# Patient Record
Sex: FEMALE | Race: WHITE | NOT HISPANIC OR LATINO | URBAN - METROPOLITAN AREA
[De-identification: names, ages, dates, MRNs, and addresses within clinical notes are randomized per-mention and may not be internally consistent; named-entity substitution may affect disease eponyms.]

---

## 2017-06-06 PROBLEM — Z00.00 ENCOUNTER FOR PREVENTIVE HEALTH EXAMINATION: Status: ACTIVE | Noted: 2017-06-06

## 2017-06-14 ENCOUNTER — EMERGENCY (EMERGENCY)
Facility: HOSPITAL | Age: 57
LOS: 0 days | Discharge: HOME | End: 2017-06-14
Admitting: INTERNAL MEDICINE

## 2017-06-14 DIAGNOSIS — F20.9 SCHIZOPHRENIA, UNSPECIFIED: ICD-10-CM

## 2017-06-14 DIAGNOSIS — F31.9 BIPOLAR DISORDER, UNSPECIFIED: ICD-10-CM

## 2017-06-14 DIAGNOSIS — S72.009A FRACTURE OF UNSPECIFIED PART OF NECK OF UNSPECIFIED FEMUR, INITIAL ENCOUNTER FOR CLOSED FRACTURE: ICD-10-CM

## 2017-06-14 DIAGNOSIS — K52.9 NONINFECTIVE GASTROENTERITIS AND COLITIS, UNSPECIFIED: ICD-10-CM

## 2017-06-14 DIAGNOSIS — M19.90 UNSPECIFIED OSTEOARTHRITIS, UNSPECIFIED SITE: ICD-10-CM

## 2017-06-14 DIAGNOSIS — F41.9 ANXIETY DISORDER, UNSPECIFIED: ICD-10-CM

## 2017-06-14 DIAGNOSIS — N10 ACUTE PYELONEPHRITIS: ICD-10-CM

## 2017-06-28 DIAGNOSIS — R19.7 DIARRHEA, UNSPECIFIED: ICD-10-CM

## 2017-06-28 DIAGNOSIS — Z79.899 OTHER LONG TERM (CURRENT) DRUG THERAPY: ICD-10-CM

## 2017-06-28 DIAGNOSIS — N39.0 URINARY TRACT INFECTION, SITE NOT SPECIFIED: ICD-10-CM

## 2017-06-28 DIAGNOSIS — J45.909 UNSPECIFIED ASTHMA, UNCOMPLICATED: ICD-10-CM

## 2017-06-28 DIAGNOSIS — Z96.652 PRESENCE OF LEFT ARTIFICIAL KNEE JOINT: ICD-10-CM

## 2017-06-28 DIAGNOSIS — Z96.642 PRESENCE OF LEFT ARTIFICIAL HIP JOINT: ICD-10-CM

## 2017-06-28 DIAGNOSIS — Z88.0 ALLERGY STATUS TO PENICILLIN: ICD-10-CM

## 2017-06-28 DIAGNOSIS — F11.23 OPIOID DEPENDENCE WITH WITHDRAWAL: ICD-10-CM

## 2017-06-28 DIAGNOSIS — Z88.1 ALLERGY STATUS TO OTHER ANTIBIOTIC AGENTS STATUS: ICD-10-CM

## 2017-06-28 DIAGNOSIS — R35.0 FREQUENCY OF MICTURITION: ICD-10-CM

## 2017-06-28 DIAGNOSIS — Z87.891 PERSONAL HISTORY OF NICOTINE DEPENDENCE: ICD-10-CM

## 2020-05-21 ENCOUNTER — APPOINTMENT (OUTPATIENT)
Dept: NEUROLOGY | Facility: CLINIC | Age: 60
End: 2020-05-21

## 2021-05-11 ENCOUNTER — INPATIENT (INPATIENT)
Facility: HOSPITAL | Age: 61
LOS: 4 days | Discharge: HOME HEALTH | End: 2021-05-16
Attending: INTERNAL MEDICINE | Admitting: INTERNAL MEDICINE
Payer: MEDICARE

## 2021-05-11 VITALS
RESPIRATION RATE: 18 BRPM | WEIGHT: 123.9 LBS | HEART RATE: 98 BPM | TEMPERATURE: 97 F | SYSTOLIC BLOOD PRESSURE: 117 MMHG | DIASTOLIC BLOOD PRESSURE: 71 MMHG | HEIGHT: 65 IN | OXYGEN SATURATION: 97 %

## 2021-05-11 LAB
ALBUMIN SERPL ELPH-MCNC: 4.4 G/DL — SIGNIFICANT CHANGE UP (ref 3.5–5.2)
ALP SERPL-CCNC: 250 U/L — HIGH (ref 30–115)
ALT FLD-CCNC: 358 U/L — HIGH (ref 0–41)
ANION GAP SERPL CALC-SCNC: 14 MMOL/L — SIGNIFICANT CHANGE UP (ref 7–14)
AST SERPL-CCNC: 282 U/L — HIGH (ref 0–41)
BASOPHILS # BLD AUTO: 0.04 K/UL — SIGNIFICANT CHANGE UP (ref 0–0.2)
BASOPHILS NFR BLD AUTO: 0.2 % — SIGNIFICANT CHANGE UP (ref 0–1)
BILIRUB DIRECT SERPL-MCNC: 0.2 MG/DL — SIGNIFICANT CHANGE UP (ref 0–0.2)
BILIRUB INDIRECT FLD-MCNC: 0.3 MG/DL — SIGNIFICANT CHANGE UP (ref 0.2–1.2)
BILIRUB SERPL-MCNC: 0.5 MG/DL — SIGNIFICANT CHANGE UP (ref 0.2–1.2)
BUN SERPL-MCNC: 18 MG/DL — SIGNIFICANT CHANGE UP (ref 10–20)
CALCIUM SERPL-MCNC: 9.9 MG/DL — SIGNIFICANT CHANGE UP (ref 8.5–10.1)
CHLORIDE SERPL-SCNC: 101 MMOL/L — SIGNIFICANT CHANGE UP (ref 98–110)
CO2 SERPL-SCNC: 25 MMOL/L — SIGNIFICANT CHANGE UP (ref 17–32)
CREAT SERPL-MCNC: 0.7 MG/DL — SIGNIFICANT CHANGE UP (ref 0.7–1.5)
EOSINOPHIL # BLD AUTO: 0.02 K/UL — SIGNIFICANT CHANGE UP (ref 0–0.7)
EOSINOPHIL NFR BLD AUTO: 0.1 % — SIGNIFICANT CHANGE UP (ref 0–8)
GLUCOSE SERPL-MCNC: 102 MG/DL — HIGH (ref 70–99)
HCT VFR BLD CALC: 40.5 % — SIGNIFICANT CHANGE UP (ref 37–47)
HGB BLD-MCNC: 13 G/DL — SIGNIFICANT CHANGE UP (ref 12–16)
IMM GRANULOCYTES NFR BLD AUTO: 0.4 % — HIGH (ref 0.1–0.3)
LIDOCAIN IGE QN: 37 U/L — SIGNIFICANT CHANGE UP (ref 7–60)
LYMPHOCYTES # BLD AUTO: 11.7 % — LOW (ref 20.5–51.1)
LYMPHOCYTES # BLD AUTO: 2.09 K/UL — SIGNIFICANT CHANGE UP (ref 1.2–3.4)
MCHC RBC-ENTMCNC: 28.9 PG — SIGNIFICANT CHANGE UP (ref 27–31)
MCHC RBC-ENTMCNC: 32.1 G/DL — SIGNIFICANT CHANGE UP (ref 32–37)
MCV RBC AUTO: 90 FL — SIGNIFICANT CHANGE UP (ref 81–99)
MONOCYTES # BLD AUTO: 0.54 K/UL — SIGNIFICANT CHANGE UP (ref 0.1–0.6)
MONOCYTES NFR BLD AUTO: 3 % — SIGNIFICANT CHANGE UP (ref 1.7–9.3)
NEUTROPHILS # BLD AUTO: 15.05 K/UL — HIGH (ref 1.4–6.5)
NEUTROPHILS NFR BLD AUTO: 84.6 % — HIGH (ref 42.2–75.2)
NRBC # BLD: 0 /100 WBCS — SIGNIFICANT CHANGE UP (ref 0–0)
PLATELET # BLD AUTO: 361 K/UL — SIGNIFICANT CHANGE UP (ref 130–400)
POTASSIUM SERPL-MCNC: 4.3 MMOL/L — SIGNIFICANT CHANGE UP (ref 3.5–5)
POTASSIUM SERPL-SCNC: 4.3 MMOL/L — SIGNIFICANT CHANGE UP (ref 3.5–5)
PROT SERPL-MCNC: 7 G/DL — SIGNIFICANT CHANGE UP (ref 6–8)
RBC # BLD: 4.5 M/UL — SIGNIFICANT CHANGE UP (ref 4.2–5.4)
RBC # FLD: 14 % — SIGNIFICANT CHANGE UP (ref 11.5–14.5)
SODIUM SERPL-SCNC: 140 MMOL/L — SIGNIFICANT CHANGE UP (ref 135–146)
WBC # BLD: 17.82 K/UL — HIGH (ref 4.8–10.8)
WBC # FLD AUTO: 17.82 K/UL — HIGH (ref 4.8–10.8)

## 2021-05-11 PROCEDURE — 74177 CT ABD & PELVIS W/CONTRAST: CPT | Mod: 26,MA

## 2021-05-11 PROCEDURE — 93010 ELECTROCARDIOGRAM REPORT: CPT

## 2021-05-11 PROCEDURE — 99285 EMERGENCY DEPT VISIT HI MDM: CPT | Mod: CS

## 2021-05-11 PROCEDURE — 76705 ECHO EXAM OF ABDOMEN: CPT | Mod: 26

## 2021-05-11 RX ORDER — ONDANSETRON 8 MG/1
4 TABLET, FILM COATED ORAL ONCE
Refills: 0 | Status: COMPLETED | OUTPATIENT
Start: 2021-05-11 | End: 2021-05-11

## 2021-05-11 RX ORDER — SODIUM CHLORIDE 9 MG/ML
1000 INJECTION, SOLUTION INTRAVENOUS ONCE
Refills: 0 | Status: COMPLETED | OUTPATIENT
Start: 2021-05-11 | End: 2021-05-11

## 2021-05-11 RX ADMIN — ONDANSETRON 4 MILLIGRAM(S): 8 TABLET, FILM COATED ORAL at 22:18

## 2021-05-11 RX ADMIN — SODIUM CHLORIDE 1000 MILLILITER(S): 9 INJECTION, SOLUTION INTRAVENOUS at 22:19

## 2021-05-11 RX ADMIN — ONDANSETRON 4 MILLIGRAM(S): 8 TABLET, FILM COATED ORAL at 21:08

## 2021-05-11 RX ADMIN — ONDANSETRON 4 MILLIGRAM(S): 8 TABLET, FILM COATED ORAL at 18:23

## 2021-05-11 RX ADMIN — SODIUM CHLORIDE 1000 MILLILITER(S): 9 INJECTION, SOLUTION INTRAVENOUS at 18:23

## 2021-05-11 NOTE — ED PROVIDER NOTE - ATTENDING CONTRIBUTION TO CARE
59 yo f hx frequent UTI, osteoporosis  pt was dx w/ uti 2 days ago. pt was started on macrobid. since then, pt has started feeling unwell and vomiting. pt states she is sensitive to abx generally. no f/c/urinary sx/bowel sx/abd pain/flank pain. no hx abd surgeries. no hx kidney stones.    vss  gen- NAD, aaox3, MM dry  card-rrr  lungs-ctab, no wheezing or rhonchi  abd-sntnd, no guarding or rebound, no cvat  neuro- full str/sensation, cn ii-xii grossly intact, normal coordination and gait  Skin- no rashes    well appearing  will get screening labs, r/o lyte abn, madeline, repeat ua  will provide supportive care, serial exam and ED observation period

## 2021-05-11 NOTE — ED PROVIDER NOTE - OBJECTIVE STATEMENT
60 year old female, past medical history frequent UTIs, Rheumatoid Arthritis,  who presents w/ vomiting x3 days. patient states she was started on macrobid for UTI x3 days ago, began having nb/nb vomiting. patient reports persistent episodes w/ inability to tolerate po prompting presentation to ed. denies f/c, chest pain, SOB, abd pain, urinary symptoms/bowel changes. Denies hx abdominal surgeries.

## 2021-05-11 NOTE — ED PROVIDER NOTE - PHYSICAL EXAMINATION
CONSTITUTIONAL: Well-developed; well-nourished; in no acute distress, nontoxic appearing  SKIN: skin exam is warm and dry  HEAD: Normocephalic; atraumatic.  EYES: PERRL, 3 mm bilateral, no nystagmus, EOM intact; conjunctiva and sclera clear.  ENT: Dry MM   NECK: ROM intact.  CARD: S1, S2 normal, no murmur  RESP: No wheezes, rales or rhonchi. Good air movement bilaterally  ABD: soft; non-distended; non-tender. No rebound/guarding. No CVAT   EXT: Normal ROM.   NEURO: awake, alert, following commands, oriented, grossly unremarkable. No Focal deficits. GCS 15.   PSYCH: Cooperative, appropriate.

## 2021-05-11 NOTE — ED PROVIDER NOTE - NS ED ROS FT
Review of Systems:  	•	CONSTITUTIONAL: no fever, no diaphoresis, no chills  	•	SKIN: no rash  	•	HEMATOLOGIC: no bleeding, no bruising  	•	ENT: no sore throat   	•	RESPIRATORY: no shortness of breath, no cough  	•	CARDIAC: no chest pain, no palpitations  	•	GI: +nausea, +vomiting, no diarrhea, no constipation   	•	GENITO-URINARY: no discharge, no dysuria; no hematuria, no increased urinary frequency  	•	MUSCULOSKELETAL: no joint paint, no swelling, no redness  	•	NEUROLOGIC: no weakness, no headache  	•	PSYCH: no anxiety, non suicidal, non homicidal, no hallucination, no depression

## 2021-05-11 NOTE — ED PROVIDER NOTE - PROGRESS NOTE DETAILS
pt endorsed to Dr. Edmond- pending imaging results, reassess, dispo Spoke with Dr. Everett, will evaluate pt.

## 2021-05-12 DIAGNOSIS — Z96.652 PRESENCE OF LEFT ARTIFICIAL KNEE JOINT: Chronic | ICD-10-CM

## 2021-05-12 LAB
ALBUMIN SERPL ELPH-MCNC: 3.9 G/DL — SIGNIFICANT CHANGE UP (ref 3.5–5.2)
ALP SERPL-CCNC: 186 U/L — HIGH (ref 30–115)
ALT FLD-CCNC: 195 U/L — HIGH (ref 0–41)
ANION GAP SERPL CALC-SCNC: 12 MMOL/L — SIGNIFICANT CHANGE UP (ref 7–14)
ANION GAP SERPL CALC-SCNC: 14 MMOL/L — SIGNIFICANT CHANGE UP (ref 7–14)
APPEARANCE UR: CLEAR — SIGNIFICANT CHANGE UP
AST SERPL-CCNC: 96 U/L — HIGH (ref 0–41)
BILIRUB SERPL-MCNC: 0.4 MG/DL — SIGNIFICANT CHANGE UP (ref 0.2–1.2)
BILIRUB UR-MCNC: NEGATIVE — SIGNIFICANT CHANGE UP
BUN SERPL-MCNC: 10 MG/DL — SIGNIFICANT CHANGE UP (ref 10–20)
BUN SERPL-MCNC: 12 MG/DL — SIGNIFICANT CHANGE UP (ref 10–20)
CALCIUM SERPL-MCNC: 9.6 MG/DL — SIGNIFICANT CHANGE UP (ref 8.5–10.1)
CALCIUM SERPL-MCNC: 9.8 MG/DL — SIGNIFICANT CHANGE UP (ref 8.5–10.1)
CHLORIDE SERPL-SCNC: 101 MMOL/L — SIGNIFICANT CHANGE UP (ref 98–110)
CHLORIDE SERPL-SCNC: 104 MMOL/L — SIGNIFICANT CHANGE UP (ref 98–110)
CO2 SERPL-SCNC: 24 MMOL/L — SIGNIFICANT CHANGE UP (ref 17–32)
CO2 SERPL-SCNC: 24 MMOL/L — SIGNIFICANT CHANGE UP (ref 17–32)
COLOR SPEC: YELLOW — SIGNIFICANT CHANGE UP
CREAT SERPL-MCNC: 0.6 MG/DL — LOW (ref 0.7–1.5)
CREAT SERPL-MCNC: 0.7 MG/DL — SIGNIFICANT CHANGE UP (ref 0.7–1.5)
DIFF PNL FLD: NEGATIVE — SIGNIFICANT CHANGE UP
GLUCOSE SERPL-MCNC: 87 MG/DL — SIGNIFICANT CHANGE UP (ref 70–99)
GLUCOSE SERPL-MCNC: 98 MG/DL — SIGNIFICANT CHANGE UP (ref 70–99)
GLUCOSE UR QL: NEGATIVE — SIGNIFICANT CHANGE UP
HCT VFR BLD CALC: 38.7 % — SIGNIFICANT CHANGE UP (ref 37–47)
HGB BLD-MCNC: 12.6 G/DL — SIGNIFICANT CHANGE UP (ref 12–16)
KETONES UR-MCNC: ABNORMAL
LEUKOCYTE ESTERASE UR-ACNC: NEGATIVE — SIGNIFICANT CHANGE UP
MAGNESIUM SERPL-MCNC: 1.7 MG/DL — LOW (ref 1.8–2.4)
MCHC RBC-ENTMCNC: 29.7 PG — SIGNIFICANT CHANGE UP (ref 27–31)
MCHC RBC-ENTMCNC: 32.6 G/DL — SIGNIFICANT CHANGE UP (ref 32–37)
MCV RBC AUTO: 91.3 FL — SIGNIFICANT CHANGE UP (ref 81–99)
NITRITE UR-MCNC: NEGATIVE — SIGNIFICANT CHANGE UP
NRBC # BLD: 0 /100 WBCS — SIGNIFICANT CHANGE UP (ref 0–0)
PH UR: 7 — SIGNIFICANT CHANGE UP (ref 5–8)
PHOSPHATE SERPL-MCNC: 3.5 MG/DL — SIGNIFICANT CHANGE UP (ref 2.1–4.9)
PLATELET # BLD AUTO: 336 K/UL — SIGNIFICANT CHANGE UP (ref 130–400)
POTASSIUM SERPL-MCNC: 3.9 MMOL/L — SIGNIFICANT CHANGE UP (ref 3.5–5)
POTASSIUM SERPL-MCNC: 3.9 MMOL/L — SIGNIFICANT CHANGE UP (ref 3.5–5)
POTASSIUM SERPL-SCNC: 3.9 MMOL/L — SIGNIFICANT CHANGE UP (ref 3.5–5)
POTASSIUM SERPL-SCNC: 3.9 MMOL/L — SIGNIFICANT CHANGE UP (ref 3.5–5)
PROT SERPL-MCNC: 6.5 G/DL — SIGNIFICANT CHANGE UP (ref 6–8)
PROT UR-MCNC: SIGNIFICANT CHANGE UP
RBC # BLD: 4.24 M/UL — SIGNIFICANT CHANGE UP (ref 4.2–5.4)
RBC # FLD: 13.9 % — SIGNIFICANT CHANGE UP (ref 11.5–14.5)
SARS-COV-2 RNA SPEC QL NAA+PROBE: SIGNIFICANT CHANGE UP
SODIUM SERPL-SCNC: 139 MMOL/L — SIGNIFICANT CHANGE UP (ref 135–146)
SODIUM SERPL-SCNC: 140 MMOL/L — SIGNIFICANT CHANGE UP (ref 135–146)
SP GR SPEC: 1.05 — HIGH (ref 1.01–1.03)
TROPONIN T SERPL-MCNC: <0.01 NG/ML — SIGNIFICANT CHANGE UP
UROBILINOGEN FLD QL: SIGNIFICANT CHANGE UP
WBC # BLD: 13.56 K/UL — HIGH (ref 4.8–10.8)
WBC # FLD AUTO: 13.56 K/UL — HIGH (ref 4.8–10.8)

## 2021-05-12 PROCEDURE — 99222 1ST HOSP IP/OBS MODERATE 55: CPT

## 2021-05-12 PROCEDURE — 70450 CT HEAD/BRAIN W/O DYE: CPT | Mod: 26,MA

## 2021-05-12 RX ORDER — CHLORHEXIDINE GLUCONATE 213 G/1000ML
1 SOLUTION TOPICAL
Refills: 0 | Status: DISCONTINUED | OUTPATIENT
Start: 2021-05-12 | End: 2021-05-16

## 2021-05-12 RX ORDER — ONDANSETRON 8 MG/1
4 TABLET, FILM COATED ORAL ONCE
Refills: 0 | Status: COMPLETED | OUTPATIENT
Start: 2021-05-12 | End: 2021-05-12

## 2021-05-12 RX ORDER — LANOLIN ALCOHOL/MO/W.PET/CERES
5 CREAM (GRAM) TOPICAL AT BEDTIME
Refills: 0 | Status: DISCONTINUED | OUTPATIENT
Start: 2021-05-12 | End: 2021-05-16

## 2021-05-12 RX ORDER — ONDANSETRON 8 MG/1
4 TABLET, FILM COATED ORAL ONCE
Refills: 0 | Status: DISCONTINUED | OUTPATIENT
Start: 2021-05-12 | End: 2021-05-16

## 2021-05-12 RX ORDER — KETOROLAC TROMETHAMINE 30 MG/ML
15 SYRINGE (ML) INJECTION ONCE
Refills: 0 | Status: DISCONTINUED | OUTPATIENT
Start: 2021-05-12 | End: 2021-05-12

## 2021-05-12 RX ORDER — CLONAZEPAM 1 MG
0.5 TABLET ORAL ONCE
Refills: 0 | Status: DISCONTINUED | OUTPATIENT
Start: 2021-05-12 | End: 2021-05-12

## 2021-05-12 RX ORDER — ACETAMINOPHEN 500 MG
650 TABLET ORAL ONCE
Refills: 0 | Status: COMPLETED | OUTPATIENT
Start: 2021-05-12 | End: 2021-05-12

## 2021-05-12 RX ORDER — ENOXAPARIN SODIUM 100 MG/ML
40 INJECTION SUBCUTANEOUS DAILY
Refills: 0 | Status: DISCONTINUED | OUTPATIENT
Start: 2021-05-12 | End: 2021-05-16

## 2021-05-12 RX ADMIN — Medication 0.5 MILLIGRAM(S): at 05:42

## 2021-05-12 RX ADMIN — Medication 650 MILLIGRAM(S): at 15:03

## 2021-05-12 RX ADMIN — ONDANSETRON 4 MILLIGRAM(S): 8 TABLET, FILM COATED ORAL at 13:52

## 2021-05-12 RX ADMIN — Medication 15 MILLIGRAM(S): at 21:14

## 2021-05-12 RX ADMIN — ONDANSETRON 4 MILLIGRAM(S): 8 TABLET, FILM COATED ORAL at 02:09

## 2021-05-12 RX ADMIN — Medication 650 MILLIGRAM(S): at 13:52

## 2021-05-12 NOTE — H&P ADULT - NSICDXFAMILYHX_GEN_ALL_CORE_FT
FAMILY HISTORY:  Father  Still living? Unknown  FH: prostate cancer, Age at diagnosis: Age Unknown    Mother  Still living? Unknown  FH: Parkinson's disease, Age at diagnosis: Age Unknown

## 2021-05-12 NOTE — H&P ADULT - NSHPLABSRESULTS_GEN_ALL_CORE
12.6   13.56 )-----------( 336      ( 12 May 2021 11:18 )             38.7           140  |  101  |  18  ----------------------------<  102<H>  4.3   |  25  |  0.7    Ca    9.9      11 May 2021 17:29    TPro  7.0  /  Alb  4.4  /  TBili  0.5  /  DBili  0.2  /  AST  282<H>  /  ALT  358<H>  /  AlkPhos  250<H>                Urinalysis Basic - ( 12 May 2021 01:00 )    Color: Yellow / Appearance: Clear / S.050 / pH: x  Gluc: x / Ketone: Small  / Bili: Negative / Urobili: <2 mg/dL   Blood: x / Protein: Trace / Nitrite: Negative   Leuk Esterase: Negative / RBC: x / WBC x   Sq Epi: x / Non Sq Epi: x / Bacteria: x            Lactate Trend      CARDIAC MARKERS ( 12 May 2021 03:09 )  x     / <0.01 ng/mL / x     / x     / x            CAPILLARY BLOOD GLUCOSE            < from: CT Head No Cont (21 @ 04:20) >    IMPRESSION:    Prominent/dilated ventricles relative to the degree of cortical sulcation/atrophy suggestive of hydrocephalus.  Right lateral ventricle is mildly enlarged relative to the left with increased periventricular white matter hypodensity relative to the left which may reflect transependymal flow.    No evidence for acute intracranial hemorrhage, midline shift or large territorial infarct.    MRI brain can be utilized for further evaluation.    < end of copied text >    < from: US Abdomen Upper Quadrant Right (21 @ 20:54) >        < end of copied text >    < from: CT Abdomen and Pelvis w/ IV Cont (21 @ 20:36) >    IMPRESSION:    No CT evidence of acute intra-abdominal infectious/inflammatory process.    < end of copied text >

## 2021-05-12 NOTE — H&P ADULT - ATTENDING COMMENTS
60 year old female w/ pmh of schizophrenia (mild), Bipolar Disorder (hypomanic episodes), Depression, anxiety, Chronic low back pain, IBS, Osteoarthritis, tremor (essential?), multiple UTI, urinary incontinence, present with 3 days of intractable vomiting upon macrobid initiation CT head significant for hydrocephalus w/ some transaminitis.     Pt seen and examined- much improved    Spoke with HO    chart reviewed- agree with above    diet as tolerated    off abx    neurology f/u to consider LP o characterize NPH    OOB    fall precautions    DC home when cleared by neuro if tolerating diet

## 2021-05-12 NOTE — H&P ADULT - NSICDXPASTMEDICALHX_GEN_ALL_CORE_FT
PAST MEDICAL HISTORY:  Anxiety     Bipolar disorder     Chronic lower back pain result of pelvic fracture in the past    Chronic urinary tract infection     Depression     History of tremor     Osteoarthritis     Schizophrenia     Urinary incontinence

## 2021-05-12 NOTE — CONSULT NOTE ADULT - ATTENDING COMMENTS
Patient seen and examined with PA and agree with above except as noted.  Patients history obtained for patient and she is able to provide details with her history and specific times for issues such as ambulation and urinary incontinence.  She has had gait difficulties for 3-4 years and urinary incontinence since 2015.  Gait unable to be assessed due too severe pain in the right knee with swelling.  Pain limited part of RLE exam as well.  Remaining exam appeared normal including cognitive assessment     Plan as above (can obtain MRI brain due too the severe white matter disease vs transependymal flow on CTH; but unlikely NPH and would be difficult to assess gait with her other co-morbidities and severe arthritis)

## 2021-05-12 NOTE — H&P ADULT - NSHPPHYSICALEXAM_GEN_ALL_CORE
LOS:     VITALS:   T(C): 38.2 (05-12-21 @ 07:29), Max: 38.2 (05-12-21 @ 07:29)  HR: 96 (05-12-21 @ 07:29) (80 - 98)  BP: 189/97 (05-12-21 @ 07:29) (117/71 - 189/97)  RR: 18 (05-12-21 @ 07:29) (18 - 18)  SpO2: 98% (05-12-21 @ 07:29) (96% - 98%)    GENERAL: NAD, lying in bed comfortably  HEAD:  Atraumatic, Normocephalic  EYES: EOMI, PERRLA, conjunctiva and sclera clear  ENT: Moist mucous membranes  NECK: Supple, No JVD  CHEST/LUNG: Clear to auscultation bilaterally; No rales, rhonchi, wheezing, or rubs. Unlabored respirations  HEART: Regular rate and rhythm; No murmurs, rubs, or gallops  ABDOMEN: BSx4; Soft, nontender, nondistended  EXTREMITIES:  2+ Peripheral Pulses, brisk capillary refill. No clubbing, cyanosis, or edema. Varus deformity in right knee, limited right knee movement. Left knee s/o TKA. Severe hammar toe defort right hallus.   NERVOUS SYSTEM:  A&Ox3, no focal deficits   SKIN: No rashes or lesions

## 2021-05-12 NOTE — H&P ADULT - ASSESSMENT
60 year old female w/ pmh of schizophrenia (mild), Bipolar Disorder (hypomanic episodes), Depression, anxiety, Chronic low back pain, IBS, Osteoarthritis, tremor (essential?), multiple UTI, urinary incontinence, present with 3 days of intractable vomiting upon macrobid initiation CT head significant for hydrocephalus w/ some transaminitis.     #Vomiting  #Idiopathic hydrocephalus   - Keep NPO  - MRI head ordered to better assess CT head finding  - Consult neurology  - Vomiting could be secondary to hydrocephalus development vs antibiotic induced  - IV fluids and conservative management    #Urinary incontinence  #recent UTI  - ua negative  - continue primafit  - Hold bactrim for now    #Transaminitis  - lab work in march noted transaminitis, increased on this admission  - GI consult for further evaluation  - f/u hepatitis panel, anti dsDNA, anti RADHA, anti diana wrk up    #Depression  #Schizophrenia  #Bipolar  #Anxiety  - consider continuing home meds    #Chronic low back pain  #Osteoarthritis  - uses walker at home  - takes Diclofenac 75mg daily  - has significant right varus knee  - PT/OT evaluation    #DVT ppx- lovenox  #GI ppx- protonix  #Diet- soft introduction of food, regular diet  #Activity- increase as tolerated, weight bearing as tolerated  #Dispo- from home

## 2021-05-12 NOTE — H&P ADULT - HISTORY OF PRESENT ILLNESS
60 year old female, past medical history frequent UTIs, Rheumatoid Arthritis,  who presents w/ vomiting x3 days. patient states she was started on macrobid for UTI x3 days ago, began having nb/nb vomiting. patient reports persistent episodes w/ inability to tolerate po prompting presentation to ed. denies f/c, chest pain, SOB, abd pain, urinary symptoms/bowel changes. Denies hx abdominal surgeries. 60 year old female w/ pmh of schizophrenia (mild), Bipolar Disorder (hypomanic episodes), Depression, anxiety, Chronic low back pain, IBS, Osteoarthritis, tremor (essential?), multiple UTI, urinary incontinence, present with 3 days of intractable vomiting. Patient state that she began treatment for recent UTI, was placed on Macrobid 100mg 1 tab BID x 7day, for 3 days since starting the medication she started to have vomiting non stop. She describes it as brown in nature then becomes bile then back to brownish color. She has not eaten for the past few days properly as a result. Patient also noted some epigastric pain associated with the vomiting episodes and blurriness of vision. She attributed this to intolerance with the Macrobid and was switched to Bactrim 800mg 1 tab BID x 7days. She notes that a few weeks ago while smoking a cigarette that she had a left frontotemporal region pain that stopped with the cessation of smoking, has not presented since. Of note, she is currently having frequent urination which has been chronic since receiving a lin catheter during left knee surgery in the past, since then has had to wear pull up briefs to control the urination. This patient uses a walker at home and notes that it can be difficult to transport during urgency. Otherwise, she denies headache, neck stiffness, diarrhea, weakness.    PCP- Dr. Flakito Hawley  Pharmacy- [facility] ([phone])    ED course: Vitals WNL initially, this morning hypertensive @ 189/97, low grade temp 100.7. Labs are significant for Transaminitis AST//358 . UA negative. COVID negative. US abdomen negative, CT abd/pelvis negative. CT head significant for Prominent/dilated ventricles relative to the degree of cortical sulcation/atrophy suggestive of hydrocephalus.  Right lateral ventricle is mildly enlarged relative to the left with increased periventricular white matter hypodensity relative to the left which may reflect transependymal flow.

## 2021-05-13 LAB
ALBUMIN SERPL ELPH-MCNC: 3.5 G/DL — SIGNIFICANT CHANGE UP (ref 3.5–5.2)
ALP SERPL-CCNC: 143 U/L — HIGH (ref 30–115)
ALT FLD-CCNC: 132 U/L — HIGH (ref 0–41)
ANION GAP SERPL CALC-SCNC: 7 MMOL/L — SIGNIFICANT CHANGE UP (ref 7–14)
APTT BLD: 29 SEC — SIGNIFICANT CHANGE UP (ref 27–39.2)
AST SERPL-CCNC: 48 U/L — HIGH (ref 0–41)
BASOPHILS # BLD AUTO: 0.08 K/UL — SIGNIFICANT CHANGE UP (ref 0–0.2)
BASOPHILS NFR BLD AUTO: 0.8 % — SIGNIFICANT CHANGE UP (ref 0–1)
BILIRUB SERPL-MCNC: 0.4 MG/DL — SIGNIFICANT CHANGE UP (ref 0.2–1.2)
BLD GP AB SCN SERPL QL: SIGNIFICANT CHANGE UP
BUN SERPL-MCNC: 18 MG/DL — SIGNIFICANT CHANGE UP (ref 10–20)
CALCIUM SERPL-MCNC: 9.4 MG/DL — SIGNIFICANT CHANGE UP (ref 8.5–10.1)
CHLORIDE SERPL-SCNC: 102 MMOL/L — SIGNIFICANT CHANGE UP (ref 98–110)
CHOLEST SERPL-MCNC: 199 MG/DL — SIGNIFICANT CHANGE UP
CO2 SERPL-SCNC: 29 MMOL/L — SIGNIFICANT CHANGE UP (ref 17–32)
CREAT SERPL-MCNC: 0.6 MG/DL — LOW (ref 0.7–1.5)
CULTURE RESULTS: NO GROWTH — SIGNIFICANT CHANGE UP
EOSINOPHIL # BLD AUTO: 0.29 K/UL — SIGNIFICANT CHANGE UP (ref 0–0.7)
EOSINOPHIL NFR BLD AUTO: 2.9 % — SIGNIFICANT CHANGE UP (ref 0–8)
FOLATE SERPL-MCNC: 10.3 NG/ML — SIGNIFICANT CHANGE UP
GLUCOSE SERPL-MCNC: 75 MG/DL — SIGNIFICANT CHANGE UP (ref 70–99)
HAV IGM SER-ACNC: SIGNIFICANT CHANGE UP
HBV CORE IGM SER-ACNC: SIGNIFICANT CHANGE UP
HBV SURFACE AG SER-ACNC: SIGNIFICANT CHANGE UP
HCT VFR BLD CALC: 35.4 % — LOW (ref 37–47)
HCV AB S/CO SERPL IA: 0.03 COI — SIGNIFICANT CHANGE UP
HCV AB S/CO SERPL IA: 0.17 S/CO — SIGNIFICANT CHANGE UP (ref 0–0.99)
HCV AB SERPL-IMP: SIGNIFICANT CHANGE UP
HCV AB SERPL-IMP: SIGNIFICANT CHANGE UP
HDLC SERPL-MCNC: 43 MG/DL — LOW
HGB BLD-MCNC: 11.5 G/DL — LOW (ref 12–16)
IMM GRANULOCYTES NFR BLD AUTO: 0.3 % — SIGNIFICANT CHANGE UP (ref 0.1–0.3)
INR BLD: 0.96 RATIO — SIGNIFICANT CHANGE UP (ref 0.65–1.3)
LIPID PNL WITH DIRECT LDL SERPL: 125 MG/DL — HIGH
LYMPHOCYTES # BLD AUTO: 3.39 K/UL — SIGNIFICANT CHANGE UP (ref 1.2–3.4)
LYMPHOCYTES # BLD AUTO: 33.8 % — SIGNIFICANT CHANGE UP (ref 20.5–51.1)
MAGNESIUM SERPL-MCNC: 1.8 MG/DL — SIGNIFICANT CHANGE UP (ref 1.8–2.4)
MCHC RBC-ENTMCNC: 29.9 PG — SIGNIFICANT CHANGE UP (ref 27–31)
MCHC RBC-ENTMCNC: 32.5 G/DL — SIGNIFICANT CHANGE UP (ref 32–37)
MCV RBC AUTO: 92.2 FL — SIGNIFICANT CHANGE UP (ref 81–99)
MONOCYTES # BLD AUTO: 0.68 K/UL — HIGH (ref 0.1–0.6)
MONOCYTES NFR BLD AUTO: 6.8 % — SIGNIFICANT CHANGE UP (ref 1.7–9.3)
NEUTROPHILS # BLD AUTO: 5.55 K/UL — SIGNIFICANT CHANGE UP (ref 1.4–6.5)
NEUTROPHILS NFR BLD AUTO: 55.4 % — SIGNIFICANT CHANGE UP (ref 42.2–75.2)
NON HDL CHOLESTEROL: 156 MG/DL — HIGH
NRBC # BLD: 0 /100 WBCS — SIGNIFICANT CHANGE UP (ref 0–0)
PHOSPHATE SERPL-MCNC: 3.8 MG/DL — SIGNIFICANT CHANGE UP (ref 2.1–4.9)
PLATELET # BLD AUTO: 336 K/UL — SIGNIFICANT CHANGE UP (ref 130–400)
POTASSIUM SERPL-MCNC: 4 MMOL/L — SIGNIFICANT CHANGE UP (ref 3.5–5)
POTASSIUM SERPL-SCNC: 4 MMOL/L — SIGNIFICANT CHANGE UP (ref 3.5–5)
PROT SERPL-MCNC: 5.9 G/DL — LOW (ref 6–8)
PROTHROM AB SERPL-ACNC: 11 SEC — SIGNIFICANT CHANGE UP (ref 9.95–12.87)
RBC # BLD: 3.84 M/UL — LOW (ref 4.2–5.4)
RBC # FLD: 14 % — SIGNIFICANT CHANGE UP (ref 11.5–14.5)
SODIUM SERPL-SCNC: 138 MMOL/L — SIGNIFICANT CHANGE UP (ref 135–146)
SPECIMEN SOURCE: SIGNIFICANT CHANGE UP
TRIGL SERPL-MCNC: 161 MG/DL — HIGH
VIT B12 SERPL-MCNC: 968 PG/ML — SIGNIFICANT CHANGE UP (ref 232–1245)
WBC # BLD: 10.02 K/UL — SIGNIFICANT CHANGE UP (ref 4.8–10.8)
WBC # FLD AUTO: 10.02 K/UL — SIGNIFICANT CHANGE UP (ref 4.8–10.8)

## 2021-05-13 PROCEDURE — 70553 MRI BRAIN STEM W/O & W/DYE: CPT | Mod: 26

## 2021-05-13 RX ORDER — CLONAZEPAM 1 MG
0.5 TABLET ORAL DAILY
Refills: 0 | Status: DISCONTINUED | OUTPATIENT
Start: 2021-05-13 | End: 2021-05-16

## 2021-05-13 RX ORDER — DICLOFENAC SODIUM 75 MG/1
25 TABLET, DELAYED RELEASE ORAL DAILY
Refills: 0 | Status: DISCONTINUED | OUTPATIENT
Start: 2021-05-13 | End: 2021-05-16

## 2021-05-13 RX ORDER — SERTRALINE 25 MG/1
100 TABLET, FILM COATED ORAL DAILY
Refills: 0 | Status: DISCONTINUED | OUTPATIENT
Start: 2021-05-13 | End: 2021-05-16

## 2021-05-13 RX ORDER — POLYETHYLENE GLYCOL 3350 17 G/17G
17 POWDER, FOR SOLUTION ORAL
Refills: 0 | Status: DISCONTINUED | OUTPATIENT
Start: 2021-05-13 | End: 2021-05-16

## 2021-05-13 RX ORDER — OLANZAPINE 15 MG/1
10 TABLET, FILM COATED ORAL DAILY
Refills: 0 | Status: DISCONTINUED | OUTPATIENT
Start: 2021-05-13 | End: 2021-05-16

## 2021-05-13 RX ORDER — LACTULOSE 10 G/15ML
20 SOLUTION ORAL ONCE
Refills: 0 | Status: COMPLETED | OUTPATIENT
Start: 2021-05-13 | End: 2021-05-13

## 2021-05-13 RX ORDER — PANTOPRAZOLE SODIUM 20 MG/1
40 TABLET, DELAYED RELEASE ORAL
Refills: 0 | Status: DISCONTINUED | OUTPATIENT
Start: 2021-05-13 | End: 2021-05-16

## 2021-05-13 RX ORDER — SENNA PLUS 8.6 MG/1
2 TABLET ORAL AT BEDTIME
Refills: 0 | Status: DISCONTINUED | OUTPATIENT
Start: 2021-05-13 | End: 2021-05-16

## 2021-05-13 RX ADMIN — DICLOFENAC SODIUM 25 MILLIGRAM(S): 75 TABLET, DELAYED RELEASE ORAL at 17:05

## 2021-05-13 RX ADMIN — Medication 1 MILLIGRAM(S): at 22:20

## 2021-05-13 RX ADMIN — SERTRALINE 100 MILLIGRAM(S): 25 TABLET, FILM COATED ORAL at 11:57

## 2021-05-13 RX ADMIN — Medication 5 MILLIGRAM(S): at 21:34

## 2021-05-13 RX ADMIN — SENNA PLUS 2 TABLET(S): 8.6 TABLET ORAL at 13:05

## 2021-05-13 RX ADMIN — CHLORHEXIDINE GLUCONATE 1 APPLICATION(S): 213 SOLUTION TOPICAL at 05:15

## 2021-05-13 RX ADMIN — ENOXAPARIN SODIUM 40 MILLIGRAM(S): 100 INJECTION SUBCUTANEOUS at 11:58

## 2021-05-13 RX ADMIN — OLANZAPINE 10 MILLIGRAM(S): 15 TABLET, FILM COATED ORAL at 11:58

## 2021-05-13 RX ADMIN — Medication 5 MILLIGRAM(S): at 00:13

## 2021-05-13 RX ADMIN — DICLOFENAC SODIUM 25 MILLIGRAM(S): 75 TABLET, DELAYED RELEASE ORAL at 16:35

## 2021-05-13 RX ADMIN — Medication 0.5 MILLIGRAM(S): at 12:00

## 2021-05-13 RX ADMIN — PANTOPRAZOLE SODIUM 40 MILLIGRAM(S): 20 TABLET, DELAYED RELEASE ORAL at 11:56

## 2021-05-14 ENCOUNTER — TRANSCRIPTION ENCOUNTER (OUTPATIENT)
Age: 61
End: 2021-05-14

## 2021-05-14 LAB
ALBUMIN SERPL ELPH-MCNC: 3.4 G/DL — LOW (ref 3.5–5.2)
ALP SERPL-CCNC: 130 U/L — HIGH (ref 30–115)
ALT FLD-CCNC: 84 U/L — HIGH (ref 0–41)
ANA TITR SER: NEGATIVE — SIGNIFICANT CHANGE UP
ANION GAP SERPL CALC-SCNC: 7 MMOL/L — SIGNIFICANT CHANGE UP (ref 7–14)
AST SERPL-CCNC: 26 U/L — SIGNIFICANT CHANGE UP (ref 0–41)
BASOPHILS # BLD AUTO: 0.08 K/UL — SIGNIFICANT CHANGE UP (ref 0–0.2)
BASOPHILS NFR BLD AUTO: 1 % — SIGNIFICANT CHANGE UP (ref 0–1)
BILIRUB SERPL-MCNC: 0.4 MG/DL — SIGNIFICANT CHANGE UP (ref 0.2–1.2)
BUN SERPL-MCNC: 12 MG/DL — SIGNIFICANT CHANGE UP (ref 10–20)
CALCIUM SERPL-MCNC: 9.2 MG/DL — SIGNIFICANT CHANGE UP (ref 8.5–10.1)
CHLORIDE SERPL-SCNC: 104 MMOL/L — SIGNIFICANT CHANGE UP (ref 98–110)
CO2 SERPL-SCNC: 29 MMOL/L — SIGNIFICANT CHANGE UP (ref 17–32)
COVID-19 SPIKE DOMAIN AB INTERP: NEGATIVE — SIGNIFICANT CHANGE UP
COVID-19 SPIKE DOMAIN ANTIBODY RESULT: 0.4 U/ML — SIGNIFICANT CHANGE UP
CREAT SERPL-MCNC: 0.6 MG/DL — LOW (ref 0.7–1.5)
EOSINOPHIL # BLD AUTO: 0.45 K/UL — SIGNIFICANT CHANGE UP (ref 0–0.7)
EOSINOPHIL NFR BLD AUTO: 5.5 % — SIGNIFICANT CHANGE UP (ref 0–8)
GLUCOSE SERPL-MCNC: 85 MG/DL — SIGNIFICANT CHANGE UP (ref 70–99)
HCT VFR BLD CALC: 37 % — SIGNIFICANT CHANGE UP (ref 37–47)
HGB BLD-MCNC: 11.7 G/DL — LOW (ref 12–16)
IMM GRANULOCYTES NFR BLD AUTO: 0.4 % — HIGH (ref 0.1–0.3)
LYMPHOCYTES # BLD AUTO: 3.76 K/UL — HIGH (ref 1.2–3.4)
LYMPHOCYTES # BLD AUTO: 45.7 % — SIGNIFICANT CHANGE UP (ref 20.5–51.1)
MCHC RBC-ENTMCNC: 29 PG — SIGNIFICANT CHANGE UP (ref 27–31)
MCHC RBC-ENTMCNC: 31.6 G/DL — LOW (ref 32–37)
MCV RBC AUTO: 91.6 FL — SIGNIFICANT CHANGE UP (ref 81–99)
MONOCYTES # BLD AUTO: 0.55 K/UL — SIGNIFICANT CHANGE UP (ref 0.1–0.6)
MONOCYTES NFR BLD AUTO: 6.7 % — SIGNIFICANT CHANGE UP (ref 1.7–9.3)
NEUTROPHILS # BLD AUTO: 3.36 K/UL — SIGNIFICANT CHANGE UP (ref 1.4–6.5)
NEUTROPHILS NFR BLD AUTO: 40.7 % — LOW (ref 42.2–75.2)
NRBC # BLD: 0 /100 WBCS — SIGNIFICANT CHANGE UP (ref 0–0)
PLATELET # BLD AUTO: 323 K/UL — SIGNIFICANT CHANGE UP (ref 130–400)
POTASSIUM SERPL-MCNC: 4.1 MMOL/L — SIGNIFICANT CHANGE UP (ref 3.5–5)
POTASSIUM SERPL-SCNC: 4.1 MMOL/L — SIGNIFICANT CHANGE UP (ref 3.5–5)
PROT SERPL-MCNC: 5.9 G/DL — LOW (ref 6–8)
RBC # BLD: 4.04 M/UL — LOW (ref 4.2–5.4)
RBC # FLD: 13.7 % — SIGNIFICANT CHANGE UP (ref 11.5–14.5)
SARS-COV-2 IGG+IGM SERPL QL IA: 0.4 U/ML — SIGNIFICANT CHANGE UP
SARS-COV-2 IGG+IGM SERPL QL IA: NEGATIVE — SIGNIFICANT CHANGE UP
SODIUM SERPL-SCNC: 140 MMOL/L — SIGNIFICANT CHANGE UP (ref 135–146)
WBC # BLD: 8.23 K/UL — SIGNIFICANT CHANGE UP (ref 4.8–10.8)
WBC # FLD AUTO: 8.23 K/UL — SIGNIFICANT CHANGE UP (ref 4.8–10.8)

## 2021-05-14 RX ADMIN — CHLORHEXIDINE GLUCONATE 1 APPLICATION(S): 213 SOLUTION TOPICAL at 05:37

## 2021-05-14 RX ADMIN — Medication 5 MILLIGRAM(S): at 21:03

## 2021-05-14 RX ADMIN — OLANZAPINE 10 MILLIGRAM(S): 15 TABLET, FILM COATED ORAL at 11:32

## 2021-05-14 RX ADMIN — SERTRALINE 100 MILLIGRAM(S): 25 TABLET, FILM COATED ORAL at 11:31

## 2021-05-14 RX ADMIN — ENOXAPARIN SODIUM 40 MILLIGRAM(S): 100 INJECTION SUBCUTANEOUS at 11:33

## 2021-05-14 RX ADMIN — SENNA PLUS 2 TABLET(S): 8.6 TABLET ORAL at 21:03

## 2021-05-14 RX ADMIN — DICLOFENAC SODIUM 25 MILLIGRAM(S): 75 TABLET, DELAYED RELEASE ORAL at 11:57

## 2021-05-14 RX ADMIN — DICLOFENAC SODIUM 25 MILLIGRAM(S): 75 TABLET, DELAYED RELEASE ORAL at 12:27

## 2021-05-14 RX ADMIN — Medication 0.5 MILLIGRAM(S): at 11:31

## 2021-05-14 RX ADMIN — POLYETHYLENE GLYCOL 3350 17 GRAM(S): 17 POWDER, FOR SOLUTION ORAL at 05:38

## 2021-05-14 RX ADMIN — PANTOPRAZOLE SODIUM 40 MILLIGRAM(S): 20 TABLET, DELAYED RELEASE ORAL at 06:41

## 2021-05-14 NOTE — OCCUPATIONAL THERAPY INITIAL EVALUATION ADULT - ADL RETRAINING, OT EVAL
Patient will perform upper body dressing independently by discharge. ; Patient will perform lower body dressing with minimal assistance with use of appropriate adaptive equipment as needed by discharge.

## 2021-05-14 NOTE — CHART NOTE - NSCHARTNOTEFT_GEN_A_CORE
MRI brain reviewed with attending, no acute findings to support NPH.   No further neurological w/u    May follow up outpatient neurology in one month        Plan discussed with neuroattending Dr. Terry Pace

## 2021-05-14 NOTE — DISCHARGE NOTE NURSING/CASE MANAGEMENT/SOCIAL WORK - PATIENT PORTAL LINK FT
You can access the FollowMyHealth Patient Portal offered by [facility] by registering at the following website: [email or web address]. By joining [email or web address] FollowMyHealth portal, you will also be able to view your health information using other applications (apps) compatible with our system.

## 2021-05-14 NOTE — PHYSICAL THERAPY INITIAL EVALUATION ADULT - GAIT DEVIATIONS NOTED, PT EVAL
decreased shahzad/increased time in double stance/decreased velocity of limb motion/decreased step length/decreased stride length/decreased weight-shifting ability

## 2021-05-14 NOTE — DISCHARGE NOTE PROVIDER - HOSPITAL COURSE
60 year old female w/ pmh of schizophrenia (mild), Bipolar Disorder (hypomanic episodes), Depression, anxiety, Chronic low back pain, IBS, Osteoarthritis, tremor (essential?), multiple UTI, urinary incontinence, present with 3 days of intractable vomiting upon macrobid initiation CT head significant for hydrocephalus w/ some transaminitis. 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 No evidence of acute process. patient nausea and vomiting resolved. No further work up recommended by neurologist. Outpatient follow up within one month.    #Vomiting  #Idiopathic hydrocephalus   - Keep NPO  - MRI head ordered to better assess CT head finding  - Consult neurology  - Vomiting could be secondary to hydrocephalus development vs antibiotic induced  - IV fluids and conservative management    #Urinary incontinence  #recent UTI  - ua negative  - continue primafit  - Hold bactrim for now    #Transaminitis  - lab work in march noted transaminitis, increased on this admission  - GI consult for further evaluation  - f/u hepatitis panel, anti dsDNA, anti RADHA, anti diana wrk up    #Depression  #Schizophrenia  #Bipolar  #Anxiety  - consider continuing home meds    #Chronic low back pain  #Osteoarthritis  - uses walker at home  - takes Diclofenac 75mg daily  - has significant right varus knee  - PT/OT evaluation    #DVT ppx- lovenox  #GI ppx- protonix  #Diet- soft introduction of food, regular diet  #Activity- increase as tolerated, weight bearing as tolerated  #Dispo- from home

## 2021-05-14 NOTE — DISCHARGE NOTE PROVIDER - NSDCMRMEDTOKEN_GEN_ALL_CORE_FT
clonazePAM 1 mg oral tablet: 1 tab(s) orally once a day  diclofenac sodium 75 mg oral delayed release tablet: 1 tab(s) orally once a day  OLANZapine 10 mg oral tablet: 1 tab(s) orally once a day  sertraline 100 mg oral tablet: 1 tab(s) orally once a day  tiZANidine 4 mg oral tablet: 1 tab(s) orally once a day  Vitamin D3 5000 intl units (125 mcg) oral tablet: 1 tab(s) orally once a week

## 2021-05-14 NOTE — DISCHARGE NOTE PROVIDER - CARE PROVIDER_API CALL
Dayton Holman)  EEGEpilepsy; Neurology  [address] [place] [address], [address]  [place] [address]  Phone: [phone]  Fax: [phone]  Follow Up Time: 1 month

## 2021-05-14 NOTE — CONSULT NOTE ADULT - SUBJECTIVE AND OBJECTIVE BOX
Neurology Consult    Patient is a 60y old  Female who presents with a chief complaint of Intractable vomiting (12 May 2021 10:20)      HPI:  60 year old female w/ pmh of schizophrenia (mild), Bipolar Disorder (hypomanic episodes), Depression, anxiety, Chronic low back pain, IBS, Osteoarthritis, tremor (essential?), multiple UTI, urinary incontinence, present with 3 days of intractable vomiting. Patient state that she began treatment for recent UTI, was placed on Macrobid 100mg 1 tab BID x 7day, for 3 days since starting the medication she started to have vomiting non stop. She describes it as brown in nature then becomes bile then back to brownish color. She has not eaten for the past few days properly as a result. Patient also noted some epigastric pain associated with the vomiting episodes and blurriness of vision. She attributed this to intolerance with the Macrobid and was switched to Bactrim 800mg 1 tab BID x 7days. She notes that a few weeks ago while smoking a cigarette that she had a left frontotemporal region pain that stopped with the cessation of smoking, has not presented since. Of note, she is currently having frequent urination which has been chronic since receiving a lin catheter during left knee surgery in the past, since then has had to wear pull up briefs to control the urination. This patient uses a walker at home and notes that it can be difficult to transport during urgency. Otherwise, she denies headache, neck stiffness, diarrhea, weakness.    PCP- Dr. Flakito Hawley  Pharmacy- [facility] ([phone])    ED course: Vitals WNL initially, this morning hypertensive @ 189/97, low grade temp 100.7. Labs are significant for Transaminitis AST//358 . UA negative. COVID negative. US abdomen negative, CT abd/pelvis negative. CT head significant for Prominent/dilated ventricles relative to the degree of cortical sulcation/atrophy suggestive of hydrocephalus.  Right lateral ventricle is mildly enlarged relative to the left with increased periventricular white matter hypodensity relative to the left which may reflect transependymal flow.   (12 May 2021 10:20)      PAST MEDICAL & SURGICAL HISTORY:  Schizophrenia    Bipolar disorder    Depression    Anxiety    Chronic lower back pain  result of pelvic fracture in the past    Osteoarthritis    Urinary incontinence    Chronic urinary tract infection    History of tremor    History of total knee arthroplasty, left        FAMILY HISTORY:  FH: prostate cancer (Father)    FH: Parkinson&#x27;s disease (Mother)        Social History: (-) x 3    Allergies    No Known Allergies    Intolerances        MEDICATIONS  (STANDING):  chlorhexidine 4% Liquid 1 Application(s) Topical <User Schedule>  enoxaparin Injectable 40 milliGRAM(s) SubCutaneous daily  ondansetron Injectable 4 milliGRAM(s) IV Push Once    MEDICATIONS  (PRN):      Review of systems:    Constitutional: No fever, weight loss or fatigue    Eyes: No eye pain or discharge  ENMT:  No difficulty hearing; No sinus or throat pain  Neck: No pain or stiffness  Respiratory: No cough, wheezing, chills or hemoptysis  Cardiovascular: No chest pain, palpitations, shortness of breath, dyspnea on exertion  Gastrointestinal: No abdominal pain, nausea, vomiting or hematemesis; No diarrhea or constipation.   Genitourinary: No dysuria, frequency, hematuria or incontinence  Neurological: As per HPI  Skin: No rashes or lesions   Endocrine: No heat or cold intolerance; No hair loss  Musculoskeletal: No joint pain or swelling  Psychiatric: No depression, anxiety, mood swings  Heme/Lymph: No easy bruising or bleeding gums    Vital Signs Last 24 Hrs  T(C): 38.2 (12 May 2021 07:29), Max: 38.2 (12 May 2021 07:29)  T(F): 100.7 (12 May 2021 07:29), Max: 100.7 (12 May 2021 07:29)  HR: 96 (12 May 2021 07:29) (80 - 98)  BP: 189/97 (12 May 2021 07:29) (117/71 - 189/97)  BP(mean): --  RR: 18 (12 May 2021 07:29) (18 - 18)  SpO2: 98% (12 May 2021 07:29) (96% - 98%)    Neurologic Examination:  General:  Appearance is consistent with chronologic age.  No abnormal facies.   General: The patient is oriented to person, place, time and date.  Recent and remote memory intact.  Fund of knowledge is intact and normal.  Language with normal repetition, comprehension and naming.  Nondysarthric.    Cranial nerves: intact VA, VFF.  EOMI w/o nystagmus, skew or reported double vision.  PERRL.  No ptosis/weakness of eyelid closure.  Facial sensation is normal with normal bite.  No facial asymmetry.  Hearing grossly intact b/l.  Palate elevates midline.  Tongue midline.  Motor examination:   Normal tone, bulk and range of motion.  No tenderness, twitching, tremors or involuntary movements.  Formal Muscle Strength Testing: (MRC grade R/L) 5/5 UE; 5/5 LE.  RLE motion is significantly impacted with R knee/hip OA.   Reflexes:   2+ b/l pectoralis, biceps, triceps, brachioradialis, patella and Achilles.  Plantar response downgoing b/l.  Jaw jerk, [place], clonus absent.  Sensory examination:   Intact to light touch and pinprick, pain, temperature and proprioception and vibration in all extremities.  Cerebellum:   FTN/HKS intact with normal ANA MARÍA in all limbs.  No dysmetria or dysdiadokinesia.  Unable to assess gait due patient's pain    Labs:   CBC Full  -  ( 12 May 2021 11:18 )  WBC Count : 13.56 K/uL  RBC Count : 4.24 M/uL  Hemoglobin : 12.6 g/dL  Hematocrit : 38.7 %  Platelet Count - Automated : 336 K/uL  Mean Cell Volume : 91.3 fL  Mean Cell Hemoglobin : 29.7 pg  Mean Cell Hemoglobin Concentration : 32.6 g/dL  Auto Neutrophil # : x  Auto Lymphocyte # : x  Auto Monocyte # : x  Auto Eosinophil # : x  Auto Basophil # : x  Auto Neutrophil % : x  Auto Lymphocyte % : x  Auto Monocyte % : x  Auto Eosinophil % : x  Auto Basophil % : x    05-12    140  |  104  |  10  ----------------------------<  98  3.9   |  24  |  0.6<L>    Ca    9.8      12 May 2021 11:18  Phos  3.5     05-12  Mg     1.7     05-12    TPro  7.0  /  Alb  4.4  /  TBili  0.5  /  DBili  0.2  /  AST  282<H>  /  ALT  358<H>  /  AlkPhos  250<H>  -11    LIVER FUNCTIONS - ( 11 May 2021 17:29 )  Alb: 4.4 g/dL / Pro: 7.0 g/dL / ALK PHOS: 250 U/L / ALT: 358 U/L / AST: 282 U/L / GGT: x             Urinalysis Basic - ( 12 May 2021 01:00 )    Color: Yellow / Appearance: Clear / S.050 / pH: x  Gluc: x / Ketone: Small  / Bili: Negative / Urobili: <2 mg/dL   Blood: x / Protein: Trace / Nitrite: Negative   Leuk Esterase: Negative / RBC: x / WBC x   Sq Epi: x / Non Sq Epi: x / Bacteria: x          Neuroimaging:  NCHCT: CT Head No Cont:   EXAM:  CT BRAIN            PROCEDURE DATE:  2021            INTERPRETATION:  CLINICAL HISTORY / REASON FOR EXAM: Intractable vomiting    TECHNIQUE: Noncontrast head CT.  Contiguous unenhanced CT axial images of the head from the base to the vertex with coronal and sagittal reformats.    COMPARISON:  None available    FINDINGS:    The ventricles appear prominent/dilated relative to degree of cortical sulcation/atrophy which may reflect underlying hydrocephalus. The right lateral ventricle is mildly enlarged relative to the left with increased periventricular white matter hypodensity relative to the left which may reflect transependymal flow.    No evidence for large territorial infarct, acute intracranial hemorrhage or midline shift isidentified. Pineal gland calcifications.    There is calcific atherosclerotic disease at the skull base.    The calvarium is intact. Left cataract surgery.    The visualized paranasal sinuses and mastoids are well aerated.    IMPRESSION:    Prominent/dilated ventricles relative to the degree of cortical sulcation/atrophy suggestive of hydrocephalus.  Right lateral ventricle is mildly enlarged relative to the left with increased periventricular white matter hypodensity relative to the left which may reflect transependymal flow.    No evidence for acute intracranial hemorrhage, midline shift or large territorial infarct.    MRI brain can be utilized for further evaluation.            JEAN PAUL ALVAREZ M.D., RESIDENT RADIOLOGIST  This document has beenelectronically signed.  HERLINDA MIXON MD; Attending Radiologist  This document has been electronically signed. May 12 2021  5:03AM (21 @ 04:20)    CT Angiography/Perfusion:  MRI Brain NC:  MRA Head/Neck:  EEG:    Assessment:  This is a 60y Female with h/o     Plan:   -   21 @ 14:23      
HPI:  60 year old female w/ pmh of schizophrenia (mild), Bipolar Disorder (hypomanic episodes), Depression, anxiety, Chronic low back pain, IBS, Osteoarthritis, tremor (essential?), multiple UTI, urinary incontinence, present with 3 days of intractable vomiting. Patient state that she began treatment for recent UTI, was placed on Macrobid 100mg 1 tab BID x 7day, for 3 days since starting the medication she started to have vomiting non stop. She describes it as brown in nature then becomes bile then back to brownish color. She has not eaten for the past few days properly as a result. Patient also noted some epigastric pain associated with the vomiting episodes and blurriness of vision. She attributed this to intolerance with the Macrobid and was switched to Bactrim 800mg 1 tab BID x 7days. She notes that a few weeks ago while smoking a cigarette that she had a left frontotemporal region pain that stopped with the cessation of smoking, has not presented since. Of note, she is currently having frequent urination which has been chronic since receiving a lin catheter during left knee surgery in the past, since then has had to wear pull up briefs to control the urination. This patient uses a walker at home and notes that it can be difficult to transport during urgency. Otherwise, she denies headache, neck stiffness, diarrhea, weakness.    PCP- Dr. Flakito Hawley  Pharmacy- [facility] ([phone])    ED course: Vitals WNL initially, this morning hypertensive @ 189/97, low grade temp 100.7. Labs are significant for Transaminitis AST//358 . UA negative. COVID negative. US abdomen negative, CT abd/pelvis negative. CT head significant for Prominent/dilated ventricles relative to the degree of cortical sulcation/atrophy suggestive of hydrocephalus.  Right lateral ventricle is mildly enlarged relative to the left with increased periventricular white matter hypodensity relative to the left which may reflect transependymal flow.         PAST MEDICAL & SURGICAL HISTORY:  Schizophrenia    Bipolar disorder    Depression    Anxiety    Chronic lower back pain  result of pelvic fracture in the past    Osteoarthritis    Urinary incontinence    Chronic urinary tract infection    History of tremor    History of total knee arthroplasty, left        Hospital Course:    TODAY'S SUBJECTIVE & REVIEW OF SYMPTOMS:     Constitutional WNL   Cardio WNL   Resp WNL   GI WNL  Heme WNL  Endo WNL  Skin WNL  MSK Weakness  Neuro WNL  Cognitive WNL  Psych WNL      MEDICATIONS  (STANDING):  chlorhexidine 4% Liquid 1 Application(s) Topical <User Schedule>  clonazePAM  Tablet 0.5 milliGRAM(s) Oral daily  enoxaparin Injectable 40 milliGRAM(s) SubCutaneous daily  melatonin 5 milliGRAM(s) Oral at bedtime  OLANZapine 10 milliGRAM(s) Oral daily  ondansetron Injectable 4 milliGRAM(s) IV Push Once  pantoprazole    Tablet 40 milliGRAM(s) Oral before breakfast  polyethylene glycol 3350 17 Gram(s) Oral two times a day  senna 2 Tablet(s) Oral at bedtime  sertraline 100 milliGRAM(s) Oral daily    MEDICATIONS  (PRN):  diclofenac 25 milliGRAM(s) Oral daily PRN Severe Pain (7 - 10)      FAMILY HISTORY:  FH: prostate cancer (Father)    FH: Parkinson&#x27;s disease (Mother)        Allergies    No Known Allergies    Intolerances        SOCIAL HISTORY:    [  ] Etoh  [  ] Smoking  [  ] Substance abuse     Home Environment:  [  x ] Home Alone  [   ] Lives with Family  [   ] Home Health Aid    Dwelling:  [   ] Apartment  [ x  ] Private House  [   ] Adult Home  [   ] Skilled Nursing Facility      [   ] Short Term  [   ] Long Term  [ x  ] Stairs       Elevator [   ]    FUNCTIONAL STATUS PTA: (Check all that apply)  Ambulation: [ x   ]Independent    [   ] Dependent     [   ] Non-Ambulatory  Assistive Device: [   ] SA Cane  [   ]  Q Cane  [ x  ] Walker  [   ]  Wheelchair  ADL : [ x  ] Independent  [    ]  Dependent       Vital Signs Last 24 Hrs  T(C): 37.6 (14 May 2021 13:27), Max: 37.6 (14 May 2021 13:27)  T(F): 99.7 (14 May 2021 13:27), Max: 99.7 (14 May 2021 13:27)  HR: 87 (14 May 2021 13:27) (74 - 87)  BP: 133/76 (14 May 2021 13:27) (129/66 - 133/76)  BP(mean): --  RR: 18 (14 May 2021 13:27) (16 - 18)  SpO2: 96% (14 May 2021 13:27) (96% - 96%)      PHYSICAL EXAM: Awake & Alert  GENERAL: NAD  HEAD:  Normocephalic  CHEST/LUNG: Clear   HEART: S1S2+  ABDOMEN: Soft, Nontender  EXTREMITIES:  no calf tenderness    NERVOUS SYSTEM:  Cranial Nerves 2-12 intact [   ] Abnormal  [   ]  ROM: WFL all extremities [x   ]  Abnormal [   ]  Motor Strength: WFL all extremities  [ x  ]  Abnormal [   ]  Sensation: intact to light touch [x   ] Abnormal [   ]    FUNCTIONAL STATUS:  Bed Mobility: Independent [   ]  Supervision [   ]  Needs Assistance [ x  ]  N/A [   ]  Transfers: Independent [   ]  Supervision [   ]  Needs Assistance [x   ]  N/A [   ]   Ambulation: Independent [   ]  Supervision [   ]  Needs Assistance [ x  ]  N/A [   ]  ADL: Independent [   ] Requires Assistance [   ] N/A [   ]      LABS:                        11.7   8.23  )-----------( 323      ( 14 May 2021 07:05 )             37.0     05-14    140  |  104  |  12  ----------------------------<  85  4.1   |  29  |  0.6<L>    Ca    9.2      14 May 2021 07:05  Phos  3.8     05-13  Mg     1.8     05-13    TPro  5.9<L>  /  Alb  3.4<L>  /  TBili  0.4  /  DBili  x   /  AST  26  /  ALT  84<H>  /  AlkPhos  130<H>  05-14    PT/INR - ( 13 May 2021 08:10 )   PT: 11.00 sec;   INR: 0.96 ratio         PTT - ( 13 May 2021 08:10 )  PTT:29.0 sec      RADIOLOGY & ADDITIONAL STUDIES:

## 2021-05-14 NOTE — OCCUPATIONAL THERAPY INITIAL EVALUATION ADULT - GENERAL OBSERVATIONS, REHAB EVAL
Pt received semi graves in bed in NAD, agreeable to OT evaluation, +primafit, +IV lock, left seated in b/s chair in NAD, +chair alarm, call bell in reach, RN aware

## 2021-05-14 NOTE — DISCHARGE NOTE PROVIDER - NSDCCPCAREPLAN_GEN_ALL_CORE_FT
PRINCIPAL DISCHARGE DIAGNOSIS  Diagnosis: Intractable vomiting with nausea  Assessment and Plan of Treatment:       SECONDARY DISCHARGE DIAGNOSES  Diagnosis: Hydrocephalus  Assessment and Plan of Treatment:

## 2021-05-14 NOTE — DISCHARGE NOTE PROVIDER - NSDCFUADDINST_GEN_ALL_CORE_FT
Please follow up with your primary care doctor within 2 week  Please follow up with neurologist within one month

## 2021-05-14 NOTE — DISCHARGE NOTE NURSING/CASE MANAGEMENT/SOCIAL WORK - NSDCVIVACCINE_GEN_ALL_CORE_FT
-- DO NOT REPLY / DO NOT REPLY ALL --  -- Message is from the [facility]--    COVID-19 Universal Screening: N/A - Not about scheduling    General Patient Message      Reason for Call: Patient is calling to let us know that the Vascular doctor office would like to get some more information about the patient medical history please, their phone number is [phone]    Caller Information       Type Contact Phone    03/05/2021 11:24 AM CST Phone (Incoming) Arceo Yomaira (Self) [phone] (H)          Alternative phone number: none    Turnaround time given to caller:   \"This message will be sent to [state Provider's name]. The clinical team will fulfill your request as soon as they review your message.\"     No Vaccines Administered.

## 2021-05-14 NOTE — CONSULT NOTE ADULT - ASSESSMENT
60 year old female w/ pmh of schizophrenia (mild), Bipolar Disorder (hypomanic episodes), Depression, anxiety, Chronic low back pain, IBS, Debilitating OA , unable to walk, multiple UTI over the past 6 months, recently was started on Bactrim, presents  with 3 days of intractable vomiting.  CTH was done in ED and shows hydrocephalus. Patient was never scanned in the past.  Due to chronicity of her symptoms it is highly unlikely she has NPH. Patient has no signs of dementia on her exam.      
IMPRESSION: Rehab of gait dysfunction / hydrocephalus    PRECAUTIONS: [   ] Cardiac  [   ] Respiratory  [   ] Seizures [   ] Contact Isolation  [   ] Droplet Isolation  [   ] Other    Weight Bearing Status:     RECOMMENDATION:    Out of Bed to Chair     DVT/Decubiti Prophylaxis    REHAB PLAN:     [  x  ] Bedside P/T 3-5 times a week   [    ]   Bedside O/T  2-3 times a week             [    ] Speech Therapy               [    ]  No Rehab Therapy Indicated   Conditioning/ROM                                    ADL  Bed Mobility                                               Conditioning/ROM  Transfers                                                     Bed Mobility  Sitting /Standing Balance                         Transfers                                        Gait Training                                               Sitting/Standing Balance  Stair Training [   ]Applicable                    Home equipment Eval                                                                        Splinting  [   ] Only      GOALS:   ADL   [    ]   Independent                    Transfers  [ x   ] Independent                          Ambulation  [  x  ] Independent     [  x   ] With device                            [    ]  CG                                                         [    ]  CG                                                                  [    ] CG                            [    ] Min A                                                   [    ] Min A                                                              [    ] Min  A          DISCHARGE PLAN:   [    ]  Good candidate for Intensive Rehabilitation/Hospital based                                             Will tolerate 3hrs Intensive Rehab Daily                                       [  x   ]  Short Term Rehab in Skilled Nursing Facility                            vs           [ x    ]  Home with Outpatient or VN services                                         [     ]  Possible Candidate for Intensive Hospital based Rehab

## 2021-05-14 NOTE — PHYSICAL THERAPY INITIAL EVALUATION ADULT - GENERAL OBSERVATIONS, REHAB EVAL
Chart reviewed, pt encountered supine, agreeable, reports R knee pain 7/10, increased to 8/20 post gait. MARIVEL Nguyen made aware. IE completed 09:40-10:10

## 2021-05-14 NOTE — PHYSICAL THERAPY INITIAL EVALUATION ADULT - FOLLOWS COMMANDS/ANSWERS QUESTIONS, REHAB EVAL
internally distracted easily, requiring frequent redirections to tasks/100% of the time/able to follow multistep instructions

## 2021-05-14 NOTE — PHYSICAL THERAPY INITIAL EVALUATION ADULT - ADDITIONAL COMMENTS
As per pt, she lives alone in t house, 1st floor, no steps to enter, Independent with RW, however pt also states incidents of multiple near falls, sliding and slipping off multiple surfaces, MD and CM made aware of this.

## 2021-05-14 NOTE — PHYSICAL THERAPY INITIAL EVALUATION ADULT - PERTINENT HX OF CURRENT PROBLEM, REHAB EVAL
60 year old female w/ pmh of schizophrenia (mild), Bipolar Disorder (hypomanic episodes), Depression, anxiety, Chronic low back pain, IBS, Osteoarthritis, tremor (essential?), multiple UTI, urinary incontinence, present with 3 days of intractable vomiting upon macrobid initiation. CT head significant for hydrocephalus as well as transaminitis which is now improving, -ve for acute intracranial hemorrhage.

## 2021-05-15 LAB
ALBUMIN SERPL ELPH-MCNC: 3.5 G/DL — SIGNIFICANT CHANGE UP (ref 3.5–5.2)
ALP SERPL-CCNC: 138 U/L — HIGH (ref 30–115)
ALT FLD-CCNC: 61 U/L — HIGH (ref 0–41)
ANION GAP SERPL CALC-SCNC: 9 MMOL/L — SIGNIFICANT CHANGE UP (ref 7–14)
AST SERPL-CCNC: 19 U/L — SIGNIFICANT CHANGE UP (ref 0–41)
BASOPHILS # BLD AUTO: 0.07 K/UL — SIGNIFICANT CHANGE UP (ref 0–0.2)
BASOPHILS NFR BLD AUTO: 0.8 % — SIGNIFICANT CHANGE UP (ref 0–1)
BILIRUB SERPL-MCNC: 0.2 MG/DL — SIGNIFICANT CHANGE UP (ref 0.2–1.2)
BUN SERPL-MCNC: 16 MG/DL — SIGNIFICANT CHANGE UP (ref 10–20)
CALCIUM SERPL-MCNC: 9.3 MG/DL — SIGNIFICANT CHANGE UP (ref 8.5–10.1)
CHLORIDE SERPL-SCNC: 103 MMOL/L — SIGNIFICANT CHANGE UP (ref 98–110)
CO2 SERPL-SCNC: 28 MMOL/L — SIGNIFICANT CHANGE UP (ref 17–32)
CREAT SERPL-MCNC: 0.7 MG/DL — SIGNIFICANT CHANGE UP (ref 0.7–1.5)
EOSINOPHIL # BLD AUTO: 0.35 K/UL — SIGNIFICANT CHANGE UP (ref 0–0.7)
EOSINOPHIL NFR BLD AUTO: 4.2 % — SIGNIFICANT CHANGE UP (ref 0–8)
GLUCOSE SERPL-MCNC: 93 MG/DL — SIGNIFICANT CHANGE UP (ref 70–99)
HCT VFR BLD CALC: 38.2 % — SIGNIFICANT CHANGE UP (ref 37–47)
HGB BLD-MCNC: 12 G/DL — SIGNIFICANT CHANGE UP (ref 12–16)
IMM GRANULOCYTES NFR BLD AUTO: 0.4 % — HIGH (ref 0.1–0.3)
LYMPHOCYTES # BLD AUTO: 3.24 K/UL — SIGNIFICANT CHANGE UP (ref 1.2–3.4)
LYMPHOCYTES # BLD AUTO: 39.1 % — SIGNIFICANT CHANGE UP (ref 20.5–51.1)
MCHC RBC-ENTMCNC: 28.8 PG — SIGNIFICANT CHANGE UP (ref 27–31)
MCHC RBC-ENTMCNC: 31.4 G/DL — LOW (ref 32–37)
MCV RBC AUTO: 91.6 FL — SIGNIFICANT CHANGE UP (ref 81–99)
MONOCYTES # BLD AUTO: 0.54 K/UL — SIGNIFICANT CHANGE UP (ref 0.1–0.6)
MONOCYTES NFR BLD AUTO: 6.5 % — SIGNIFICANT CHANGE UP (ref 1.7–9.3)
NEUTROPHILS # BLD AUTO: 4.06 K/UL — SIGNIFICANT CHANGE UP (ref 1.4–6.5)
NEUTROPHILS NFR BLD AUTO: 49 % — SIGNIFICANT CHANGE UP (ref 42.2–75.2)
NRBC # BLD: 0 /100 WBCS — SIGNIFICANT CHANGE UP (ref 0–0)
PLATELET # BLD AUTO: 369 K/UL — SIGNIFICANT CHANGE UP (ref 130–400)
POTASSIUM SERPL-MCNC: 4.4 MMOL/L — SIGNIFICANT CHANGE UP (ref 3.5–5)
POTASSIUM SERPL-SCNC: 4.4 MMOL/L — SIGNIFICANT CHANGE UP (ref 3.5–5)
PROT SERPL-MCNC: 6.1 G/DL — SIGNIFICANT CHANGE UP (ref 6–8)
RBC # BLD: 4.17 M/UL — LOW (ref 4.2–5.4)
RBC # FLD: 13.8 % — SIGNIFICANT CHANGE UP (ref 11.5–14.5)
SODIUM SERPL-SCNC: 140 MMOL/L — SIGNIFICANT CHANGE UP (ref 135–146)
WBC # BLD: 8.29 K/UL — SIGNIFICANT CHANGE UP (ref 4.8–10.8)
WBC # FLD AUTO: 8.29 K/UL — SIGNIFICANT CHANGE UP (ref 4.8–10.8)

## 2021-05-15 PROCEDURE — 99231 SBSQ HOSP IP/OBS SF/LOW 25: CPT

## 2021-05-15 RX ORDER — SODIUM CHLORIDE 9 MG/ML
1000 INJECTION INTRAMUSCULAR; INTRAVENOUS; SUBCUTANEOUS
Refills: 0 | Status: DISCONTINUED | OUTPATIENT
Start: 2021-05-15 | End: 2021-05-16

## 2021-05-15 RX ADMIN — DICLOFENAC SODIUM 25 MILLIGRAM(S): 75 TABLET, DELAYED RELEASE ORAL at 05:51

## 2021-05-15 RX ADMIN — OLANZAPINE 10 MILLIGRAM(S): 15 TABLET, FILM COATED ORAL at 11:17

## 2021-05-15 RX ADMIN — POLYETHYLENE GLYCOL 3350 17 GRAM(S): 17 POWDER, FOR SOLUTION ORAL at 05:44

## 2021-05-15 RX ADMIN — ENOXAPARIN SODIUM 40 MILLIGRAM(S): 100 INJECTION SUBCUTANEOUS at 11:17

## 2021-05-15 RX ADMIN — Medication 5 MILLIGRAM(S): at 21:24

## 2021-05-15 RX ADMIN — SODIUM CHLORIDE 60 MILLILITER(S): 9 INJECTION INTRAMUSCULAR; INTRAVENOUS; SUBCUTANEOUS at 15:30

## 2021-05-15 RX ADMIN — SERTRALINE 100 MILLIGRAM(S): 25 TABLET, FILM COATED ORAL at 11:17

## 2021-05-15 RX ADMIN — PANTOPRAZOLE SODIUM 40 MILLIGRAM(S): 20 TABLET, DELAYED RELEASE ORAL at 05:45

## 2021-05-15 RX ADMIN — Medication 0.5 MILLIGRAM(S): at 11:17

## 2021-05-15 NOTE — PROGRESS NOTE ADULT - ATTENDING COMMENTS
Pt seen and examined. Neurology reviewed MRI findings of moderate hydrocephalus. No acute interventions needed at this time, pt to follow up outpatient. She is medically stable for discharge. Pt seen and examined. Neurology reviewed MRI findings of moderate hydrocephalus. No acute interventions needed at this time, pt to follow up outpatient. She is medically stable for discharge. However, she was unable to ambulate today due to dizziness. Will start gentle IVF hydration, anticipate DC tomorrow.

## 2021-05-15 NOTE — PROGRESS NOTE ADULT - ASSESSMENT
60 year old female w/ pmh of schizophrenia (mild), Bipolar Disorder (hypomanic episodes), Depression, anxiety, Chronic low back pain, IBS, Osteoarthritis, tremor (essential?), multiple UTI, urinary incontinence, present with 3 days of intractable vomiting upon macrobid initiation CT head significant for hydrocephalus w/ some transaminitis. 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 No evidence of acute process. patient nausea and vomiting resolved. No further work up recommended by neurologist. Outpatient follow up within one month.    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  - Per previous notes:  No further work up recommended by neurologist. Outpatient follow up within one month.  - Vomiting could be secondary to hydrocephalus: resolved.    #Urinary incontinence  #recent UTI  - ua negative  - continue primafit  - Hold bactrim for now    #Transaminitis  - trending down on repeat labs  - work up negative so far.  - GI follow up as outpt.    #Depression  #Schizophrenia  #Bipolar  #Anxiety  - consider continuing home meds    #Chronic low back pain  #Osteoarthritis  - uses walker at home  - takes Diclofenac 75mg daily  - has significant right varus knee  - Physiatry eval noted.    #DVT ppx- lovenox  #GI ppx- protonix  #Diet- regular diet  #Activity- increase as tolerated, weight bearing as tolerated  #Dispo- from home. possible d/c today

## 2021-05-16 VITALS
DIASTOLIC BLOOD PRESSURE: 103 MMHG | SYSTOLIC BLOOD PRESSURE: 161 MMHG | HEART RATE: 90 BPM | RESPIRATION RATE: 20 BRPM | TEMPERATURE: 100 F

## 2021-05-16 LAB
ALBUMIN SERPL ELPH-MCNC: 3.6 G/DL — SIGNIFICANT CHANGE UP (ref 3.5–5.2)
ALP SERPL-CCNC: 125 U/L — HIGH (ref 30–115)
ALT FLD-CCNC: 45 U/L — HIGH (ref 0–41)
ANION GAP SERPL CALC-SCNC: 13 MMOL/L — SIGNIFICANT CHANGE UP (ref 7–14)
AST SERPL-CCNC: 17 U/L — SIGNIFICANT CHANGE UP (ref 0–41)
BASOPHILS # BLD AUTO: 0.09 K/UL — SIGNIFICANT CHANGE UP (ref 0–0.2)
BASOPHILS NFR BLD AUTO: 1.1 % — HIGH (ref 0–1)
BILIRUB SERPL-MCNC: 0.2 MG/DL — SIGNIFICANT CHANGE UP (ref 0.2–1.2)
BUN SERPL-MCNC: 15 MG/DL — SIGNIFICANT CHANGE UP (ref 10–20)
CALCIUM SERPL-MCNC: 8.8 MG/DL — SIGNIFICANT CHANGE UP (ref 8.5–10.1)
CHLORIDE SERPL-SCNC: 105 MMOL/L — SIGNIFICANT CHANGE UP (ref 98–110)
CO2 SERPL-SCNC: 22 MMOL/L — SIGNIFICANT CHANGE UP (ref 17–32)
CREAT SERPL-MCNC: 0.6 MG/DL — LOW (ref 0.7–1.5)
EOSINOPHIL # BLD AUTO: 0.47 K/UL — SIGNIFICANT CHANGE UP (ref 0–0.7)
EOSINOPHIL NFR BLD AUTO: 5.8 % — SIGNIFICANT CHANGE UP (ref 0–8)
GLUCOSE SERPL-MCNC: 88 MG/DL — SIGNIFICANT CHANGE UP (ref 70–99)
HCT VFR BLD CALC: 35.9 % — LOW (ref 37–47)
HGB BLD-MCNC: 11.4 G/DL — LOW (ref 12–16)
IMM GRANULOCYTES NFR BLD AUTO: 0.5 % — HIGH (ref 0.1–0.3)
LYMPHOCYTES # BLD AUTO: 3.68 K/UL — HIGH (ref 1.2–3.4)
LYMPHOCYTES # BLD AUTO: 45.2 % — SIGNIFICANT CHANGE UP (ref 20.5–51.1)
MCHC RBC-ENTMCNC: 29.4 PG — SIGNIFICANT CHANGE UP (ref 27–31)
MCHC RBC-ENTMCNC: 31.8 G/DL — LOW (ref 32–37)
MCV RBC AUTO: 92.5 FL — SIGNIFICANT CHANGE UP (ref 81–99)
MONOCYTES # BLD AUTO: 0.49 K/UL — SIGNIFICANT CHANGE UP (ref 0.1–0.6)
MONOCYTES NFR BLD AUTO: 6 % — SIGNIFICANT CHANGE UP (ref 1.7–9.3)
NEUTROPHILS # BLD AUTO: 3.37 K/UL — SIGNIFICANT CHANGE UP (ref 1.4–6.5)
NEUTROPHILS NFR BLD AUTO: 41.4 % — LOW (ref 42.2–75.2)
NRBC # BLD: 0 /100 WBCS — SIGNIFICANT CHANGE UP (ref 0–0)
PLATELET # BLD AUTO: 344 K/UL — SIGNIFICANT CHANGE UP (ref 130–400)
POTASSIUM SERPL-MCNC: 4.4 MMOL/L — SIGNIFICANT CHANGE UP (ref 3.5–5)
POTASSIUM SERPL-SCNC: 4.4 MMOL/L — SIGNIFICANT CHANGE UP (ref 3.5–5)
PROT SERPL-MCNC: 5.7 G/DL — LOW (ref 6–8)
RBC # BLD: 3.88 M/UL — LOW (ref 4.2–5.4)
RBC # FLD: 14.1 % — SIGNIFICANT CHANGE UP (ref 11.5–14.5)
SODIUM SERPL-SCNC: 140 MMOL/L — SIGNIFICANT CHANGE UP (ref 135–146)
WBC # BLD: 8.14 K/UL — SIGNIFICANT CHANGE UP (ref 4.8–10.8)
WBC # FLD AUTO: 8.14 K/UL — SIGNIFICANT CHANGE UP (ref 4.8–10.8)

## 2021-05-16 PROCEDURE — 99231 SBSQ HOSP IP/OBS SF/LOW 25: CPT

## 2021-05-16 RX ADMIN — Medication 0.5 MILLIGRAM(S): at 12:00

## 2021-05-16 RX ADMIN — ENOXAPARIN SODIUM 40 MILLIGRAM(S): 100 INJECTION SUBCUTANEOUS at 12:00

## 2021-05-16 RX ADMIN — DICLOFENAC SODIUM 25 MILLIGRAM(S): 75 TABLET, DELAYED RELEASE ORAL at 00:29

## 2021-05-16 RX ADMIN — SERTRALINE 100 MILLIGRAM(S): 25 TABLET, FILM COATED ORAL at 12:38

## 2021-05-16 RX ADMIN — PANTOPRAZOLE SODIUM 40 MILLIGRAM(S): 20 TABLET, DELAYED RELEASE ORAL at 05:04

## 2021-05-16 RX ADMIN — OLANZAPINE 10 MILLIGRAM(S): 15 TABLET, FILM COATED ORAL at 12:00

## 2021-05-16 NOTE — PROGRESS NOTE ADULT - ASSESSMENT
60 year old female w/ pmh of schizophrenia (mild), Bipolar Disorder (hypomanic episodes), Depression, anxiety, Chronic low back pain, IBS, Osteoarthritis, tremor (essential?), multiple UTI, urinary incontinence, present with 3 days of intractable vomiting upon macrobid initiation CT head significant for hydrocephalus w/ some transaminitis. 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 No evidence of acute process. patient nausea and vomiting resolved. No further work up recommended by neurologist. Outpatient follow up within one month.    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  - Per previous notes:  No further work up recommended by neurologist. Outpatient follow up within one month.  - Vomiting could be secondary to hydrocephalus: resolved.    #Urinary incontinence  #recent UTI  - ua negative  - continue primafit  - Hold bactrim for now    #Transaminitis  - trending down on repeat labs  - work up negative so far.  - GI follow up as outpt.    #Depression  #Schizophrenia  #Bipolar  #Anxiety  - consider continuing home meds    #Chronic low back pain  #Osteoarthritis  - uses walker at home  - takes Diclofenac 75mg daily  - has significant right varus knee  - Physiatry eval noted.    #DVT ppx- lovenox  #GI ppx- protonix  #Diet- regular diet  #Activity- increase as tolerated, weight bearing as tolerated  #Dispo- from home. DC planning today if able to ambulate 60 year old female w/ pmh of schizophrenia (mild), Bipolar Disorder (hypomanic episodes), Depression, anxiety, Chronic low back pain, IBS, Osteoarthritis, tremor (essential?), multiple UTI, urinary incontinence, present with 3 days of intractable vomiting upon macrobid initiation CT head significant for hydrocephalus w/ some transaminitis. 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 No evidence of acute process. patient nausea and vomiting resolved. No further work up recommended by neurologist. Outpatient follow up within one month.    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  - Per previous notes:  No further work up recommended by neurologist. Outpatient follow up within one month.  - Vomiting could be secondary to hydrocephalus: resolved.    #Urinary incontinence  #recent UTI  - ua negative  - continue primafit  - Hold bactrim for now    #Transaminitis  - trending down on repeat labs  - work up negative so far.  - GI follow up as outpt.    #Depression  #Schizophrenia  #Bipolar  #Anxiety  - consider continuing home meds    #Chronic low back pain  #Osteoarthritis  - uses walker at home  - takes Diclofenac 75mg daily  - has significant right varus knee  - Physiatry eval noted.    #DVT ppx- lovenox  #GI ppx- protonix  #Diet- regular diet  #Activity- increase as tolerated, weight bearing as tolerated  #Dispo- from home. DC planned for today, however, pt does not have keys to her home. CM following up

## 2021-05-16 NOTE — PROGRESS NOTE ADULT - SUBJECTIVE AND OBJECTIVE BOX
COMFORT FARIAS  60y, Female  Allergy: No Known Allergies    Hospital Day: 4d    Patient seen and examined earlier today. No acute events overnight.    PMH/PSH:  PAST MEDICAL & SURGICAL HISTORY:  Schizophrenia    Bipolar disorder    Depression    Anxiety    Chronic lower back pain  result of pelvic fracture in the past    Osteoarthritis    Urinary incontinence    Chronic urinary tract infection    History of tremor    History of total knee arthroplasty, left        VITALS:  T(F): 97.6 (05-16-21 @ 05:44), Max: 97.9 (05-15-21 @ 20:56)  HR: 66 (05-16-21 @ 05:44)  BP: 127/67 (05-16-21 @ 05:44) (121/76 - 137/83)  RR: 18 (05-16-21 @ 05:44)  SpO2: --    TESTS & MEASUREMENTS:  Weight (Kg):   BMI (kg/m2): 26.8 (05-12)    05-14-21 @ 07:01  -  05-15-21 @ 07:00  --------------------------------------------------------  IN: 0 mL / OUT: 400 mL / NET: -400 mL    05-15-21 @ 07:01  -  05-16-21 @ 07:00  --------------------------------------------------------  IN: 720 mL / OUT: 0 mL / NET: 720 mL                            11.4   8.14  )-----------( 344      ( 16 May 2021 05:43 )             35.9       05-16    140  |  105  |  15  ----------------------------<  88  4.4   |  22  |  0.6<L>    Ca    8.8      16 May 2021 05:43    TPro  5.7<L>  /  Alb  3.6  /  TBili  0.2  /  DBili  x   /  AST  17  /  ALT  45<H>  /  AlkPhos  125<H>  05-16    LIVER FUNCTIONS - ( 16 May 2021 05:43 )  Alb: 3.6 g/dL / Pro: 5.7 g/dL / ALK PHOS: 125 U/L / ALT: 45 U/L / AST: 17 U/L / GGT: x                 Culture - Blood (collected 05-12-21 @ 16:00)  Source: .Blood Blood  Preliminary Report (05-13-21 @ 22:02):    No growth to date.    Culture - Urine (collected 05-12-21 @ 01:00)  Source: .Urine Clean Catch (Midstream)  Final Report (05-13-21 @ 04:44):    No growth          RADIOLOGY & ADDITIONAL TESTS:    RECENT DIAGNOSTIC ORDERS:      MEDICATIONS:  MEDICATIONS  (STANDING):  chlorhexidine 4% Liquid 1 Application(s) Topical <User Schedule>  clonazePAM  Tablet 0.5 milliGRAM(s) Oral daily  enoxaparin Injectable 40 milliGRAM(s) SubCutaneous daily  melatonin 5 milliGRAM(s) Oral at bedtime  OLANZapine 10 milliGRAM(s) Oral daily  ondansetron Injectable 4 milliGRAM(s) IV Push Once  pantoprazole    Tablet 40 milliGRAM(s) Oral before breakfast  polyethylene glycol 3350 17 Gram(s) Oral two times a day  senna 2 Tablet(s) Oral at bedtime  sertraline 100 milliGRAM(s) Oral daily  sodium chloride 0.9%. 1000 milliLiter(s) (60 mL/Hr) IV Continuous <Continuous>    MEDICATIONS  (PRN):  diclofenac 25 milliGRAM(s) Oral daily PRN Severe Pain (7 - 10)      HOME MEDICATIONS:  clonazePAM 1 mg oral tablet (05-12)  diclofenac sodium 75 mg oral delayed release tablet (05-12)  OLANZapine 10 mg oral tablet (05-12)  sertraline 100 mg oral tablet (05-12)  tiZANidine 4 mg oral tablet (05-12)  Vitamin D3 5000 intl units (125 mcg) oral tablet (05-12)      REVIEW OF SYSTEMS:  All other review of systems is negative unless indicated above.     PHYSICAL EXAM:  GENERAL: NAD  HEENT: No Swelling  CHEST/LUNG: Good air entry, No wheezing  HEART: RRR, No murmurs  ABDOMEN: Soft, Bowel sounds present  EXTREMITIES:  No clubbing      
Patient was seen and examined. Spoke with HO. Chart reviewed.  No events overnight.  Vital Signs Last 24 Hrs  T(F): 97.1 (14 May 2021 05:00), Max: 99.2 (13 May 2021 21:18)  HR: 74 (14 May 2021 05:00) (74 - 89)  BP: 133/70 (14 May 2021 05:00) (114/62 - 133/70)  SpO2: 95% (13 May 2021 13:21) (95% - 95%)  MEDICATIONS  (STANDING):  chlorhexidine 4% Liquid 1 Application(s) Topical <User Schedule>  clonazePAM  Tablet 0.5 milliGRAM(s) Oral daily  enoxaparin Injectable 40 milliGRAM(s) SubCutaneous daily  melatonin 5 milliGRAM(s) Oral at bedtime  OLANZapine 10 milliGRAM(s) Oral daily  ondansetron Injectable 4 milliGRAM(s) IV Push Once  pantoprazole    Tablet 40 milliGRAM(s) Oral before breakfast  polyethylene glycol 3350 17 Gram(s) Oral two times a day  senna 2 Tablet(s) Oral at bedtime  sertraline 100 milliGRAM(s) Oral daily    MEDICATIONS  (PRN):  diclofenac 25 milliGRAM(s) Oral daily PRN Severe Pain (7 - 10)    Labs:                        11.5   10.02 )-----------( 336      ( 13 May 2021 08:10 )             35.4                         12.6   13.56 )-----------( 336      ( 12 May 2021 11:18 )             38.7     13 May 2021 08:10    138    |  102    |  18     ----------------------------<  75     4.0     |  29     |  0.6    12 May 2021 16:00    139    |  101    |  12     ----------------------------<  87     3.9     |  24     |  0.7      Ca    9.4        13 May 2021 08:10  Ca    9.6        12 May 2021 16:00  Phos  3.8       13 May 2021 08:10  Phos  3.5       12 May 2021 11:18  Mg     1.8       13 May 2021 08:10  Mg     1.7       12 May 2021 11:18    TPro  5.9    /  Alb  3.5    /  TBili  0.4    /  DBili  x      /  AST  48     /  ALT  132    /  AlkPhos  143    13 May 2021 08:10  TPro  6.5    /  Alb  3.9    /  TBili  0.4    /  DBili  x      /  AST  96     /  ALT  195    /  AlkPhos  186    12 May 2021 16:00    PT/INR - ( 13 May 2021 08:10 )   PT: 11.00 sec;   INR: 0.96 ratio         PTT - ( 13 May 2021 08:10 )  PTT:29.0 sec      Culture - Blood (collected 12 May 2021 16:00)  Source: .Blood Blood  Preliminary Report (13 May 2021 22:02):    No growth to date.    Culture - Urine (collected 12 May 2021 01:00)  Source: .Urine Clean Catch (Midstream)  Final Report (13 May 2021 04:44):    No growth      General: comfortable, NAD  exam unchanged      A/P:  60 year old female w/ pmh of schizophrenia (mild), Bipolar Disorder (hypomanic episodes), Depression, anxiety, Chronic low back pain, IBS, Osteoarthritis, tremor (essential?), multiple UTI, urinary incontinence, present with 3 days of intractable vomiting upon macrobid initiation CT head significant for hydrocephalus as well as transaminitis which is now improving     monitor LFT's    diet as tolerated    off abx    neurology f/u to consider LP o characterize NPH    OOB    fall precautions    DC home when cleared by neuro    outpt GI eval    DVT prophylaxis  Decubitus prevention- all measures as per RN protocol  Please call or text me with any questions or updates      
SUBJECTIVE:    Patient is a 60y old Female who presents with a chief complaint of Intractable vomiting (14 May 2021 16:09)    Currently admitted to medicine with the primary diagnosis of Intractable vomiting with nausea    Today is hospital day 3d. This morning she is resting comfortably in bed and reports no new issues or overnight events.     Admit Diagnosis:  INTRACTABLE VOMITING WITH NAUSEA HYDROCEPHALUS        PAST MEDICAL & SURGICAL HISTORY  Schizophrenia    Bipolar disorder    Depression    Anxiety    Chronic lower back pain  result of pelvic fracture in the past    Osteoarthritis    Urinary incontinence    Chronic urinary tract infection    History of tremor    History of total knee arthroplasty, left    Schizophrenia    Bipolar disorder    Depression    Anxiety    Chronic lower back pain    Osteoarthritis    Urinary incontinence    Chronic urinary tract infection    History of tremor    History of total knee arthroplasty, left        SOCIAL HISTORY:  Negative for smoking/alcohol/drug use.     ALLERGIES:  No Known Allergies    MEDICATIONS:  STANDING MEDICATIONS  chlorhexidine 4% Liquid 1 Application(s) Topical <User Schedule>  clonazePAM  Tablet 0.5 milliGRAM(s) Oral daily  enoxaparin Injectable 40 milliGRAM(s) SubCutaneous daily  melatonin 5 milliGRAM(s) Oral at bedtime  OLANZapine 10 milliGRAM(s) Oral daily  ondansetron Injectable 4 milliGRAM(s) IV Push Once  pantoprazole    Tablet 40 milliGRAM(s) Oral before breakfast  polyethylene glycol 3350 17 Gram(s) Oral two times a day  senna 2 Tablet(s) Oral at bedtime  sertraline 100 milliGRAM(s) Oral daily    PRN MEDICATIONS  diclofenac 25 milliGRAM(s) Oral daily PRN    VITALS:   T(F): 97.3, Max: 99.8 (05-14-21 @ 21:00)  HR: 77 (77 - 97)  BP: 134/79 (117/61 - 134/79)  RR: 18 (18 - 18)  SpO2: 98% (96% - 98%)    I&Os:  05-14-21 @ 07:01  -  05-15-21 @ 07:00  --------------------------------------------------------  IN: 0 mL / OUT: 400 mL / NET: -400 mL        PHYSICAL EXAM:  GEN: No acute distress  LUNGS: Clear to auscultation bilaterally   HEART: S1/S2  ABD: Soft, NT/ND. BS +  EXT: no cyanosis/edema  NEURO: AAOX3    LABS:                        12.0   8.29  )-----------( 369      ( 15 May 2021 07:14 )             38.2     05-15    140  |  103  |  16  ----------------------------<  93  4.4   |  28  |  0.7    Ca    9.3      15 May 2021 07:14    TPro  6.1  /  Alb  3.5  /  TBili  0.2  /  DBili  x   /  AST  19  /  ALT  61<H>  /  AlkPhos  138<H>  05-15    Creatinine trend: 0.7<--, 0.6<--, 0.6<--, 0.7<--, 0.6<--, 0.7<--          Culture - Blood (collected 12 May 2021 16:00)  Source: .Blood Blood  Preliminary Report (13 May 2021 22:02):    No growth to date.        Culture - Blood (collected 05-12-21 @ 16:00)  Source: .Blood Blood  Preliminary Report (05-13-21 @ 22:02):    No growth to date.    Culture - Urine (collected 05-12-21 @ 01:00)  Source: .Urine Clean Catch (Midstream)  Final Report (05-13-21 @ 04:44):    No growth          COVID-19 PCR: NotDetec (05-12-21 @ 01:40)      RADIOLOGY:  < from: MR Head w/wo IV Cont (05.13.21 @ 23:09) >  No acute intracranial pathology.    Moderate chronic microvascular ischemic changes.    Moderate ventriculomegaly likely reflecting underlying communicating hydrocephalus. No evidence of transependymal edema to suggest acute hydrocephalus. Previously observed periventricular hypodensity on the head CT from 5/12/2021 likely represents part of the chronic ischemic changes.    < end of copied text >

## 2021-05-17 LAB
CULTURE RESULTS: SIGNIFICANT CHANGE UP
SPECIMEN SOURCE: SIGNIFICANT CHANGE UP

## 2021-05-18 LAB
DSDNA AB SER-ACNC: 27 IU/ML — SIGNIFICANT CHANGE UP
MITOCHONDRIA AB SER-ACNC: ABNORMAL

## 2021-05-24 DIAGNOSIS — M54.5 LOW BACK PAIN: ICD-10-CM

## 2021-05-24 DIAGNOSIS — M81.0 AGE-RELATED OSTEOPOROSIS WITHOUT CURRENT PATHOLOGICAL FRACTURE: ICD-10-CM

## 2021-05-24 DIAGNOSIS — M06.9 RHEUMATOID ARTHRITIS, UNSPECIFIED: ICD-10-CM

## 2021-05-24 DIAGNOSIS — R74.01 ELEVATION OF LEVELS OF LIVER TRANSAMINASE LEVELS: ICD-10-CM

## 2021-05-24 DIAGNOSIS — F20.9 SCHIZOPHRENIA, UNSPECIFIED: ICD-10-CM

## 2021-05-24 DIAGNOSIS — R11.2 NAUSEA WITH VOMITING, UNSPECIFIED: ICD-10-CM

## 2021-05-24 DIAGNOSIS — M19.90 UNSPECIFIED OSTEOARTHRITIS, UNSPECIFIED SITE: ICD-10-CM

## 2021-05-24 DIAGNOSIS — R32 UNSPECIFIED URINARY INCONTINENCE: ICD-10-CM

## 2021-05-24 DIAGNOSIS — R25.1 TREMOR, UNSPECIFIED: ICD-10-CM

## 2021-05-24 DIAGNOSIS — F31.9 BIPOLAR DISORDER, UNSPECIFIED: ICD-10-CM

## 2021-05-24 DIAGNOSIS — F41.9 ANXIETY DISORDER, UNSPECIFIED: ICD-10-CM

## 2021-05-24 DIAGNOSIS — F06.2 PSYCHOTIC DISORDER WITH DELUSIONS DUE TO KNOWN PHYSIOLOGICAL CONDITION: ICD-10-CM

## 2021-05-24 DIAGNOSIS — K58.9 IRRITABLE BOWEL SYNDROME WITHOUT DIARRHEA: ICD-10-CM

## 2021-05-24 DIAGNOSIS — G91.2 (IDIOPATHIC) NORMAL PRESSURE HYDROCEPHALUS: ICD-10-CM

## 2021-05-24 DIAGNOSIS — G89.29 OTHER CHRONIC PAIN: ICD-10-CM

## 2021-06-22 ENCOUNTER — INPATIENT (INPATIENT)
Facility: HOSPITAL | Age: 61
LOS: 2 days | Discharge: NURSING, REHAB OR LONG-TERM CARE | End: 2021-06-25
Attending: INTERNAL MEDICINE | Admitting: INTERNAL MEDICINE
Payer: MEDICARE

## 2021-06-22 VITALS
RESPIRATION RATE: 18 BRPM | DIASTOLIC BLOOD PRESSURE: 70 MMHG | TEMPERATURE: 99 F | OXYGEN SATURATION: 98 % | HEIGHT: 59 IN | SYSTOLIC BLOOD PRESSURE: 119 MMHG | HEART RATE: 92 BPM

## 2021-06-22 DIAGNOSIS — Z96.652 PRESENCE OF LEFT ARTIFICIAL KNEE JOINT: Chronic | ICD-10-CM

## 2021-06-22 PROBLEM — M19.90 UNSPECIFIED OSTEOARTHRITIS, UNSPECIFIED SITE: Chronic | Status: ACTIVE | Noted: 2021-05-12

## 2021-06-22 PROBLEM — M54.5 LOW BACK PAIN: Chronic | Status: ACTIVE | Noted: 2021-05-12

## 2021-06-22 PROBLEM — Z86.69 PERSONAL HISTORY OF OTHER DISEASES OF THE NERVOUS SYSTEM AND SENSE ORGANS: Chronic | Status: ACTIVE | Noted: 2021-05-12

## 2021-06-22 PROBLEM — F41.9 ANXIETY DISORDER, UNSPECIFIED: Chronic | Status: ACTIVE | Noted: 2021-05-12

## 2021-06-22 PROBLEM — R32 UNSPECIFIED URINARY INCONTINENCE: Chronic | Status: ACTIVE | Noted: 2021-05-12

## 2021-06-22 PROBLEM — F32.9 MAJOR DEPRESSIVE DISORDER, SINGLE EPISODE, UNSPECIFIED: Chronic | Status: ACTIVE | Noted: 2021-05-12

## 2021-06-22 PROBLEM — F20.9 SCHIZOPHRENIA, UNSPECIFIED: Chronic | Status: ACTIVE | Noted: 2021-05-12

## 2021-06-22 PROBLEM — N39.0 URINARY TRACT INFECTION, SITE NOT SPECIFIED: Chronic | Status: ACTIVE | Noted: 2021-05-12

## 2021-06-22 PROBLEM — F31.9 BIPOLAR DISORDER, UNSPECIFIED: Chronic | Status: ACTIVE | Noted: 2021-05-12

## 2021-06-22 LAB
ALBUMIN SERPL ELPH-MCNC: 4.2 G/DL — SIGNIFICANT CHANGE UP (ref 3.5–5.2)
ALP SERPL-CCNC: 99 U/L — SIGNIFICANT CHANGE UP (ref 30–115)
ALT FLD-CCNC: 15 U/L — SIGNIFICANT CHANGE UP (ref 0–41)
ANION GAP SERPL CALC-SCNC: 10 MMOL/L — SIGNIFICANT CHANGE UP (ref 7–14)
APPEARANCE UR: ABNORMAL
AST SERPL-CCNC: 27 U/L — SIGNIFICANT CHANGE UP (ref 0–41)
BACTERIA # UR AUTO: ABNORMAL
BILIRUB SERPL-MCNC: 0.3 MG/DL — SIGNIFICANT CHANGE UP (ref 0.2–1.2)
BILIRUB UR-MCNC: NEGATIVE — SIGNIFICANT CHANGE UP
BUN SERPL-MCNC: 15 MG/DL — SIGNIFICANT CHANGE UP (ref 10–20)
CALCIUM SERPL-MCNC: 9.9 MG/DL — SIGNIFICANT CHANGE UP (ref 8.5–10.1)
CHLORIDE SERPL-SCNC: 106 MMOL/L — SIGNIFICANT CHANGE UP (ref 98–110)
CO2 SERPL-SCNC: 24 MMOL/L — SIGNIFICANT CHANGE UP (ref 17–32)
COLOR SPEC: YELLOW — SIGNIFICANT CHANGE UP
CREAT SERPL-MCNC: 0.6 MG/DL — LOW (ref 0.7–1.5)
DIFF PNL FLD: NEGATIVE — SIGNIFICANT CHANGE UP
EPI CELLS # UR: 1 /HPF — SIGNIFICANT CHANGE UP (ref 0–5)
FLUAV AG NPH QL: NEGATIVE COUNTS — SIGNIFICANT CHANGE UP
FLUBV AG NPH QL: NEGATIVE COUNTS — SIGNIFICANT CHANGE UP
GLUCOSE SERPL-MCNC: 103 MG/DL — HIGH (ref 70–99)
GLUCOSE UR QL: NEGATIVE — SIGNIFICANT CHANGE UP
HCT VFR BLD CALC: 38.2 % — SIGNIFICANT CHANGE UP (ref 37–47)
HGB BLD-MCNC: 12.4 G/DL — SIGNIFICANT CHANGE UP (ref 12–16)
HYALINE CASTS # UR AUTO: 15 /LPF — HIGH (ref 0–7)
KETONES UR-MCNC: ABNORMAL
LEUKOCYTE ESTERASE UR-ACNC: ABNORMAL
MCHC RBC-ENTMCNC: 29.7 PG — SIGNIFICANT CHANGE UP (ref 27–31)
MCHC RBC-ENTMCNC: 32.5 G/DL — SIGNIFICANT CHANGE UP (ref 32–37)
MCV RBC AUTO: 91.6 FL — SIGNIFICANT CHANGE UP (ref 81–99)
NITRITE UR-MCNC: POSITIVE
NRBC # BLD: 0 /100 WBCS — SIGNIFICANT CHANGE UP (ref 0–0)
PH UR: 6.5 — SIGNIFICANT CHANGE UP (ref 5–8)
PLATELET # BLD AUTO: 323 K/UL — SIGNIFICANT CHANGE UP (ref 130–400)
POTASSIUM SERPL-MCNC: 5 MMOL/L — SIGNIFICANT CHANGE UP (ref 3.5–5)
POTASSIUM SERPL-SCNC: 5 MMOL/L — SIGNIFICANT CHANGE UP (ref 3.5–5)
PROT SERPL-MCNC: 7.1 G/DL — SIGNIFICANT CHANGE UP (ref 6–8)
PROT UR-MCNC: ABNORMAL
RBC # BLD: 4.17 M/UL — LOW (ref 4.2–5.4)
RBC # FLD: 13.8 % — SIGNIFICANT CHANGE UP (ref 11.5–14.5)
RBC CASTS # UR COMP ASSIST: 6 /HPF — HIGH (ref 0–4)
RSV RNA NPH QL NAA+NON-PROBE: NEGATIVE COUNTS — SIGNIFICANT CHANGE UP
SARS-COV-2 RNA SPEC QL NAA+PROBE: NEGATIVE COUNTS — SIGNIFICANT CHANGE UP
SODIUM SERPL-SCNC: 140 MMOL/L — SIGNIFICANT CHANGE UP (ref 135–146)
SP GR SPEC: 1.03 — SIGNIFICANT CHANGE UP (ref 1.01–1.03)
UROBILINOGEN FLD QL: SIGNIFICANT CHANGE UP
WBC # BLD: 7.18 K/UL — SIGNIFICANT CHANGE UP (ref 4.8–10.8)
WBC # FLD AUTO: 7.18 K/UL — SIGNIFICANT CHANGE UP (ref 4.8–10.8)
WBC UR QL: 23 /HPF — HIGH (ref 0–5)

## 2021-06-22 PROCEDURE — 99285 EMERGENCY DEPT VISIT HI MDM: CPT

## 2021-06-22 PROCEDURE — 93010 ELECTROCARDIOGRAM REPORT: CPT

## 2021-06-22 RX ORDER — SODIUM CHLORIDE 9 MG/ML
1000 INJECTION, SOLUTION INTRAVENOUS ONCE
Refills: 0 | Status: COMPLETED | OUTPATIENT
Start: 2021-06-22 | End: 2021-06-22

## 2021-06-22 RX ORDER — OLANZAPINE 15 MG/1
10 TABLET, FILM COATED ORAL DAILY
Refills: 0 | Status: DISCONTINUED | OUTPATIENT
Start: 2021-06-22 | End: 2021-06-25

## 2021-06-22 RX ORDER — CEFTRIAXONE 500 MG/1
1000 INJECTION, POWDER, FOR SOLUTION INTRAMUSCULAR; INTRAVENOUS ONCE
Refills: 0 | Status: COMPLETED | OUTPATIENT
Start: 2021-06-22 | End: 2021-06-22

## 2021-06-22 RX ORDER — CLONAZEPAM 1 MG
1 TABLET ORAL DAILY
Refills: 0 | Status: DISCONTINUED | OUTPATIENT
Start: 2021-06-22 | End: 2021-06-25

## 2021-06-22 RX ORDER — ACETAMINOPHEN 500 MG
650 TABLET ORAL EVERY 6 HOURS
Refills: 0 | Status: DISCONTINUED | OUTPATIENT
Start: 2021-06-22 | End: 2021-06-25

## 2021-06-22 RX ORDER — ENOXAPARIN SODIUM 100 MG/ML
40 INJECTION SUBCUTANEOUS AT BEDTIME
Refills: 0 | Status: DISCONTINUED | OUTPATIENT
Start: 2021-06-22 | End: 2021-06-25

## 2021-06-22 RX ORDER — CHLORHEXIDINE GLUCONATE 213 G/1000ML
1 SOLUTION TOPICAL
Refills: 0 | Status: DISCONTINUED | OUTPATIENT
Start: 2021-06-22 | End: 2021-06-25

## 2021-06-22 RX ORDER — SERTRALINE 25 MG/1
100 TABLET, FILM COATED ORAL DAILY
Refills: 0 | Status: DISCONTINUED | OUTPATIENT
Start: 2021-06-22 | End: 2021-06-25

## 2021-06-22 RX ADMIN — SERTRALINE 100 MILLIGRAM(S): 25 TABLET, FILM COATED ORAL at 18:03

## 2021-06-22 RX ADMIN — ENOXAPARIN SODIUM 40 MILLIGRAM(S): 100 INJECTION SUBCUTANEOUS at 21:16

## 2021-06-22 RX ADMIN — Medication 650 MILLIGRAM(S): at 18:03

## 2021-06-22 RX ADMIN — Medication 1 MILLIGRAM(S): at 18:03

## 2021-06-22 RX ADMIN — CEFTRIAXONE 100 MILLIGRAM(S): 500 INJECTION, POWDER, FOR SOLUTION INTRAMUSCULAR; INTRAVENOUS at 10:06

## 2021-06-22 RX ADMIN — SODIUM CHLORIDE 1000 MILLILITER(S): 9 INJECTION, SOLUTION INTRAVENOUS at 07:59

## 2021-06-22 RX ADMIN — OLANZAPINE 10 MILLIGRAM(S): 15 TABLET, FILM COATED ORAL at 18:03

## 2021-06-22 NOTE — ED PROVIDER NOTE - OBJECTIVE STATEMENT
The pt is a 60y F w/ hx of HTN, hydrocephalus, bipolar, schizophrenia, anxiety, osteoarthritis presenting for inability to care for herself and poor living conditions. Pt lives at home on her own. States that the house is filthy and full of rodents. In recent weeks, she has had increasing back pain and at baseline uses a walker but now hasn't been able to move much. She is scared that mice are going to crawl all over her. She has no other family members that can care for her. She was in short-term rehab after a fall several months ago and wishes to be placed in rehab again. States that sometimes she is not eating because she can't move and has lost weight in the past week. Denies fever, headache, chest pain, SOB, N/V, abdominal pain, diarrhea, dysuria/hematuria.

## 2021-06-22 NOTE — H&P ADULT - NSHPREVIEWOFSYSTEMS_GEN_ALL_CORE
CONSTITUTIONAL: +weakness, No fevers or chills  EYES/ENT: No visual changes;  No hearing changes or throat pain   NECK: No pain or stiffness  RESPIRATORY: No cough, wheezing, hemoptysis; No shortness of breath  CARDIOVASCULAR: No chest pain or palpitations  GASTROINTESTINAL: +mild intermittent abdominal pain, constipation. No nausea, vomiting. No melena or hematochezia.  GENITOURINARY: No dysuria, frequency or hematuria, no suprapubic tenderness  NEUROLOGICAL: +generalized tremor, No numbness  SKIN: No itching, rashes,  EXT: B/L mild non-pitting edema, b/l ankle edema  Psych: denies suicidal/homicidal thoughts

## 2021-06-22 NOTE — ED PROVIDER NOTE - PHYSICAL EXAMINATION
Vital Signs: I have reviewed the initial vital signs.  Constitutional: well-nourished, appears stated age, no acute distress  Cardiovascular: regular rate, regular rhythm, well-perfused extremities  Respiratory: unlabored respiratory effort, clear to auscultation bilaterally  Gastrointestinal: soft, non-tender abdomen  Musculoskeletal: supple neck, no lower extremity edema  Integumentary: warm, dry, no rash  Neurologic: awake, alert, extremities’ motor and sensory functions grossly intact  Psychiatric: (+) anxious appearing, tremulous

## 2021-06-22 NOTE — ED ADULT NURSE NOTE - FAMILY HISTORY
Father  Still living? Unknown  FH: prostate cancer, Age at diagnosis: Age Unknown     Mother  Still living? Unknown  FH: Parkinson's disease, Age at diagnosis: Age Unknown

## 2021-06-22 NOTE — ED ADULT NURSE NOTE - CHIEF COMPLAINT QUOTE
Pt BIBA for complaints of fatigue and "dehydration" as per pt. Pt. states she lives alone and is unable to care for herself as she is unable to ambulate. Pt. states, "I can't live in my house anymore, it smells and I have mice and rats." Pt requesting short term placement care.

## 2021-06-22 NOTE — H&P ADULT - NSHPLABSRESULTS_GEN_ALL_CORE
LABS:                        12.4   7.18  )-----------( 323      ( 2021 08:30 )             38.2     -    140  |  106  |  15  ----------------------------<  103<H>  5.0   |  24  |  0.6<L>    Ca    9.9      2021 07:54    TPro  7.1  /  Alb  4.2  /  TBili  0.3  /  DBili  x   /  AST  27  /  ALT  15  /  AlkPhos  99  06-          Urinalysis Basic - ( 2021 05:46 )    Color: Yellow / Appearance: Slightly Turbid / S.027 / pH: x  Gluc: x / Ketone: Moderate  / Bili: Negative / Urobili: <2 mg/dL   Blood: x / Protein: 30 mg/dL / Nitrite: Positive   Leuk Esterase: Moderate / RBC: 6 /HPF / WBC 23 /HPF   Sq Epi: x / Non Sq Epi: 1 /HPF / Bacteria: Many      Lactate Trend      RADIOLOGY:  MR Head w/wo IV Cont (21 @ 23:09)   IMPRESSION:  No acute intracranial pathology.  Moderate chronic microvascular ischemic changes.  Moderate ventriculomegaly likely reflecting underlying communicating hydrocephalus. No evidence of transependymal edema to suggest acute hydrocephalus. Previously observed periventricular hypodensity on the head CT from 2021 likely represents part of the chronic ischemic changes.        EKGS:    Ventricular Rate 77 BPM    QRS Duration 54 ms    Q-T Interval 366 ms    QTC Calculation(Bazett) 414 ms    R Axis 78 degrees    T Axis -35 degrees    Diagnosis Line Accelerated Junctional rhythm  ST & T wave abnormality, consider inferolateral ischemia  Abnormal ECG

## 2021-06-22 NOTE — ED PROVIDER NOTE - ATTENDING CONTRIBUTION TO CARE
60 F to ED for eval from home  h/o HTN, hydrocephalus, bipolar, schizophrenia  pt unable to care for self and poor living conditions  concerned for rodents in her home.   AVSS, exam as noted, CTAB, RRR, abdomen soft NTND, (+) bowel sounds,

## 2021-06-22 NOTE — H&P ADULT - HISTORY OF PRESENT ILLNESS
61 y/o female with PMHx hydrocephalus, bipolar disorder, schizophrenia, anxiety, osteoarthritis, low back pain presented to ED for inability to care for herself and poor living conditions x 1month. Pt lives at home alone and endorsed that she does not receive in home assistance and family does not help. Pt has been having decreased ability to perform her ADL, including cooking, bathing, eating, walking, and cleaning. She uses a walker. Pt reports subjective weight loss especially over the last week, but has not weighed herself. Over the past month she has had increased back pain secondary to increased bedrest. Pt was recently admitted for in May2021 for intractable vomiting and was discharged home. She expresses interest in being placed in a long term facility for assistance with her ADLs and for physical therapy.    ED vitals: /70, HR 92;  RR 18; SpO2 98% on RA;  T 98.6F. 59 y/o female with PMHx hydrocephalus, bipolar disorder, schizophrenia, anxiety, osteoarthritis, low back pain presented to ED for inability to care for herself and poor living conditions x 1month. Pt lives at home alone and endorsed that she does not receive in home assistance and family does not help. Pt has been having decreased ability to perform her ADL, including cooking, bathing, eating, walking, and cleaning. She uses a walker. Pt reports subjective weight loss especially over the last week, but has not weighed herself. Over the past month she has had increased back pain secondary to increased bedrest. Pt was recently admitted for in May2021 for intractable vomiting and was discharged home. She expresses interest in being placed in a long term facility for assistance with her ADLs and for physical therapy.    Pt is a social admission.    ED vitals: /70, HR 92;  RR 18; SpO2 98% on RA;  T 98.6F.

## 2021-06-22 NOTE — H&P ADULT - ATTENDING COMMENTS
61 y/o female with PMHx hydrocephalus, bipolar disorder, schizophrenia, anxiety, osteoarthritis, low back pain presented to ED for inability to walk and care for herself     Pt seen and examined- comfortable; no new complaints    Spoke with RN and HO- no new events    Chart reviewed- agree with above    encourage PO intake    OOB to chair    PT/rehab eval    case management/ social service    DVT proph    skin care/decubitus prevention    DC planning 61 y/o female with PMHx hydrocephalus, bipolar disorder, schizophrenia, anxiety, osteoarthritis, low back pain presented to ED for inability to walk and care for herself     Pt seen and examined- comfortable; no new complaints    Spoke with RN and HO- no new events    Chart reviewed- agree with above    encourage PO intake    OOB to chair    PT/rehab eval    outpt neurology and psych f/u    case management/ social service    DVT proph    skin care/decubitus prevention    DC planning

## 2021-06-22 NOTE — PATIENT PROFILE ADULT - LIFE CHALLENGES - DETAILS
Pt. is not able to live alone; she does not have any HHA or family that could be of any considerable help.

## 2021-06-22 NOTE — ED ADULT NURSE NOTE - PMH
Anxiety    Bipolar disorder    Chronic lower back pain  result of pelvic fracture in the past  Chronic urinary tract infection    Depression    History of tremor    Osteoarthritis    Schizophrenia    Urinary incontinence

## 2021-06-22 NOTE — H&P ADULT - ASSESSMENT
59 y/o Female with PMHx hydrocephalus, bipolar disorder, schizophrenia, anxiety, osteoarthritis, low back pain presented to ED for inability to care for herself and poor living conditions x 1month.     Inability to Care for Self  -bedbound with very limited capacity to walk with walker, unable to carry out ADLs  -Consult PT/OT  -/ for placement in LTR/NH    Asymptomatic Bacteruria   -h/o UTI, uses urinary pads  -positive U/A  -pt denies frequency, dysuria, discharge, suprapubic tenderness, no fevers, no chills  -Discontinue abx    Back Pain  -Continue home med - diclofenac    Osteoarthritis  -continue home meds     HTN  -/70   -pt not on any antihypertensives  -Continue to monitor    Schizophrenia, Bipolar disorder, Anxiety  -pressured speech, tangential thinking  -Continue home meds  -psych consult?    DVT prophylaxis: lovenox  GI prophylaxis: famotidine  Diet: regular  Activity: activity with assistance  Code status: full  Disposition: from home, awaiting PT/OT and placement in LTR/NH 61 y/o Female with PMHx hydrocephalus, bipolar disorder, schizophrenia, anxiety, osteoarthritis, low back pain presented to ED for inability to care for herself and poor living conditions x 1month.     Inability to Care for Self  -bedbound with very limited capacity to walk with walker, unable to carry out ADLs  -pt is a social admission - no indication for serial CBCs and BMPs, unless clinically indicated  -PT consult  -/ for placement in LTR/NH    Asymptomatic Bacteruria   -h/o UTI, uses urinary pads  -positive U/A  -pt denies frequency, dysuria, discharge, suprapubic tenderness, no fevers, no chills  -Discontinue abx    Back Pain  -Tylenol PRN    Osteoarthritis  -Tylenol PRN    HTN  -/70   -pt not on any antihypertensives  -Continue to monitor    Schizophrenia, Bipolar disorder, Anxiety  -pressured speech, tangential thinking  -Continue home meds    DVT prophylaxis: lovenox  GI prophylaxis: not indicated  Diet: regular  Activity: activity with assistance  Code status: full  Disposition: from home, awaiting PT/OT and placement in LTR/NH

## 2021-06-22 NOTE — ED ADULT NURSE NOTE - OBJECTIVE STATEMENT
61 y/o female from home, lives alone. BIBA for unsafe home environment. Pt states home infested with mice. Skin intact, bruise to left hip.

## 2021-06-22 NOTE — ED ADULT TRIAGE NOTE - CHIEF COMPLAINT QUOTE
Pt BIBA for complaints of fatigue and "dehydration" as per pt. Pt. states she lives alone and is unable to care for herself as she is unable to ambulate. Pt. states, "I can't live in my house anymore, it smells and I have mice and rats." As per EMS, pt. living conditions are not inhabitable. Pt BIBA for complaints of fatigue and "dehydration" as per pt. Pt. states she lives alone and is unable to care for herself as she is unable to ambulate. Pt. states, "I can't live in my house anymore, it smells and I have mice and rats." Pt requesting short term placement care.

## 2021-06-22 NOTE — ED PROVIDER NOTE - CARE PLAN
Principal Discharge DX:	UTI (urinary tract infection)  Secondary Diagnosis:	Unsatisfactory living conditions  Secondary Diagnosis:	Chronic lower back pain

## 2021-06-22 NOTE — H&P ADULT - NSHPPHYSICALEXAM_GEN_ALL_CORE
GENERAL: NAD, generalized tremor, weakness, pressured speech, tangential speech  HEAD:  Atraumatic, Normocephalic  EYES: +R eye pupillary opacification, Anisocoria L>R, +pupillary light reflex, EOMI, conjunctiva and sclera clear  ENT: Moist mucous membranes  NECK: Supple  CHEST/LUNG: Clear to auscultation bilaterally; No rales, rhonchi, wheezing, or rubs. Unlabored respirations  HEART: Regular rate and rhythm; No murmurs, rubs, or gallops  ABDOMEN: BSx4; Soft, nontender, nondistended  EXTREMITIES:  +mild non-pitting LE edema, b/l ankle edema. 2+ Peripheral Pulses, brisk capillary refill. No cyanosis  NERVOUS SYSTEM:  A&Ox3, no focal deficits   SKIN: No rashes or lesions, no decubitus ulcers on back or buttock

## 2021-06-23 ENCOUNTER — TRANSCRIPTION ENCOUNTER (OUTPATIENT)
Age: 61
End: 2021-06-23

## 2021-06-23 LAB
COVID-19 SPIKE DOMAIN AB INTERP: NEGATIVE — SIGNIFICANT CHANGE UP
COVID-19 SPIKE DOMAIN ANTIBODY RESULT: 0.4 U/ML — SIGNIFICANT CHANGE UP
SARS-COV-2 IGG+IGM SERPL QL IA: 0.4 U/ML — SIGNIFICANT CHANGE UP
SARS-COV-2 IGG+IGM SERPL QL IA: NEGATIVE — SIGNIFICANT CHANGE UP

## 2021-06-23 RX ORDER — SODIUM CHLORIDE 9 MG/ML
1000 INJECTION INTRAMUSCULAR; INTRAVENOUS; SUBCUTANEOUS
Refills: 0 | Status: DISCONTINUED | OUTPATIENT
Start: 2021-06-23 | End: 2021-06-24

## 2021-06-23 RX ORDER — DICLOFENAC SODIUM 75 MG/1
75 TABLET, DELAYED RELEASE ORAL DAILY
Refills: 0 | Status: DISCONTINUED | OUTPATIENT
Start: 2021-06-23 | End: 2021-06-25

## 2021-06-23 RX ORDER — PHENAZOPYRIDINE HCL 100 MG
100 TABLET ORAL EVERY 8 HOURS
Refills: 0 | Status: DISCONTINUED | OUTPATIENT
Start: 2021-06-23 | End: 2021-06-25

## 2021-06-23 RX ORDER — CEFTRIAXONE 500 MG/1
1000 INJECTION, POWDER, FOR SOLUTION INTRAMUSCULAR; INTRAVENOUS EVERY 24 HOURS
Refills: 0 | Status: DISCONTINUED | OUTPATIENT
Start: 2021-06-23 | End: 2021-06-25

## 2021-06-23 RX ORDER — LANOLIN ALCOHOL/MO/W.PET/CERES
10 CREAM (GRAM) TOPICAL AT BEDTIME
Refills: 0 | Status: DISCONTINUED | OUTPATIENT
Start: 2021-06-23 | End: 2021-06-25

## 2021-06-23 RX ADMIN — ENOXAPARIN SODIUM 40 MILLIGRAM(S): 100 INJECTION SUBCUTANEOUS at 21:19

## 2021-06-23 RX ADMIN — Medication 100 MILLIGRAM(S): at 13:04

## 2021-06-23 RX ADMIN — Medication 650 MILLIGRAM(S): at 20:09

## 2021-06-23 RX ADMIN — Medication 650 MILLIGRAM(S): at 09:20

## 2021-06-23 RX ADMIN — Medication 100 MILLIGRAM(S): at 21:21

## 2021-06-23 RX ADMIN — Medication 650 MILLIGRAM(S): at 08:45

## 2021-06-23 RX ADMIN — Medication 1 MILLIGRAM(S): at 11:02

## 2021-06-23 RX ADMIN — CEFTRIAXONE 100 MILLIGRAM(S): 500 INJECTION, POWDER, FOR SOLUTION INTRAMUSCULAR; INTRAVENOUS at 11:00

## 2021-06-23 RX ADMIN — Medication 10 MILLIGRAM(S): at 22:20

## 2021-06-23 RX ADMIN — SODIUM CHLORIDE 75 MILLILITER(S): 9 INJECTION INTRAMUSCULAR; INTRAVENOUS; SUBCUTANEOUS at 11:03

## 2021-06-23 RX ADMIN — SERTRALINE 100 MILLIGRAM(S): 25 TABLET, FILM COATED ORAL at 11:01

## 2021-06-23 RX ADMIN — OLANZAPINE 10 MILLIGRAM(S): 15 TABLET, FILM COATED ORAL at 11:01

## 2021-06-23 NOTE — DISCHARGE NOTE PROVIDER - NSDCFUADDINST_GEN_ALL_CORE_FT
Please make sure to adhere to physical therapy instructions provided by the physical therapist assigned to see you. Please make sure to adhere to physical therapy instructions provided by the physical therapist assigned to see you.  Please make sure to take 1 tablet of rosefin per day until July 2nd.

## 2021-06-23 NOTE — DISCHARGE NOTE PROVIDER - CARE PROVIDER_API CALL
Flakito Hawley  INTERNAL MEDICINE  [address] [place] [address]  Phone: [phone]  Fax: [phone]  Follow Up Time: 2 weeks

## 2021-06-23 NOTE — PROGRESS NOTE ADULT - ASSESSMENT
ASSESSMENT & PLAN    61 y/o Female with PMHx hydrocephalus, bipolar disorder, schizophrenia, anxiety, osteoarthritis, low back pain presented to ED for inability to care for herself and poor living conditions x 1month.     Inability to Care for Self  -bedbound with very limited capacity to walk with walker, unable to carry out ADLs  -pt is a social admission - no indication for serial CBCs and BMPs, unless clinically indicated  -PT/OT consult  -/ for placement in [facility]/NH    Asymptomatic Bacteruria   -h/o UTI, uses urinary pads  -positive U/A, f/u UCx results  -pt denies frequency, dysuria, discharge, suprapubic tenderness, no fevers, no chills  -Continue 1g ceftriaxone until UCx results    Back Pain  -Tylenol PRN    Osteoarthritis  -Tylenol PRN    HTN  -/70   -pt not on any antihypertensives  -Continue to monitor    Schizophrenia, Bipolar disorder, Anxiety  -pressured speech, tangential thinking  -Continue home meds                                                                                  ----------------------------------------------------  # DVT prophylaxis enoxaparin 40mg subq daily    # GI prophylaxis none    # Diet regular    # Activity Score (AM-PAC)    # Code status full     # Disposition Awaiting PT/OT/CM recommendation for NH/long term care facility                                                                              --------------------------------------------------------    # Handoff

## 2021-06-23 NOTE — PHYSICAL THERAPY INITIAL EVALUATION ADULT - GAIT DEVIATIONS NOTED, PT EVAL
crouch/decreased shahzad/increased time in double stance/decreased stride length/increased stride width

## 2021-06-23 NOTE — PHYSICAL THERAPY INITIAL EVALUATION ADULT - PERTINENT HX OF CURRENT PROBLEM, REHAB EVAL
Reliable, Intoxicated but A&OX3 61 y/o female with PMHx hydrocephalus, bipolar disorder, schizophrenia, anxiety, osteoarthritis, low back pain presented to ED for inability to care for herself and poor living conditions x 1month. Pt lives at home alone and endorsed that she does not receive in home assistance and family does not help. Pt has been having decreased ability to perform her ADL, including cooking, bathing, eating, walking, and cleaning.

## 2021-06-23 NOTE — OCCUPATIONAL THERAPY INITIAL EVALUATION ADULT - ADDITIONAL COMMENTS
As per pt report, resides on 1 level in condo with mandatory steps to enter. As per pt report, looking for d/c to rehab center to short term. As per pt report, previous level of function documented on level of function prior to last 5 days in home. As per pt report, warms up food in microwave and utilizes a laundry service.

## 2021-06-23 NOTE — DISCHARGE NOTE PROVIDER - HOSPITAL COURSE
59 y/o Female with PMHx hydrocephalus, bipolar disorder, schizophrenia, anxiety, osteoarthritis, low back pain presented to ED for inability to care for herself and poor living conditions x 1month. Patient is a social admission. She is bedbound with very limited capacity to walk with walker, and is unable to carry out ADLs. During her stay, she was evaluated by PT and OT and a plan was devised for her following discharge to a nursing home or long term rehab facility. She was found to have asymptomatic bacteruria that was given antibiotics empirically while awaiting UCx results. After UCx returned negative results, ABx were discontinued and patient was cleared for discharge to nursing home.   59 y/o Female with PMHx hydrocephalus, bipolar disorder, schizophrenia, anxiety, osteoarthritis, low back pain presented to ED for inability to care for herself and poor living conditions x 1month. Patient is a social admission. She is bedbound with very limited capacity to walk with walker, and is unable to carry out ADLs. During her stay, she was evaluated by PT and OT and a plan was devised for her following discharge to a nursing home or long term rehab facility. She was found to have asymptomatic bacteruria that was given antibiotics empirically while awaiting UCx results. After UCx returned klebsiella, she was given rosefin and discharged to [facility] in light of no prior MDR UCx results.

## 2021-06-23 NOTE — OCCUPATIONAL THERAPY INITIAL EVALUATION ADULT - BALANCE TRAINING, PT EVAL
In one week, pt will demonstrate  increase in s/d sitting/standing balance  by 1/2 grade, enabling increased independence in ADL/transfers.

## 2021-06-23 NOTE — CONSULT NOTE ADULT - ASSESSMENT
IMPRESSION: Rehab of gait dysfunction / uti    PRECAUTIONS: [   ] Cardiac  [   ] Respiratory  [   ] Seizures [   ] Contact Isolation  [   ] Droplet Isolation  [   ] Other    Weight Bearing Status:     RECOMMENDATION:    Out of Bed to Chair     DVT/Decubiti Prophylaxis    REHAB PLAN:     [  x  ] Bedside P/T 3-5 times a week   [    ]   Bedside O/T  2-3 times a week             [    ] Speech Therapy               [    ]  No Rehab Therapy Indicated   Conditioning/ROM                                    ADL  Bed Mobility                                               Conditioning/ROM  Transfers                                                     Bed Mobility  Sitting /Standing Balance                         Transfers                                        Gait Training                                               Sitting/Standing Balance  Stair Training [   ]Applicable                    Home equipment Eval                                                                        Splinting  [   ] Only      GOALS:   ADL   [    ]   Independent                    Transfers  [x    ] Independent                          Ambulation  [  x  ] Independent     [  x   ] With device                            [    ]  CG                                                         [    ]  CG                                                                  [    ] CG                            [    ] Min A                                                   [    ] Min A                                                              [    ] Min  A          DISCHARGE PLAN:   [    ]  Good candidate for Intensive Rehabilitation/Hospital based                                             Will tolerate 3hrs Intensive Rehab Daily                                       [ x    ]  Short Term Rehab in Skilled Nursing Facility / snf                                       [     ]  Home with Outpatient or VN services                                         [     ]  Possible Candidate for Intensive Hospital based Rehab

## 2021-06-23 NOTE — CONSULT NOTE ADULT - SUBJECTIVE AND OBJECTIVE BOX
HPI:  59 y/o female with PMHx hydrocephalus, bipolar disorder, schizophrenia, anxiety, osteoarthritis, low back pain presented to ED for inability to care for herself and poor living conditions x 1month. Pt lives at home alone and endorsed that she does not receive in home assistance and family does not help. Pt has been having decreased ability to perform her ADL, including cooking, bathing, eating, walking, and cleaning. She uses a walker. Pt reports subjective weight loss especially over the last week, but has not weighed herself. Over the past month she has had increased back pain secondary to increased bedrest. Pt was recently admitted for in 2021 for intractable vomiting and was discharged home. She expresses interest in being placed in a long term facility for assistance with her ADLs and for physical therapy.    Pt is a social admission.    ED vitals: /70, HR 92;  RR 18; SpO2 98% on RA;  T 98.6F.       PAST MEDICAL & SURGICAL HISTORY:  Schizophrenia    Bipolar disorder    Depression    Anxiety    Chronic lower back pain  result of pelvic fracture in the past    Osteoarthritis    Urinary incontinence    Chronic urinary tract infection    History of tremor    History of total knee arthroplasty, left        Hospital Course:    TODAY'S SUBJECTIVE & REVIEW OF SYMPTOMS:     Constitutional WNL   Cardio WNL   Resp WNL   GI WNL  Heme WNL  Endo WNL  Skin WNL  MSK Weakness  Neuro WNL  Cognitive WNL  Psych WNL      MEDICATIONS  (STANDING):  cefTRIAXone   IVPB 1000 milliGRAM(s) IV Intermittent every 24 hours  chlorhexidine 4% Liquid 1 Application(s) Topical <User Schedule>  clonazePAM  Tablet 1 milliGRAM(s) Oral daily  enoxaparin Injectable 40 milliGRAM(s) SubCutaneous at bedtime  OLANZapine 10 milliGRAM(s) Oral daily  phenazopyridine 100 milliGRAM(s) Oral every 8 hours  sertraline 100 milliGRAM(s) Oral daily  sodium chloride 0.9%. 1000 milliLiter(s) (75 mL/Hr) IV Continuous <Continuous>    MEDICATIONS  (PRN):  acetaminophen   Tablet .. 650 milliGRAM(s) Oral every 6 hours PRN Mild Pain (1 - 3), Moderate Pain (4 - 6)      FAMILY HISTORY:  FH: prostate cancer (Father)    FH: Parkinson&#x27;s disease (Mother)        Allergies    No Known Allergies    Intolerances        SOCIAL HISTORY:    [  ] Etoh  [  ] Smoking  [  ] Substance abuse     Home Environment:  [ x  ] Home Alone  [   ] Lives with Family  [   ] Home Health Aid    Dwelling:  [   ] Apartment  [x   ] Private House  [   ] Adult Home  [   ] Skilled Nursing Facility      [   ] Short Term  [   ] Long Term  [ x  ] Stairs       Elevator [   ]    FUNCTIONAL STATUS PTA: (Check all that apply)  Ambulation: [   x ]Independent    [   ] Dependent     [   ] Non-Ambulatory  Assistive Device: [   ] SA Cane  [   ]  Q Cane  [ x  ] Walker  [   ]  Wheelchair  ADL : [   ] Independent  [  x  ]  Dependent       Vital Signs Last 24 Hrs  T(C): 36.1 (2021 05:16), Max: 36.1 (2021 19:45)  T(F): 96.9 (2021 05:16), Max: 97 (2021 19:45)  HR: 87 (2021 05:16) (87 - 93)  BP: 130/69 (2021 05:16) (112/59 - 130/69)  BP(mean): --  RR: 18 (2021 05:16) (18 - 18)  SpO2: 98% (2021 07:51) (98% - 98%)      PHYSICAL EXAM: Awake & Alert  GENERAL: NAD  HEAD:  Normocephalic  CHEST/LUNG: Clear   HEART: S1S2+  ABDOMEN: Soft, Nontender  EXTREMITIES:  no calf tenderness    NERVOUS SYSTEM:  Cranial Nerves 2-12 intact [   ] Abnormal  [   ]  ROM: WFL all extremities [   ]  Abnormal [   ]able to move all ext - limited cooperation  Motor Strength: WFL all extremities  [   ]  Abnormal [   ]  Sensation: intact to light touch [   ] Abnormal [   ]    FUNCTIONAL STATUS:  Bed Mobility: Independent [   ]  Supervision [   ]  Needs Assistance [ x  ]  N/A [   ]  Transfers: Independent [   ]  Supervision [   ]  Needs Assistance [x   ]  N/A [   ]   Ambulation: Independent [   ]  Supervision [   ]  Needs Assistance [   ]  N/A [   ]  ADL: Independent [   ] Requires Assistance [   ] N/A [   ]      LABS:                        12.4   7.18  )-----------( 323      ( 2021 08:30 )             38.2     06-    140  |  106  |  15  ----------------------------<  103<H>  5.0   |  24  |  0.6<L>    Ca    9.9      2021 07:54    TPro  7.1  /  Alb  4.2  /  TBili  0.3  /  DBili  x   /  AST  27  /  ALT  15  /  AlkPhos  99  06-22      Urinalysis Basic - ( 2021 05:46 )    Color: Yellow / Appearance: Slightly Turbid / S.027 / pH: x  Gluc: x / Ketone: Moderate  / Bili: Negative / Urobili: <2 mg/dL   Blood: x / Protein: 30 mg/dL / Nitrite: Positive   Leuk Esterase: Moderate / RBC: 6 /HPF / WBC 23 /HPF   Sq Epi: x / Non Sq Epi: 1 /HPF / Bacteria: Many        RADIOLOGY & ADDITIONAL STUDIES:

## 2021-06-23 NOTE — DISCHARGE NOTE PROVIDER - NSDCCPCAREPLAN_GEN_ALL_CORE_FT
PRINCIPAL DISCHARGE DIAGNOSIS  Diagnosis: Unsatisfactory living conditions  Assessment and Plan of Treatment: Patient presented to the hospital because of unsatisfactory living conditions and inability to take care of herself for the past month. She lives at home and endorsed that she doesn't receive home assisstance from family. She has trouble with activities of daily living including eating, cooking, bathing, walking, and cleaning. She reported subjective weight loss and demonstrated interest in being placed at a nursing home or a long term care facility.      SECONDARY DISCHARGE DIAGNOSES  Diagnosis: UTI (urinary tract infection)  Assessment and Plan of Treatment: Patient was found to have asymptomatic bacteruria, which means that there was bacteria and white blood cells in her urine even though she didn't report any symptoms of burning urination, discharge, or flank/suprapubic pain. She received empiric ceftriaxone therapy while awaiting urine culture results which turned out to be negative, and the antibiotics were stopped thereafter.     PRINCIPAL DISCHARGE DIAGNOSIS  Diagnosis: Unsatisfactory living conditions  Assessment and Plan of Treatment: Patient presented to the hospital because of unsatisfactory living conditions and inability to take care of herself for the past month. She lives at home and endorsed that she doesn't receive home assisstance from family. She has trouble with activities of daily living including eating, cooking, bathing, walking, and cleaning. She reported subjective weight loss and demonstrated interest in being placed at a nursing home or a long term care facility.      SECONDARY DISCHARGE DIAGNOSES  Diagnosis: UTI (urinary tract infection)  Assessment and Plan of Treatment: Patient was found to have asymptomatic bacteruria, which means that there was bacteria and white blood cells in her urine even though she didn't report any symptoms of burning urination, discharge, or flank/suprapubic pain. She received empiric ceftriaxone therapy while awaiting urine culture results which showed klebsiella, and she was continued on outpatient ceftriaxone to be taken for the following week.

## 2021-06-23 NOTE — DISCHARGE NOTE PROVIDER - NSDCMRMEDTOKEN_GEN_ALL_CORE_FT
clonazePAM 1 mg oral tablet: 1 tab(s) orally once a day  diclofenac sodium 75 mg oral delayed release tablet: 1 tab(s) orally once a day  OLANZapine 10 mg oral tablet: 1 tab(s) orally once a day  sertraline 100 mg oral tablet: 1 tab(s) orally once a day  tiZANidine 4 mg oral tablet: 1 tab(s) orally once a day  Vitamin D3 5000 intl units (125 mcg) oral tablet: 1 tab(s) orally once a week   cephalexin 500 mg oral tablet: 1 tab(s) orally 2 times a day until 6/30  clonazePAM 1 mg oral tablet: 1 tab(s) orally once a day  diclofenac sodium 75 mg oral delayed release tablet: 1 tab(s) orally once a day  OLANZapine 10 mg oral tablet: 1 tab(s) orally once a day  sertraline 100 mg oral tablet: 1 tab(s) orally once a day  tiZANidine 4 mg oral tablet: 1 tab(s) orally once a day  Vitamin D3 5000 intl units (125 mcg) oral tablet: 1 tab(s) orally once a week

## 2021-06-24 LAB
ANION GAP SERPL CALC-SCNC: 8 MMOL/L — SIGNIFICANT CHANGE UP (ref 7–14)
BUN SERPL-MCNC: 10 MG/DL — SIGNIFICANT CHANGE UP (ref 10–20)
CALCIUM SERPL-MCNC: 9.2 MG/DL — SIGNIFICANT CHANGE UP (ref 8.5–10.1)
CHLORIDE SERPL-SCNC: 107 MMOL/L — SIGNIFICANT CHANGE UP (ref 98–110)
CO2 SERPL-SCNC: 26 MMOL/L — SIGNIFICANT CHANGE UP (ref 17–32)
CREAT SERPL-MCNC: 0.6 MG/DL — LOW (ref 0.7–1.5)
GLUCOSE SERPL-MCNC: 91 MG/DL — SIGNIFICANT CHANGE UP (ref 70–99)
HCT VFR BLD CALC: 38.7 % — SIGNIFICANT CHANGE UP (ref 37–47)
HGB BLD-MCNC: 12 G/DL — SIGNIFICANT CHANGE UP (ref 12–16)
MAGNESIUM SERPL-MCNC: 1.8 MG/DL — SIGNIFICANT CHANGE UP (ref 1.8–2.4)
MCHC RBC-ENTMCNC: 28.8 PG — SIGNIFICANT CHANGE UP (ref 27–31)
MCHC RBC-ENTMCNC: 31 G/DL — LOW (ref 32–37)
MCV RBC AUTO: 92.8 FL — SIGNIFICANT CHANGE UP (ref 81–99)
NRBC # BLD: 0 /100 WBCS — SIGNIFICANT CHANGE UP (ref 0–0)
PLATELET # BLD AUTO: 294 K/UL — SIGNIFICANT CHANGE UP (ref 130–400)
POTASSIUM SERPL-MCNC: 4.5 MMOL/L — SIGNIFICANT CHANGE UP (ref 3.5–5)
POTASSIUM SERPL-SCNC: 4.5 MMOL/L — SIGNIFICANT CHANGE UP (ref 3.5–5)
RBC # BLD: 4.17 M/UL — LOW (ref 4.2–5.4)
RBC # FLD: 13.9 % — SIGNIFICANT CHANGE UP (ref 11.5–14.5)
SODIUM SERPL-SCNC: 141 MMOL/L — SIGNIFICANT CHANGE UP (ref 135–146)
WBC # BLD: 6.77 K/UL — SIGNIFICANT CHANGE UP (ref 4.8–10.8)
WBC # FLD AUTO: 6.77 K/UL — SIGNIFICANT CHANGE UP (ref 4.8–10.8)

## 2021-06-24 RX ADMIN — Medication 100 MILLIGRAM(S): at 21:29

## 2021-06-24 RX ADMIN — ENOXAPARIN SODIUM 40 MILLIGRAM(S): 100 INJECTION SUBCUTANEOUS at 21:29

## 2021-06-24 RX ADMIN — Medication 650 MILLIGRAM(S): at 13:04

## 2021-06-24 RX ADMIN — Medication 100 MILLIGRAM(S): at 07:22

## 2021-06-24 RX ADMIN — OLANZAPINE 10 MILLIGRAM(S): 15 TABLET, FILM COATED ORAL at 11:02

## 2021-06-24 RX ADMIN — SERTRALINE 100 MILLIGRAM(S): 25 TABLET, FILM COATED ORAL at 11:02

## 2021-06-24 RX ADMIN — Medication 650 MILLIGRAM(S): at 19:38

## 2021-06-24 RX ADMIN — Medication 650 MILLIGRAM(S): at 12:12

## 2021-06-24 RX ADMIN — Medication 100 MILLIGRAM(S): at 13:04

## 2021-06-24 RX ADMIN — CEFTRIAXONE 100 MILLIGRAM(S): 500 INJECTION, POWDER, FOR SOLUTION INTRAMUSCULAR; INTRAVENOUS at 11:01

## 2021-06-24 RX ADMIN — Medication 1 MILLIGRAM(S): at 11:02

## 2021-06-24 RX ADMIN — Medication 10 MILLIGRAM(S): at 21:29

## 2021-06-24 NOTE — PROGRESS NOTE ADULT - ASSESSMENT
ASSESSMENT & PLAN    59 y/o Female with PMHx hydrocephalus, bipolar disorder, schizophrenia, anxiety, osteoarthritis, low back pain presented to ED for inability to care for herself and poor living conditions x 1month.     Inability to Care for Self  -bedbound with very limited capacity to walk with walker, unable to carry out ADLs  -pt is a social admission - no indication for serial CBCs and BMPs, unless clinically indicated  -PT/OT consult recommended bedside PT x3-5/week  -/ for placement in [facility]    Asymptomatic Bacteruria   -h/o UTI, uses urinary pads  -positive U/A, UCx shows klebsiella >100k, sensitivities pending  -pt denies frequency, dysuria, discharge, suprapubic tenderness, no fevers, no chills  -Continue 1g ceftriaxone empiric until UCx results    Back Pain  -Tylenol PRN    Osteoarthritis  -Tylenol PRN  -Diclofenac    HTN  -/70   -pt not on any antihypertensives  -Continue to monitor    Schizophrenia, Bipolar disorder, Anxiety  -pressured speech, tangential thinking  -Continue home meds                                                                                  ----------------------------------------------------  # DVT prophylaxis enoxaparin 40mg subq daily    # GI prophylaxis     # Diet Regular    # Activity Score (AM-PAC)    # Code status Full     # Disposition                                                                              --------------------------------------------------------    # Handoff

## 2021-06-25 ENCOUNTER — TRANSCRIPTION ENCOUNTER (OUTPATIENT)
Age: 61
End: 2021-06-25

## 2021-06-25 VITALS
RESPIRATION RATE: 19 BRPM | HEART RATE: 88 BPM | TEMPERATURE: 99 F | DIASTOLIC BLOOD PRESSURE: 69 MMHG | SYSTOLIC BLOOD PRESSURE: 117 MMHG

## 2021-06-25 LAB
ANION GAP SERPL CALC-SCNC: 6 MMOL/L — LOW (ref 7–14)
BASOPHILS # BLD AUTO: 0.11 K/UL — SIGNIFICANT CHANGE UP (ref 0–0.2)
BASOPHILS NFR BLD AUTO: 1.5 % — HIGH (ref 0–1)
BUN SERPL-MCNC: 13 MG/DL — SIGNIFICANT CHANGE UP (ref 10–20)
CALCIUM SERPL-MCNC: 9.3 MG/DL — SIGNIFICANT CHANGE UP (ref 8.5–10.1)
CHLORIDE SERPL-SCNC: 106 MMOL/L — SIGNIFICANT CHANGE UP (ref 98–110)
CO2 SERPL-SCNC: 28 MMOL/L — SIGNIFICANT CHANGE UP (ref 17–32)
CREAT SERPL-MCNC: 0.7 MG/DL — SIGNIFICANT CHANGE UP (ref 0.7–1.5)
EOSINOPHIL # BLD AUTO: 0.87 K/UL — HIGH (ref 0–0.7)
EOSINOPHIL NFR BLD AUTO: 11.5 % — HIGH (ref 0–8)
GLUCOSE SERPL-MCNC: 81 MG/DL — SIGNIFICANT CHANGE UP (ref 70–99)
HCT VFR BLD CALC: 38.2 % — SIGNIFICANT CHANGE UP (ref 37–47)
HGB BLD-MCNC: 11.9 G/DL — LOW (ref 12–16)
IMM GRANULOCYTES NFR BLD AUTO: 0.1 % — SIGNIFICANT CHANGE UP (ref 0.1–0.3)
LYMPHOCYTES # BLD AUTO: 3.24 K/UL — SIGNIFICANT CHANGE UP (ref 1.2–3.4)
LYMPHOCYTES # BLD AUTO: 42.8 % — SIGNIFICANT CHANGE UP (ref 20.5–51.1)
MAGNESIUM SERPL-MCNC: 1.9 MG/DL — SIGNIFICANT CHANGE UP (ref 1.8–2.4)
MCHC RBC-ENTMCNC: 29.2 PG — SIGNIFICANT CHANGE UP (ref 27–31)
MCHC RBC-ENTMCNC: 31.2 G/DL — LOW (ref 32–37)
MCV RBC AUTO: 93.6 FL — SIGNIFICANT CHANGE UP (ref 81–99)
MONOCYTES # BLD AUTO: 0.52 K/UL — SIGNIFICANT CHANGE UP (ref 0.1–0.6)
MONOCYTES NFR BLD AUTO: 6.9 % — SIGNIFICANT CHANGE UP (ref 1.7–9.3)
NEUTROPHILS # BLD AUTO: 2.82 K/UL — SIGNIFICANT CHANGE UP (ref 1.4–6.5)
NEUTROPHILS NFR BLD AUTO: 37.2 % — LOW (ref 42.2–75.2)
NRBC # BLD: 0 /100 WBCS — SIGNIFICANT CHANGE UP (ref 0–0)
PLATELET # BLD AUTO: 311 K/UL — SIGNIFICANT CHANGE UP (ref 130–400)
POTASSIUM SERPL-MCNC: 4.5 MMOL/L — SIGNIFICANT CHANGE UP (ref 3.5–5)
POTASSIUM SERPL-SCNC: 4.5 MMOL/L — SIGNIFICANT CHANGE UP (ref 3.5–5)
RBC # BLD: 4.08 M/UL — LOW (ref 4.2–5.4)
RBC # FLD: 13.9 % — SIGNIFICANT CHANGE UP (ref 11.5–14.5)
SARS-COV-2 RNA SPEC QL NAA+PROBE: SIGNIFICANT CHANGE UP
SODIUM SERPL-SCNC: 140 MMOL/L — SIGNIFICANT CHANGE UP (ref 135–146)
WBC # BLD: 7.57 K/UL — SIGNIFICANT CHANGE UP (ref 4.8–10.8)
WBC # FLD AUTO: 7.57 K/UL — SIGNIFICANT CHANGE UP (ref 4.8–10.8)

## 2021-06-25 RX ORDER — CEPHALEXIN 500 MG
1 CAPSULE ORAL
Qty: 15 | Refills: 0
Start: 2021-06-25 | End: 2021-06-29

## 2021-06-25 RX ORDER — CEPHALEXIN 500 MG
1 CAPSULE ORAL
Qty: 10 | Refills: 0
Start: 2021-06-25 | End: 2021-06-29

## 2021-06-25 RX ADMIN — Medication 100 MILLIGRAM(S): at 22:20

## 2021-06-25 RX ADMIN — Medication 100 MILLIGRAM(S): at 13:16

## 2021-06-25 RX ADMIN — SERTRALINE 100 MILLIGRAM(S): 25 TABLET, FILM COATED ORAL at 11:07

## 2021-06-25 RX ADMIN — Medication 100 MILLIGRAM(S): at 05:42

## 2021-06-25 RX ADMIN — CHLORHEXIDINE GLUCONATE 1 APPLICATION(S): 213 SOLUTION TOPICAL at 05:42

## 2021-06-25 RX ADMIN — Medication 1 MILLIGRAM(S): at 11:06

## 2021-06-25 RX ADMIN — OLANZAPINE 10 MILLIGRAM(S): 15 TABLET, FILM COATED ORAL at 11:07

## 2021-06-25 RX ADMIN — DICLOFENAC SODIUM 75 MILLIGRAM(S): 75 TABLET, DELAYED RELEASE ORAL at 09:45

## 2021-06-25 RX ADMIN — CEFTRIAXONE 100 MILLIGRAM(S): 500 INJECTION, POWDER, FOR SOLUTION INTRAMUSCULAR; INTRAVENOUS at 11:11

## 2021-06-25 RX ADMIN — ENOXAPARIN SODIUM 40 MILLIGRAM(S): 100 INJECTION SUBCUTANEOUS at 22:21

## 2021-06-25 RX ADMIN — DICLOFENAC SODIUM 75 MILLIGRAM(S): 75 TABLET, DELAYED RELEASE ORAL at 09:18

## 2021-06-25 NOTE — PROGRESS NOTE ADULT - SUBJECTIVE AND OBJECTIVE BOX
COMFORT FARIAS 60y Female  MRN#: [id number]   CODE STATUS:________    Hospital Day: 2d    Pt is currently admitted with the primary diagnosis of UTI and placement into Long term rehab/NH    SUBJECTIVE  Hospital Course  Patient was doing well, didn't have any complaints    Overnight events   No overnight events    Subjective complaints  Complained of knee pain yesterday that was relieved with diclofenac (upon her request)     Present Today:   - Mcduffie:  No [x], Yes [   ] : Indication:     - Type of IV Access:       .. CVC/Piccline:  No [  ], Yes [   ] : Indication:       .. Midline: No [  ], Yes [   ] : Indication:                                             ----------------------------------------------------------  OBJECTIVE  PAST MEDICAL & SURGICAL HISTORY  Schizophrenia    Bipolar disorder    Depression    Anxiety    Chronic lower back pain  result of pelvic fracture in the past    Osteoarthritis    Urinary incontinence    Chronic urinary tract infection    History of tremor    History of total knee arthroplasty, left                                              -----------------------------------------------------------  ALLERGIES:  No Known Allergies                                            ------------------------------------------------------------    HOME MEDICATIONS  Home Medications:  clonazePAM 1 mg oral tablet: 1 tab(s) orally once a day (12 May 2021 11:58)  diclofenac sodium 75 mg oral delayed release tablet: 1 tab(s) orally once a day (12 May 2021 11:58)  OLANZapine 10 mg oral tablet: 1 tab(s) orally once a day (12 May 2021 11:58)  sertraline 100 mg oral tablet: 1 tab(s) orally once a day (12 May 2021 11:58)  tiZANidine 4 mg oral tablet: 1 tab(s) orally once a day (12 May 2021 11:58)  Vitamin D3 5000 intl units (125 mcg) oral tablet: 1 tab(s) orally once a week (12 May 2021 11:58)                           MEDICATIONS:  STANDING MEDICATIONS  cefTRIAXone   IVPB 1000 milliGRAM(s) IV Intermittent every 24 hours  chlorhexidine 4% Liquid 1 Application(s) Topical <User Schedule>  clonazePAM  Tablet 1 milliGRAM(s) Oral daily  enoxaparin Injectable 40 milliGRAM(s) SubCutaneous at bedtime  OLANZapine 10 milliGRAM(s) Oral daily  phenazopyridine 100 milliGRAM(s) Oral every 8 hours  sertraline 100 milliGRAM(s) Oral daily    PRN MEDICATIONS  acetaminophen   Tablet .. 650 milliGRAM(s) Oral every 6 hours PRN  diclofenac 75 milliGRAM(s) Oral daily PRN  melatonin 10 milliGRAM(s) Oral at bedtime PRN                                            ------------------------------------------------------------  VITAL SIGNS: Last 24 Hours  T(C): 35.9 (24 Jun 2021 05:51), Max: 36.6 (23 Jun 2021 19:41)  T(F): 96.7 (24 Jun 2021 05:51), Max: 97.8 (23 Jun 2021 19:41)  HR: 55 (24 Jun 2021 05:51) (55 - 88)  BP: 167/83 (24 Jun 2021 05:51) (130/68 - 167/83)  BP(mean): --  RR: 18 (24 Jun 2021 05:51) (18 - 18)  SpO2: --                                             --------------------------------------------------------------  LABS:                        12.0   6.77  )-----------( 294      ( 24 Jun 2021 04:30 )             38.7     06-24    141  |  107  |  10  ----------------------------<  91  4.5   |  26  |  0.6<L>    Ca    9.2      24 Jun 2021 04:30  Mg     1.8     06-24                  Culture - Urine (collected 22 Jun 2021 05:46)  Source: .Urine Clean Catch (Midstream)  Preliminary Report (24 Jun 2021 05:31):    >100,000 CFU/ml Klebsiella pneumoniae                                                    -------------------------------------------------------------  RADIOLOGY: No new imaging                                            --------------------------------------------------------------    PHYSICAL EXAM:  General: NAD, mild tremor, weakness, pressured speech.  HEENT: atraumatic  LUNGS: Normal breath sounds, no wheezes/crackles.  HEART: RRR, no murmurs, rubs, or gallops  ABDOMEN: Soft, NT/ND  EXT: mild bilateral LE ankle edema  NEURO: generalized tremor and weakness but alert and oriented  SKIN: no rashes or bruises                                           --------------------------------------------------------------
Patient was seen and examined. Spoke with HO. Chart reviewed.  No events overnight.  Vital Signs Last 24 Hrs  T(F): 96.5 (25 Jun 2021 05:24), Max: 97.9 (24 Jun 2021 14:15)  HR: 74 (25 Jun 2021 05:24) (64 - 82)  BP: 114/73 (25 Jun 2021 05:24) (114/73 - 136/72)  SpO2: --  MEDICATIONS  (STANDING):  cefTRIAXone   IVPB 1000 milliGRAM(s) IV Intermittent every 24 hours  chlorhexidine 4% Liquid 1 Application(s) Topical <User Schedule>  clonazePAM  Tablet 1 milliGRAM(s) Oral daily  enoxaparin Injectable 40 milliGRAM(s) SubCutaneous at bedtime  OLANZapine 10 milliGRAM(s) Oral daily  phenazopyridine 100 milliGRAM(s) Oral every 8 hours  sertraline 100 milliGRAM(s) Oral daily    MEDICATIONS  (PRN):  acetaminophen   Tablet .. 650 milliGRAM(s) Oral every 6 hours PRN Mild Pain (1 - 3), Moderate Pain (4 - 6)  diclofenac 75 milliGRAM(s) Oral daily PRN Mild Pain (1 - 3)  melatonin 10 milliGRAM(s) Oral at bedtime PRN Sleep    Labs:                        12.0   6.77  )-----------( 294      ( 24 Jun 2021 04:30 )             38.7     24 Jun 2021 04:30    141    |  107    |  10     ----------------------------<  91     4.5     |  26     |  0.6      Ca    9.2        24 Jun 2021 04:30  Mg     1.8       24 Jun 2021 04:30            General: comfortable, NAD      A/P:  61 y/o female with PMHx hydrocephalus, bipolar disorder, schizophrenia, anxiety, osteoarthritis, low back pain presented to ED for inability to walk and care for herself ; noted with klebsiella UTI    empiric IV abx    f/u sensitivities of urine cultures for targeted abx     encourage PO intake    OOB to chair    PT/rehab eval    outpt neurology and psych f/u    case management/ social service    DC planning  DVT prophylaxis  Decubitus prevention- all measures as per RN protocol  Please call or text me with any questions or updates      
Patient was seen and examined. Spoke with HO. Chart reviewed.  No events overnight.  Vital Signs Last 24 Hrs  T(F): 96.7 (24 Jun 2021 05:51), Max: 97.8 (23 Jun 2021 19:41)  HR: 55 (24 Jun 2021 05:51) (55 - 88)  BP: 167/83 (24 Jun 2021 05:51) (130/68 - 167/83)  SpO2: 98% (23 Jun 2021 07:51) (98% - 98%)  MEDICATIONS  (STANDING):  cefTRIAXone   IVPB 1000 milliGRAM(s) IV Intermittent every 24 hours  chlorhexidine 4% Liquid 1 Application(s) Topical <User Schedule>  clonazePAM  Tablet 1 milliGRAM(s) Oral daily  enoxaparin Injectable 40 milliGRAM(s) SubCutaneous at bedtime  OLANZapine 10 milliGRAM(s) Oral daily  phenazopyridine 100 milliGRAM(s) Oral every 8 hours  sertraline 100 milliGRAM(s) Oral daily  sodium chloride 0.9%. 1000 milliLiter(s) (75 mL/Hr) IV Continuous <Continuous>    MEDICATIONS  (PRN):  acetaminophen   Tablet .. 650 milliGRAM(s) Oral every 6 hours PRN Mild Pain (1 - 3), Moderate Pain (4 - 6)  diclofenac 75 milliGRAM(s) Oral daily PRN Mild Pain (1 - 3)  melatonin 10 milliGRAM(s) Oral at bedtime PRN Sleep    Labs:                        12.4   7.18  )-----------( 323      ( 22 Jun 2021 08:30 )             38.2     22 Jun 2021 07:54    140    |  106    |  15     ----------------------------<  103    5.0     |  24     |  0.6      Ca    9.9        22 Jun 2021 07:54    TPro  7.1    /  Alb  4.2    /  TBili  0.3    /  DBili  x      /  AST  27     /  ALT  15     /  AlkPhos  99     22 Jun 2021 07:54          Culture - Urine (collected 22 Jun 2021 05:46)  Source: .Urine Clean Catch (Midstream)  Preliminary Report (24 Jun 2021 05:31):    >100,000 CFU/ml Klebsiella pneumoniae      General: comfortable, NAD      A/P:  59 y/o female with PMHx hydrocephalus, bipolar disorder, schizophrenia, anxiety, osteoarthritis, low back pain presented to ED for inability to walk and care for herself ; noted with klebsiella UTI    empiric IV abx    f/u sensitivities of urine cultures for targeted abx     encourage PO intake    OOB to chair    PT/rehab eval    outpt neurology and psych f/u    case management/ social service    DC planning    DVT prophylaxis  Decubitus prevention- all measures as per RN protocol  Please call or text me with any questions or updates      
COMFORT FARIAS 60y Female  MRN#: [id number]   CODE STATUS:________    Hospital Day: 1d    Pt is currently admitted with the primary diagnosis of UTI and placement into a long term rehab/NH    SUBJECTIVE  Hospital Course  Patient is not complaining of any dysuria, discharge, or abdominal pain. Will be seen by PT/OT.    Overnight events   No events overnight.    Subjective complaints   No complaints except showing interest to be placed in a long term facility for assistance.    Present Today:   - Mcduffie:  No [  ], Yes [   ] : Indication:     - Type of IV Access:       .. CVC/Piccline:  No [  ], Yes [   ] : Indication:       .. Midline: No [  ], Yes [   ] : Indication:                                             ----------------------------------------------------------  OBJECTIVE  PAST MEDICAL & SURGICAL HISTORY  Schizophrenia    Bipolar disorder    Depression    Anxiety    Chronic lower back pain  result of pelvic fracture in the past    Osteoarthritis    Urinary incontinence    Chronic urinary tract infection    History of tremor    History of total knee arthroplasty, left                                              -----------------------------------------------------------  ALLERGIES:  No Known Allergies                                            ------------------------------------------------------------    HOME MEDICATIONS  Home Medications:  clonazePAM 1 mg oral tablet: 1 tab(s) orally once a day (12 May 2021 11:58)  diclofenac sodium 75 mg oral delayed release tablet: 1 tab(s) orally once a day (12 May 2021 11:58)  OLANZapine 10 mg oral tablet: 1 tab(s) orally once a day (12 May 2021 11:58)  sertraline 100 mg oral tablet: 1 tab(s) orally once a day (12 May 2021 11:58)  tiZANidine 4 mg oral tablet: 1 tab(s) orally once a day (12 May 2021 11:58)  Vitamin D3 5000 intl units (125 mcg) oral tablet: 1 tab(s) orally once a week (12 May 2021 11:58)                           MEDICATIONS:  STANDING MEDICATIONS  cefTRIAXone   IVPB 1000 milliGRAM(s) IV Intermittent every 24 hours  chlorhexidine 4% Liquid 1 Application(s) Topical <User Schedule>  clonazePAM  Tablet 1 milliGRAM(s) Oral daily  enoxaparin Injectable 40 milliGRAM(s) SubCutaneous at bedtime  OLANZapine 10 milliGRAM(s) Oral daily  phenazopyridine 100 milliGRAM(s) Oral every 8 hours  sertraline 100 milliGRAM(s) Oral daily  sodium chloride 0.9%. 1000 milliLiter(s) IV Continuous <Continuous>    PRN MEDICATIONS  acetaminophen   Tablet .. 650 milliGRAM(s) Oral every 6 hours PRN                                            ------------------------------------------------------------  VITAL SIGNS: Last 24 Hours  T(C): 36.1 (2021 05:16), Max: 36.4 (2021 12:27)  T(F): 96.9 (2021 05:16), Max: 97.6 (2021 12:27)  HR: 87 (2021 05:16) (85 - 93)  BP: 130/69 (2021 05:16) (112/59 - 159/72)  BP(mean): --  RR: 18 (2021 05:16) (18 - 18)  SpO2: 98% (2021 07:51) (98% - 98%)                                             --------------------------------------------------------------  LABS:                        12.4   7.18  )-----------( 323      ( 2021 08:30 )             38.2     06-22    140  |  106  |  15  ----------------------------<  103<H>  5.0   |  24  |  0.6<L>    Ca    9.9      2021 07:54    TPro  7.1  /  Alb  4.2  /  TBili  0.3  /  DBili  x   /  AST  27  /  ALT  15  /  AlkPhos  99  06-22      Urinalysis Basic - ( 2021 05:46 )    Color: Yellow / Appearance: Slightly Turbid / S.027 / pH: x  Gluc: x / Ketone: Moderate  / Bili: Negative / Urobili: <2 mg/dL   Blood: x / Protein: 30 mg/dL / Nitrite: Positive   Leuk Esterase: Moderate / RBC: 6 /HPF / WBC 23 /HPF   Sq Epi: x / Non Sq Epi: 1 /HPF / Bacteria: Many                                                            -------------------------------------------------------------  RADIOLOGY: No imaging this visit                                            --------------------------------------------------------------    PHYSICAL EXAM:  General: NAD, mild tremor, weakness, pressured speech.  HEENT: atraumatic  LUNGS: Normal breath sounds, no wheezes/crackles.  HEART: RRR, no murmurs, rubs, or gallops  ABDOMEN: Soft, NT/ND  EXT: mild bilateral LE ankle edema  NEURO: generalized tremor and weakness but alert and oriented  SKIN: no rashes or bruises                                           -------------------------------------------------------------

## 2021-06-25 NOTE — PROGRESS NOTE ADULT - REASON FOR ADMISSION
UTI and placement into Long term rehab/NH

## 2021-06-25 NOTE — DISCHARGE NOTE NURSING/CASE MANAGEMENT/SOCIAL WORK - PATIENT PORTAL LINK FT
You can access the FollowMyHealth Patient Portal offered by [facility] by registering at the following website: [email or web address]. By joining [facility]’s FollowMyHealth portal, you will also be able to view your health information using other applications (apps) compatible with our system.

## 2021-07-02 DIAGNOSIS — F41.9 ANXIETY DISORDER, UNSPECIFIED: ICD-10-CM

## 2021-07-02 DIAGNOSIS — M54.5 LOW BACK PAIN: ICD-10-CM

## 2021-07-02 DIAGNOSIS — G89.29 OTHER CHRONIC PAIN: ICD-10-CM

## 2021-07-02 DIAGNOSIS — Z74.2 NEED FOR ASSISTANCE AT HOME AND NO OTHER HOUSEHOLD MEMBER ABLE TO RENDER CARE: ICD-10-CM

## 2021-07-02 DIAGNOSIS — N39.0 URINARY TRACT INFECTION, SITE NOT SPECIFIED: ICD-10-CM

## 2021-07-02 DIAGNOSIS — F20.9 SCHIZOPHRENIA, UNSPECIFIED: ICD-10-CM

## 2021-07-02 DIAGNOSIS — F17.210 NICOTINE DEPENDENCE, CIGARETTES, UNCOMPLICATED: ICD-10-CM

## 2021-07-02 DIAGNOSIS — Z74.1 NEED FOR ASSISTANCE WITH PERSONAL CARE: ICD-10-CM

## 2021-07-02 DIAGNOSIS — F31.9 BIPOLAR DISORDER, UNSPECIFIED: ICD-10-CM

## 2021-07-02 DIAGNOSIS — B96.1 KLEBSIELLA PNEUMONIAE [K. PNEUMONIAE] AS THE CAUSE OF DISEASES CLASSIFIED ELSEWHERE: ICD-10-CM

## 2021-07-02 DIAGNOSIS — M19.90 UNSPECIFIED OSTEOARTHRITIS, UNSPECIFIED SITE: ICD-10-CM

## 2021-07-02 DIAGNOSIS — G91.9 HYDROCEPHALUS, UNSPECIFIED: ICD-10-CM

## 2021-10-24 NOTE — OCCUPATIONAL THERAPY INITIAL EVALUATION ADULT - ADL RETRAINING, OT EVAL
Monitor blood pressures  Avoid hypotension In one week, pt will demonstrate LB dressing with mos assist with use of AE as needed.

## 2021-11-10 ENCOUNTER — EMERGENCY (EMERGENCY)
Facility: HOSPITAL | Age: 61
LOS: 0 days | Discharge: HOME | End: 2021-11-11
Attending: EMERGENCY MEDICINE | Admitting: EMERGENCY MEDICINE
Payer: MEDICARE

## 2021-11-10 VITALS
DIASTOLIC BLOOD PRESSURE: 69 MMHG | OXYGEN SATURATION: 98 % | TEMPERATURE: 98 F | HEART RATE: 95 BPM | SYSTOLIC BLOOD PRESSURE: 130 MMHG | HEIGHT: 59 IN | RESPIRATION RATE: 18 BRPM

## 2021-11-10 DIAGNOSIS — F31.9 BIPOLAR DISORDER, UNSPECIFIED: ICD-10-CM

## 2021-11-10 DIAGNOSIS — Z96.652 PRESENCE OF LEFT ARTIFICIAL KNEE JOINT: ICD-10-CM

## 2021-11-10 DIAGNOSIS — F41.9 ANXIETY DISORDER, UNSPECIFIED: ICD-10-CM

## 2021-11-10 DIAGNOSIS — M19.90 UNSPECIFIED OSTEOARTHRITIS, UNSPECIFIED SITE: ICD-10-CM

## 2021-11-10 DIAGNOSIS — F32.A DEPRESSION, UNSPECIFIED: ICD-10-CM

## 2021-11-10 DIAGNOSIS — N39.0 URINARY TRACT INFECTION, SITE NOT SPECIFIED: ICD-10-CM

## 2021-11-10 DIAGNOSIS — R11.2 NAUSEA WITH VOMITING, UNSPECIFIED: ICD-10-CM

## 2021-11-10 DIAGNOSIS — I10 ESSENTIAL (PRIMARY) HYPERTENSION: ICD-10-CM

## 2021-11-10 DIAGNOSIS — Z96.652 PRESENCE OF LEFT ARTIFICIAL KNEE JOINT: Chronic | ICD-10-CM

## 2021-11-10 DIAGNOSIS — Z88.1 ALLERGY STATUS TO OTHER ANTIBIOTIC AGENTS STATUS: ICD-10-CM

## 2021-11-10 DIAGNOSIS — Z88.2 ALLERGY STATUS TO SULFONAMIDES: ICD-10-CM

## 2021-11-10 DIAGNOSIS — K27.9 PEPTIC ULCER, SITE UNSPECIFIED, UNSPECIFIED AS ACUTE OR CHRONIC, WITHOUT HEMORRHAGE OR PERFORATION: ICD-10-CM

## 2021-11-10 DIAGNOSIS — F20.9 SCHIZOPHRENIA, UNSPECIFIED: ICD-10-CM

## 2021-11-10 DIAGNOSIS — R30.0 DYSURIA: ICD-10-CM

## 2021-11-10 DIAGNOSIS — Z88.0 ALLERGY STATUS TO PENICILLIN: ICD-10-CM

## 2021-11-10 DIAGNOSIS — R10.30 LOWER ABDOMINAL PAIN, UNSPECIFIED: ICD-10-CM

## 2021-11-10 PROCEDURE — 99284 EMERGENCY DEPT VISIT MOD MDM: CPT

## 2021-11-10 RX ORDER — SODIUM CHLORIDE 9 MG/ML
1000 INJECTION, SOLUTION INTRAVENOUS ONCE
Refills: 0 | Status: COMPLETED | OUTPATIENT
Start: 2021-11-10 | End: 2021-11-10

## 2021-11-10 RX ORDER — ONDANSETRON 8 MG/1
4 TABLET, FILM COATED ORAL ONCE
Refills: 0 | Status: COMPLETED | OUTPATIENT
Start: 2021-11-10 | End: 2021-11-10

## 2021-11-10 RX ORDER — KETOROLAC TROMETHAMINE 30 MG/ML
15 SYRINGE (ML) INJECTION ONCE
Refills: 0 | Status: DISCONTINUED | OUTPATIENT
Start: 2021-11-10 | End: 2021-11-10

## 2021-11-10 RX ADMIN — SODIUM CHLORIDE 1000 MILLILITER(S): 9 INJECTION, SOLUTION INTRAVENOUS at 21:28

## 2021-11-10 RX ADMIN — ONDANSETRON 4 MILLIGRAM(S): 8 TABLET, FILM COATED ORAL at 21:28

## 2021-11-10 RX ADMIN — Medication 15 MILLIGRAM(S): at 21:28

## 2021-11-10 NOTE — ED PROVIDER NOTE - PHYSICAL EXAMINATION
CONSTITUTIONAL: well-appearing, in NAD  SKIN: Warm dry, normal skin turgor  HEAD: NCAT  EYES: EOMI, PERRLA, no scleral icterus, conjunctiva pink  ENT: normal pharynx with no erythema or exudates  NECK: Supple; non tender. Full ROM.  CARD: RRR, no murmurs.  RESP: clear to ausculation b/l. No crackles or wheezing.  ABD: soft, mildly tender in suprapubic region, non-distended, no rebound or guarding.  EXT: Full ROM, no bony tenderness, no pedal edema, no calf tenderness  NEURO: normal motor. normal sensory. Normal gait.  PSYCH: Cooperative, appropriate.

## 2021-11-10 NOTE — ED PROVIDER NOTE - CARE PROVIDER_API CALL
Sage Patricia (DO)  Gastroenterology  [address] [place] [id number]  Phone: [phone]  Fax: [phone]  Follow Up Time: 1-3 Days

## 2021-11-10 NOTE — ED PROVIDER NOTE - NSFOLLOWUPINSTRUCTIONS_ED_ALL_ED_FT
Urinary Tract Infection    A urinary tract infection (UTI) is an infection of any part of the urinary tract, which includes the kidneys, ureters, bladder, and urethra. Risk factors include ignoring your need to urinate, wiping back to front if female, being an uncircumcised male, and having diabetes or a weak immune system. Symptoms include frequent urination, pain or burning with urination, foul smelling urine, cloudy urine, pain in the lower abdomen, blood in the urine, and fever. If you were prescribed an antibiotic medicine, take it as told by your health care provider. Do not stop taking the antibiotic even if you start to feel better.    SEEK IMMEDIATE MEDICAL CARE IF YOU HAVE ANY OF THE FOLLOWING SYMPTOMS: severe back or abdominal pain, fever, inability to keep fluids or medicine down, dizziness/lightheadedness, or a change in mental status.      Peptic Ulcer    WHAT YOU NEED TO KNOW:    A peptic ulcer is an open sore in the lining of your stomach, intestine, or esophagus. Peptic ulcers have different names, depending on their location. Gastric ulcers are peptic ulcers in the stomach. Duodenal ulcers are peptic ulcers in the intestine. Esophageal ulcers are peptic ulcers in the esophagus. Peptic ulcers may be a short-term or long-term problem.    Digestive Tract         DISCHARGE INSTRUCTIONS:    Call your local emergency number (911 in the ) if:   •You have a fast heartbeat or fast breathing.      •You are too dizzy or weak to stand up.      •You vomit bright red blood.      •You have bright red blood in your bowel movement.      Seek care immediately if:   •You have severe pain in your stomach.      •Your vomit looks like coffee grounds.      •Your bowel movements are black.      •You have sudden shortness of breath.      Call your doctor if:   •You have a fever.      •You have diarrhea or constipation.      •Your stomach pain does not go away or gets worse after you take medicine.      •You have questions or concerns about your condition or care.      Medicines: You may need any of the following:  •Antacids decrease stomach acid.      •Antiulcer medicines help decrease the amount of acid made by the stomach. These help relieve pain and heal or prevent ulcers.      •Antibiotics help kill bacteria.  •Take your medicine as directed. Contact your healthcare provider if you think your medicine is not helping or if you have side effects. Tell him or her if you are allergic to any medicine. Keep a list of the medicines, vitamins, and herbs you take. Include the amounts, and when and why you take them. Bring the list or the pill bottles to follow-up visits. Carry your medicine list with you in case of an emergency.          Nutrition:   •Do not have carbonated drinks, alcohol, or caffeine. Caffeine is found in some coffees, teas, and sodas. It is also found in chocolate.      •Do not eat foods that upset your stomach. These include spicy or acidic foods, such as oranges.      •Eat small meals more often rather than big meals less often. An empty stomach may make your symptoms worse.      Do not smoke: Smoking increases your risk of developing ulcers. Nicotine and other chemicals in cigarettes and cigars can also cause lung damage. Ask your healthcare provider for information if you currently smoke and need help to quit. E-cigarettes or smokeless tobacco still contain nicotine. Talk to your healthcare provider before you use these products.    Follow up with your doctor as directed: Write down your questions so you remember to ask them during your visits.

## 2021-11-10 NOTE — ED PROVIDER NOTE - NS ED ROS FT
Constitutional:  (-) fever, (-) chills, (-) lethargy  Eyes:  (-) eye pain (-) visual changes  ENMT: (-) nasal discharge, (-) sore throat. (-) neck pain or stiffness  Cardiac: (-) chest pain (-) palpitations  Respiratory:  (-) cough (-) respiratory distress.   GI:  (+) nausea (+) vomiting (-) diarrhea (+) abdominal pain.  :  (-) dysuria (-) frequency (-) burning.  MS:  (-) back pain (-) joint pain.  Neuro:  (-) headache (-) numbness (-) tingling (-) focal weakness  Skin:  (-) rash  Except as documented in the HPI,  all other systems are negative

## 2021-11-10 NOTE — ED PROVIDER NOTE - OBJECTIVE STATEMENT
60 yo F with PMH of HTN, anxiety, bipolar presenting for constant, achy, non-radiating suprapubic abdominal pain, not related to movement or PO intake. Patient also endorses nausea during this time with multiple episodes of NBNB vomiting in the past two days. Denies fever, new foods/medications, dizziness, vision changes, cold/flu symptoms, CP, SOB, diarrhea, dysuria, hematuria, melena, hematochezia.

## 2021-11-10 NOTE — ED PROVIDER NOTE - PATIENT PORTAL LINK FT
You can access the FollowMyHealth Patient Portal offered by [facility] by registering at the following website: [email or web address]. By joining [employer]’s FollowMyHealth portal, you will also be able to view your health information using other applications (apps) compatible with our system.

## 2021-11-10 NOTE — ED PROVIDER NOTE - ATTENDING CONTRIBUTION TO CARE
61 year old female, pmhx as documented presenting with diffuse abdominal pain and dysuria x 4 days. States pain is crampy, non-radiating, no palliative or provocative factors, moderate severity. Denies having this before. Otherwise denies fevers, chest pain, dyspnea, N/V/D, blood in stool or any other complaints.    Vital Signs: I have reviewed the initial vital signs.  Constitutional: NAD, well-nourished, appears stated age, no acute distress.  HEENT: Airway patent, moist MM, no erythema/swelling/deformity of oral structures. EOMI, PERRLA.  CV: regular rate, regular rhythm, well-perfused extremities, 2+ b/l DP and radial pulses equal.  Lungs: BCTA, no increased WOB.  ABD: (+) diffuse tenderness, no guarding or rebound, no pulsatile mass, no hernias.   MSK: Neck supple, nontender, nl ROM, no stepoff. Chest nontender. Back nontender in TLS spine or to b/l bony structures or flanks. Ext nontender, nl rom, no deformity.   INTEG: Skin warm, dry, no rash.  NEURO: A&Ox3, normal strength, nl sensation throughout, normal speech.   PSYCH: Calm, cooperative, normal affect and interaction.    Will obtain labs, UA, CT abd/pelvis, give IVF, symptomatic control PRN, re-eval.

## 2021-11-10 NOTE — ED ADULT NURSE NOTE - PLAN OF CARE DISCUSSED WITH:
conducted a detailed discussion...
I had a detailed discussion with the patient and/or guardian regarding the historical points, exam findings, and any diagnostic results supporting the discharge/admit diagnosis.
Patient
adult child(pasquale)/spouse

## 2021-11-10 NOTE — ED PROVIDER NOTE - PROGRESS NOTE DETAILS
VIANNEY: Case endorsed to Dr. Thurman pending CT abd/pelvis, re-eval, dispo BK: Discussed patient's findings and how important it is to take her abx for UTI, PPI for PUD and the need for GI follow up for endoscopy. Patient agreeable with plan.

## 2021-11-10 NOTE — ED ADULT NURSE NOTE - NSIMPLEMENTINTERV_GEN_ALL_ED
Implemented All Fall Risk Interventions:  [place] to call system. Call bell, personal items and telephone within reach. Instruct patient to call for assistance. Room bathroom lighting operational. Non-slip footwear when patient is off stretcher. Physically safe environment: no spills, clutter or unnecessary equipment. Stretcher in lowest position, wheels locked, appropriate side rails in place. Provide visual cue, wrist band, yellow gown, etc. Monitor gait and stability. Monitor for mental status changes and reorient to person, place, and time. Review medications for side effects contributing to fall risk. Reinforce activity limits and safety measures with patient and family.

## 2021-11-11 VITALS
DIASTOLIC BLOOD PRESSURE: 63 MMHG | SYSTOLIC BLOOD PRESSURE: 121 MMHG | HEART RATE: 82 BPM | RESPIRATION RATE: 18 BRPM | OXYGEN SATURATION: 98 % | TEMPERATURE: 99 F

## 2021-11-11 PROCEDURE — 74177 CT ABD & PELVIS W/CONTRAST: CPT | Mod: 26,MH

## 2021-11-11 RX ORDER — CEFTRIAXONE 500 MG/1
1000 INJECTION, POWDER, FOR SOLUTION INTRAMUSCULAR; INTRAVENOUS ONCE
Refills: 0 | Status: COMPLETED | OUTPATIENT
Start: 2021-11-11 | End: 2021-11-11

## 2021-11-11 RX ORDER — METOCLOPRAMIDE HCL 10 MG
1 TABLET ORAL
Qty: 6 | Refills: 0
Start: 2021-11-11 | End: 2021-11-12

## 2021-11-11 RX ORDER — CEFPODOXIME PROXETIL 100 MG
1 TABLET ORAL
Qty: 14 | Refills: 0
Start: 2021-11-11 | End: 2021-11-17

## 2021-11-11 RX ORDER — PANTOPRAZOLE SODIUM 20 MG/1
1 TABLET, DELAYED RELEASE ORAL
Qty: 14 | Refills: 0
Start: 2021-11-11 | End: 2021-11-24

## 2021-11-11 RX ADMIN — CEFTRIAXONE 100 MILLIGRAM(S): 500 INJECTION, POWDER, FOR SOLUTION INTRAMUSCULAR; INTRAVENOUS at 03:49

## 2021-12-13 ENCOUNTER — INPATIENT (INPATIENT)
Facility: HOSPITAL | Age: 61
LOS: 4 days | Discharge: NURSING, REHAB OR LONG-TERM CARE | End: 2021-12-18
Attending: INTERNAL MEDICINE | Admitting: INTERNAL MEDICINE
Payer: MEDICARE

## 2021-12-13 VITALS
RESPIRATION RATE: 18 BRPM | WEIGHT: 106.92 LBS | SYSTOLIC BLOOD PRESSURE: 128 MMHG | HEIGHT: 59 IN | OXYGEN SATURATION: 98 % | TEMPERATURE: 98 F | HEART RATE: 88 BPM | DIASTOLIC BLOOD PRESSURE: 76 MMHG
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
## 2021-12-13 DIAGNOSIS — Z96.652 PRESENCE OF LEFT ARTIFICIAL KNEE JOINT: Chronic | ICD-10-CM

## 2021-12-13 LAB
ALBUMIN SERPL ELPH-MCNC: 3.8 G/DL — SIGNIFICANT CHANGE UP (ref 3.5–5.2)
ALP SERPL-CCNC: 102 U/L — SIGNIFICANT CHANGE UP (ref 30–115)
ALT FLD-CCNC: 9 U/L — SIGNIFICANT CHANGE UP (ref 0–41)
ANION GAP SERPL CALC-SCNC: 15 MMOL/L — HIGH (ref 7–14)
APPEARANCE UR: ABNORMAL
AST SERPL-CCNC: 19 U/L — SIGNIFICANT CHANGE UP (ref 0–41)
BASOPHILS # BLD AUTO: 0.1 K/UL — SIGNIFICANT CHANGE UP (ref 0–0.2)
BASOPHILS NFR BLD AUTO: 1 % — SIGNIFICANT CHANGE UP (ref 0–1)
BILIRUB SERPL-MCNC: 0.3 MG/DL — SIGNIFICANT CHANGE UP (ref 0.2–1.2)
BILIRUB UR-MCNC: NEGATIVE — SIGNIFICANT CHANGE UP
BUN SERPL-MCNC: 10 MG/DL — SIGNIFICANT CHANGE UP (ref 10–20)
CALCIUM SERPL-MCNC: 9.7 MG/DL — SIGNIFICANT CHANGE UP (ref 8.5–10.1)
CHLORIDE SERPL-SCNC: 106 MMOL/L — SIGNIFICANT CHANGE UP (ref 98–110)
CO2 SERPL-SCNC: 19 MMOL/L — SIGNIFICANT CHANGE UP (ref 17–32)
COLOR SPEC: YELLOW — SIGNIFICANT CHANGE UP
CREAT SERPL-MCNC: 0.6 MG/DL — LOW (ref 0.7–1.5)
DIFF PNL FLD: NEGATIVE — SIGNIFICANT CHANGE UP
EOSINOPHIL # BLD AUTO: 0.23 K/UL — SIGNIFICANT CHANGE UP (ref 0–0.7)
EOSINOPHIL NFR BLD AUTO: 2.4 % — SIGNIFICANT CHANGE UP (ref 0–8)
GLUCOSE SERPL-MCNC: 100 MG/DL — HIGH (ref 70–99)
GLUCOSE UR QL: NEGATIVE — SIGNIFICANT CHANGE UP
HCT VFR BLD CALC: 38.5 % — SIGNIFICANT CHANGE UP (ref 37–47)
HGB BLD-MCNC: 12.4 G/DL — SIGNIFICANT CHANGE UP (ref 12–16)
IMM GRANULOCYTES NFR BLD AUTO: 0.3 % — SIGNIFICANT CHANGE UP (ref 0.1–0.3)
KETONES UR-MCNC: SIGNIFICANT CHANGE UP
LEUKOCYTE ESTERASE UR-ACNC: NEGATIVE — SIGNIFICANT CHANGE UP
LYMPHOCYTES # BLD AUTO: 2.77 K/UL — SIGNIFICANT CHANGE UP (ref 1.2–3.4)
LYMPHOCYTES # BLD AUTO: 28.8 % — SIGNIFICANT CHANGE UP (ref 20.5–51.1)
MCHC RBC-ENTMCNC: 29.1 PG — SIGNIFICANT CHANGE UP (ref 27–31)
MCHC RBC-ENTMCNC: 32.2 G/DL — SIGNIFICANT CHANGE UP (ref 32–37)
MCV RBC AUTO: 90.4 FL — SIGNIFICANT CHANGE UP (ref 81–99)
MONOCYTES # BLD AUTO: 0.58 K/UL — SIGNIFICANT CHANGE UP (ref 0.1–0.6)
MONOCYTES NFR BLD AUTO: 6 % — SIGNIFICANT CHANGE UP (ref 1.7–9.3)
NEUTROPHILS # BLD AUTO: 5.9 K/UL — SIGNIFICANT CHANGE UP (ref 1.4–6.5)
NEUTROPHILS NFR BLD AUTO: 61.5 % — SIGNIFICANT CHANGE UP (ref 42.2–75.2)
NITRITE UR-MCNC: NEGATIVE — SIGNIFICANT CHANGE UP
NRBC # BLD: 0 /100 WBCS — SIGNIFICANT CHANGE UP (ref 0–0)
PH UR: 7 — SIGNIFICANT CHANGE UP (ref 5–8)
PLATELET # BLD AUTO: 353 K/UL — SIGNIFICANT CHANGE UP (ref 130–400)
POTASSIUM SERPL-MCNC: 4.8 MMOL/L — SIGNIFICANT CHANGE UP (ref 3.5–5)
POTASSIUM SERPL-SCNC: 4.8 MMOL/L — SIGNIFICANT CHANGE UP (ref 3.5–5)
PROT SERPL-MCNC: 6.7 G/DL — SIGNIFICANT CHANGE UP (ref 6–8)
PROT UR-MCNC: ABNORMAL
RBC # BLD: 4.26 M/UL — SIGNIFICANT CHANGE UP (ref 4.2–5.4)
RBC # FLD: 13.6 % — SIGNIFICANT CHANGE UP (ref 11.5–14.5)
SARS-COV-2 RNA SPEC QL NAA+PROBE: SIGNIFICANT CHANGE UP
SODIUM SERPL-SCNC: 140 MMOL/L — SIGNIFICANT CHANGE UP (ref 135–146)
SP GR SPEC: 1.03 — SIGNIFICANT CHANGE UP (ref 1.01–1.03)
UROBILINOGEN FLD QL: ABNORMAL
WBC # BLD: 9.61 K/UL — SIGNIFICANT CHANGE UP (ref 4.8–10.8)
WBC # FLD AUTO: 9.61 K/UL — SIGNIFICANT CHANGE UP (ref 4.8–10.8)

## 2021-12-13 PROCEDURE — 99285 EMERGENCY DEPT VISIT HI MDM: CPT

## 2021-12-13 PROCEDURE — 76770 US EXAM ABDO BACK WALL COMP: CPT | Mod: 26

## 2021-12-13 PROCEDURE — 73564 X-RAY EXAM KNEE 4 OR MORE: CPT | Mod: 26,RT

## 2021-12-13 PROCEDURE — 99223 1ST HOSP IP/OBS HIGH 75: CPT | Mod: AI

## 2021-12-13 PROCEDURE — 93010 ELECTROCARDIOGRAM REPORT: CPT

## 2021-12-13 RX ORDER — DICLOFENAC SODIUM 75 MG/1
1 TABLET, DELAYED RELEASE ORAL
Qty: 0 | Refills: 0 | DISCHARGE

## 2021-12-13 RX ORDER — OLANZAPINE 15 MG/1
10 TABLET, FILM COATED ORAL DAILY
Refills: 0 | Status: DISCONTINUED | OUTPATIENT
Start: 2021-12-13 | End: 2021-12-18

## 2021-12-13 RX ORDER — ACETAMINOPHEN 500 MG
650 TABLET ORAL EVERY 6 HOURS
Refills: 0 | Status: DISCONTINUED | OUTPATIENT
Start: 2021-12-13 | End: 2021-12-15

## 2021-12-13 RX ORDER — LANOLIN ALCOHOL/MO/W.PET/CERES
3 CREAM (GRAM) TOPICAL AT BEDTIME
Refills: 0 | Status: DISCONTINUED | OUTPATIENT
Start: 2021-12-13 | End: 2021-12-18

## 2021-12-13 RX ORDER — INFLUENZA VIRUS VACCINE 15; 15; 15; 15 UG/.5ML; UG/.5ML; UG/.5ML; UG/.5ML
0.5 SUSPENSION INTRAMUSCULAR ONCE
Refills: 0 | Status: DISCONTINUED | OUTPATIENT
Start: 2021-12-13 | End: 2021-12-18

## 2021-12-13 RX ORDER — CLONAZEPAM 1 MG
1 TABLET ORAL
Qty: 0 | Refills: 0 | DISCHARGE

## 2021-12-13 RX ORDER — CHLORHEXIDINE GLUCONATE 213 G/1000ML
1 SOLUTION TOPICAL
Refills: 0 | Status: DISCONTINUED | OUTPATIENT
Start: 2021-12-13 | End: 2021-12-18

## 2021-12-13 RX ORDER — SERTRALINE 25 MG/1
1 TABLET, FILM COATED ORAL
Qty: 0 | Refills: 0 | DISCHARGE

## 2021-12-13 RX ORDER — CLONAZEPAM 1 MG
1 TABLET ORAL DAILY
Refills: 0 | Status: DISCONTINUED | OUTPATIENT
Start: 2021-12-13 | End: 2021-12-18

## 2021-12-13 RX ORDER — OLANZAPINE 15 MG/1
1 TABLET, FILM COATED ORAL
Qty: 0 | Refills: 0 | DISCHARGE

## 2021-12-13 RX ORDER — CHOLECALCIFEROL (VITAMIN D3) 125 MCG
1 CAPSULE ORAL
Qty: 0 | Refills: 0 | DISCHARGE

## 2021-12-13 RX ORDER — SERTRALINE 25 MG/1
100 TABLET, FILM COATED ORAL DAILY
Refills: 0 | Status: DISCONTINUED | OUTPATIENT
Start: 2021-12-13 | End: 2021-12-15

## 2021-12-13 RX ORDER — TIZANIDINE 4 MG/1
1 TABLET ORAL
Qty: 0 | Refills: 0 | DISCHARGE

## 2021-12-13 RX ORDER — OXYCODONE AND ACETAMINOPHEN 5; 325 MG/1; MG/1
1 TABLET ORAL EVERY 6 HOURS
Refills: 0 | Status: DISCONTINUED | OUTPATIENT
Start: 2021-12-13 | End: 2021-12-15

## 2021-12-13 RX ORDER — LIDOCAINE 4 G/100G
1 CREAM TOPICAL DAILY
Refills: 0 | Status: DISCONTINUED | OUTPATIENT
Start: 2021-12-13 | End: 2021-12-18

## 2021-12-13 RX ORDER — ONDANSETRON 8 MG/1
4 TABLET, FILM COATED ORAL EVERY 8 HOURS
Refills: 0 | Status: DISCONTINUED | OUTPATIENT
Start: 2021-12-13 | End: 2021-12-18

## 2021-12-13 RX ORDER — CLONAZEPAM 1 MG
1 TABLET ORAL ONCE
Refills: 0 | Status: DISCONTINUED | OUTPATIENT
Start: 2021-12-13 | End: 2021-12-13

## 2021-12-13 RX ORDER — ENOXAPARIN SODIUM 100 MG/ML
40 INJECTION SUBCUTANEOUS DAILY
Refills: 0 | Status: DISCONTINUED | OUTPATIENT
Start: 2021-12-13 | End: 2021-12-18

## 2021-12-13 RX ADMIN — Medication 3 MILLIGRAM(S): at 23:02

## 2021-12-13 RX ADMIN — OXYCODONE AND ACETAMINOPHEN 1 TABLET(S): 5; 325 TABLET ORAL at 19:27

## 2021-12-13 RX ADMIN — Medication 1 MILLIGRAM(S): at 11:01

## 2021-12-13 NOTE — H&P ADULT - ATTENDING COMMENTS
I have performed a history and physical exam of this patient and discussed the management with the resident.  I have reviewed the resident note and agree with the documented findings and plan of care.    Prepare for discharge to rehab in 24 to 48 hrs. PT and CM eval. Does not need to wait for urine cx result. She has no leukocytosis and is afebrile.

## 2021-12-13 NOTE — ED PROVIDER NOTE - NS ED ROS FT
Constitutional:  No fevers or chills.  Eyes:  No visual changes, eye pain, or discharge.  ENT:  No hearing changes. No sore throat.  Neck:  No neck pain or stiffness.  Cardiac:  No CP or edema.  Resp:  No cough or SOB. No hemoptysis.   GI:  No nausea, vomiting, diarrhea, or abdominal pain.  :  No dysuria, frequency, or hematuria.  MSK:  No myalgias or joint pain/swelling.  Neuro:  No headache, dizziness, or weakness.  Skin: (+) wound.   No skin rash.

## 2021-12-13 NOTE — H&P ADULT - NSHPLABSRESULTS_GEN_ALL_CORE
LABS:                        12.4   9.61  )-----------( 353      ( 13 Dec 2021 11:30 )             38.5     12-13    140  |  106  |  10  ----------------------------<  100<H>  4.8   |  19  |  0.6<L>    Ca    9.7      13 Dec 2021 11:30    TPro  6.7  /  Alb  3.8  /  TBili  0.3  /  DBili  x   /  AST  19  /  ALT  9   /  AlkPhos  102  12-13        CAPILLARY BLOOD GLUCOSE          RADIOLOGY & ADDITIONAL TESTS: Reviewed.

## 2021-12-13 NOTE — ED ADULT NURSE NOTE - OBJECTIVE STATEMENT
The patient is a 61y Female complaining of wound check on right leg. Patient stated that she has a pain from a fall last month and has a contusion near breast bone.

## 2021-12-13 NOTE — ED PROVIDER NOTE - CARE PLAN
1 Principal Discharge DX:	Inability to ambulate due to knee  Secondary Diagnosis:	Anxiety  Secondary Diagnosis:	Abrasion of right leg

## 2021-12-13 NOTE — ED PROVIDER NOTE - CLINICAL SUMMARY MEDICAL DECISION MAKING FREE TEXT BOX
Patient presented with worsening generalized weakness and functional decline, unable to care for herself at home or perform ADLs. Otherwise afebrile, HD stable, neurovascularly intact. Obtained labs which were grossly unremarkable including no significant leukocytosis, anemia, signs of dehydration/BRENNAN, transaminitis or significant electrolyte abnormalities. UA showed (+) UTI which is likely contributing to sxs of generalized weakness. Given inability to care for self, fall risk, (+) UTI, will require admission for further management. Patient agreeable with plan. HD stable at time of admission.

## 2021-12-13 NOTE — CONSULT NOTE ADULT - ASSESSMENT
IMPRESSION: Rehab of gait dysfunction / right knee OA    PRECAUTIONS: [   ] Cardiac  [   ] Respiratory  [   ] Seizures [   ] Contact Isolation  [   ] Droplet Isolation  [   ] Other    Weight Bearing Status:     RECOMMENDATION:    Out of Bed to Chair     DVT/Decubiti Prophylaxis    REHAB PLAN:     [ x   ] Bedside P/T 3-5 times a week   [    ]   Bedside O/T  2-3 times a week             [    ] Speech Therapy               [    ]  No Rehab Therapy Indicated   Conditioning/ROM                                    ADL  Bed Mobility                                               Conditioning/ROM  Transfers                                                     Bed Mobility  Sitting /Standing Balance                         Transfers                                        Gait Training                                               Sitting/Standing Balance  Stair Training [   ]Applicable                    Home equipment Eval                                                                        Splinting  [   ] Only      GOALS:   ADL   [    ]   Independent                    Transfers  [ x   ] Independent                          Ambulation  [  x  ] Independent     [  x   ] With device                            [    ]  CG                                                         [    ]  CG                                                                  [    ] CG                            [    ] Min A                                                   [    ] Min A                                                              [    ] Min  A          DISCHARGE PLAN:   [    ]  Good candidate for Intensive Rehabilitation/Hospital based                                             Will tolerate 3hrs Intensive Rehab Daily                                       [  x   ]  Short Term Rehab in Skilled Nursing Facility / snf                                       [     ]  Home with Outpatient or  services                                         [     ]  Possible Candidate for Intensive Hospital based Rehab

## 2021-12-13 NOTE — ED PROVIDER NOTE - PHYSICAL EXAMINATION
PHYSICAL EXAM: I have reviewed current vital signs.  GENERAL: hair disheveled dried food on clothing.  smells of urine.  HEAD:  Normocephalic, atraumatic.  EYES: EOMI, PERRL, conjunctiva and sclera clear.  ENT: MMM, no erythema/exudates.  NECK: Supple, no JVD.  CHEST/LUNG: Clear to auscultation bilaterally; no wheezes, rales, or rhonchi.  HEART: Regular rate and rhythm, normal S1 and S2; no murmurs, rubs, or gallops.  ABDOMEN: Soft, nontender, nondistended.  EXTREMITIES:  2+ peripheral pulses; no clubbing, cyanosis, or edema.  swelling of the right knee which is unchanged from baseline.   PSYCH: Cooperative, appropriate, normal mood and affect.  tremulous and states she feels anxious.  NEUROLOGY: A&O x 3. Motor 5/5. Sensory intact. No focal neurological deficits. CN II - XII intact. (-) dysmetria, facial droop, pronator drift.  SKIN: Warm and dry.

## 2021-12-13 NOTE — H&P ADULT - ASSESSMENT
61 y/o female with PMHx bipolar disorder with hypomanic episodes, schizophrenia, IBS, anxiety, depression, severe right knee osteoarthritis, low back pain, recurrent UTI, presented to ED for inability to care for herself and poor living conditions that has been going on for few months    # inability to ambulate:  - secondary to severe right knee OA and lower back pain  - check knee X ray to r/o progression of OA and call orthopedics for assessment  - lidocaine patch   - pain control  - PT / rehab   - was at [facility] few months ago   - check for vitamin D, vitamin B12 , tsh and folate    # recurrent UTI:  - check UA and Urine cx  - last urine cx showed klebsiella  - straight cath for residual urine   - denies any constipation   - check US kidneys and pelvis with PVR  - need urodynamics studies as OP and UROGYN referral to r/o any prolapse, vs overactive bladder vs other etiology     # Schizophrenia, Bipolar disorder, Anxiety  -pressured speech, tangential thinking  - stopped her medications few weeks ago   - resume zoloft 100 mg po od  - resume zyprexa 10 mg po od   - check QTc on EKG     DVT prophylaxis: lovenox  GI prophylaxis: not indicated  Diet: regular  Activity: activity with assistance  Code status: full  Disposition: from home, awaiting PT/OT and placement in [facility]/[place]   61 y/o female with PMHx bipolar disorder with hypomanic episodes, schizophrenia, IBS, anxiety, depression, severe right knee osteoarthritis, low back pain, recurrent UTI, presented to ED for inability to care for herself and poor living conditions that has been going on for few months    # inability to ambulate:  - secondary to severe right knee OA and lower back pain  - check knee X ray to r/o progression of OA and call orthopedics for assessment  - lidocaine patch   - pain control  - PT / rehab   - was at [facility] few months ago   - check for vitamin D, vitamin B12 , tsh and folate    # recurrent UTI:  - check UA and Urine cx /  denies any burning, fever or chills  - straight cath for residual urine   - last urine cx showed klebsiella pansensitive   - start ceftriaxone 1 g daily in case UA is positive and consider suppressive therapy on DC   - denies any constipation   - check US kidneys and pelvis with PVR  - need urodynamics studies as OP and UROGYN referral to r/o any prolapse, vs overactive bladder vs other etiology     # Schizophrenia, Bipolar disorder, Anxiety  -pressured speech, tangential thinking  - stopped her medications few weeks ago   - resume zoloft 100 mg po od  - resume zyprexa 10 mg po od   - check QTc on EKG     DVT prophylaxis: lovenox  GI prophylaxis: not indicated  Diet: regular  Activity: activity with assistance  Code status: full  Disposition: from home, awaiting PT/OT and placement in [facility]   59 y/o female with PMHx bipolar disorder with hypomanic episodes, schizophrenia, IBS, anxiety, depression, severe right knee osteoarthritis, low back pain, recurrent UTI, presented to ED for inability to care for herself and poor living conditions that has been going on for few months    # inability to ambulate:  - secondary to severe right knee OA and lower back pain  - check knee X ray to r/o progression of OA and call orthopedics for assessment  - lidocaine patch   - pain control  - PT / rehab   - was at [facility] few months ago   - check for vitamin D, vitamin B12 , tsh and folate    # Recurrent UTI:  - check UA and Urine cx /  denies any burning, fever or chills  - straight cath for residual urine   - last urine cx showed klebsiella pansensitive   - start ceftriaxone 1 g daily in case UA is positive and consider suppressive therapy on DC   - denies any constipation   - check US kidneys and pelvis with PVR  - need urodynamics studies as OP and UROGYN referral to r/o any prolapse, vs overactive bladder vs other etiology     # Schizophrenia, Bipolar disorder, Anxiety  -pressured speech, tangential thinking  - stopped her medications few weeks ago   - resume zoloft 100 mg po od  - resume zyprexa 10 mg po od   - check QTc on EKG     DVT prophylaxis: lovenox  GI prophylaxis: not indicated  Diet: regular  Activity: activity with assistance  Code status: full  Disposition: from home, awaiting PT/OT and placement in [facility]/[place]

## 2021-12-13 NOTE — H&P ADULT - NSHPPHYSICALEXAM_GEN_ALL_CORE
Vital Signs Last 24 Hrs  T(C): 36.6 (13 Dec 2021 09:26), Max: 36.6 (13 Dec 2021 09:26)  T(F): 97.9 (13 Dec 2021 09:26), Max: 97.9 (13 Dec 2021 09:26)  HR: 88 (13 Dec 2021 09:26) (88 - 88)  BP: 128/76 (13 Dec 2021 09:26) (128/76 - 128/76)  BP(mean): --  RR: 18 (13 Dec 2021 09:26) (18 - 18)  SpO2: 98% (13 Dec 2021 09:26) (98% - 98%)    GENERAL: hair disheveled dried food on clothing.  smells of urine.  HEAD:  Normocephalic, atraumatic.  EYES: EOMI, PERRL, conjunctiva and sclera clear.  ENT: MMM, no erythema/exudates.  NECK: Supple, no JVD.  CHEST/LUNG: Clear to auscultation bilaterally; no wheezes, rales, or rhonchi.  HEART: Regular rate and rhythm, normal S1 and S2; no murmurs, rubs, or gallops.  ABDOMEN: Soft, nontender, nondistended.  EXTREMITIES:  2+ peripheral pulses; no clubbing, cyanosis, or edema.  swelling of the right knee which is unchanged from baseline.   PSYCH: Cooperative, appropriate, normal mood and affect.  tremulous and states she feels anxious.  NEUROLOGY: A&O x 3. Motor 5/5. Sensory intact. No focal neurological deficits. CN II - XII intact. (-) dysmetria, facial droop, pronator drift.  SKIN: Warm and

## 2021-12-13 NOTE — ED ADULT NURSE NOTE - INTERVENTIONS DEFINITIONS
[place] to call system/Call bell, personal items and telephone within reach/Instruct patient to call for assistance/Non-slip footwear when patient is off stretcher/Provide visual clues: red socks

## 2021-12-13 NOTE — PATIENT PROFILE ADULT - NSPROPTRIGHTSUPPORTPERSON_GEN_A_NUR
Assessment    1  Hypertension (401 9) (I10)   2  Hyponatremia (276 1) (E87 1)   3  Anemia of chronic disease (285 29) (D63 8)    Plan  Hyperlipidemia    · Red Yeast Rice 600 MG Oral Capsule  Hypertension    · Losartan Potassium 100 MG Oral Tablet; TAKE 1 TABLET DAILY  Restless legs syndrome    · ROPINIRole HCl - 0 5 MG Oral Tablet; Take 2 tabs at bedtime and one tab in the  morning    Discussion/Summary    The patient continues to have intermittent leg cramps  Since she has had some relief from ropinirole, I am going to increase that to one tablet in the morning and 2 tablets in the evening  She has chronic issues with urinary incontinence, as well as some issues with some mild electrolyte abnormalities including hyponatremia and hypokalemia, so I am going to stop her hydrochlorothiazide component of her medicine  I would like her to just take losartan 100 mg daily  She should followup within one month for a blood pressure check and to review how she's doing with her leg cramping  She is going to currently stick with the Oxytrol patch which is over-the-counter and has been slightly helpful with her voiding  Chief Complaint  f/u leg cramping      History of Present Illness  Patient presents today in followup for her leg cramping  She notes that her symptoms have improved since she is now on ropinirole calm she does continue to get some breakthrough cramping in her calves in particular  She notices does continue to improve when she ambulates  She is concerned that she had a rather severe episode of cramping 2 nights ago  Blood work revealed only very mild hyponatremia and a low normal potassium level  She does not get claudication and exercise almost always makes her feel better  She has a history of urinary incontinence and is currently trying Oxytrol patches  She notes these have been helpful, more so than Detrol   She denies any problems with dysuria or hematuria, but notes that she has episodes of urinary incontinence most days  She has history of hypertension but denies chest pain, shortness of breath or palpitations  She has no lightheadedness from the medication  Her blood pressure remains acceptable despite titrating back on her hydrochlorothiazide      Review of Systems    Constitutional: no fever, not feeling poorly, no recent weight gain, no chills, not feeling tired and no recent weight loss  Cardiovascular: the heart rate was not slow, no chest pain and no palpitations  Respiratory: no shortness of breath, no cough, no wheezing and no shortness of breath during exertion  Gastrointestinal: no abdominal pain  Genitourinary: incontinence, but no dysuria  Integumentary: no rashes  Neurological: no headache and no numbness  Psychiatric: no anxiety and no depression  Hematologic/Lymphatic: no tendency for easy bleeding and no tendency for easy bruising  Active Problems    1  Anemia of chronic disease (285 29) (D63 8)   2  Benign essential hypertension (401 1) (I10)   3  Chronic diarrhea (787 91) (K52 9)   4  Colonoscopy (Fiberoptic) Screening   5  Early satiety (780 94) (R68 81)   6  Edema (782 3) (R60 9)   7  Encounter for screening mammogram for breast cancer (V76 12) (Z12 31)   8  Esophageal reflux (530 81) (K21 9)   9  Hyperlipidemia (272 4) (E78 5)   10  Hypertension (401 9) (I10)   11  Hyponatremia (276 1) (E87 1)   12  Hypothyroidism (244 9) (E03 9)   13  Left breast mass (611 72) (N63)   14  Medicare annual wellness visit, initial (V70 0) (Z00 00)   15  Multiple falls (V15 88) (R29 6)   16  Muscle cramps (729 82) (R25 2)   17  Myalgia (729 1) (M79 1)   18  Need for influenza vaccination (V04 81) (Z23)   19  Need for pneumococcal vaccination (V03 82) (Z23)   20  Obesity (278 00) (E66 9)   21  Restless legs syndrome (333 94) (G25 81)   22  Rheumatoid arthritis (714 0) (M06 9)   23  Spinal stenosis (724 00) (M48 00)   24  Tinea cruris (110 3) (B35 6)   25   Type 2 diabetes mellitus (250 00) (E11 9)   26  Urge incontinence of urine (788 31) (N39 41)   27  Urgency of urination (788 63) (R39 15)   28  Weight loss, non-intentional (783 21) (R63 4)    Past Medical History    1  History of Acute pain of left knee (719 46) (M25 562)   2  History of Ankle sprain (845 00) (S93 409A)   3  History of Benign essential hypertension (401 1) (I10)   4  History of Colon, diverticulosis (562 10) (K57 30)   5  History of colonic polyps (V12 72) (Z86 010)   6  History of diverticulitis of colon (V12 79) (Z87 19)   7  History of gastritis (V12 79) (Z87 19)   8  History of gastroesophageal reflux (GERD) (V12 79) (Z87 19)   9  History of hemorrhoids (V13 89) (Z87 19)   10  History of hiatal hernia (V12 79) (Z87 19)   11  History of impacted cerumen (V12 49) (Z86 69)   12  History of urinary tract infection (V13 02) (Z87 440)   13  History of Impaired fasting glucose (790 21) (R73 01)   14  History of Poorly controlled type 2 diabetes mellitus (250 00) (E11 65)    The active problems and past medical history were reviewed and updated today  Surgical History    1  History of Appendectomy   2  History of Bladder Surgery   3  History of Cholecystectomy   4  History of Colonoscopy (Fiberoptic)   5  History of Diagnostic Esophagogastroduodenoscopy   6  History of Hysterectomy   7  History of Knee Replacement   8  History of Nose Surgery   9  History of Simple Bunion Exostectomy (Silver Procedure)   10  History of Treatment Of Forearm Fracture   11  History of Wrist Arthroscopy With Release Of Transverse Carpal Ligament    The surgical history was reviewed and updated today  Family History    1  Family history of Epilepsy    2  Family history of Glaucoma   3  Family history of Silent Stroke    The family history was reviewed and updated today  Social History    · Alcohol Use (History)   · Never A Smoker   · No alcohol use  The social history was reviewed and updated today  Current Meds   1  Calcium + D TABS; TAKE 1 TABLET TWICE DAILY; Therapy: (Recorded:11Aug2015) to Recorded   2  Daily Multivitamin TABS; Take 1 tablet daily Recorded   3  Fish Oil 1000 MG Oral Capsule; Take 1 caps BID; Therapy: (Recorded:01Hnn1394) to Recorded   4  Ketoconazole 2 % External Cream; CREA EX X 5; Therapy: 49XVH9175 to (Evaluate:85Jvt2651)  Requested for: 87OTA8797; Last   Rx:04Jan2016 Ordered   5  Leucovorin Calcium 5 MG Oral Tablet; Therapy: 89GEB2018 to (Last Cloyce Lyell)  Requested for: 49Jca1894 Ordered   6  Levothyroxine Sodium 75 MCG Oral Tablet; take 1 tab daily; Therapy: 89OCJ6138 to (Evaluate:12Mar2016)  Requested for: 36Hqv9445; Last   Rx:17Gux3094 Ordered   7  Losartan Potassium-HCTZ 100-12 5 MG Oral Tablet; Therapy: 63DPZ6886 to Recorded   8  Methotrexate 2 5 MG Oral Tablet; TAKE 8 TABLETS PER WEEK; Therapy: 11EOS9834 to (Evaluate:04Nay9474)  Requested for: 11Aug2015 Recorded   9  Metoprolol Tartrate 50 MG Oral Tablet; TAKE 1/2 TABLET EVERY EVENING; Therapy: 74XGH5753 to (Evaluate:11Nov2015)  Requested for: 76Bcc6185; Last   Rx:93Wul2804 Ordered   10  Omeprazole 20 MG Oral Capsule Delayed Release; TAKE 1 CAPSULE DAILY as needed    for stomach acid; Therapy: 09PQV2185 to (Evaluate:12Mar2016)  Requested for: 55Bct2957; Last    Rx:58Pkt6139 Ordered   11  Oxytrol 3 9 MG/24HR Transdermal Patch Twice Weekly; Therapy: 14AXM3624 to Recorded   12  Red Yeast Rice 600 MG Oral Capsule; take 2 tablets at bedtime; Therapy: 87MPD6306 to Recorded   13  Remicade 100 MG Intravenous Solution Reconstituted; Infuse once every 5 weeks; Therapy: 18QAO5003 to Recorded   14  ROPINIRole HCl - 0 5 MG Oral Tablet; TAKE 1 TABLET AT BEDTIME, may increase to 2    tablets after 1 week if no better; Therapy: 34KZY1148 to (Last Rx:05Jan2016)  Requested for: 27QSY0396 Ordered   15  Tylenol Arthritis Pain TBCR; TAKE 1 TABLET 4 TIMES DAILY AS NEEDED Recorded    Allergies    1  Darvocet-N 100 TABS   2   Lipitor TABS   3  Macrobid CAPS   4  Penicillins   5  Sulfa Drugs   6  Tramadol    Vitals  Vital Signs [Data Includes: Current Encounter]    Recorded: 08NTB8033 03:52PM   Heart Rate 72   Systolic 550   Diastolic 70   Height 5 ft 1 in   Weight 208 lb    BMI Calculated 39 3   BSA Calculated 1 92     Physical Exam    Constitutional   General appearance: Abnormal   obese  Eyes   Conjunctiva and lids: No swelling, erythema or discharge  Pupils and irises: Equal, round and reactive to light  Ears, Nose, Mouth, and Throat   Oropharynx: Normal with no erythema, edema, exudate or lesions  Pulmonary   Respiratory effort: No increased work of breathing or signs of respiratory distress  Cardiovascular   Auscultation of heart: Normal rate and rhythm, normal S1 and S2, without murmurs  Abdomen   Abdomen: Non-tender, no masses  Liver and spleen: No hepatomegaly or splenomegaly  Lymphatic   Palpation of lymph nodes in neck: No lymphadenopathy  Musculoskeletal   Gait and station: Normal     Neurologic   Cranial nerves: Cranial nerves 2-12 intact  Psychiatric   Orientation to person, place, and time: Normal     Mood and affect: Normal          Future Appointments    Date/Time Provider Specialty Site   02/29/2016 02:00 PM Marianna Rodriguez, [facility]   02/23/2016 01:30 PM GLORIA Morales   [facility]     Signatures   Electronically signed by : GLORIA Gould ; Jan 18 2016 10:35PM EST                       (Author) same name as above

## 2021-12-13 NOTE — H&P ADULT - HISTORY OF PRESENT ILLNESS
59 y/o female with PMHx bipolar disorder with hypomanic episodes, schizophrenia, IBS, anxiety, depression, severe right knee osteoarthritis, low back pain, recurrent UTI, presented to ED for inability to care for herself and poor living conditions that has been going on for few months  patient was admitted back in june 2021 for the same chief complaint and got dc to [facility] for STR. she said that her niece was not able to take care of her anymore. patient usually uses a walker to ambulate, but recently she was having trouble walking even with a rolling walker in view of her severe pain in her right knee. she mentions that she was taking diclofenac and her pain was well controlled at that time but recently she went to [facility] and her doctor stopped her diclofenac. to note that she had several falls over the last few months but she denied any head trauma, or LOC. she also has chronic recurrent UTI and she is c/o urgency and intermittency and most of the time, she cannot make it to the bathroom in view of her inability to ambulate  patient will be admitted for disposition plan, to r/o UTI and to check for right knee OA progression  61 y/o female with PMHx bipolar disorder with hypomanic episodes, schizophrenia, IBS, anxiety, depression, severe right knee osteoarthritis, low back pain, recurrent UTI, presented to ED for inability to care for herself and poor living conditions that has been going on for few months  patient was admitted back in june 2021 for the same chief complaint and got dc to [facility] for STR. she said that her niece was not able to take care of her anymore. patient usually uses a walker to ambulate, but recently she was having trouble walking even with a rolling walker in view of her severe pain in her right knee. she mentions that she was taking diclofenac and her pain was well controlled at that time but recently she went to [facility] [place] and her doctor stopped her diclofenac. to note that she had several falls over the last few months but she denied any head trauma, or LOC. she had a contusion on her breastbone. she also has chronic recurrent UTI and c/o urgency and intermittency and most of the time, she cannot make it to the bathroom in view of her inability to ambulate  patient will be admitted for disposition plan, to r/o UTI and to check for right knee OA progression

## 2021-12-13 NOTE — PATIENT PROFILE ADULT - FALL HARM RISK - HARM RISK INTERVENTIONS
Assistance with ambulation/Assistance OOB with selected safe patient handling equipment/Communicate Risk of Fall with Harm to all staff/Discuss with provider need for PT consult/Monitor gait and stability/Reinforce activity limits and safety measures with patient and family/Tailored Fall Risk Interventions/Visual Cue: Yellow wristband and red socks/Bed in lowest position, wheels locked, appropriate side rails in place/Call bell, personal items and telephone in reach/Instruct patient to call for assistance before getting out of bed or chair/Non-slip footwear when patient is out of bed/[place] to call system/Physically safe environment - no spills, clutter or unnecessary equipment/Purposeful Proactive Rounding/Room/bathroom lighting operational, light cord in reach

## 2021-12-13 NOTE — CONSULT NOTE ADULT - SUBJECTIVE AND OBJECTIVE BOX
HPI:  59 y/o female with PMHx bipolar disorder with hypomanic episodes, schizophrenia, IBS, anxiety, depression, severe right knee osteoarthritis, low back pain, recurrent UTI, presented to ED for inability to care for herself and poor living conditions that has been going on for few months  patient was admitted back in june 2021 for the same chief complaint and got dc to [facility] for STR. she said that her niece was not able to take care of her anymore. patient usually uses a walker to ambulate, but recently she was having trouble walking even with a rolling walker in view of her severe pain in her right knee. she mentions that she was taking diclofenac and her pain was well controlled at that time but recently she went to [facility] and her doctor stopped her diclofenac. to note that she had several falls over the last few months but she denied any head trauma, or LOC. she had a contusion on her breastbone. she also has chronic recurrent UTI and c/o urgency and intermittency and most of the time, she cannot make it to the bathroom in view of her inability to ambulate  patient will be admitted for disposition plan, to r/o UTI and to check for right knee OA progression       PAST MEDICAL & SURGICAL HISTORY:  Schizophrenia    Bipolar disorder    Depression    Anxiety    Chronic lower back pain  result of pelvic fracture in the past    Osteoarthritis    Urinary incontinence    Chronic urinary tract infection    History of tremor    History of total knee arthroplasty, left        Hospital Course:    TODAY'S SUBJECTIVE & REVIEW OF SYMPTOMS:     Constitutional WNL   Cardio WNL   Resp WNL   GI WNL  Heme WNL  Endo WNL  Skin WNL  MSK right knee pain  Neuro WNL  Cognitive WNL  Psych WNL      MEDICATIONS  (STANDING):  chlorhexidine 4% Liquid 1 Application(s) Topical <User Schedule>  clonazePAM  Tablet 1 milliGRAM(s) Oral daily  enoxaparin Injectable 40 milliGRAM(s) SubCutaneous daily  lidocaine   4% Patch 1 Patch Transdermal daily  OLANZapine 10 milliGRAM(s) Oral daily  sertraline 100 milliGRAM(s) Oral daily    MEDICATIONS  (PRN):  acetaminophen     Tablet .. 650 milliGRAM(s) Oral every 6 hours PRN Temp greater or equal to 38C (100.4F), Mild Pain (1 - 3)  aluminum hydroxide/magnesium hydroxide/simethicone Suspension 30 milliLiter(s) Oral every 4 hours PRN Dyspepsia  melatonin 3 milliGRAM(s) Oral at bedtime PRN Insomnia  ondansetron Injectable 4 milliGRAM(s) IV Push every 8 hours PRN Nausea and/or Vomiting  oxycodone    5 mG/acetaminophen 325 mG 1 Tablet(s) Oral every 6 hours PRN Severe Pain (7 - 10)      FAMILY HISTORY:  FH: prostate cancer (Father)    FH: Parkinson&#x27;s disease (Mother)        Allergies    azithromycin (Unknown)  moxifloxacin (Unknown)  penicillin (Unknown)  sulfa drugs (Unknown)    Intolerances        SOCIAL HISTORY:    [  ] Etoh  [  ] Smoking  [  ] Substance abuse     Home Environment:  [x   ] Home Alone  [   ] Lives with Family  [   ] Home Health Aid    Dwelling:  [   ] Apartment  [ x  ] Private House  [   ] Adult Home  [   ] Skilled Nursing Facility      [   ] Short Term  [   ] Long Term  [ x  ] Stairs       Elevator [   ]    FUNCTIONAL STATUS PTA: (Check all that apply)  Ambulation: [   x ]Independent    [   ] Dependent     [   ] Non-Ambulatory  Assistive Device: [   ] SA Cane  [   ]  Q Cane  [ x  ] Walker  [   ]  Wheelchair  ADL : [  x ] Independent  [    ]  Dependent       Vital Signs Last 24 Hrs  T(C): 36.8 (13 Dec 2021 15:52), Max: 36.8 (13 Dec 2021 15:52)  T(F): 98.3 (13 Dec 2021 15:52), Max: 98.3 (13 Dec 2021 15:52)  HR: 86 (13 Dec 2021 15:52) (86 - 88)  BP: 146/73 (13 Dec 2021 15:52) (128/76 - 146/73)  BP(mean): --  RR: 18 (13 Dec 2021 15:52) (18 - 18)  SpO2: 98% (13 Dec 2021 15:52) (98% - 98%)      PHYSICAL EXAM: Awake & Alert  GENERAL: NAD  HEAD:  Normocephalic  CHEST/LUNG: Clear   HEART: S1S2+  ABDOMEN: Soft, Nontender  EXTREMITIES:  no calf tenderness, + tenderness right knee    NERVOUS SYSTEM:  Cranial Nerves 2-12 intact [   ] Abnormal  [   ]  ROM: WFL all extremities [   ]  Abnormal [  x ]limited right knee  Motor Strength: WFL all extremities  [   ]  Abnormal [ x  ]limited RLE  Sensation: intact to light touch [ x  ] Abnormal [   ]    FUNCTIONAL STATUS:  Bed Mobility: Independent [   ]  Supervision [   ]  Needs Assistance [ x  ]  N/A [   ]  Transfers: Independent [   ]  Supervision [   ]  Needs Assistance [  x ]  N/A [   ]   Ambulation: Independent [   ]  Supervision [   ]  Needs Assistance [   ]  N/A [   ]  ADL: Independent [   ] Requires Assistance [   ] N/A [   ]      LABS:                        12.4   9.61  )-----------( 353      ( 13 Dec 2021 11:30 )             38.5     12-13    140  |  106  |  10  ----------------------------<  100<H>  4.8   |  19  |  0.6<L>    Ca    9.7      13 Dec 2021 11:30    TPro  6.7  /  Alb  3.8  /  TBili  0.3  /  DBili  x   /  AST  19  /  ALT  9   /  AlkPhos  102  12-13          RADIOLOGY & ADDITIONAL STUDIES:

## 2021-12-13 NOTE — ED PROVIDER NOTE - OBJECTIVE STATEMENT
61 y female with PMH of HTN, anxiety, bipolar, chronic UTI presents with complaints of wound on right shin denies fever or drainage from the wound.  patient denies trauma does not know how she got wound and first noted the wound 4 days prior.  patient states that she has noted a reduction in her ADL's in the past 4 weeks and that her house is getting foreclosed on.  patient states she feels overwhelmed with the stresses daily living and has been non compliant with her medication in the last month.  patient states that she has one niece that used to take care of her but does not now.  patient walks with a walker at baseline due to osteoarthritis of the right knee and has stated in the last month it has been harder to get around the house.

## 2021-12-13 NOTE — ED PROVIDER NOTE - ATTENDING CONTRIBUTION TO CARE
61 year old female, pmhx as documented presenting with primarily the complaint of decreased ability to do ADLs 2/2 generalized weakness. Patient states she used to have somebody who cared for her but now she doesn't anymore and she does not feel like she can care for herself. Walks with walker at baseline but states she feels too weak to ambulate and is worried she is going to fall. Otherwise denies pain and denies fevers, N/V/D, blood in stool, urinary symptoms or leg swelling.    Vital Signs: I have reviewed the initial vital signs.  Constitutional: NAD, well-nourished, appears stated age, no acute distress.  HEENT: Airway patent, moist MM, no erythema/swelling/deformity of oral structures. EOMI, PERRLA.  CV: regular rate, regular rhythm, well-perfused extremities, 2+ b/l DP and radial pulses equal.  Lungs: BCTA, no increased WOB.  ABD: NTND, no guarding or rebound, no pulsatile mass, no hernias.   MSK: Neck supple, nontender, nl ROM, no stepoff. Chest nontender. Back nontender in TLS spine or to b/l bony structures or flanks. Ext nontender, nl rom, no deformity.   INTEG: Skin warm, dry, no rash.  NEURO: A&Ox3, normal strength, nl sensation throughout, normal speech.   PSYCH: Calm, cooperative, normal affect and interaction.    Neurovascularly intact. Will obtain labs but likely will require admission given HPI - patient unable to care for herself, is fall risk 2/2 generalized weakness. Will re-eval pending work up.

## 2021-12-14 ENCOUNTER — TRANSCRIPTION ENCOUNTER (OUTPATIENT)
Age: 61
End: 2021-12-14

## 2021-12-14 LAB
ALBUMIN SERPL ELPH-MCNC: 3.6 G/DL — SIGNIFICANT CHANGE UP (ref 3.5–5.2)
ALP SERPL-CCNC: 95 U/L — SIGNIFICANT CHANGE UP (ref 30–115)
ALT FLD-CCNC: 8 U/L — SIGNIFICANT CHANGE UP (ref 0–41)
ANION GAP SERPL CALC-SCNC: 15 MMOL/L — HIGH (ref 7–14)
AST SERPL-CCNC: 13 U/L — SIGNIFICANT CHANGE UP (ref 0–41)
BASOPHILS # BLD AUTO: 0.07 K/UL — SIGNIFICANT CHANGE UP (ref 0–0.2)
BASOPHILS NFR BLD AUTO: 0.9 % — SIGNIFICANT CHANGE UP (ref 0–1)
BILIRUB SERPL-MCNC: 0.2 MG/DL — SIGNIFICANT CHANGE UP (ref 0.2–1.2)
BUN SERPL-MCNC: 14 MG/DL — SIGNIFICANT CHANGE UP (ref 10–20)
CALCIUM SERPL-MCNC: 9.2 MG/DL — SIGNIFICANT CHANGE UP (ref 8.5–10.1)
CHLORIDE SERPL-SCNC: 107 MMOL/L — SIGNIFICANT CHANGE UP (ref 98–110)
CO2 SERPL-SCNC: 21 MMOL/L — SIGNIFICANT CHANGE UP (ref 17–32)
CREAT SERPL-MCNC: 0.6 MG/DL — LOW (ref 0.7–1.5)
CULTURE RESULTS: SIGNIFICANT CHANGE UP
EOSINOPHIL # BLD AUTO: 0.29 K/UL — SIGNIFICANT CHANGE UP (ref 0–0.7)
EOSINOPHIL NFR BLD AUTO: 3.6 % — SIGNIFICANT CHANGE UP (ref 0–8)
FOLATE SERPL-MCNC: 5.2 NG/ML — SIGNIFICANT CHANGE UP
GLUCOSE SERPL-MCNC: 102 MG/DL — HIGH (ref 70–99)
HCT VFR BLD CALC: 38.6 % — SIGNIFICANT CHANGE UP (ref 37–47)
HGB BLD-MCNC: 12.2 G/DL — SIGNIFICANT CHANGE UP (ref 12–16)
IMM GRANULOCYTES NFR BLD AUTO: 0.2 % — SIGNIFICANT CHANGE UP (ref 0.1–0.3)
LYMPHOCYTES # BLD AUTO: 3.3 K/UL — SIGNIFICANT CHANGE UP (ref 1.2–3.4)
LYMPHOCYTES # BLD AUTO: 40.5 % — SIGNIFICANT CHANGE UP (ref 20.5–51.1)
MCHC RBC-ENTMCNC: 29.3 PG — SIGNIFICANT CHANGE UP (ref 27–31)
MCHC RBC-ENTMCNC: 31.6 G/DL — LOW (ref 32–37)
MCV RBC AUTO: 92.8 FL — SIGNIFICANT CHANGE UP (ref 81–99)
MONOCYTES # BLD AUTO: 0.52 K/UL — SIGNIFICANT CHANGE UP (ref 0.1–0.6)
MONOCYTES NFR BLD AUTO: 6.4 % — SIGNIFICANT CHANGE UP (ref 1.7–9.3)
NEUTROPHILS # BLD AUTO: 3.95 K/UL — SIGNIFICANT CHANGE UP (ref 1.4–6.5)
NEUTROPHILS NFR BLD AUTO: 48.4 % — SIGNIFICANT CHANGE UP (ref 42.2–75.2)
NRBC # BLD: 0 /100 WBCS — SIGNIFICANT CHANGE UP (ref 0–0)
PLATELET # BLD AUTO: 365 K/UL — SIGNIFICANT CHANGE UP (ref 130–400)
POTASSIUM SERPL-MCNC: 4.2 MMOL/L — SIGNIFICANT CHANGE UP (ref 3.5–5)
POTASSIUM SERPL-SCNC: 4.2 MMOL/L — SIGNIFICANT CHANGE UP (ref 3.5–5)
PROT SERPL-MCNC: 6.1 G/DL — SIGNIFICANT CHANGE UP (ref 6–8)
RBC # BLD: 4.16 M/UL — LOW (ref 4.2–5.4)
RBC # FLD: 13.7 % — SIGNIFICANT CHANGE UP (ref 11.5–14.5)
SODIUM SERPL-SCNC: 143 MMOL/L — SIGNIFICANT CHANGE UP (ref 135–146)
SPECIMEN SOURCE: SIGNIFICANT CHANGE UP
VIT B12 SERPL-MCNC: 1302 PG/ML — HIGH (ref 232–1245)
WBC # BLD: 8.15 K/UL — SIGNIFICANT CHANGE UP (ref 4.8–10.8)
WBC # FLD AUTO: 8.15 K/UL — SIGNIFICANT CHANGE UP (ref 4.8–10.8)

## 2021-12-14 PROCEDURE — 99232 SBSQ HOSP IP/OBS MODERATE 35: CPT

## 2021-12-14 RX ORDER — ACETAMINOPHEN 500 MG
2 TABLET ORAL
Qty: 0 | Refills: 0 | DISCHARGE
Start: 2021-12-14

## 2021-12-14 RX ADMIN — SERTRALINE 100 MILLIGRAM(S): 25 TABLET, FILM COATED ORAL at 11:48

## 2021-12-14 RX ADMIN — OXYCODONE AND ACETAMINOPHEN 1 TABLET(S): 5; 325 TABLET ORAL at 08:57

## 2021-12-14 RX ADMIN — ENOXAPARIN SODIUM 40 MILLIGRAM(S): 100 INJECTION SUBCUTANEOUS at 11:48

## 2021-12-14 RX ADMIN — Medication 650 MILLIGRAM(S): at 20:13

## 2021-12-14 RX ADMIN — Medication 1 MILLIGRAM(S): at 13:28

## 2021-12-14 RX ADMIN — OXYCODONE AND ACETAMINOPHEN 1 TABLET(S): 5; 325 TABLET ORAL at 22:21

## 2021-12-14 RX ADMIN — LIDOCAINE 1 PATCH: 4 CREAM TOPICAL at 19:55

## 2021-12-14 RX ADMIN — LIDOCAINE 1 PATCH: 4 CREAM TOPICAL at 11:49

## 2021-12-14 RX ADMIN — OLANZAPINE 10 MILLIGRAM(S): 15 TABLET, FILM COATED ORAL at 11:48

## 2021-12-14 RX ADMIN — OXYCODONE AND ACETAMINOPHEN 1 TABLET(S): 5; 325 TABLET ORAL at 09:40

## 2021-12-14 RX ADMIN — CHLORHEXIDINE GLUCONATE 1 APPLICATION(S): 213 SOLUTION TOPICAL at 05:02

## 2021-12-14 RX ADMIN — Medication 650 MILLIGRAM(S): at 21:32

## 2021-12-14 RX ADMIN — LIDOCAINE 1 PATCH: 4 CREAM TOPICAL at 23:53

## 2021-12-14 NOTE — DISCHARGE NOTE PROVIDER - CARE PROVIDERS DIRECT ADDRESSES
,[email or web address]

## 2021-12-14 NOTE — DISCHARGE NOTE PROVIDER - NSDCFUADDINST_GEN_ALL_CORE_FT
albuterol 1630 Physical Therapy 3-5x/week Physical Therapy 3-5x/week  Wound care: Clean with normal saline; apply Xeroform, ABD and Kerlix

## 2021-12-14 NOTE — PROGRESS NOTE ADULT - ATTENDING COMMENTS
***My note supersedes any discrepancies that may be above in the resident's note***    60 y/o female with PMHx bipolar disorder with hypomanic episodes, schizophrenia, IBS, anxiety, depression, severe right knee osteoarthritis, low back pain, recurrent UTI, presented to ED for inability to care for herself and poor living conditions that has been going on for few months    # Ambulatory dysfunction  - was at [facility] few months ago   - secondary to severe right knee OA and lower back pain  - Xray Knee 4 Views, Right        impression: No acute displaced fracture or dislocation. Severe medial and patellofemoral compartment and moderate lateral compartment        osteoarthritis, unchanged. Subchondral sclerosis at the medial compartment with multiple joint bodies and small joint effusion, unchanged.  - lidocaine patch   - pain control  - PT eval for STR  - physiatry rec STR  - check for vitamin D, vitamin B12 , tsh and folate    # Recurrent UTI:  - check UA: bacteria few, wbc 3   - straight cath for residual urine   - last urine cx showed klebsiella pansensitive   - d/c rocephin   - denies any constipation   - us renal normal   - need urodynamics studies as OP and UROGYN referral to r/o any prolapse, vs overactive bladder vs other etiology     # Schizophrenia, Bipolar disorder, Anxiety  -pressured speech, tangential thinking  - stopped her medications few weeks ago   - resume zoloft 100 mg po od  - resume zyprexa 10 mg po od     DVT prophylaxis: lovenox  GI prophylaxis: not indicated  Diet: regular  Activity: activity with assistance  Code status: full      #Progress Note Handoff  Pending (specify):  placement into rehab  Family discussion: patient updated. in agreement with plan  Disposition: STR pending CM update on placement and Auth. prepare order for 12/15 placed

## 2021-12-14 NOTE — DISCHARGE NOTE PROVIDER - CARE PROVIDER_API CALL
Flakito Hawley)  Internal Medicine  [address] [place] [address]  Phone: [phone]  Fax: [phone]  Follow Up Time: 1 week   Flakito Hawley)  Internal Medicine  [address] [place] [address]  Phone: [phone]  Fax: [phone]  Follow Up Time: 1 week    Osmar Silveira)  Orthopaedic Surgery  [address] [place] [address]  Phone: [phone]  Fax: [phone]  Follow Up Time: 2 weeks   Flakito Hawley)  Internal Medicine  [address] [place] [address]  Phone: [phone]  Fax: [phone]  Follow Up Time: 1 week    Osmar Silveira)  Orthopaedic Surgery  [address] [place] [address]  Phone: [phone]  Fax: [phone]  Follow Up Time: 2 weeks    Krishna Black)  Anesthesiology; Pain Medicine  [address] [place] [address]  Phone: [phone]  Fax: [phone]  Follow Up Time: 2 weeks

## 2021-12-14 NOTE — DISCHARGE NOTE PROVIDER - NSDCMRMEDTOKEN_GEN_ALL_CORE_FT
acetaminophen 325 mg oral tablet: 2 tab(s) orally every 6 hours, As needed, Temp greater or equal to 38C (100.4F), Mild Pain (1 - 3)  clonazePAM 1 mg oral tablet: 1 tab(s) orally once a day  OLANZapine 10 mg oral tablet: 1 tab(s) orally once a day  sertraline 100 mg oral tablet: 1 tab(s) orally once a day   acetaminophen 325 mg oral tablet: 2 tab(s) orally every 6 hours, As needed, Temp greater or equal to 38C (100.4F), Mild Pain (1 - 3)  bisacodyl 5 mg oral delayed release tablet: 1 tab(s) orally every 12 hours, As needed, Constipation  clonazePAM 1 mg oral tablet: 1 tab(s) orally once a day  OLANZapine 10 mg oral tablet: 1 tab(s) orally once a day  sertraline 100 mg oral tablet: 1 tab(s) orally once a day

## 2021-12-14 NOTE — DISCHARGE NOTE PROVIDER - NSDCCPCAREPLAN_GEN_ALL_CORE_FT
PRINCIPAL DISCHARGE DIAGNOSIS  Diagnosis: Inability to ambulate due to knee  Assessment and Plan of Treatment: Physical therapy and rehab doctors saw you and recommend SNF.       PRINCIPAL DISCHARGE DIAGNOSIS  Diagnosis: Inability to ambulate due to knee  Assessment and Plan of Treatment: Physical therapy and rehab doctors saw you and recommend SNF.      SECONDARY DISCHARGE DIAGNOSES  Diagnosis: Osteoarthritis  Assessment and Plan of Treatment: Folllow up outpatient with orthopedics physician for further management of OA.

## 2021-12-14 NOTE — DISCHARGE NOTE PROVIDER - HOSPITAL COURSE
61 y/o female with PMHx bipolar disorder with hypomanic episodes, schizophrenia, IBS, anxiety, depression, severe right knee osteoarthritis, low back pain, recurrent UTI, presented to ED for inability to care for herself and poor living conditions that has been going on for few months  patient was admitted back in june 2021 for the same chief complaint and got dc to [facility] for STR. she said that her niece was not able to take care of her anymore. patient usually uses a walker to ambulate, but recently she was having trouble walking even with a rolling walker in view of her severe pain in her right knee. she mentions that she was taking diclofenac and her pain was well controlled at that time but recently she went to [facility] and her doctor stopped her diclofenac. to note that she had several falls over the last few months but she denied any head trauma, or LOC. she had a contusion on her breastbone. she also has chronic recurrent UTI and c/o urgency and intermittency and most of the time, she cannot make it to the bathroom in view of her inability to ambulate  patient will be admitted for disposition plan, to r/o UTI and to check for right knee OA progression     Hospital course:   Admitted for inability to take care of herself.   Physiatry and PT rec SNF.   Rocephin held as u/a was negative. 61 y/o female with PMHx bipolar disorder with hypomanic episodes, schizophrenia, IBS, anxiety, depression, severe right knee osteoarthritis, low back pain, recurrent UTI, presented to ED for inability to care for herself and poor living conditions that has been going on for few months  patient was admitted back in june 2021 for the same chief complaint and got dc to [facility] for STR. she said that her niece was not able to take care of her anymore. patient usually uses a walker to ambulate, but recently she was having trouble walking even with a rolling walker in view of her severe pain in her right knee. she mentions that she was taking diclofenac and her pain was well controlled at that time but recently she went to [facility] and her doctor stopped her diclofenac. to note that she had several falls over the last few months but she denied any head trauma, or LOC. she had a contusion on her breastbone. she also has chronic recurrent UTI and c/o urgency and intermittency and most of the time, she cannot make it to the bathroom in view of her inability to ambulate  patient will be admitted for disposition plan, to r/o UTI and to check for right knee OA progression     Hospital course:   Admitted for inability to take care of herself.   Physiatry and PT rec SNF.   Rocephin held as u/a was negative, UTI was ruled out .     I have personally seen and examined patient on the above date.  I discussed the case with Dr. Be and I agree with findings and plan as detailed per note above, which I have amended where appropriate. 61 y/o female with PMHx bipolar disorder with hypomanic episodes, schizophrenia, IBS, anxiety, depression, severe right knee osteoarthritis, low back pain, recurrent UTI, presented to ED for inability to care for herself and poor living conditions that has been going on for few months  patient was admitted back in june 2021 for the same chief complaint and got dc to [facility] for STR. she said that her niece was not able to take care of her anymore. patient usually uses a walker to ambulate, but recently she was having trouble walking even with a rolling walker in view of her severe pain in her right knee. she mentions that she was taking diclofenac and her pain was well controlled at that time but recently she went to [facility] and her doctor stopped her diclofenac. to note that she had several falls over the last few months but she denied any head trauma, or LOC. she had a contusion on her breastbone. she also has chronic recurrent UTI and c/o urgency and intermittency and most of the time, she cannot make it to the bathroom in view of her inability to ambulate  patient will be admitted for disposition plan, to r/o UTI and to check for right knee OA progression     Hospital course:   Admitted for inability to take care of herself.   Physiatry and PT rec SNF.   Rocephin held as u/a was negative, UTI was ruled out .     pt had one episode of fever, but has been fever free for 48 hours. 61 y/o female with PMHx bipolar disorder with hypomanic episodes, schizophrenia, IBS, anxiety, depression, severe right knee osteoarthritis, low back pain, recurrent UTI, presented to ED for inability to care for herself and poor living conditions that has been going on for few months  patient was admitted back in june 2021 for the same chief complaint and got dc to [facility] for STR. she said that her niece was not able to take care of her anymore. patient usually uses a walker to ambulate, but recently she was having trouble walking even with a rolling walker in view of her severe pain in her right knee. she mentions that she was taking diclofenac and her pain was well controlled at that time but recently she went to [facility] and her doctor stopped her diclofenac. to note that she had several falls over the last few months but she denied any head trauma, or LOC. she had a contusion on her breastbone. she also has chronic recurrent UTI and c/o urgency and intermittency and most of the time, she cannot make it to the bathroom in view of her inability to ambulate  patient will be admitted for disposition plan, to r/o UTI and to check for right knee OA progression     Hospital course:   Admitted for inability to take care of herself.   Physiatry and PT rec SNF.   Rocephin held as u/a was negative, UTI was ruled out .     pt had one episode of fever, but has been fever free for 48 hours.   Patient being discharged to rehab facility     I have personally seen and examined patient on the above date.  I discussed the case with Dr. Walker and I agree with findings and plan as detailed per note above, which I have amended where appropriate.

## 2021-12-14 NOTE — DISCHARGE NOTE PROVIDER - PROVIDER TOKENS
PROVIDER:[TOKEN:[[id number]],FOLLOWUP:[1 week]] PROVIDER:[TOKEN:[[id number]],FOLLOWUP:[1 week]],PROVIDER:[TOKEN:[[id number]],FOLLOWUP:[2 weeks]] PROVIDER:[TOKEN:[[id number]],FOLLOWUP:[1 week]],PROVIDER:[TOKEN:[[id number]],FOLLOWUP:[2 weeks]],PROVIDER:[TOKEN:[[id number]],FOLLOWUP:[2 weeks]]

## 2021-12-15 ENCOUNTER — TRANSCRIPTION ENCOUNTER (OUTPATIENT)
Age: 61
End: 2021-12-15

## 2021-12-15 PROCEDURE — 99239 HOSP IP/OBS DSCHRG MGMT >30: CPT

## 2021-12-15 RX ORDER — ACETAMINOPHEN 500 MG
650 TABLET ORAL EVERY 6 HOURS
Refills: 0 | Status: DISCONTINUED | OUTPATIENT
Start: 2021-12-15 | End: 2021-12-18

## 2021-12-15 RX ORDER — SODIUM CHLORIDE 9 MG/ML
500 INJECTION, SOLUTION INTRAVENOUS
Refills: 0 | Status: DISCONTINUED | OUTPATIENT
Start: 2021-12-15 | End: 2021-12-16

## 2021-12-15 RX ORDER — CLONAZEPAM 1 MG
1 TABLET ORAL ONCE
Refills: 0 | Status: DISCONTINUED | OUTPATIENT
Start: 2021-12-15 | End: 2021-12-15

## 2021-12-15 RX ORDER — SERTRALINE 25 MG/1
100 TABLET, FILM COATED ORAL AT BEDTIME
Refills: 0 | Status: DISCONTINUED | OUTPATIENT
Start: 2021-12-15 | End: 2021-12-18

## 2021-12-15 RX ADMIN — LIDOCAINE 1 PATCH: 4 CREAM TOPICAL at 11:40

## 2021-12-15 RX ADMIN — ONDANSETRON 4 MILLIGRAM(S): 8 TABLET, FILM COATED ORAL at 20:51

## 2021-12-15 RX ADMIN — SERTRALINE 100 MILLIGRAM(S): 25 TABLET, FILM COATED ORAL at 21:57

## 2021-12-15 RX ADMIN — CHLORHEXIDINE GLUCONATE 1 APPLICATION(S): 213 SOLUTION TOPICAL at 05:45

## 2021-12-15 RX ADMIN — Medication 1 MILLIGRAM(S): at 17:48

## 2021-12-15 RX ADMIN — OLANZAPINE 10 MILLIGRAM(S): 15 TABLET, FILM COATED ORAL at 11:41

## 2021-12-15 RX ADMIN — SERTRALINE 100 MILLIGRAM(S): 25 TABLET, FILM COATED ORAL at 11:40

## 2021-12-15 RX ADMIN — Medication 30 MILLILITER(S): at 19:00

## 2021-12-15 RX ADMIN — Medication 650 MILLIGRAM(S): at 20:54

## 2021-12-15 RX ADMIN — ENOXAPARIN SODIUM 40 MILLIGRAM(S): 100 INJECTION SUBCUTANEOUS at 11:41

## 2021-12-15 RX ADMIN — Medication 1 MILLIGRAM(S): at 11:43

## 2021-12-15 NOTE — CONSULT NOTE ADULT - ASSESSMENT
INCOMPLETE NOTE 62 F w/ PMH as above with diffuse arthritic pain, but worst in the R knee    - Pt does not want to take opioid pain medications  - Tylenol 1000mg TID around the clock  - Consider diclofenac if not contraindicated and GI PPX while taking this medication.   - Lidoderm patch over R knee 12 hours on 12 hours off

## 2021-12-15 NOTE — CONSULT NOTE ADULT - SUBJECTIVE AND OBJECTIVE BOX
Chief Complaint: arthritic pain    HPI:  61 y/o female with PMHx bipolar disorder with hypomanic episodes, schizophrenia, IBS, anxiety, depression, severe right knee osteoarthritis, low back pain, recurrent UTI, presented to ED for inability to care for herself and poor living conditions that has been going on for few months  patient was admitted back in 2021 for the same chief complaint and got dc to [facility] for STR. she said that her niece was not able to take care of her anymore. patient usually uses a walker to ambulate, but recently she was having trouble walking even with a rolling walker in view of her severe pain in her right knee. she mentions that she was taking diclofenac and her pain was well controlled at that time but recently she went to [facility] and her doctor stopped her diclofenac. to note that she had several falls over the last few months but she denied any head trauma, or LOC. she had a contusion on her breastbone. she also has chronic recurrent UTI and c/o urgency and intermittency and most of the time, she cannot make it to the bathroom in view of her inability to ambulate  patient will be admitted for disposition plan, to r/o UTI and to check for right knee OA progression    PAIN HPI:          PAST MEDICAL & SURGICAL HISTORY:  Schizophrenia    Bipolar disorder    Depression    Anxiety    Chronic lower back pain  result of pelvic fracture in the past    Osteoarthritis    Urinary incontinence    Chronic urinary tract infection    History of tremor    History of total knee arthroplasty, left        FAMILY HISTORY:  FH: prostate cancer (Father)    FH: Parkinson&#x27;s disease (Mother)        SOCIAL HISTORY:  [ ] Denies Smoking, Alcohol, or Drug Use    Allergies    azithromycin (Unknown)  moxifloxacin (Unknown)  penicillin (Unknown)  sulfa drugs (Unknown)    Intolerances        PAIN MEDICATIONS:  acetaminophen     Tablet .. 650 milliGRAM(s) Oral every 6 hours PRN  clonazePAM  Tablet 1 milliGRAM(s) Oral daily  melatonin 3 milliGRAM(s) Oral at bedtime PRN  OLANZapine 10 milliGRAM(s) Oral daily  ondansetron Injectable 4 milliGRAM(s) IV Push every 8 hours PRN  sertraline 100 milliGRAM(s) Oral at bedtime    Heme:  enoxaparin Injectable 40 milliGRAM(s) SubCutaneous daily    Antibiotics:    Cardiovascular:    GI:  aluminum hydroxide/magnesium hydroxide/simethicone Suspension 30 milliLiter(s) Oral every 4 hours PRN    Endocrine:    All Other Medications:  chlorhexidine 4% Liquid 1 Application(s) Topical <User Schedule>  influenza   Vaccine 0.5 milliLiter(s) IntraMuscular once  lidocaine   4% Patch 1 Patch Transdermal daily      REVIEW OF SYSTEMS:    CONSTITUTIONAL: No fever, weight loss, or fatigue  EYES: No eye pain, visual disturbances, or discharge  RESPIRATORY: No cough, wheezing, chills or hemoptysis; No shortness of breath  CARDIOVASCULAR: No chest pain, palpitations, dizziness, or leg swelling  GASTROINTESTINAL: No abdominal or epigastric pain. No nausea, vomiting, or hematemesis; No diarrhea or constipation. No melena or hematochezia.  GENITOURINARY: No dysuria, frequency, hematuria, or incontinence  NEUROLOGICAL: No headaches, memory loss, loss of strength, numbness, or tremors  SKIN: No itching, burning, rashes, or lesions   MUSCULOSKELETAL: AS HPI  PSYCHIATRIC: No depression, anxiety, mood swings, or difficulty sleeping        Vital Signs Last 24 Hrs  T(C): 36.7 (15 Dec 2021 13:12), Max: 37.9 (14 Dec 2021 20:38)  T(F): 98 (15 Dec 2021 13:12), Max: 100.3 (14 Dec 2021 20:38)  HR: 88 (15 Dec 2021 13:12) (56 - 88)  BP: 119/56 (15 Dec 2021 13:12) (111/62 - 161/78)  BP(mean): --  RR: 18 (15 Dec 2021 13:12) (17 - 18)  SpO2: --    PAIN SCORE:         SCALE USED: (1-10 VNRS)             PHYSICAL EXAM:    GENERAL: NAD  HEAD:  Atraumatic, Normocephalic  EYES: EOMI, PERRLA, conjunctiva and sclera clear  ENMT: No tonsillar erythema, exudates, or enlargement; Moist mucous membranes, Good dentition, No lesions  NECK: Supple, No JVD, Normal thyroid  NERVOUS SYSTEM:  Alert & Oriented X3, Good concentration; Motor Strength 5/5 B/L upper and lower extremities  CHEST/LUNG:   HEART:   ABDOMEN: Soft, Nontender, Nondistended  EXTREMITIES:  2+ Peripheral Pulses      LABS:                          12.2   8.15  )-----------( 365      ( 14 Dec 2021 04:30 )             38.6     12-14    143  |  107  |  14  ----------------------------<  102<H>  4.2   |  21  |  0.6<L>    Ca    9.2      14 Dec 2021 04:30    TPro  6.1  /  Alb  3.6  /  TBili  0.2  /  DBili  x   /  AST  13  /  ALT  8   /  AlkPhos  95  12-14      Urinalysis Basic - ( 13 Dec 2021 19:25 )    Color: Yellow / Appearance: Slightly Turbid / S.030 / pH: x  Gluc: x / Ketone: Trace  / Bili: Negative / Urobili: 6 mg/dL   Blood: x / Protein: 30 mg/dL / Nitrite: Negative   Leuk Esterase: Negative / RBC: 4 /HPF / WBC 3 /HPF   Sq Epi: x / Non Sq Epi: 19 /HPF / Bacteria: Few        RADIOLOGY:    < from: Xray Knee 4 Views, Right (21 @ 17:19) >    ACC: [id number] EXAM:  XR KNEE COMP 4+ VIEWS RT                          PROCEDURE DATE:  2021          INTERPRETATION:  Clinical History / Reason for exam: Knee pain    4 views of the right knee.    COMPARISON: 2015    Findings/  impression: No acute displaced fracture or dislocation. Severe medial and   patellofemoral compartment and moderate lateral compartment   osteoarthritis, unchanged. Subchondral sclerosis at the medial   compartment with multiple joint bodies and small joint effusion,   unchanged.    --- End of Report ---            CARLO SOLIZ MD; Attending Radiologist  This document has been electronically signed. Dec 14 2021  8:32AM    < end of copied text >      Drug Screen:  n/a            [x ]  [place]  Reviewed and Copied to Chart    No chronic opioids or controlled substances in ISTOP Chief Complaint: arthritic pain    HPI:  59 y/o female with PMHx bipolar disorder with hypomanic episodes, schizophrenia, IBS, anxiety, depression, severe right knee osteoarthritis, low back pain, recurrent UTI, presented to ED for inability to care for herself and poor living conditions that has been going on for few months  patient was admitted back in 2021 for the same chief complaint and got dc to [facility] for STR. she said that her niece was not able to take care of her anymore. patient usually uses a walker to ambulate, but recently she was having trouble walking even with a rolling walker in view of her severe pain in her right knee. she mentions that she was taking diclofenac and her pain was well controlled at that time but recently she went to [facility] and her doctor stopped her diclofenac. to note that she had several falls over the last few months but she denied any head trauma, or LOC. she had a contusion on her breastbone. she also has chronic recurrent UTI and c/o urgency and intermittency and most of the time, she cannot make it to the bathroom in view of her inability to ambulate  patient will be admitted for disposition plan, to r/o UTI and to check for right knee OA progression    PAIN HPI:  Patient complains most of pain in the R knee. At rest, she currently has no pain. When she gets up to move, she says that is when the pain is the worst. It can be rated 10/10. Diclofenac she says has helped her the most with her pain but it has started to bother her stomach.   She does not want to take opioid pain medications as she said they make her feel too out of it, confused, and as per the patient, prone to falling. She would rather take non-opioid medications to help with her pain. She said she had held off on the replacement of her right knee, but she understands that she needs it for her pain.       PAST MEDICAL & SURGICAL HISTORY:  Schizophrenia    Bipolar disorder    Depression    Anxiety    Chronic lower back pain  result of pelvic fracture in the past    Osteoarthritis    Urinary incontinence    Chronic urinary tract infection    History of tremor    History of total knee arthroplasty, left        FAMILY HISTORY:  FH: prostate cancer (Father)    FH: Parkinson&#x27;s disease (Mother)        SOCIAL HISTORY:  [ ] Denies Smoking, Alcohol, or Drug Use    Allergies    azithromycin (Unknown)  moxifloxacin (Unknown)  penicillin (Unknown)  sulfa drugs (Unknown)    Intolerances        PAIN MEDICATIONS:  acetaminophen     Tablet .. 650 milliGRAM(s) Oral every 6 hours PRN  clonazePAM  Tablet 1 milliGRAM(s) Oral daily  melatonin 3 milliGRAM(s) Oral at bedtime PRN  OLANZapine 10 milliGRAM(s) Oral daily  ondansetron Injectable 4 milliGRAM(s) IV Push every 8 hours PRN  sertraline 100 milliGRAM(s) Oral at bedtime    Heme:  enoxaparin Injectable 40 milliGRAM(s) SubCutaneous daily    Antibiotics:    Cardiovascular:    GI:  aluminum hydroxide/magnesium hydroxide/simethicone Suspension 30 milliLiter(s) Oral every 4 hours PRN    Endocrine:    All Other Medications:  chlorhexidine 4% Liquid 1 Application(s) Topical <User Schedule>  influenza   Vaccine 0.5 milliLiter(s) IntraMuscular once  lidocaine   4% Patch 1 Patch Transdermal daily      REVIEW OF SYSTEMS:    CONSTITUTIONAL: No fever, weight loss, or fatigue  EYES: No eye pain, visual disturbances, or discharge  RESPIRATORY: No cough, wheezing, chills or hemoptysis; No shortness of breath  CARDIOVASCULAR: No chest pain, palpitations, dizziness, or leg swelling  GASTROINTESTINAL: No abdominal or epigastric pain. No nausea, vomiting, or hematemesis; No diarrhea or constipation. No melena or hematochezia.  GENITOURINARY: No dysuria, frequency, hematuria, or incontinence  NEUROLOGICAL: No headaches, memory loss, loss of strength, numbness, or tremors  SKIN: No itching, burning, rashes, or lesions   MUSCULOSKELETAL: AS HPI  PSYCHIATRIC: No depression, anxiety, mood swings, or difficulty sleeping        Vital Signs Last 24 Hrs  T(C): 36.7 (15 Dec 2021 13:12), Max: 37.9 (14 Dec 2021 20:38)  T(F): 98 (15 Dec 2021 13:12), Max: 100.3 (14 Dec 2021 20:38)  HR: 88 (15 Dec 2021 13:12) (56 - 88)  BP: 119/56 (15 Dec 2021 13:12) (111/62 - 161/78)  BP(mean): --  RR: 18 (15 Dec 2021 13:12) (17 - 18)  SpO2: --    PAIN SCORE:      0   SCALE USED: (1-10 VNRS)             PHYSICAL EXAM:    GENERAL: NAD  HEAD:  Atraumatic, Normocephalic  EYES: EOMI, PERRLA, conjunctiva and sclera clear  ENMT: No tonsillar erythema, exudates, or enlargement; Moist mucous membranes, Good dentition, No lesions  NECK: Supple, No JVD, Normal thyroid  NERVOUS SYSTEM:  Alert & Oriented X3, Good concentration; Motor Strength 5/5 B/L upper and lower extremities  CHEST/LUNG: Non labored breathing  HEART: RRR  ABDOMEN: Soft, Nontender, Nondistended  EXTREMITIES:  2+ Peripheral Pulses. TTP R knee      LABS:                          12.2   8.15  )-----------( 365      ( 14 Dec 2021 04:30 )             38.6         143  |  107  |  14  ----------------------------<  102<H>  4.2   |  21  |  0.6<L>    Ca    9.2      14 Dec 2021 04:30    TPro  6.1  /  Alb  3.6  /  TBili  0.2  /  DBili  x   /  AST  13  /  ALT  8   /  AlkPhos  95        Urinalysis Basic - ( 13 Dec 2021 19:25 )    Color: Yellow / Appearance: Slightly Turbid / S.030 / pH: x  Gluc: x / Ketone: Trace  / Bili: Negative / Urobili: 6 mg/dL   Blood: x / Protein: 30 mg/dL / Nitrite: Negative   Leuk Esterase: Negative / RBC: 4 /HPF / WBC 3 /HPF   Sq Epi: x / Non Sq Epi: 19 /HPF / Bacteria: Few        RADIOLOGY:    < from: Xray Knee 4 Views, Right (21 @ 17:19) >    ACC: [id number] EXAM:  XR KNEE COMP 4+ VIEWS RT                          PROCEDURE DATE:  2021          INTERPRETATION:  Clinical History / Reason for exam: Knee pain    4 views of the right knee.    COMPARISON: 2015    Findings/  impression: No acute displaced fracture or dislocation. Severe medial and   patellofemoral compartment and moderate lateral compartment   osteoarthritis, unchanged. Subchondral sclerosis at the medial   compartment with multiple joint bodies and small joint effusion,   unchanged.    --- End of Report ---            CARLO SOLIZ MD; Attending Radiologist  This document has been electronically signed. Dec 14 2021  8:32AM    < end of copied text >      Drug Screen:  n/a            [x ]  [place]  Reviewed and Copied to Chart    No chronic opioids or controlled substances in ISTOP

## 2021-12-15 NOTE — DISCHARGE NOTE NURSING/CASE MANAGEMENT/SOCIAL WORK - NSDCPEWEB_GEN_ALL_CORE
[facility] for Tobacco Control website --- [email or web address] website --- [email or web address]

## 2021-12-15 NOTE — PROGRESS NOTE ADULT - SUBJECTIVE AND OBJECTIVE BOX
Patient is a 61y old  Female who presents with a chief complaint of inability to ambulate (15 Dec 2021 14:32)      Patient seen and examined at bedside.    ALLERGIES:  azithromycin (Unknown)  moxifloxacin (Unknown)  penicillin (Unknown)  sulfa drugs (Unknown)    MEDICATIONS:  acetaminophen     Tablet .. 650 milliGRAM(s) Oral every 6 hours PRN  aluminum hydroxide/magnesium hydroxide/simethicone Suspension 30 milliLiter(s) Oral every 4 hours PRN  chlorhexidine 4% Liquid 1 Application(s) Topical <User Schedule>  clonazePAM  Tablet 1 milliGRAM(s) Oral daily  enoxaparin Injectable 40 milliGRAM(s) SubCutaneous daily  influenza   Vaccine 0.5 milliLiter(s) IntraMuscular once  lidocaine   4% Patch 1 Patch Transdermal daily  melatonin 3 milliGRAM(s) Oral at bedtime PRN  OLANZapine 10 milliGRAM(s) Oral daily  ondansetron Injectable 4 milliGRAM(s) IV Push every 8 hours PRN  sertraline 100 milliGRAM(s) Oral at bedtime    Vital Signs Last 24 Hrs  T(F): 98 (15 Dec 2021 13:12), Max: 100.3 (14 Dec 2021 20:38)  HR: 88 (15 Dec 2021 13:12) (56 - 88)  BP: 119/56 (15 Dec 2021 13:12) (111/62 - 161/78)  RR: 18 (15 Dec 2021 13:12) (17 - 18)  SpO2: --  I&O's Summary    14 Dec 2021 07:01  -  15 Dec 2021 07:00  --------------------------------------------------------  IN: 473 mL / OUT: 1 mL / NET: 472 mL        PHYSICAL EXAM:  General: NAD, A/O x 3  ENT: MMM  Neck: Supple, No JVD  Lungs: Clear to auscultation bilaterally  Cardio: RRR, S1/S2, No murmurs  Abdomen: Soft, Nontender, Nondistended; Bowel sounds present  Extremities: No cyanosis, No edema    LABS:                        12.2   8.15  )-----------( 365      ( 14 Dec 2021 04:30 )             38.6     12-14    143  |  107  |  14  ----------------------------<  102  4.2   |  21  |  0.6    Ca    9.2      14 Dec 2021 04:30    TPro  6.1  /  Alb  3.6  /  TBili  0.2  /  DBili  x   /  AST  13  /  ALT  8   /  AlkPhos  95      eGFR if Non African American: 97 mL/min/1.73M2 (21 @ 04:30)  eGFR if African American: 112 mL/min/1.73M2 (21 @ 04:30)              TSH 0.66   TSH with FT4 reflex --  Total T3 --                  Urinalysis Basic - ( 13 Dec 2021 19:25 )    Color: Yellow / Appearance: Slightly Turbid / S.030 / pH: x  Gluc: x / Ketone: Trace  / Bili: Negative / Urobili: 6 mg/dL   Blood: x / Protein: 30 mg/dL / Nitrite: Negative   Leuk Esterase: Negative / RBC: 4 /HPF / WBC 3 /HPF   Sq Epi: x / Non Sq Epi: 19 /HPF / Bacteria: Few        Culture - Urine (collected 13 Dec 2021 19:25)  Source: Catheterized Catheterized  Final Report (14 Dec 2021 22:45):    >=3 organisms. Probable collection contamination.      COVID-19 PCR: NotDetec (21 @ 11:37)      RADIOLOGY & ADDITIONAL TESTS:    Care Discussed with Consultants/Other Providers:

## 2021-12-15 NOTE — PROGRESS NOTE ADULT - SUBJECTIVE AND OBJECTIVE BOX
SUBJECTIVE:   LENGTH OF HOSPITAL STAY: 2d    CHIEF COMPLAINT:  Patient is a 61y old  Female who presents with a chief complaint of inability to ambulate (14 Dec 2021 13:04)      Events over the past 24 hours:  Patient was seen at the bedside this morning. She is lying comfortably on the bed. There were no acute events overnight.  Patient denies any chest pain, shortness of breath, cough, nausea, vomiting, dizziness, she c/o itching over the wounds on the leg.       REVIEW OF SYSTEMS  Negative Except as mentioned above.    ALLERGIES:  azithromycin (Unknown)  moxifloxacin (Unknown)  penicillin (Unknown)  sulfa drugs (Unknown)        MEDICATIONS:  STANDING MEDICATIONS  chlorhexidine 4% Liquid 1 Application(s) Topical <User Schedule>  clonazePAM  Tablet 1 milliGRAM(s) Oral daily  enoxaparin Injectable 40 milliGRAM(s) SubCutaneous daily  influenza   Vaccine 0.5 milliLiter(s) IntraMuscular once  lidocaine   4% Patch 1 Patch Transdermal daily  OLANZapine 10 milliGRAM(s) Oral daily  sertraline 100 milliGRAM(s) Oral at bedtime    PRN MEDICATIONS  acetaminophen     Tablet .. 650 milliGRAM(s) Oral every 6 hours PRN  aluminum hydroxide/magnesium hydroxide/simethicone Suspension 30 milliLiter(s) Oral every 4 hours PRN  melatonin 3 milliGRAM(s) Oral at bedtime PRN  ondansetron Injectable 4 milliGRAM(s) IV Push every 8 hours PRN        OBJECTIVE:  VITALS:   T(F): 98 (12-15 @ 13:12), Max: 100.3 ( @ 20:38)  HR: 88 (12-15 @ 13:12) (56 - 88)  BP: 119/56 (12-15 @ 13:12) (111/62 - 161/78)  RR: 18 (12-15 @ 13:12) (17 - 18)  SpO2: --    I&O's:      @ 07:01  -  12-15 @ 07:00  --------------------------------------------------------  IN: 473 mL / OUT: 1 mL / NET: 472 mL        PHYSICAL EXAM:  General: No acute distress; Pallor (-), Icterus (-), Cyanosis (-), Clubbing (-)  HEENT: Normocephalic, atraumatic, PERRLA, EOMI  PULM: Bilaterally equal and clear breath sounds, wheeze (-), rubs (-), crackles (-)  CVS: Normal S1 and S2, murmurs (-), rubs (-), gallops (-)   GI: Soft, nondistended, nontender, BS +  MSK: Edema (-), 2nd degree ulcer present over the posterior aspect of right leg and over the left knee.   SKIN: Warm and well perfused,  NEURO:  Alert and Oriented x 3; No gross focal neurological deficit noted      LABS:                        12.2   8.15  )-----------( 365      ( 14 Dec 2021 04:30 )             38.6             12-14    143  |  107  |  14  ----------------------------<  102<H>  4.2   |  21  |  0.6<L>    Ca    9.2      14 Dec 2021 04:30    TPro  6.1  /  Alb  3.6  /  TBili  0.2  /  DBili  x   /  AST  13  /  ALT  8   /  AlkPhos  95  12-14    LIVER FUNCTIONS - ( 14 Dec 2021 04:30 )  Alb: 3.6 g/dL / Pro: 6.1 g/dL / ALK PHOS: 95 U/L / ALT: 8 U/L / AST: 13 U/L / GGT: x                     Urinalysis Basic - ( 13 Dec 2021 19:25 )    Color: Yellow / Appearance: Slightly Turbid / S.030 / pH: x  Gluc: x / Ketone: Trace  / Bili: Negative / Urobili: 6 mg/dL   Blood: x / Protein: 30 mg/dL / Nitrite: Negative   Leuk Esterase: Negative / RBC: 4 /HPF / WBC 3 /HPF   Sq Epi: x / Non Sq Epi: 19 /HPF / Bacteria: Few    Culture - Urine (collected 13 Dec 2021 19:25)  Source: Catheterized Catheterized  Final Report (14 Dec 2021 22:45):    >=3 organisms. Probable collection contamination.

## 2021-12-15 NOTE — DISCHARGE NOTE NURSING/CASE MANAGEMENT/SOCIAL WORK - NSDCPEFALRISK_GEN_ALL_CORE
For information on Fall & Injury Prevention, visit: [email or web address] OR  [email or web address] OR  https://www.cdc.gov/steadi/patient.html

## 2021-12-15 NOTE — DISCHARGE NOTE NURSING/CASE MANAGEMENT/SOCIAL WORK - NSDCPEEMAIL_GEN_ALL_CORE
[facility] for Tobacco Control email [email or web address]

## 2021-12-15 NOTE — DISCHARGE NOTE NURSING/CASE MANAGEMENT/SOCIAL WORK - PATIENT PORTAL LINK FT
You can access the FollowMyHealth Patient Portal offered by [facility] by registering at the following website: [email or web address]. By joining [facility]’s FollowMyHealth portal, you will also be able to view your health information using other applications (apps) compatible with our system.

## 2021-12-16 LAB
ALBUMIN SERPL ELPH-MCNC: 3.2 G/DL — LOW (ref 3.5–5.2)
ALP SERPL-CCNC: 93 U/L — SIGNIFICANT CHANGE UP (ref 30–115)
ALT FLD-CCNC: 7 U/L — SIGNIFICANT CHANGE UP (ref 0–41)
ANION GAP SERPL CALC-SCNC: 11 MMOL/L — SIGNIFICANT CHANGE UP (ref 7–14)
AST SERPL-CCNC: 13 U/L — SIGNIFICANT CHANGE UP (ref 0–41)
BASOPHILS # BLD AUTO: 0.08 K/UL — SIGNIFICANT CHANGE UP (ref 0–0.2)
BASOPHILS NFR BLD AUTO: 0.8 % — SIGNIFICANT CHANGE UP (ref 0–1)
BILIRUB SERPL-MCNC: <0.2 MG/DL — SIGNIFICANT CHANGE UP (ref 0.2–1.2)
BUN SERPL-MCNC: 14 MG/DL — SIGNIFICANT CHANGE UP (ref 10–20)
CALCIUM SERPL-MCNC: 9.1 MG/DL — SIGNIFICANT CHANGE UP (ref 8.5–10.1)
CHLORIDE SERPL-SCNC: 102 MMOL/L — SIGNIFICANT CHANGE UP (ref 98–110)
CO2 SERPL-SCNC: 23 MMOL/L — SIGNIFICANT CHANGE UP (ref 17–32)
CREAT SERPL-MCNC: 0.7 MG/DL — SIGNIFICANT CHANGE UP (ref 0.7–1.5)
EOSINOPHIL # BLD AUTO: 0.34 K/UL — SIGNIFICANT CHANGE UP (ref 0–0.7)
EOSINOPHIL NFR BLD AUTO: 3.4 % — SIGNIFICANT CHANGE UP (ref 0–8)
ERYTHROCYTE [SEDIMENTATION RATE] IN BLOOD: 16 MM/HR — SIGNIFICANT CHANGE UP (ref 0–20)
GLUCOSE SERPL-MCNC: 111 MG/DL — HIGH (ref 70–99)
HCT VFR BLD CALC: 35.9 % — LOW (ref 37–47)
HGB BLD-MCNC: 11.4 G/DL — LOW (ref 12–16)
IMM GRANULOCYTES NFR BLD AUTO: 0.2 % — SIGNIFICANT CHANGE UP (ref 0.1–0.3)
LYMPHOCYTES # BLD AUTO: 3.04 K/UL — SIGNIFICANT CHANGE UP (ref 1.2–3.4)
LYMPHOCYTES # BLD AUTO: 30.6 % — SIGNIFICANT CHANGE UP (ref 20.5–51.1)
MAGNESIUM SERPL-MCNC: 1.8 MG/DL — SIGNIFICANT CHANGE UP (ref 1.8–2.4)
MCHC RBC-ENTMCNC: 29.3 PG — SIGNIFICANT CHANGE UP (ref 27–31)
MCHC RBC-ENTMCNC: 31.8 G/DL — LOW (ref 32–37)
MCV RBC AUTO: 92.3 FL — SIGNIFICANT CHANGE UP (ref 81–99)
MONOCYTES # BLD AUTO: 0.57 K/UL — SIGNIFICANT CHANGE UP (ref 0.1–0.6)
MONOCYTES NFR BLD AUTO: 5.7 % — SIGNIFICANT CHANGE UP (ref 1.7–9.3)
NEUTROPHILS # BLD AUTO: 5.87 K/UL — SIGNIFICANT CHANGE UP (ref 1.4–6.5)
NEUTROPHILS NFR BLD AUTO: 59.3 % — SIGNIFICANT CHANGE UP (ref 42.2–75.2)
NRBC # BLD: 0 /100 WBCS — SIGNIFICANT CHANGE UP (ref 0–0)
PLATELET # BLD AUTO: 356 K/UL — SIGNIFICANT CHANGE UP (ref 130–400)
POTASSIUM SERPL-MCNC: 4.5 MMOL/L — SIGNIFICANT CHANGE UP (ref 3.5–5)
POTASSIUM SERPL-SCNC: 4.5 MMOL/L — SIGNIFICANT CHANGE UP (ref 3.5–5)
PROCALCITONIN SERPL-MCNC: 0.02 NG/ML — SIGNIFICANT CHANGE UP (ref 0.02–0.1)
PROT SERPL-MCNC: 5.6 G/DL — LOW (ref 6–8)
RBC # BLD: 3.89 M/UL — LOW (ref 4.2–5.4)
RBC # FLD: 13.6 % — SIGNIFICANT CHANGE UP (ref 11.5–14.5)
SODIUM SERPL-SCNC: 136 MMOL/L — SIGNIFICANT CHANGE UP (ref 135–146)
WBC # BLD: 9.92 K/UL — SIGNIFICANT CHANGE UP (ref 4.8–10.8)
WBC # FLD AUTO: 9.92 K/UL — SIGNIFICANT CHANGE UP (ref 4.8–10.8)

## 2021-12-16 PROCEDURE — 71045 X-RAY EXAM CHEST 1 VIEW: CPT | Mod: 26

## 2021-12-16 PROCEDURE — 99232 SBSQ HOSP IP/OBS MODERATE 35: CPT

## 2021-12-16 RX ADMIN — LIDOCAINE 1 PATCH: 4 CREAM TOPICAL at 21:00

## 2021-12-16 RX ADMIN — Medication 650 MILLIGRAM(S): at 21:30

## 2021-12-16 RX ADMIN — SERTRALINE 100 MILLIGRAM(S): 25 TABLET, FILM COATED ORAL at 21:04

## 2021-12-16 RX ADMIN — Medication 1 MILLIGRAM(S): at 15:08

## 2021-12-16 RX ADMIN — ENOXAPARIN SODIUM 40 MILLIGRAM(S): 100 INJECTION SUBCUTANEOUS at 13:20

## 2021-12-16 RX ADMIN — Medication 3 MILLIGRAM(S): at 21:30

## 2021-12-16 RX ADMIN — CHLORHEXIDINE GLUCONATE 1 APPLICATION(S): 213 SOLUTION TOPICAL at 06:06

## 2021-12-16 RX ADMIN — OLANZAPINE 10 MILLIGRAM(S): 15 TABLET, FILM COATED ORAL at 13:21

## 2021-12-16 RX ADMIN — LIDOCAINE 1 PATCH: 4 CREAM TOPICAL at 13:20

## 2021-12-16 RX ADMIN — Medication 650 MILLIGRAM(S): at 22:05

## 2021-12-16 NOTE — PROGRESS NOTE ADULT - SUBJECTIVE AND OBJECTIVE BOX
Patient is a 61y old  Female who presents with a chief complaint of inability to ambulate (15 Dec 2021 17:04)      Patient seen and examined at bedside.    ALLERGIES:  azithromycin (Unknown)  moxifloxacin (Unknown)  penicillin (Unknown)  sulfa drugs (Unknown)    MEDICATIONS:  acetaminophen     Tablet .. 650 milliGRAM(s) Oral every 6 hours PRN  aluminum hydroxide/magnesium hydroxide/simethicone Suspension 30 milliLiter(s) Oral every 4 hours PRN  bisacodyl 5 milliGRAM(s) Oral every 12 hours PRN  chlorhexidine 4% Liquid 1 Application(s) Topical <User Schedule>  clonazePAM  Tablet 1 milliGRAM(s) Oral daily  enoxaparin Injectable 40 milliGRAM(s) SubCutaneous daily  influenza   Vaccine 0.5 milliLiter(s) IntraMuscular once  lactated ringers. 500 milliLiter(s) IV Continuous <Continuous>  lidocaine   4% Patch 1 Patch Transdermal daily  melatonin 3 milliGRAM(s) Oral at bedtime PRN  OLANZapine 10 milliGRAM(s) Oral daily  ondansetron Injectable 4 milliGRAM(s) IV Push every 8 hours PRN  sertraline 100 milliGRAM(s) Oral at bedtime    Vital Signs Last 24 Hrs  T(F): 98.5 (16 Dec 2021 15:08), Max: 101.3 (15 Dec 2021 20:18)  HR: 101 (16 Dec 2021 15:08) (76 - 101)  BP: 102/56 (16 Dec 2021 15:08) (102/56 - 184/85)  RR: 18 (16 Dec 2021 15:08) (18 - 18)  SpO2: --  I&O's Summary      PHYSICAL EXAM:  General: NAD, A/O x 2  ENT: MMM  Neck: Supple, No JVD  Lungs: Clear to auscultation bilaterally  Cardio: RRR, S1/S2, No murmurs  Abdomen: Soft, Nontender, Nondistended; Bowel sounds present  Extremities: No cyanosis, No edema    LABS:                        11.4   9.92  )-----------( 356      ( 15 Dec 2021 23:30 )             35.9     12-15    136  |  102  |  14  ----------------------------<  111  4.5   |  23  |  0.7    Ca    9.1      15 Dec 2021 23:30  Mg     1.8     -15    TPro  5.6  /  Alb  3.2  /  TBili  <0.2  /  DBili  x   /  AST  13  /  ALT  7   /  AlkPhos  93  12-15    eGFR if Non African American: 92 mL/min/1.73M2 (12-15-21 @ 23:30)  eGFR if : 107 mL/min/1.73M2 (12-15-21 @ 23:30)              TSH 0.66   TSH with FT4 reflex --  Total T3 --                  Urinalysis Basic - ( 13 Dec 2021 19:25 )    Color: Yellow / Appearance: Slightly Turbid / S.030 / pH: x  Gluc: x / Ketone: Trace  / Bili: Negative / Urobili: 6 mg/dL   Blood: x / Protein: 30 mg/dL / Nitrite: Negative   Leuk Esterase: Negative / RBC: 4 /HPF / WBC 3 /HPF   Sq Epi: x / Non Sq Epi: 19 /HPF / Bacteria: Few        Culture - Urine (collected 13 Dec 2021 19:25)  Source: Catheterized Catheterized  Final Report (14 Dec 2021 22:45):    >=3 organisms. Probable collection contamination.      COVID-19 PCR: NotDetec (21 @ 11:37)      RADIOLOGY & ADDITIONAL TESTS:    Care Discussed with Consultants/Other Providers:

## 2021-12-17 LAB
ALBUMIN SERPL ELPH-MCNC: 3.4 G/DL — LOW (ref 3.5–5.2)
ALP SERPL-CCNC: 96 U/L — SIGNIFICANT CHANGE UP (ref 30–115)
ALT FLD-CCNC: 6 U/L — SIGNIFICANT CHANGE UP (ref 0–41)
ANION GAP SERPL CALC-SCNC: 14 MMOL/L — SIGNIFICANT CHANGE UP (ref 7–14)
APPEARANCE UR: ABNORMAL
AST SERPL-CCNC: 11 U/L — SIGNIFICANT CHANGE UP (ref 0–41)
BACTERIA # UR AUTO: ABNORMAL
BILIRUB SERPL-MCNC: <0.2 MG/DL — SIGNIFICANT CHANGE UP (ref 0.2–1.2)
BILIRUB UR-MCNC: NEGATIVE — SIGNIFICANT CHANGE UP
BUN SERPL-MCNC: 20 MG/DL — SIGNIFICANT CHANGE UP (ref 10–20)
CALCIUM SERPL-MCNC: 9.3 MG/DL — SIGNIFICANT CHANGE UP (ref 8.5–10.1)
CHLORIDE SERPL-SCNC: 105 MMOL/L — SIGNIFICANT CHANGE UP (ref 98–110)
CO2 SERPL-SCNC: 23 MMOL/L — SIGNIFICANT CHANGE UP (ref 17–32)
COLOR SPEC: YELLOW — SIGNIFICANT CHANGE UP
CREAT SERPL-MCNC: 0.7 MG/DL — SIGNIFICANT CHANGE UP (ref 0.7–1.5)
DIFF PNL FLD: NEGATIVE — SIGNIFICANT CHANGE UP
EPI CELLS # UR: 15 /HPF — HIGH (ref 0–5)
GLUCOSE SERPL-MCNC: 104 MG/DL — HIGH (ref 70–99)
GLUCOSE UR QL: NEGATIVE — SIGNIFICANT CHANGE UP
HCT VFR BLD CALC: 36.9 % — LOW (ref 37–47)
HGB BLD-MCNC: 11.4 G/DL — LOW (ref 12–16)
HYALINE CASTS # UR AUTO: 2 /LPF — SIGNIFICANT CHANGE UP (ref 0–7)
KETONES UR-MCNC: NEGATIVE — SIGNIFICANT CHANGE UP
LEUKOCYTE ESTERASE UR-ACNC: NEGATIVE — SIGNIFICANT CHANGE UP
MCHC RBC-ENTMCNC: 28.9 PG — SIGNIFICANT CHANGE UP (ref 27–31)
MCHC RBC-ENTMCNC: 30.9 G/DL — LOW (ref 32–37)
MCV RBC AUTO: 93.7 FL — SIGNIFICANT CHANGE UP (ref 81–99)
NITRITE UR-MCNC: NEGATIVE — SIGNIFICANT CHANGE UP
NRBC # BLD: 0 /100 WBCS — SIGNIFICANT CHANGE UP (ref 0–0)
PH UR: 7 — SIGNIFICANT CHANGE UP (ref 5–8)
PLATELET # BLD AUTO: 388 K/UL — SIGNIFICANT CHANGE UP (ref 130–400)
POTASSIUM SERPL-MCNC: 4.4 MMOL/L — SIGNIFICANT CHANGE UP (ref 3.5–5)
POTASSIUM SERPL-SCNC: 4.4 MMOL/L — SIGNIFICANT CHANGE UP (ref 3.5–5)
PROT SERPL-MCNC: 5.8 G/DL — LOW (ref 6–8)
PROT UR-MCNC: NEGATIVE — SIGNIFICANT CHANGE UP
RBC # BLD: 3.94 M/UL — LOW (ref 4.2–5.4)
RBC # FLD: 13.4 % — SIGNIFICANT CHANGE UP (ref 11.5–14.5)
RBC CASTS # UR COMP ASSIST: 68 /HPF — HIGH (ref 0–4)
SARS-COV-2 RNA SPEC QL NAA+PROBE: SIGNIFICANT CHANGE UP
SODIUM SERPL-SCNC: 142 MMOL/L — SIGNIFICANT CHANGE UP (ref 135–146)
SP GR SPEC: 1.02 — SIGNIFICANT CHANGE UP (ref 1.01–1.03)
UROBILINOGEN FLD QL: SIGNIFICANT CHANGE UP
WBC # BLD: 8.51 K/UL — SIGNIFICANT CHANGE UP (ref 4.8–10.8)
WBC # FLD AUTO: 8.51 K/UL — SIGNIFICANT CHANGE UP (ref 4.8–10.8)
WBC UR QL: 6 /HPF — HIGH (ref 0–5)

## 2021-12-17 PROCEDURE — 99232 SBSQ HOSP IP/OBS MODERATE 35: CPT

## 2021-12-17 PROCEDURE — 74018 RADEX ABDOMEN 1 VIEW: CPT | Mod: 26

## 2021-12-17 RX ORDER — PANTOPRAZOLE SODIUM 20 MG/1
40 TABLET, DELAYED RELEASE ORAL
Refills: 0 | Status: DISCONTINUED | OUTPATIENT
Start: 2021-12-17 | End: 2021-12-18

## 2021-12-17 RX ADMIN — SERTRALINE 100 MILLIGRAM(S): 25 TABLET, FILM COATED ORAL at 21:40

## 2021-12-17 RX ADMIN — Medication 5 MILLIGRAM(S): at 13:14

## 2021-12-17 RX ADMIN — LIDOCAINE 1 PATCH: 4 CREAM TOPICAL at 01:05

## 2021-12-17 RX ADMIN — PANTOPRAZOLE SODIUM 40 MILLIGRAM(S): 20 TABLET, DELAYED RELEASE ORAL at 21:41

## 2021-12-17 RX ADMIN — CHLORHEXIDINE GLUCONATE 1 APPLICATION(S): 213 SOLUTION TOPICAL at 06:04

## 2021-12-17 RX ADMIN — LIDOCAINE 1 PATCH: 4 CREAM TOPICAL at 19:55

## 2021-12-17 RX ADMIN — Medication 1 MILLIGRAM(S): at 13:07

## 2021-12-17 RX ADMIN — Medication 650 MILLIGRAM(S): at 07:30

## 2021-12-17 RX ADMIN — Medication 650 MILLIGRAM(S): at 07:10

## 2021-12-17 RX ADMIN — Medication 30 MILLILITER(S): at 21:40

## 2021-12-17 RX ADMIN — Medication 3 MILLIGRAM(S): at 21:39

## 2021-12-17 RX ADMIN — ENOXAPARIN SODIUM 40 MILLIGRAM(S): 100 INJECTION SUBCUTANEOUS at 13:04

## 2021-12-17 RX ADMIN — Medication 1 ENEMA: at 18:55

## 2021-12-17 RX ADMIN — OLANZAPINE 10 MILLIGRAM(S): 15 TABLET, FILM COATED ORAL at 13:04

## 2021-12-17 RX ADMIN — LIDOCAINE 1 PATCH: 4 CREAM TOPICAL at 13:03

## 2021-12-17 NOTE — PROGRESS NOTE ADULT - SUBJECTIVE AND OBJECTIVE BOX
SUBJECTIVE  Patient is a 61y old Female who presents with a chief complaint of inability to ambulate (16 Dec 2021 15:24)  Currently admitted to medicine with the primary diagnosis of Inability to ambulate due to knee    Today is hospital day 4d, and this morning she is _________ and reports ________ overnight events.       OBJECTIVE  PAST MEDICAL & SURGICAL HISTORY  Schizophrenia    Bipolar disorder    Depression    Anxiety    Chronic lower back pain  result of pelvic fracture in the past    Osteoarthritis    Urinary incontinence    Chronic urinary tract infection    History of tremor    History of total knee arthroplasty, left      ALLERGIES:  azithromycin (Unknown)  moxifloxacin (Unknown)  penicillin (Unknown)  sulfa drugs (Unknown)    MEDICATIONS:  STANDING MEDICATIONS  chlorhexidine 4% Liquid 1 Application(s) Topical <User Schedule>  clonazePAM  Tablet 1 milliGRAM(s) Oral daily  enoxaparin Injectable 40 milliGRAM(s) SubCutaneous daily  influenza   Vaccine 0.5 milliLiter(s) IntraMuscular once  lidocaine   4% Patch 1 Patch Transdermal daily  OLANZapine 10 milliGRAM(s) Oral daily  sertraline 100 milliGRAM(s) Oral at bedtime    PRN MEDICATIONS  acetaminophen     Tablet .. 650 milliGRAM(s) Oral every 6 hours PRN  aluminum hydroxide/magnesium hydroxide/simethicone Suspension 30 milliLiter(s) Oral every 4 hours PRN  bisacodyl 5 milliGRAM(s) Oral every 12 hours PRN  melatonin 3 milliGRAM(s) Oral at bedtime PRN  ondansetron Injectable 4 milliGRAM(s) IV Push every 8 hours PRN      VITAL SIGNS: Last 24 Hours  T(C): 36.5 (17 Dec 2021 05:20), Max: 36.9 (16 Dec 2021 15:08)  T(F): 97.7 (17 Dec 2021 05:20), Max: 98.5 (16 Dec 2021 15:08)  HR: 60 (17 Dec 2021 05:20) (60 - 101)  BP: 115/56 (17 Dec 2021 05:20) (102/56 - 115/56)  BP(mean): --  RR: 18 (17 Dec 2021 05:20) (18 - 18)  SpO2: --    LABS:                        11.4   9.92  )-----------( 356      ( 15 Dec 2021 23:30 )             35.9     12-15    136  |  102  |  14  ----------------------------<  111<H>  4.5   |  23  |  0.7    Ca    9.1      15 Dec 2021 23:30  Mg     1.8     12-15    TPro  5.6<L>  /  Alb  3.2<L>  /  TBili  <0.2  /  DBili  x   /  AST  13  /  ALT  7   /  AlkPhos  93  12-15                  RADIOLOGY:      PHYSICAL EXAM:    GENERAL: NAD, well-developed, AAOx3  HEENT:  Atraumatic, Normocephalic. EOMI, PERRLA, conjunctiva and sclera clear, No JVD  PULMONARY: Clear to auscultation bilaterally; No wheeze  CARDIOVASCULAR: Regular rate and rhythm; No murmurs, rubs, or gallops  GASTROINTESTINAL: Soft, Nontender, Nondistended; Bowel sounds present  MUSCULOSKELETAL:  2+ Peripheral Pulses, No clubbing, cyanosis, or edema  NEUROLOGY: non-focal  SKIN: No rashes or lesions      ADMISSION SUMMARY  62 y/o female with PMHx bipolar disorder with hypomanic episodes, schizophrenia, IBS, anxiety, depression, severe right knee osteoarthritis, low back pain, recurrent UTI, presented to ED for inability to care for herself and poor living conditions that has been going on for few months.     #Fever  -12/15 night developed fever up to 101.3 with vomiting  -no fever since then   -no increase in WBC  -blood culture, UA, urine culture pending  -if fever free for 24 hrs can dc     # Inability to ambulate  - was at [facility] few months ago   - secondary to severe right knee OA and lower back pain  - X-ray (12/13) - No acute displaced fracture or dislocation. Severe medial and patellofemoral compartment and moderate lateral compartment osteoarthritis, unchanged. Subchondral sclerosis at the medial compartment with multiple joint bodies and small joint effusion, unchanged.  - lidocaine patch   - Pain med eval  - PT eval/ Physiatry rec SNF  - vitamin B12: 1300  - TSH 0.66    - folate 5.2  - check for vitamin D,      # h/o Recurrent UTI  - denies any constipation   - us renal normal   - need urodynamics studies as OP and UROGYN referral to r/o any prolapse, vs overactive bladder vs other etiology   - UA was negative, no current UTI    # Schizophrenia, Bipolar disorder, Anxiety  - pressured speech, tangential thinking  - stopped her medications few weeks ago  - resume zoloft 100 mg po od ([facility])  - resume zyprexa 10 mg po od  - continue with clonopin 1mg OD    # DVT prophylaxis: lovenox  # GI prophylaxis: not indicated  # Diet: regular  # Activity: activity with assistance  # Code status: full  # Disposition: from home, pending placement in [facility]  # Pending: dc if fever free for 24 hours anticipate 12/17       SUBJECTIVE  Patient is a 61y old Female who presents with a chief complaint of inability to ambulate (16 Dec 2021 15:24)  Currently admitted to medicine with the primary diagnosis of Inability to ambulate due to knee    Today is hospital day 4d, and this morning she is alert, not in distress and reports no  overnight events. no fever      OBJECTIVE  PAST MEDICAL & SURGICAL HISTORY  Schizophrenia    Bipolar disorder    Depression    Anxiety    Chronic lower back pain  result of pelvic fracture in the past    Osteoarthritis    Urinary incontinence    Chronic urinary tract infection    History of tremor    History of total knee arthroplasty, left      ALLERGIES:  azithromycin (Unknown)  moxifloxacin (Unknown)  penicillin (Unknown)  sulfa drugs (Unknown)    MEDICATIONS:  STANDING MEDICATIONS  chlorhexidine 4% Liquid 1 Application(s) Topical <User Schedule>  clonazePAM  Tablet 1 milliGRAM(s) Oral daily  enoxaparin Injectable 40 milliGRAM(s) SubCutaneous daily  influenza   Vaccine 0.5 milliLiter(s) IntraMuscular once  lidocaine   4% Patch 1 Patch Transdermal daily  OLANZapine 10 milliGRAM(s) Oral daily  sertraline 100 milliGRAM(s) Oral at bedtime    PRN MEDICATIONS  acetaminophen     Tablet .. 650 milliGRAM(s) Oral every 6 hours PRN  aluminum hydroxide/magnesium hydroxide/simethicone Suspension 30 milliLiter(s) Oral every 4 hours PRN  bisacodyl 5 milliGRAM(s) Oral every 12 hours PRN  melatonin 3 milliGRAM(s) Oral at bedtime PRN  ondansetron Injectable 4 milliGRAM(s) IV Push every 8 hours PRN      VITAL SIGNS: Last 24 Hours  T(C): 36.5 (17 Dec 2021 05:20), Max: 36.9 (16 Dec 2021 15:08)  T(F): 97.7 (17 Dec 2021 05:20), Max: 98.5 (16 Dec 2021 15:08)  HR: 60 (17 Dec 2021 05:20) (60 - 101)  BP: 115/56 (17 Dec 2021 05:20) (102/56 - 115/56)  BP(mean): --  RR: 18 (17 Dec 2021 05:20) (18 - 18)  SpO2: --    LABS:                        11.4   9.92  )-----------( 356      ( 15 Dec 2021 23:30 )             35.9     12-15    136  |  102  |  14  ----------------------------<  111<H>  4.5   |  23  |  0.7    Ca    9.1      15 Dec 2021 23:30  Mg     1.8     12-15    TPro  5.6<L>  /  Alb  3.2<L>  /  TBili  <0.2  /  DBili  x   /  AST  13  /  ALT  7   /  AlkPhos  93  12-15                  RADIOLOGY:      PHYSICAL EXAM:    GENERAL: NAD, well-developed, AAOx3  HEENT:  Atraumatic, Normocephalic. EOMI, PERRLA, conjunctiva and sclera clear, No JVD  PULMONARY: Clear to auscultation bilaterally; No wheeze  CARDIOVASCULAR: Regular rate and rhythm; No murmurs, rubs, or gallops  GASTROINTESTINAL: Soft, Nontender, Nondistended; Bowel sounds present  MUSCULOSKELETAL:  2+ Peripheral Pulses, No clubbing, cyanosis, or edema  NEUROLOGY: non-focal  SKIN: No rashes or lesions      ADMISSION SUMMARY  60 y/o female with PMHx bipolar disorder with hypomanic episodes, schizophrenia, IBS, anxiety, depression, severe right knee osteoarthritis, low back pain, recurrent UTI, presented to ED for inability to care for herself and poor living conditions that has been going on for few months.     #Fever  -12/15 night developed fever up to 101.3 with vomiting  -no fever since then   -no increase in WBC  - Will discharge.     # Inability to ambulate  - was at [facility] few months ago   - secondary to severe right knee OA and lower back pain  - X-ray (12/13) - No acute displaced fracture or dislocation. Severe medial and patellofemoral compartment and moderate lateral compartment osteoarthritis, unchanged. Subchondral sclerosis at the medial compartment with multiple joint bodies and small joint effusion, unchanged.  - lidocaine patch   - Pain med eval  - PT eval/ Physiatry rec SNF  - vitamin B12: 1300  - TSH 0.66    - folate 5.2  - check for vitamin D,      # h/o Recurrent UTI  - denies any constipation   - us renal normal   - need urodynamics studies as OP and UROGYN referral to r/o any prolapse, vs overactive bladder vs other etiology   - UA was negative, no current UTI    # Schizophrenia, Bipolar disorder, Anxiety  - pressured speech, tangential thinking  - stopped her medications few weeks ago  - resume zoloft 100 mg po od ([facility])  - resume zyprexa 10 mg po od  - continue with clonopin 1mg OD    # DVT prophylaxis: lovenox  # GI prophylaxis: not indicated  # Diet: regular  # Activity: activity with assistance  # Code status: full  # Disposition: from home, pending placement in [facility]  # Pending: dc if fever free for 24 hours anticipate 12/17

## 2021-12-17 NOTE — PROGRESS NOTE ADULT - SUBJECTIVE AND OBJECTIVE BOX
Patient is a 61y old  Female who presents with a chief complaint of inability to ambulate (17 Dec 2021 07:34)      Patient seen and examined at bedside.    ALLERGIES:  azithromycin (Unknown)  moxifloxacin (Unknown)  penicillin (Unknown)  sulfa drugs (Unknown)    MEDICATIONS:  acetaminophen     Tablet .. 650 milliGRAM(s) Oral every 6 hours PRN  aluminum hydroxide/magnesium hydroxide/simethicone Suspension 30 milliLiter(s) Oral every 4 hours PRN  bisacodyl 5 milliGRAM(s) Oral every 12 hours PRN  chlorhexidine 4% Liquid 1 Application(s) Topical <User Schedule>  clonazePAM  Tablet 1 milliGRAM(s) Oral daily  enoxaparin Injectable 40 milliGRAM(s) SubCutaneous daily  influenza   Vaccine 0.5 milliLiter(s) IntraMuscular once  lidocaine   4% Patch 1 Patch Transdermal daily  melatonin 3 milliGRAM(s) Oral at bedtime PRN  OLANZapine 10 milliGRAM(s) Oral daily  ondansetron Injectable 4 milliGRAM(s) IV Push every 8 hours PRN  pantoprazole    Tablet 40 milliGRAM(s) Oral before breakfast  saline laxative (FLEET) Rectal Enema 1 Enema Rectal once  sertraline 100 milliGRAM(s) Oral at bedtime    Vital Signs Last 24 Hrs  T(F): 97.4 (17 Dec 2021 12:15), Max: 98.4 (16 Dec 2021 20:07)  HR: 94 (17 Dec 2021 12:15) (60 - 94)  BP: 125/60 (17 Dec 2021 12:15) (107/58 - 125/60)  RR: 18 (17 Dec 2021 12:15) (18 - 18)  SpO2: --  I&O's Summary    16 Dec 2021 07:01  -  17 Dec 2021 07:00  --------------------------------------------------------  IN: 360 mL / OUT: 1 mL / NET: 359 mL        PHYSICAL EXAM:  General: NAD, A/O x 3  ENT: MMM, poor dentition   Neck: Supple, No JVD  Lungs: Clear to auscultation bilaterally  Cardio: RRR, S1/S2, No murmurs  Abdomen: Soft, Nontender, Nondistended; Bowel sounds present  Extremities: No cyanosis, No edema    LABS:                        11.4   8.51  )-----------( 388      ( 17 Dec 2021 06:57 )             36.9         142  |  105  |  20  ----------------------------<  104  4.4   |  23  |  0.7    Ca    9.3      17 Dec 2021 06:57  Mg     1.8     12-15    TPro  5.8  /  Alb  3.4  /  TBili  <0.2  /  DBili  x   /  AST  11  /  ALT  6   /  AlkPhos  96      eGFR if Non African American: 92 mL/min/1.73M2 (21 @ 06:57)  eGFR if : 107 mL/min/1.73M2 (21 @ 06:57)                              Urinalysis Basic - ( 17 Dec 2021 09:00 )    Color: Yellow / Appearance: Turbid / S.020 / pH: x  Gluc: x / Ketone: Negative  / Bili: Negative / Urobili: <2 mg/dL   Blood: x / Protein: Negative / Nitrite: Negative   Leuk Esterase: Negative / RBC: 68 /HPF / WBC 6 /HPF   Sq Epi: x / Non Sq Epi: 15 /HPF / Bacteria: Few        Culture - Blood (collected 15 Dec 2021 23:30)  Source: .Blood Blood-Peripheral  Preliminary Report (17 Dec 2021 09:01):    No growth to date.    Culture - Urine (collected 13 Dec 2021 19:25)  Source: Catheterized Catheterized  Final Report (14 Dec 2021 22:45):    >=3 organisms. Probable collection contamination.      COVID-19 PCR: NotDetec (21 @ 17:20)  COVID-19 PCR: NotDetec (21 @ 11:37)      RADIOLOGY & ADDITIONAL TESTS:    Care Discussed with Consultants/Other Providers:

## 2021-12-18 VITALS
SYSTOLIC BLOOD PRESSURE: 124 MMHG | TEMPERATURE: 98 F | DIASTOLIC BLOOD PRESSURE: 59 MMHG | HEART RATE: 85 BPM | RESPIRATION RATE: 20 BRPM

## 2021-12-18 PROCEDURE — 99232 SBSQ HOSP IP/OBS MODERATE 35: CPT

## 2021-12-18 RX ORDER — CLONAZEPAM 1 MG
0.5 TABLET ORAL ONCE
Refills: 0 | Status: DISCONTINUED | OUTPATIENT
Start: 2021-12-18 | End: 2021-12-18

## 2021-12-18 RX ADMIN — OLANZAPINE 10 MILLIGRAM(S): 15 TABLET, FILM COATED ORAL at 12:00

## 2021-12-18 RX ADMIN — Medication 1 MILLIGRAM(S): at 12:00

## 2021-12-18 RX ADMIN — PANTOPRAZOLE SODIUM 40 MILLIGRAM(S): 20 TABLET, DELAYED RELEASE ORAL at 05:43

## 2021-12-18 RX ADMIN — ENOXAPARIN SODIUM 40 MILLIGRAM(S): 100 INJECTION SUBCUTANEOUS at 12:00

## 2021-12-18 RX ADMIN — LIDOCAINE 1 PATCH: 4 CREAM TOPICAL at 01:20

## 2021-12-18 RX ADMIN — LIDOCAINE 1 PATCH: 4 CREAM TOPICAL at 12:00

## 2021-12-18 RX ADMIN — CHLORHEXIDINE GLUCONATE 1 APPLICATION(S): 213 SOLUTION TOPICAL at 05:43

## 2021-12-18 NOTE — PROGRESS NOTE ADULT - SUBJECTIVE AND OBJECTIVE BOX
Patient is a 61y old  Female who presents with a chief complaint of inability to ambulate (17 Dec 2021 15:32)      Patient seen and examined at bedside.    ALLERGIES:  azithromycin (Unknown)  moxifloxacin (Unknown)  penicillin (Unknown)  sulfa drugs (Unknown)    MEDICATIONS:  acetaminophen     Tablet .. 650 milliGRAM(s) Oral every 6 hours PRN  aluminum hydroxide/magnesium hydroxide/simethicone Suspension 30 milliLiter(s) Oral every 4 hours PRN  bisacodyl 5 milliGRAM(s) Oral every 12 hours PRN  chlorhexidine 4% Liquid 1 Application(s) Topical <User Schedule>  clonazePAM  Tablet 1 milliGRAM(s) Oral daily  enoxaparin Injectable 40 milliGRAM(s) SubCutaneous daily  influenza   Vaccine 0.5 milliLiter(s) IntraMuscular once  lidocaine   4% Patch 1 Patch Transdermal daily  melatonin 3 milliGRAM(s) Oral at bedtime PRN  OLANZapine 10 milliGRAM(s) Oral daily  ondansetron Injectable 4 milliGRAM(s) IV Push every 8 hours PRN  pantoprazole    Tablet 40 milliGRAM(s) Oral before breakfast  sertraline 100 milliGRAM(s) Oral at bedtime    Vital Signs Last 24 Hrs  T(F): 98.2 (18 Dec 2021 12:30), Max: 98.2 (18 Dec 2021 12:30)  HR: 85 (18 Dec 2021 12:30) (64 - 85)  BP: 124/59 (18 Dec 2021 12:30) (124/59 - 145/70)  RR: 20 (18 Dec 2021 12:30) (18 - 20)  SpO2: 98% (17 Dec 2021 21:27) (98% - 98%)  I&O's Summary      PHYSICAL EXAM:  General: NAD, A/O x 3  ENT: MMM  Neck: Supple, No JVD  Lungs: Clear to auscultation bilaterally  Cardio: RRR, S1/S2, No murmurs  Abdomen: Soft, Nontender, Nondistended; Bowel sounds present  Extremities: No cyanosis, No edema    LABS:                        11.4   8.51  )-----------( 388      ( 17 Dec 2021 06:57 )             36.9     12-17    142  |  105  |  20  ----------------------------<  104  4.4   |  23  |  0.7    Ca    9.3      17 Dec 2021 06:57  Mg     1.8     -15    TPro  5.8  /  Alb  3.4  /  TBili  <0.2  /  DBili  x   /  AST  11  /  ALT  6   /  AlkPhos  96  -    eGFR if Non African American: 92 mL/min/1.73M2 (21 @ 06:57)  eGFR if : 107 mL/min/1.73M2 (21 @ 06:57)                              Urinalysis Basic - ( 17 Dec 2021 09:00 )    Color: Yellow / Appearance: Turbid / S.020 / pH: x  Gluc: x / Ketone: Negative  / Bili: Negative / Urobili: <2 mg/dL   Blood: x / Protein: Negative / Nitrite: Negative   Leuk Esterase: Negative / RBC: 68 /HPF / WBC 6 /HPF   Sq Epi: x / Non Sq Epi: 15 /HPF / Bacteria: Few        Culture - Blood (collected 15 Dec 2021 23:30)  Source: .Blood Blood-Peripheral  Preliminary Report (17 Dec 2021 09:01):    No growth to date.    Culture - Urine (collected 13 Dec 2021 19:25)  Source: Catheterized Catheterized  Final Report (14 Dec 2021 22:45):    >=3 organisms. Probable collection contamination.      COVID-19 PCR: NotDetec (21 @ 17:20)  COVID-19 PCR: NotDetec (21 @ 11:37)      RADIOLOGY & ADDITIONAL TESTS:    Care Discussed with Consultants/Other Providers:

## 2021-12-18 NOTE — PROGRESS NOTE ADULT - ASSESSMENT
60 y/o female with PMHx bipolar disorder with hypomanic episodes, schizophrenia, IBS, anxiety, depression, severe right knee osteoarthritis, low back pain, recurrent UTI, presented to ED for inability to care for herself and poor living conditions that has been going on for few months.     # Inability to ambulate  - was at [facility] few months ago   - secondary to severe right knee OA and lower back pain  - X-ray (12/13) - No acute displaced fracture or dislocation. Severe medial and patellofemoral compartment and moderate lateral compartment osteoarthritis, unchanged. Subchondral sclerosis at the medial compartment with multiple joint bodies and small joint effusion, unchanged.  - lidocaine patch   - Pain med eval  - PT eval/ Physiatry rec SNF  - check for vitamin D, vitamin B12 , TSH and folate    # h/o Recurrent UTI  - denies any constipation   - us renal normal   - need urodynamics studies as OP and UROGYN referral to r/o any prolapse, vs overactive bladder vs other etiology     # Schizophrenia, Bipolar disorder, Anxiety  - pressured speech, tangential thinking  - stopped her medications few weeks ago  - resume zoloft 100 mg po od ([facility])  - resume zyprexa 10 mg po od  - continue with clonopin 1mg OD    # DVT prophylaxis: lovenox  # GI prophylaxis: not indicated  # Diet: regular  # Activity: activity with assistance  # Code status: full  # Disposition: from home, pending placement in [facility]/[place]
62 y/o female with PMHx bipolar disorder with hypomanic episodes, schizophrenia, IBS, anxiety, depression, severe right knee osteoarthritis, low back pain, recurrent UTI, presented to ED for inability to care for herself and poor living conditions that has been going on for few months    # inability to ambulate  - was at [facility] few months ago   - secondary to severe right knee OA and lower back pain  - check knee X ray to r/o progression of OA and call orthopedics for assessment  - lidocaine patch   - pain control  - PT eval  - physiatry rec SNF  - check for vitamin D, vitamin B12 , tsh and folate    # Recurrent UTI:  - check UA: bacteria few, wbc 3   - straight cath for residual urine   - last urine cx showed klebsiella pansensitive   - d/c rocephin   - denies any constipation   - us renal normal   - need urodynamics studies as OP and UROGYN referral to r/o any prolapse, vs overactive bladder vs other etiology     # Schizophrenia, Bipolar disorder, Anxiety  -pressured speech, tangential thinking  - stopped her medications few weeks ago   - resume zoloft 100 mg po od  - resume zyprexa 10 mg po od     DVT prophylaxis: lovenox  GI prophylaxis: not indicated  Diet: regular  Activity: activity with assistance  Code status: full  Disposition: from home, f/u xray; awaiting PT/OT and placement in [facility]  
60 y/o female with PMHx bipolar disorder with hypomanic episodes, schizophrenia, IBS, anxiety, depression, severe right knee osteoarthritis, low back pain, recurrent UTI, presented to ED for inability to care for herself and poor living conditions that has been going on for few months.     #Fever  -12/15 night developed fever up to 101.3 with vomiting  -no fever since then - over 24 hours   -no increase in WBC  -blood culture-pending, UA-negative      # Inability to ambulate  - was at [facility] few months ago   - secondary to severe right knee OA and lower back pain  - X-ray (12/13) - No acute displaced fracture or dislocation. Severe medial and patellofemoral compartment and moderate lateral compartment osteoarthritis, unchanged. Subchondral sclerosis at the medial compartment with multiple joint bodies and small joint effusion, unchanged.  - lidocaine patch   - Pain med eval  - PT eval/ Physiatry rec SNF  - check for vitamin D, vitamin B12 , TSH and folate    # h/o Recurrent UTI  - denies any constipation   - us renal normal   - need urodynamics studies as OP and UROGYN referral to r/o any prolapse, vs overactive bladder vs other etiology   - UA was negative, no current UTI    # Schizophrenia, Bipolar disorder, Anxiety  - pressured speech, tangential thinking  - stopped her medications few weeks ago  - resume zoloft 100 mg po od ([facility])  - resume zyprexa 10 mg po od  - continue with clonopin 1mg OD    # DVT prophylaxis: lovenox  # GI prophylaxis: not indicated  # Diet: regular  # Activity: activity with assistance  # Code status: full  # Disposition: from home, pending placement in [facility]/[place]  # Pending: patient is medically cleared for discharge  >30min spent on discharge 
60 y/o female with PMHx bipolar disorder with hypomanic episodes, schizophrenia, IBS, anxiety, depression, severe right knee osteoarthritis, low back pain, recurrent UTI, presented to ED for inability to care for herself and poor living conditions that has been going on for few months.     # Inability to ambulate  - was at [facility] few months ago   - secondary to severe right knee OA and lower back pain  - X-ray (12/13) - No acute displaced fracture or dislocation. Severe medial and patellofemoral compartment and moderate lateral compartment osteoarthritis, unchanged. Subchondral sclerosis at the medial compartment with multiple joint bodies and small joint effusion, unchanged.  - lidocaine patch   - Pain med eval  - PT eval/ Physiatry rec SNF  - check for vitamin D, vitamin B12 , TSH and folate    # h/o Recurrent UTI  - denies any constipation   - us renal normal   - need urodynamics studies as OP and UROGYN referral to r/o any prolapse, vs overactive bladder vs other etiology   - UA was negative, no current UTI    # Schizophrenia, Bipolar disorder, Anxiety  - pressured speech, tangential thinking  - stopped her medications few weeks ago  - resume zoloft 100 mg po od ([facility])  - resume zyprexa 10 mg po od  - continue with clonopin 1mg OD    # DVT prophylaxis: lovenox  # GI prophylaxis: not indicated  # Diet: regular  # Activity: activity with assistance  # Code status: full  # Disposition: from home, pending placement in [facility]/[place] today  >30min spent on discharge planning and counseling regarding rehab
60 y/o female with PMHx bipolar disorder with hypomanic episodes, schizophrenia, IBS, anxiety, depression, severe right knee osteoarthritis, low back pain, recurrent UTI, presented to ED for inability to care for herself and poor living conditions that has been going on for few months.     #Fever  -12/15 night developed fever up to 101.3 with vomiting  -no fever since then   -no increase in WBC  -blood culture, UA, urine culture pending  -if fever free for 24 hrs can dc     # Inability to ambulate  - was at [facility] few months ago   - secondary to severe right knee OA and lower back pain  - X-ray (12/13) - No acute displaced fracture or dislocation. Severe medial and patellofemoral compartment and moderate lateral compartment osteoarthritis, unchanged. Subchondral sclerosis at the medial compartment with multiple joint bodies and small joint effusion, unchanged.  - lidocaine patch   - Pain med eval  - PT eval/ Physiatry rec SNF  - check for vitamin D, vitamin B12 , TSH and folate    # h/o Recurrent UTI  - denies any constipation   - us renal normal   - need urodynamics studies as OP and UROGYN referral to r/o any prolapse, vs overactive bladder vs other etiology   - UA was negative, no current UTI    # Schizophrenia, Bipolar disorder, Anxiety  - pressured speech, tangential thinking  - stopped her medications few weeks ago  - resume zoloft 100 mg po od ([facility])  - resume zyprexa 10 mg po od  - continue with clonopin 1mg OD    # DVT prophylaxis: lovenox  # GI prophylaxis: not indicated  # Diet: regular  # Activity: activity with assistance  # Code status: full  # Disposition: from home, pending placement in [facility]/[place]  # Pending: dc if fever free for 24 hours anticipate 12/17
60 y/o female with PMHx bipolar disorder with hypomanic episodes, schizophrenia, IBS, anxiety, depression, severe right knee osteoarthritis, low back pain, recurrent UTI, presented to ED for inability to care for herself and poor living conditions that has been going on for few months.     #Fever  -12/15 night developed fever up to 101.3 with vomiting  -no fever since then - over 24 hours   -no increase in WBC  -blood culture-negative, UA-negative      # Inability to ambulate  - was at [facility] few months ago   - secondary to severe right knee OA and lower back pain  - X-ray (12/13) - No acute displaced fracture or dislocation. Severe medial and patellofemoral compartment and moderate lateral compartment osteoarthritis, unchanged. Subchondral sclerosis at the medial compartment with multiple joint bodies and small joint effusion, unchanged.  - lidocaine patch   - Pain med eval  - PT eval/ Physiatry rec SNF  - check for vitamin D, vitamin B12 , TSH and folate    # h/o Recurrent UTI  - denies any constipation   - us renal normal   - need urodynamics studies as OP and UROGYN referral to r/o any prolapse, vs overactive bladder vs other etiology   - UA was negative, no current UTI    # Schizophrenia, Bipolar disorder, Anxiety  - pressured speech, tangential thinking  - stopped her medications few weeks ago  - resume zoloft 100 mg po od ([facility])  - resume zyprexa 10 mg po od  - continue with clonopin 1mg OD    # DVT prophylaxis: lovenox  # GI prophylaxis: not indicated  # Diet: regular  # Activity: activity with assistance  # Code status: full  # Disposition: from home, pending placement in [facility]  # Pending: patient is medically cleared for discharge  >30min spent on discharge planning and counseling regarding constipation

## 2021-12-21 LAB
-  AMIKACIN: SIGNIFICANT CHANGE UP
-  AMOXICILLIN/CLAVULANIC ACID: SIGNIFICANT CHANGE UP
-  AMPICILLIN/SULBACTAM: SIGNIFICANT CHANGE UP
-  AMPICILLIN: SIGNIFICANT CHANGE UP
-  AZTREONAM: SIGNIFICANT CHANGE UP
-  CEFAZOLIN: SIGNIFICANT CHANGE UP
-  CEFEPIME: SIGNIFICANT CHANGE UP
-  CEFOXITIN: SIGNIFICANT CHANGE UP
-  CEFTRIAXONE: SIGNIFICANT CHANGE UP
-  CIPROFLOXACIN: SIGNIFICANT CHANGE UP
-  ERTAPENEM: SIGNIFICANT CHANGE UP
-  GENTAMICIN: SIGNIFICANT CHANGE UP
-  IMIPENEM: SIGNIFICANT CHANGE UP
-  LEVOFLOXACIN: SIGNIFICANT CHANGE UP
-  MEROPENEM: SIGNIFICANT CHANGE UP
-  NITROFURANTOIN: SIGNIFICANT CHANGE UP
-  PIPERACILLIN/TAZOBACTAM: SIGNIFICANT CHANGE UP
-  TIGECYCLINE: SIGNIFICANT CHANGE UP
-  TOBRAMYCIN: SIGNIFICANT CHANGE UP
-  TRIMETHOPRIM/SULFAMETHOXAZOLE: SIGNIFICANT CHANGE UP
CULTURE RESULTS: SIGNIFICANT CHANGE UP
CULTURE RESULTS: SIGNIFICANT CHANGE UP
METHOD TYPE: SIGNIFICANT CHANGE UP
ORGANISM # SPEC MICROSCOPIC CNT: SIGNIFICANT CHANGE UP
ORGANISM # SPEC MICROSCOPIC CNT: SIGNIFICANT CHANGE UP
SPECIMEN SOURCE: SIGNIFICANT CHANGE UP
SPECIMEN SOURCE: SIGNIFICANT CHANGE UP

## 2021-12-30 DIAGNOSIS — S70.311A ABRASION, RIGHT THIGH, INITIAL ENCOUNTER: ICD-10-CM

## 2021-12-30 DIAGNOSIS — F31.9 BIPOLAR DISORDER, UNSPECIFIED: ICD-10-CM

## 2021-12-30 DIAGNOSIS — F20.9 SCHIZOPHRENIA, UNSPECIFIED: ICD-10-CM

## 2021-12-30 DIAGNOSIS — Z88.0 ALLERGY STATUS TO PENICILLIN: ICD-10-CM

## 2021-12-30 DIAGNOSIS — M17.11 UNILATERAL PRIMARY OSTEOARTHRITIS, RIGHT KNEE: ICD-10-CM

## 2021-12-30 DIAGNOSIS — Z96.652 PRESENCE OF LEFT ARTIFICIAL KNEE JOINT: ICD-10-CM

## 2021-12-30 DIAGNOSIS — Z88.2 ALLERGY STATUS TO SULFONAMIDES: ICD-10-CM

## 2022-01-05 ENCOUNTER — INPATIENT (INPATIENT)
Facility: HOSPITAL | Age: 62
LOS: 9 days | Discharge: NURSING, REHAB OR LONG-TERM CARE | End: 2022-01-15
Attending: HOSPITALIST | Admitting: HOSPITALIST
Payer: MEDICARE

## 2022-01-05 VITALS
WEIGHT: 100.09 LBS | HEART RATE: 102 BPM | OXYGEN SATURATION: 98 % | TEMPERATURE: 98 F | HEIGHT: 59 IN | RESPIRATION RATE: 17 BRPM | DIASTOLIC BLOOD PRESSURE: 67 MMHG | SYSTOLIC BLOOD PRESSURE: 127 MMHG

## 2022-01-05 DIAGNOSIS — Z96.652 PRESENCE OF LEFT ARTIFICIAL KNEE JOINT: Chronic | ICD-10-CM

## 2022-01-05 LAB
ALBUMIN SERPL ELPH-MCNC: 4.1 G/DL — SIGNIFICANT CHANGE UP (ref 3.5–5.2)
ALP SERPL-CCNC: 118 U/L — HIGH (ref 30–115)
ALT FLD-CCNC: 25 U/L — SIGNIFICANT CHANGE UP (ref 0–41)
ANION GAP SERPL CALC-SCNC: 22 MMOL/L — HIGH (ref 7–14)
AST SERPL-CCNC: 43 U/L — HIGH (ref 0–41)
BASOPHILS # BLD AUTO: 0.03 K/UL — SIGNIFICANT CHANGE UP (ref 0–0.2)
BASOPHILS NFR BLD AUTO: 0.3 % — SIGNIFICANT CHANGE UP (ref 0–1)
BILIRUB SERPL-MCNC: 0.4 MG/DL — SIGNIFICANT CHANGE UP (ref 0.2–1.2)
BUN SERPL-MCNC: 21 MG/DL — HIGH (ref 10–20)
CALCIUM SERPL-MCNC: 10.4 MG/DL — HIGH (ref 8.5–10.1)
CHLORIDE SERPL-SCNC: 105 MMOL/L — SIGNIFICANT CHANGE UP (ref 98–110)
CK SERPL-CCNC: 1188 U/L — HIGH (ref 0–225)
CO2 SERPL-SCNC: 17 MMOL/L — SIGNIFICANT CHANGE UP (ref 17–32)
CREAT SERPL-MCNC: 0.8 MG/DL — SIGNIFICANT CHANGE UP (ref 0.7–1.5)
EOSINOPHIL # BLD AUTO: 0.06 K/UL — SIGNIFICANT CHANGE UP (ref 0–0.7)
EOSINOPHIL NFR BLD AUTO: 0.5 % — SIGNIFICANT CHANGE UP (ref 0–8)
GLUCOSE SERPL-MCNC: 88 MG/DL — SIGNIFICANT CHANGE UP (ref 70–99)
HCT VFR BLD CALC: 37.7 % — SIGNIFICANT CHANGE UP (ref 37–47)
HGB BLD-MCNC: 12.2 G/DL — SIGNIFICANT CHANGE UP (ref 12–16)
IMM GRANULOCYTES NFR BLD AUTO: 0.4 % — HIGH (ref 0.1–0.3)
LIDOCAIN IGE QN: 21 U/L — SIGNIFICANT CHANGE UP (ref 7–60)
LYMPHOCYTES # BLD AUTO: 1.98 K/UL — SIGNIFICANT CHANGE UP (ref 1.2–3.4)
LYMPHOCYTES # BLD AUTO: 17.5 % — LOW (ref 20.5–51.1)
MCHC RBC-ENTMCNC: 29.2 PG — SIGNIFICANT CHANGE UP (ref 27–31)
MCHC RBC-ENTMCNC: 32.4 G/DL — SIGNIFICANT CHANGE UP (ref 32–37)
MCV RBC AUTO: 90.2 FL — SIGNIFICANT CHANGE UP (ref 81–99)
MONOCYTES # BLD AUTO: 0.84 K/UL — HIGH (ref 0.1–0.6)
MONOCYTES NFR BLD AUTO: 7.4 % — SIGNIFICANT CHANGE UP (ref 1.7–9.3)
NEUTROPHILS # BLD AUTO: 8.35 K/UL — HIGH (ref 1.4–6.5)
NEUTROPHILS NFR BLD AUTO: 73.9 % — SIGNIFICANT CHANGE UP (ref 42.2–75.2)
NRBC # BLD: 0 /100 WBCS — SIGNIFICANT CHANGE UP (ref 0–0)
PLATELET # BLD AUTO: 351 K/UL — SIGNIFICANT CHANGE UP (ref 130–400)
POTASSIUM SERPL-MCNC: 4.3 MMOL/L — SIGNIFICANT CHANGE UP (ref 3.5–5)
POTASSIUM SERPL-SCNC: 4.3 MMOL/L — SIGNIFICANT CHANGE UP (ref 3.5–5)
PROT SERPL-MCNC: 7 G/DL — SIGNIFICANT CHANGE UP (ref 6–8)
RBC # BLD: 4.18 M/UL — LOW (ref 4.2–5.4)
RBC # FLD: 13.8 % — SIGNIFICANT CHANGE UP (ref 11.5–14.5)
SODIUM SERPL-SCNC: 144 MMOL/L — SIGNIFICANT CHANGE UP (ref 135–146)
TROPONIN T SERPL-MCNC: <0.01 NG/ML — SIGNIFICANT CHANGE UP
WBC # BLD: 11.31 K/UL — HIGH (ref 4.8–10.8)
WBC # FLD AUTO: 11.31 K/UL — HIGH (ref 4.8–10.8)

## 2022-01-05 PROCEDURE — 72125 CT NECK SPINE W/O DYE: CPT | Mod: 26,MA

## 2022-01-05 PROCEDURE — 70450 CT HEAD/BRAIN W/O DYE: CPT | Mod: 26,MA

## 2022-01-05 PROCEDURE — 93010 ELECTROCARDIOGRAM REPORT: CPT

## 2022-01-05 PROCEDURE — 99285 EMERGENCY DEPT VISIT HI MDM: CPT | Mod: GC

## 2022-01-05 RX ORDER — SODIUM CHLORIDE 9 MG/ML
1000 INJECTION INTRAMUSCULAR; INTRAVENOUS; SUBCUTANEOUS ONCE
Refills: 0 | Status: COMPLETED | OUTPATIENT
Start: 2022-01-05 | End: 2022-01-05

## 2022-01-05 RX ORDER — ONDANSETRON 8 MG/1
4 TABLET, FILM COATED ORAL ONCE
Refills: 0 | Status: COMPLETED | OUTPATIENT
Start: 2022-01-05 | End: 2022-01-05

## 2022-01-05 RX ORDER — METHOCARBAMOL 500 MG/1
750 TABLET, FILM COATED ORAL ONCE
Refills: 0 | Status: DISCONTINUED | OUTPATIENT
Start: 2022-01-05 | End: 2022-01-05

## 2022-01-05 RX ORDER — MORPHINE SULFATE 50 MG/1
2 CAPSULE, EXTENDED RELEASE ORAL ONCE
Refills: 0 | Status: DISCONTINUED | OUTPATIENT
Start: 2022-01-05 | End: 2022-01-05

## 2022-01-05 NOTE — ED ADULT TRIAGE NOTE - CHIEF COMPLAINT QUOTE
BIBA from home, pt fell from bed three days ago, now with lower back pain  pt also sent in for social work for possible home health assistance

## 2022-01-05 NOTE — ED PROVIDER NOTE - PROGRESS NOTE DETAILS
kondrat- back pain improved with pain management.  patient able to lay in bed without pain.  ok to admit.

## 2022-01-05 NOTE — ED PROVIDER NOTE - OBJECTIVE STATEMENT
61 y female with PMH with Schizophrenia, gout, right knee OA chronic LBP, recurrent UTI, anxiety with complaints of fall 3 days ago from bed.  patient unable to get up from the floor due to pain for the past 3 days found by friend.  patient complaining of low back pain sitting up on bed, N/v several episodes today.  Patient admitted to [facility] and AMA to home.  patient seeking placement. and pain control.

## 2022-01-05 NOTE — ED PROVIDER NOTE - PHYSICAL EXAMINATION
CONSTITUTIONAL: Well-developed; well-nourished; in no acute distress. gcs 15, speaking in full sentences, moving all extremities  SKIN: warm, dry  HEAD: Normocephalic; see above  EYES: PERRL, EOMI, no conjunctival erythema  ENT: No nasal discharge; airway clear, mucous membranes moist  SPINE: no cervical spinal tenderness. lumbosacral spinal tenderness.  CARD: +S1, S2 no murmurs, gallops, or rubs. Regular rate and rhythm. radial 2+ b/l, no chest wall tenderness or crepitus  RESP: No wheezes, rales or rhonchi. CTABL  ABD: soft ntnd, no rebound, no guarding, no rigidity  FAST negative  EXT: moves all extremities,  No clubbing, cyanosis or edema.   NEURO: Alert, oriented, grossly unremarkable, cn ii-xii grossly intact, follows commands  PSYCH: Cooperative, appropriate.

## 2022-01-05 NOTE — ED PROVIDER NOTE - NS ED ROS FT
Constitutional:  No fevers or chills.  Eyes:  No visual changes, eye pain, or discharge.  ENT:  No hearing changes. No sore throat.  Neck:  No neck pain or stiffness.  Cardiac:  No CP or edema.  Resp:  No cough or SOB. No hemoptysis.   GI:  (+) nausea, (+)  vomiting, no diarrhea or abdominal pain.  :  No dysuria, frequency, or hematuria.  MSK:  (+) back pain.  No myalgias or joint pain/swelling.  Neuro:  No headache, dizziness, or weakness.  Skin:  No skin rash.

## 2022-01-06 LAB
ALBUMIN SERPL ELPH-MCNC: 3.5 G/DL — SIGNIFICANT CHANGE UP (ref 3.5–5.2)
ALP SERPL-CCNC: 99 U/L — SIGNIFICANT CHANGE UP (ref 30–115)
ALT FLD-CCNC: 21 U/L — SIGNIFICANT CHANGE UP (ref 0–41)
ANION GAP SERPL CALC-SCNC: 14 MMOL/L — SIGNIFICANT CHANGE UP (ref 7–14)
AST SERPL-CCNC: 35 U/L — SIGNIFICANT CHANGE UP (ref 0–41)
BASOPHILS # BLD AUTO: 0.05 K/UL — SIGNIFICANT CHANGE UP (ref 0–0.2)
BASOPHILS NFR BLD AUTO: 0.6 % — SIGNIFICANT CHANGE UP (ref 0–1)
BILIRUB SERPL-MCNC: 0.3 MG/DL — SIGNIFICANT CHANGE UP (ref 0.2–1.2)
BUN SERPL-MCNC: 19 MG/DL — SIGNIFICANT CHANGE UP (ref 10–20)
CALCIUM SERPL-MCNC: 9.1 MG/DL — SIGNIFICANT CHANGE UP (ref 8.5–10.1)
CHLORIDE SERPL-SCNC: 106 MMOL/L — SIGNIFICANT CHANGE UP (ref 98–110)
CK SERPL-CCNC: 735 U/L — HIGH (ref 0–225)
CO2 SERPL-SCNC: 19 MMOL/L — SIGNIFICANT CHANGE UP (ref 17–32)
CREAT SERPL-MCNC: 0.7 MG/DL — SIGNIFICANT CHANGE UP (ref 0.7–1.5)
EOSINOPHIL # BLD AUTO: 0.11 K/UL — SIGNIFICANT CHANGE UP (ref 0–0.7)
EOSINOPHIL NFR BLD AUTO: 1.2 % — SIGNIFICANT CHANGE UP (ref 0–8)
GLUCOSE SERPL-MCNC: 152 MG/DL — HIGH (ref 70–99)
HCT VFR BLD CALC: 33 % — LOW (ref 37–47)
HGB BLD-MCNC: 10.5 G/DL — LOW (ref 12–16)
IMM GRANULOCYTES NFR BLD AUTO: 0.3 % — SIGNIFICANT CHANGE UP (ref 0.1–0.3)
LYMPHOCYTES # BLD AUTO: 2.01 K/UL — SIGNIFICANT CHANGE UP (ref 1.2–3.4)
LYMPHOCYTES # BLD AUTO: 22.3 % — SIGNIFICANT CHANGE UP (ref 20.5–51.1)
MAGNESIUM SERPL-MCNC: 1.6 MG/DL — LOW (ref 1.8–2.4)
MCHC RBC-ENTMCNC: 28.7 PG — SIGNIFICANT CHANGE UP (ref 27–31)
MCHC RBC-ENTMCNC: 31.8 G/DL — LOW (ref 32–37)
MCV RBC AUTO: 90.2 FL — SIGNIFICANT CHANGE UP (ref 81–99)
MONOCYTES # BLD AUTO: 0.61 K/UL — HIGH (ref 0.1–0.6)
MONOCYTES NFR BLD AUTO: 6.8 % — SIGNIFICANT CHANGE UP (ref 1.7–9.3)
NEUTROPHILS # BLD AUTO: 6.19 K/UL — SIGNIFICANT CHANGE UP (ref 1.4–6.5)
NEUTROPHILS NFR BLD AUTO: 68.8 % — SIGNIFICANT CHANGE UP (ref 42.2–75.2)
NRBC # BLD: 0 /100 WBCS — SIGNIFICANT CHANGE UP (ref 0–0)
PLATELET # BLD AUTO: 309 K/UL — SIGNIFICANT CHANGE UP (ref 130–400)
POTASSIUM SERPL-MCNC: 4.2 MMOL/L — SIGNIFICANT CHANGE UP (ref 3.5–5)
POTASSIUM SERPL-SCNC: 4.2 MMOL/L — SIGNIFICANT CHANGE UP (ref 3.5–5)
PROT SERPL-MCNC: 6 G/DL — SIGNIFICANT CHANGE UP (ref 6–8)
RBC # BLD: 3.66 M/UL — LOW (ref 4.2–5.4)
RBC # FLD: 14 % — SIGNIFICANT CHANGE UP (ref 11.5–14.5)
SARS-COV-2 RNA SPEC QL NAA+PROBE: SIGNIFICANT CHANGE UP
SODIUM SERPL-SCNC: 139 MMOL/L — SIGNIFICANT CHANGE UP (ref 135–146)
TSH SERPL-MCNC: 0.17 UIU/ML — LOW (ref 0.27–4.2)
WBC # BLD: 9 K/UL — SIGNIFICANT CHANGE UP (ref 4.8–10.8)
WBC # FLD AUTO: 9 K/UL — SIGNIFICANT CHANGE UP (ref 4.8–10.8)

## 2022-01-06 PROCEDURE — 71045 X-RAY EXAM CHEST 1 VIEW: CPT | Mod: 26

## 2022-01-06 PROCEDURE — 99222 1ST HOSP IP/OBS MODERATE 55: CPT

## 2022-01-06 PROCEDURE — 93306 TTE W/DOPPLER COMPLETE: CPT | Mod: 26

## 2022-01-06 PROCEDURE — 72100 X-RAY EXAM L-S SPINE 2/3 VWS: CPT | Mod: 26

## 2022-01-06 PROCEDURE — 72170 X-RAY EXAM OF PELVIS: CPT | Mod: 26

## 2022-01-06 RX ORDER — ACETAMINOPHEN 500 MG
650 TABLET ORAL EVERY 6 HOURS
Refills: 0 | Status: DISCONTINUED | OUTPATIENT
Start: 2022-01-06 | End: 2022-01-15

## 2022-01-06 RX ORDER — ENOXAPARIN SODIUM 100 MG/ML
40 INJECTION SUBCUTANEOUS DAILY
Refills: 0 | Status: DISCONTINUED | OUTPATIENT
Start: 2022-01-06 | End: 2022-01-15

## 2022-01-06 RX ORDER — MAGNESIUM SULFATE 500 MG/ML
2 VIAL (ML) INJECTION ONCE
Refills: 0 | Status: COMPLETED | OUTPATIENT
Start: 2022-01-06 | End: 2022-01-06

## 2022-01-06 RX ORDER — LANOLIN ALCOHOL/MO/W.PET/CERES
3 CREAM (GRAM) TOPICAL AT BEDTIME
Refills: 0 | Status: DISCONTINUED | OUTPATIENT
Start: 2022-01-06 | End: 2022-01-15

## 2022-01-06 RX ORDER — SERTRALINE 25 MG/1
100 TABLET, FILM COATED ORAL DAILY
Refills: 0 | Status: DISCONTINUED | OUTPATIENT
Start: 2022-01-06 | End: 2022-01-15

## 2022-01-06 RX ORDER — SODIUM CHLORIDE 9 MG/ML
1000 INJECTION INTRAMUSCULAR; INTRAVENOUS; SUBCUTANEOUS
Refills: 0 | Status: DISCONTINUED | OUTPATIENT
Start: 2022-01-06 | End: 2022-01-08

## 2022-01-06 RX ORDER — OLANZAPINE 15 MG/1
10 TABLET, FILM COATED ORAL DAILY
Refills: 0 | Status: DISCONTINUED | OUTPATIENT
Start: 2022-01-06 | End: 2022-01-15

## 2022-01-06 RX ORDER — ONDANSETRON 8 MG/1
4 TABLET, FILM COATED ORAL EVERY 8 HOURS
Refills: 0 | Status: DISCONTINUED | OUTPATIENT
Start: 2022-01-06 | End: 2022-01-09

## 2022-01-06 RX ORDER — CLONAZEPAM 1 MG
1 TABLET ORAL AT BEDTIME
Refills: 0 | Status: DISCONTINUED | OUTPATIENT
Start: 2022-01-06 | End: 2022-01-13

## 2022-01-06 RX ADMIN — Medication 650 MILLIGRAM(S): at 12:18

## 2022-01-06 RX ADMIN — MORPHINE SULFATE 2 MILLIGRAM(S): 50 CAPSULE, EXTENDED RELEASE ORAL at 00:13

## 2022-01-06 RX ADMIN — ONDANSETRON 4 MILLIGRAM(S): 8 TABLET, FILM COATED ORAL at 00:13

## 2022-01-06 RX ADMIN — SERTRALINE 100 MILLIGRAM(S): 25 TABLET, FILM COATED ORAL at 12:17

## 2022-01-06 RX ADMIN — Medication 650 MILLIGRAM(S): at 13:54

## 2022-01-06 RX ADMIN — ENOXAPARIN SODIUM 40 MILLIGRAM(S): 100 INJECTION SUBCUTANEOUS at 12:17

## 2022-01-06 RX ADMIN — OLANZAPINE 10 MILLIGRAM(S): 15 TABLET, FILM COATED ORAL at 12:17

## 2022-01-06 RX ADMIN — MORPHINE SULFATE 2 MILLIGRAM(S): 50 CAPSULE, EXTENDED RELEASE ORAL at 01:47

## 2022-01-06 RX ADMIN — SODIUM CHLORIDE 1000 MILLILITER(S): 9 INJECTION INTRAMUSCULAR; INTRAVENOUS; SUBCUTANEOUS at 00:13

## 2022-01-06 RX ADMIN — ONDANSETRON 4 MILLIGRAM(S): 8 TABLET, FILM COATED ORAL at 21:59

## 2022-01-06 RX ADMIN — Medication 650 MILLIGRAM(S): at 21:57

## 2022-01-06 RX ADMIN — SODIUM CHLORIDE 75 MILLILITER(S): 9 INJECTION INTRAMUSCULAR; INTRAVENOUS; SUBCUTANEOUS at 05:02

## 2022-01-06 RX ADMIN — Medication 25 GRAM(S): at 18:00

## 2022-01-06 NOTE — H&P ADULT - ASSESSMENT
62 yo F with PMH of bipolar disorder, schizophrenia, gout, OA, chronic lower back pain, recurrent UTI, anxiety presented to the ED with c/o fall 3 days ago from bed. Denies LOC, head trauma. Requesting placement to [facility].     # Frequent falls  - likely d/t deconditioning, Severe OA  - PT eval  - Check orthostatics  - Denies LOC, head trauma  - Trauma workup negative  - Check Echo  - Fall precautions    # Rhabdomyolysis  - likely sec to fall  - CK 1188  - IV hydration   - Monitor renal function    # Schizophrenia/Bipolar disorder/anxiety  - continue with olanzapine, sertraline, clonazepam    # OA  - PT  - Pain control - tylenol PRN  - OP ortho f/u    # Diet: Regular  # Activity: IAT  # GI PPX: NA  # DVT PPX: Lovenox  # Full Code

## 2022-01-06 NOTE — H&P ADULT - NSHPPHYSICALEXAM_GEN_ALL_CORE
Vitals:  T(C): 36.1 (01-06-22 @ 01:39), Max: 36.6 (01-05-22 @ 19:48)  T(F): 97 (01-06-22 @ 01:39), Max: 97.8 (01-05-22 @ 19:48)  HR: 102 (01-06-22 @ 01:39) (102 - 102)  BP: 110/65 (01-06-22 @ 01:39) (110/65 - 127/67)  RR: 18 (01-06-22 @ 01:39) (17 - 18)  SpO2: 97% (01-06-22 @ 01:39) (97% - 98%)      General: No acute distress; Pallor (-), Icterus (-), Cyanosis (-), Clubbing (-)  HEENT: Normocephalic, atraumatic, PERRLA, EOMI  PULM: Bilaterally equal and clear breath sounds, wheeze (-), rubs (-), crackles (-)  CVS: Normal S1 and S2, murmurs (-), rubs (-), gallops (-)   GI: Soft, nondistended, nontender, BS +  MSK: Edema (-), no muscle, bone or joint tenderness noted, Limited ROM on bilateral arms and knees, lateral deviation of 1st MTP of bilateral feet,   SKIN: Warm and well perfused,  NEURO:  Alert and Oriented x 2, confused; No gross focal neurological deficit noted

## 2022-01-06 NOTE — PHYSICAL THERAPY INITIAL EVALUATION ADULT - PERTINENT HX OF CURRENT PROBLEM, REHAB EVAL
60 yo F with PMH of bipolar disorder, schizophrenia, gout, OA, chronic lower back pain, recurrent UTI, anxiety presented to the ED with complaints of fall 3 days ago from bed

## 2022-01-06 NOTE — PHYSICAL THERAPY INITIAL EVALUATION ADULT - TRANSFER SAFETY CONCERNS NOTED: SIT/STAND, REHAB EVAL
pt leans backward when standing/decreased safety awareness/losing balance/decreased step length/decreased weight-shifting ability

## 2022-01-06 NOTE — PATIENT PROFILE ADULT - FALL HARM RISK - HARM RISK INTERVENTIONS

## 2022-01-06 NOTE — H&P ADULT - NSHPLABSRESULTS_GEN_ALL_CORE
(01-05 @ 21:52)                      12.2  11.31 )-----------( 351                 37.7    Neutrophils = 8.35 (73.9%)  Lymphocytes = 1.98 (17.5%)  Eosinophils = 0.06 (0.5%)  Basophils = 0.03 (0.3%)  Monocytes = 0.84 (7.4%)  Bands = --%    01-05    144  |  105  |  21<H>  ----------------------------<  88  4.3   |  17  |  0.8    Ca    10.4<H>      05 Jan 2022 21:52    TPro  7.0  /  Alb  4.1  /  TBili  0.4  /  DBili  x   /  AST  43<H>  /  ALT  25  /  AlkPhos  118<H>  01-05      CARDIAC MARKERS ( 05 Jan 2022 22:08 )  Trop <0.01 ng/mL / CK 1188 U/L<H> / CKMB x

## 2022-01-06 NOTE — PHYSICAL THERAPY INITIAL EVALUATION ADULT - GENERAL OBSERVATIONS, REHAB EV[place] [id number]. Chart reviewed. Pt encountered semi-reclined in bed. pt appeared to be alert, confused, oriented 2-3.

## 2022-01-06 NOTE — H&P ADULT - HISTORY OF PRESENT ILLNESS
60 yo F with PMH of bipolar disorder, schizophrenia, gout, OA, chronic lower back pain, recurrent UTI, anxiety presented to the ED with complaints of fall 3 days ago from bed. States that she lives at home by herself and was unable to get up from the floor for the past 3 days until her friend found her today. Denies any head trauma, LOC, no CP, SOB. Complains of nausea and several episodes of vomiting. Pt was admitted several weeks ago to [facility] but left AMA to go home. Requesting to be admitted to [facility].   In the ED trauma workup negative for acute fracture, dislocations or hemorrhage. Labs were significant for CK of 1188. Patient being admitted for placement.

## 2022-01-06 NOTE — H&P ADULT - ATTENDING COMMENTS
62 yo F with PMH of bipolar disorder, schizophrenia, gout, OA, chronic lower back pain, recurrent UTI, anxiety presented to the ED with c/o fall 3 days ago from bed. Denies LOC, head trauma. Requesting placement to [facility].     # Frequent falls  - likely d/t deconditioning   - PT eval   - Fall precautions  - Spoke with CM regarding STR placement     Rest of plan as above.

## 2022-01-07 LAB
ALBUMIN SERPL ELPH-MCNC: 3.4 G/DL — LOW (ref 3.5–5.2)
ALP SERPL-CCNC: 93 U/L — SIGNIFICANT CHANGE UP (ref 30–115)
ALT FLD-CCNC: 18 U/L — SIGNIFICANT CHANGE UP (ref 0–41)
ANION GAP SERPL CALC-SCNC: 13 MMOL/L — SIGNIFICANT CHANGE UP (ref 7–14)
APPEARANCE UR: CLEAR — SIGNIFICANT CHANGE UP
AST SERPL-CCNC: 23 U/L — SIGNIFICANT CHANGE UP (ref 0–41)
BACTERIA # UR AUTO: NEGATIVE — SIGNIFICANT CHANGE UP
BASOPHILS # BLD AUTO: 0.07 K/UL — SIGNIFICANT CHANGE UP (ref 0–0.2)
BASOPHILS NFR BLD AUTO: 0.9 % — SIGNIFICANT CHANGE UP (ref 0–1)
BILIRUB SERPL-MCNC: 0.4 MG/DL — SIGNIFICANT CHANGE UP (ref 0.2–1.2)
BILIRUB UR-MCNC: NEGATIVE — SIGNIFICANT CHANGE UP
BUN SERPL-MCNC: 15 MG/DL — SIGNIFICANT CHANGE UP (ref 10–20)
CALCIUM SERPL-MCNC: 9 MG/DL — SIGNIFICANT CHANGE UP (ref 8.5–10.1)
CHLORIDE SERPL-SCNC: 108 MMOL/L — SIGNIFICANT CHANGE UP (ref 98–110)
CK SERPL-CCNC: 328 U/L — HIGH (ref 0–225)
CO2 SERPL-SCNC: 19 MMOL/L — SIGNIFICANT CHANGE UP (ref 17–32)
COLOR SPEC: YELLOW — SIGNIFICANT CHANGE UP
CREAT SERPL-MCNC: 0.6 MG/DL — LOW (ref 0.7–1.5)
D DIMER BLD IA.RAPID-MCNC: 328 NG/ML DDU — HIGH (ref 0–230)
DIFF PNL FLD: NEGATIVE — SIGNIFICANT CHANGE UP
EOSINOPHIL # BLD AUTO: 0.18 K/UL — SIGNIFICANT CHANGE UP (ref 0–0.7)
EOSINOPHIL NFR BLD AUTO: 2.2 % — SIGNIFICANT CHANGE UP (ref 0–8)
EPI CELLS # UR: 1 /HPF — SIGNIFICANT CHANGE UP (ref 0–5)
GLUCOSE SERPL-MCNC: 85 MG/DL — SIGNIFICANT CHANGE UP (ref 70–99)
GLUCOSE UR QL: NEGATIVE — SIGNIFICANT CHANGE UP
HCT VFR BLD CALC: 35.3 % — LOW (ref 37–47)
HGB BLD-MCNC: 10.9 G/DL — LOW (ref 12–16)
HYALINE CASTS # UR AUTO: 6 /LPF — SIGNIFICANT CHANGE UP (ref 0–7)
IMM GRANULOCYTES NFR BLD AUTO: 0.2 % — SIGNIFICANT CHANGE UP (ref 0.1–0.3)
KETONES UR-MCNC: ABNORMAL
LEUKOCYTE ESTERASE UR-ACNC: NEGATIVE — SIGNIFICANT CHANGE UP
LYMPHOCYTES # BLD AUTO: 3.43 K/UL — HIGH (ref 1.2–3.4)
LYMPHOCYTES # BLD AUTO: 42.2 % — SIGNIFICANT CHANGE UP (ref 20.5–51.1)
MAGNESIUM SERPL-MCNC: 2.1 MG/DL — SIGNIFICANT CHANGE UP (ref 1.8–2.4)
MCHC RBC-ENTMCNC: 28.7 PG — SIGNIFICANT CHANGE UP (ref 27–31)
MCHC RBC-ENTMCNC: 30.9 G/DL — LOW (ref 32–37)
MCV RBC AUTO: 92.9 FL — SIGNIFICANT CHANGE UP (ref 81–99)
MONOCYTES # BLD AUTO: 0.64 K/UL — HIGH (ref 0.1–0.6)
MONOCYTES NFR BLD AUTO: 7.9 % — SIGNIFICANT CHANGE UP (ref 1.7–9.3)
NEUTROPHILS # BLD AUTO: 3.78 K/UL — SIGNIFICANT CHANGE UP (ref 1.4–6.5)
NEUTROPHILS NFR BLD AUTO: 46.6 % — SIGNIFICANT CHANGE UP (ref 42.2–75.2)
NITRITE UR-MCNC: NEGATIVE — SIGNIFICANT CHANGE UP
NRBC # BLD: 0 /100 WBCS — SIGNIFICANT CHANGE UP (ref 0–0)
PH UR: 6.5 — SIGNIFICANT CHANGE UP (ref 5–8)
PLATELET # BLD AUTO: 327 K/UL — SIGNIFICANT CHANGE UP (ref 130–400)
POTASSIUM SERPL-MCNC: 4.1 MMOL/L — SIGNIFICANT CHANGE UP (ref 3.5–5)
POTASSIUM SERPL-SCNC: 4.1 MMOL/L — SIGNIFICANT CHANGE UP (ref 3.5–5)
PROCALCITONIN SERPL-MCNC: 0.06 NG/ML — SIGNIFICANT CHANGE UP (ref 0.02–0.1)
PROT SERPL-MCNC: 6.1 G/DL — SIGNIFICANT CHANGE UP (ref 6–8)
PROT UR-MCNC: ABNORMAL
RAPID RVP RESULT: SIGNIFICANT CHANGE UP
RBC # BLD: 3.8 M/UL — LOW (ref 4.2–5.4)
RBC # FLD: 14 % — SIGNIFICANT CHANGE UP (ref 11.5–14.5)
RBC CASTS # UR COMP ASSIST: 3 /HPF — SIGNIFICANT CHANGE UP (ref 0–4)
SARS-COV-2 RNA SPEC QL NAA+PROBE: SIGNIFICANT CHANGE UP
SODIUM SERPL-SCNC: 140 MMOL/L — SIGNIFICANT CHANGE UP (ref 135–146)
SP GR SPEC: 1.03 — SIGNIFICANT CHANGE UP (ref 1.01–1.03)
T3 SERPL-MCNC: 54 NG/DL — LOW (ref 80–200)
T4 AB SER-ACNC: 7.4 UG/DL — SIGNIFICANT CHANGE UP (ref 4.6–12)
T4 FREE SERPL-MCNC: 1.4 NG/DL — SIGNIFICANT CHANGE UP (ref 0.9–1.8)
UROBILINOGEN FLD QL: SIGNIFICANT CHANGE UP
WBC # BLD: 8.12 K/UL — SIGNIFICANT CHANGE UP (ref 4.8–10.8)
WBC # FLD AUTO: 8.12 K/UL — SIGNIFICANT CHANGE UP (ref 4.8–10.8)
WBC UR QL: 2 /HPF — SIGNIFICANT CHANGE UP (ref 0–5)

## 2022-01-07 RX ORDER — CEFEPIME 1 G/1
2000 INJECTION, POWDER, FOR SOLUTION INTRAMUSCULAR; INTRAVENOUS ONCE
Refills: 0 | Status: DISCONTINUED | OUTPATIENT
Start: 2022-01-07 | End: 2022-01-07

## 2022-01-07 RX ORDER — CEFEPIME 1 G/1
1000 INJECTION, POWDER, FOR SOLUTION INTRAMUSCULAR; INTRAVENOUS ONCE
Refills: 0 | Status: COMPLETED | OUTPATIENT
Start: 2022-01-07 | End: 2022-01-07

## 2022-01-07 RX ORDER — ONDANSETRON 8 MG/1
4 TABLET, FILM COATED ORAL
Refills: 0 | Status: DISCONTINUED | OUTPATIENT
Start: 2022-01-07 | End: 2022-01-09

## 2022-01-07 RX ORDER — FAMOTIDINE 10 MG/ML
20 INJECTION INTRAVENOUS DAILY
Refills: 0 | Status: DISCONTINUED | OUTPATIENT
Start: 2022-01-07 | End: 2022-01-15

## 2022-01-07 RX ORDER — VANCOMYCIN HCL 1 G
750 VIAL (EA) INTRAVENOUS ONCE
Refills: 0 | Status: COMPLETED | OUTPATIENT
Start: 2022-01-07 | End: 2022-01-07

## 2022-01-07 RX ADMIN — OLANZAPINE 10 MILLIGRAM(S): 15 TABLET, FILM COATED ORAL at 11:48

## 2022-01-07 RX ADMIN — ENOXAPARIN SODIUM 40 MILLIGRAM(S): 100 INJECTION SUBCUTANEOUS at 11:48

## 2022-01-07 RX ADMIN — Medication 650 MILLIGRAM(S): at 06:28

## 2022-01-07 RX ADMIN — ONDANSETRON 4 MILLIGRAM(S): 8 TABLET, FILM COATED ORAL at 17:00

## 2022-01-07 RX ADMIN — Medication 1 MILLIGRAM(S): at 21:46

## 2022-01-07 RX ADMIN — FAMOTIDINE 20 MILLIGRAM(S): 10 INJECTION INTRAVENOUS at 16:42

## 2022-01-07 RX ADMIN — Medication 250 MILLIGRAM(S): at 09:33

## 2022-01-07 RX ADMIN — ONDANSETRON 4 MILLIGRAM(S): 8 TABLET, FILM COATED ORAL at 21:46

## 2022-01-07 RX ADMIN — Medication 650 MILLIGRAM(S): at 14:08

## 2022-01-07 RX ADMIN — CEFEPIME 100 MILLIGRAM(S): 1 INJECTION, POWDER, FOR SOLUTION INTRAMUSCULAR; INTRAVENOUS at 11:55

## 2022-01-07 RX ADMIN — SERTRALINE 100 MILLIGRAM(S): 25 TABLET, FILM COATED ORAL at 11:48

## 2022-01-07 RX ADMIN — ONDANSETRON 4 MILLIGRAM(S): 8 TABLET, FILM COATED ORAL at 08:57

## 2022-01-07 NOTE — ADVANCED PRACTICE NURSE CONSULT - ASSESSMENT
Patient is a  60 yo F with PMH of bipolar disorder, schizophrenia, gout, OA, chronic lower back pain, recurrent UTI, anxiety presented to the ED with complaints of fall 3 days ago from bed. States that she lives at home by herself and was unable to get up from the floor for the past 3 days until her friend found her today. Denies any head trauma, LOC, no CP, SOB. Complains of nausea and several episodes of vomiting. Pt was admitted several weeks ago to [facility] but left AMA to go home. Requesting to be admitted to [facility].   In the ED trauma workup negative for acute fracture, dislocations or hemorrhage. Labs were significant for CK of 1188. Patient being admitted for placement.      PAST MEDICAL & SURGICAL HISTORY:  Schizophrenia  Bipolar disorder  Depression  Anxiety  Chronic lower back pain  result of pelvic fracture in the past  Osteoarthritis  Urinary incontinence  Chronic urinary tract infection  History of tremor  History of total knee arthroplasty, left    Assessment:  Patient received in bed A/O, responds appropriately to verbal command.                      Skin assessed-    Pressure Injury  #1  Location:  Coccyx  Stage: II   Size:  2x2   Tissue Description :  Pink , macerated   Wound Exudate :  None    Wound Edge:  Intact  Periwound Condition :  Macerated

## 2022-01-07 NOTE — ADVANCED PRACTICE NURSE CONSULT - RECOMMEDATIONS
Plan: Clean pressure injury with soap and water , pat dry then apply triad hydrophilic dressing   Pressure  injury  preventive  measures  skin care   Assess wound and inform primary provider of any changes   Case discussed with primary Rn   Wound/ ostomy specialist  to f/u as needed     Offloading: [ x] Frequent position changes [ ] Devices/Equipment  Cleansing: [ ] Saline [x ] Soap/Water [ ] Other: ______  Topicals: [x ] Barrier Cream [ ] Antimicrobial [ ] Enzymatic Wound Debridement  Dressings: [ ] Dry, sterile [ ] Foam [ ] Absorbant Pads [ ] Collagenase

## 2022-01-07 NOTE — PROGRESS NOTE ADULT - SUBJECTIVE AND OBJECTIVE BOX
SUBJECTIVE:    Patient is a 61y old Female who presents with a chief complaint of Frequent Falls (2022 05:27)    Currently admitted to medicine with the primary diagnosis of Fall      Today is hospital day 1d. This morning she is resting comfortably in bed and reports feeling cold. Overnight patient reports feeling feverish and she had one episode of vomiting this AM.    PAST MEDICAL & SURGICAL HISTORY  Schizophrenia    Bipolar disorder    Depression    Anxiety    Chronic lower back pain  result of pelvic fracture in the past    Osteoarthritis    Urinary incontinence    Chronic urinary tract infection    History of tremor    History of total knee arthroplasty, left      SOCIAL HISTORY:  Negative for smoking/alcohol/drug use.     ALLERGIES:  azithromycin (Unknown)  moxifloxacin (Unknown)  penicillin (Unknown)  sulfa drugs (Unknown)    MEDICATIONS:  STANDING MEDICATIONS  cefepime   IVPB 2000 milliGRAM(s) IV Intermittent once  clonazePAM  Tablet 1 milliGRAM(s) Oral at bedtime  enoxaparin Injectable 40 milliGRAM(s) SubCutaneous daily  OLANZapine 10 milliGRAM(s) Oral daily  sertraline 100 milliGRAM(s) Oral daily  sodium chloride 0.9%. 1000 milliLiter(s) IV Continuous <Continuous>  vancomycin  IVPB 750 milliGRAM(s) IV Intermittent once    PRN MEDICATIONS  acetaminophen     Tablet .. 650 milliGRAM(s) Oral every 6 hours PRN  aluminum hydroxide/magnesium hydroxide/simethicone Suspension 30 milliLiter(s) Oral every 4 hours PRN  bisacodyl 5 milliGRAM(s) Oral every 12 hours PRN  melatonin 3 milliGRAM(s) Oral at bedtime PRN  ondansetron Injectable 4 milliGRAM(s) IV Push every 8 hours PRN      LABS:                        10.9   8.12  )-----------( 327      ( 2022 07:23 )             35.3     01-07    140  |  108  |  15  ----------------------------<  85  4.1   |  19  |  0.6<L>    Ca    9.0      2022 07:23  Mg     2.1     -    TPro  6.1  /  Alb  3.4<L>  /  TBili  0.4  /  DBili  x   /  AST  23  /  ALT  18  /  AlkPhos  93  -      Urinalysis Basic - ( 2022 21:52 )    Color: Yellow / Appearance: Clear / S.026 / pH: x  Gluc: x / Ketone: Moderate  / Bili: Negative / Urobili: <2 mg/dL   Blood: x / Protein: 30 mg/dL / Nitrite: Negative   Leuk Esterase: Negative / RBC: 3 /HPF / WBC 2 /HPF   Sq Epi: x / Non Sq Epi: 1 /HPF / Bacteria: Negative        Creatine Kinase, Serum: 328 U/L *H* (22 @ 07:23)  Creatine Kinase, Serum: 735 U/L *H* (22 @ 11:50)      CARDIAC MARKERS ( 2022 07:23 )  x     / x     / 328 U/L / x     / x      CARDIAC MARKERS ( 2022 11:50 )  x     / x     / 735 U/L / x     / x      CARDIAC MARKERS ( 2022 22:08 )  x     / <0.01 ng/mL / 1188 U/L / x     / x          RADIOLOGY:    VITALS:   T(F): 97.7, Max: 101.3 (22 @ 00:45)  HR: 80  BP: 100/57  RR: 18  SpO2: --  PHYSICAL EXAM:  GEN: No acute distress  LUNGS: Clear to auscultation bilaterally, no wheezes rhonchi or rales  HEART: Regular rate and rhythm, S1, S2 auscultated, no murmurs, rubs, or gallops  ABD: Soft, non-tender, non-distended.  EXT: No edema, no pallor, pulses 2+ BL.   SKIN: healing lesion on LLE, medial thigh, no erythema or discharge noted. minor bruise noted on R lateral calf.  NEURO: AAOX3    Intravenous access:   NG tube:   Mcduffie Catheter: O/N 900 mL, 20mL this AM  Indwelling Urethral Catheter:     Connect To:  Straight Drainage/[place]    Indication:  Urinary Retention / Obstruction (22 @ 00:36) (not performed) [Active]       SUBJECTIVE:    Patient is a 61y old Female who presents with a chief complaint of Frequent Falls (2022 05:27)    Currently admitted to medicine with the primary diagnosis of Fall      Today is hospital day 1d. This morning she is resting comfortably in bed and reports feeling cold. Overnight patient reports feeling feverish and she had one episode of vomiting this AM.    PAST MEDICAL & SURGICAL HISTORY  Schizophrenia    Bipolar disorder    Depression    Anxiety    Chronic lower back pain  result of pelvic fracture in the past    Osteoarthritis    Urinary incontinence    Chronic urinary tract infection    History of tremor    History of total knee arthroplasty, left      SOCIAL HISTORY:  Negative for smoking/alcohol/drug use.     ALLERGIES:  azithromycin (Unknown)  moxifloxacin (Unknown)  penicillin (Unknown)  sulfa drugs (Unknown)    MEDICATIONS:  STANDING MEDICATIONS  cefepime   IVPB 2000 milliGRAM(s) IV Intermittent once  clonazePAM  Tablet 1 milliGRAM(s) Oral at bedtime  enoxaparin Injectable 40 milliGRAM(s) SubCutaneous daily  OLANZapine 10 milliGRAM(s) Oral daily  sertraline 100 milliGRAM(s) Oral daily  sodium chloride 0.9%. 1000 milliLiter(s) IV Continuous <Continuous>  vancomycin  IVPB 750 milliGRAM(s) IV Intermittent once    PRN MEDICATIONS  acetaminophen     Tablet .. 650 milliGRAM(s) Oral every 6 hours PRN  aluminum hydroxide/magnesium hydroxide/simethicone Suspension 30 milliLiter(s) Oral every 4 hours PRN  bisacodyl 5 milliGRAM(s) Oral every 12 hours PRN  melatonin 3 milliGRAM(s) Oral at bedtime PRN  ondansetron Injectable 4 milliGRAM(s) IV Push every 8 hours PRN      LABS:                        10.9   8.12  )-----------( 327      ( 2022 07:23 )             35.3     01-07    140  |  108  |  15  ----------------------------<  85  4.1   |  19  |  0.6<L>    Ca    9.0      2022 07:23  Mg     2.1     -    TPro  6.1  /  Alb  3.4<L>  /  TBili  0.4  /  DBili  x   /  AST  23  /  ALT  18  /  AlkPhos  93  -      Urinalysis Basic - ( 2022 21:52 )    Color: Yellow / Appearance: Clear / S.026 / pH: x  Gluc: x / Ketone: Moderate  / Bili: Negative / Urobili: <2 mg/dL   Blood: x / Protein: 30 mg/dL / Nitrite: Negative   Leuk Esterase: Negative / RBC: 3 /HPF / WBC 2 /HPF   Sq Epi: x / Non Sq Epi: 1 /HPF / Bacteria: Negative        Creatine Kinase, Serum: 328 U/L *H* (22 @ 07:23)  Creatine Kinase, Serum: 735 U/L *H* (22 @ 11:50)      CARDIAC MARKERS ( 2022 07:23 )  x     / x     / 328 U/L / x     / x      CARDIAC MARKERS ( 2022 11:50 )  x     / x     / 735 U/L / x     / x      CARDIAC MARKERS ( 2022 22:08 )  x     / <0.01 ng/mL / 1188 U/L / x     / x          RADIOLOGY:    VITALS:   T(F): 97.7, Max: 101.3 (22 @ 00:45)  HR: 80  BP: 100/57  RR: 18  SpO2: --    PHYSICAL EXAM:  GEN: No acute distress  LUNGS: Clear to auscultation bilaterally, no wheezes rhonchi or rales  HEART: Regular rate and rhythm, S1, S2 auscultated, no murmurs, rubs, or gallops  ABD: Soft, non-tender, non-distended.  EXT: No edema, no pallor, pulses 2+ BL.   SKIN: healing lesion on LLE, medial thigh, no erythema or discharge noted. minor bruise noted on R lateral calf.  NEURO: AAOX3    Intravenous access:   NG tube:   Mcduffie Catheter: O/N 900 mL, 20mL this AM  Indwelling Urethral Catheter:     Connect To:  Straight Drainage/[place]    Indication:  Urinary Retention / Obstruction (22 @ 00:36) (not performed) [Active]

## 2022-01-07 NOTE — PROGRESS NOTE ADULT - ASSESSMENT
60 yo F with PMH of bipolar disorder, schizophrenia, gout, OA, chronic lower back pain, recurrent UTI, anxiety presented to the ED s/p fall, 3 days ago from couch. Denies LOC, head trauma. Requesting placement to [facility].     #O/N fevers, Tmax: 101.3   - UA neg, ++ketones  - UCx: pending  - BCx: pending    # Frequent falls  - likely d/t deconditioning, Severe OA  - PT eval  - Check orthostatics  - Denies LOC, head trauma  - Trauma workup negative  - Check Echo  - Fall precautions    # Rhabdomyolysis  - likely sec to fall  - CK 1188->735  - IV hydration   - Monitor renal function    #Hypethyroidism  - TSH 0.17  - f/u thyroid panel    #Hypomagnesia, resolved  - repleted  - M.6->2.1    # Schizophrenia/Bipolar disorder/anxiety  - continue with olanzapine, sertraline, clonazepam    # OA  - PT  - Pain control - tylenol PRN  - OP ortho f/u    # Diet: Regular  # Activity: IAT  # GI PPX: NA  # DVT PPX: Lovenox  # Full Code   62 yo F with PMH of bipolar disorder, schizophrenia, gout, OA, chronic lower back pain, recurrent UTI, anxiety presented to the ED s/p fall, 3 days ago from couch. Denies LOC, head trauma. Requesting placement to [facility].     #O/N fevers, Tmax: 101.3  - WBC 8.12   - S/p x1 Vancomycin 750mg IV, x1 Cefepime 1g IV  - UA neg, ++ketones  - UCx: pending  - BCx: pending    # Frequent falls  - likely d/t deconditioning, Severe OA  - PT eval  - Check orthostatics  - O/N BP: 100s/50s, on IVFs  - Denies LOC, head trauma  - Trauma workup negative  - f/u Echo  - Fall precautions    # Rhabdomyolysis  - likely sec to fall  - ->328  - IV hydration   - Monitor renal function, Cr 0.6 today (0.6-0.8)    #Possible hyperthyroidism  - TSH 0.17  - f/u thyroid panel    #Hypomagnesia, resolved  - repleted  - M.6->2.1    # Schizophrenia/Bipolar disorder/anxiety  - continue with olanzapine, sertraline, clonazepam (at bedtime)    # OA  - PT  - Pain control - tylenol PRN  - OP ortho f/u    # Diet: Regular  # Activity: IAT  # GI PPX: NA  # DVT PPX: Lovenox  # Full Code   62 yo F with PMH of bipolar disorder, schizophrenia, gout, OA, chronic lower back pain, recurrent UTI, anxiety presented to the ED s/p fall, 3 days ago from couch. Denies LOC, head trauma. Requesting placement to [facility].     #O/N fevers, Tmax: 101.3  - WBC 8.12   - S/p x1 Vancomycin 750mg IV, x1 Cefepime 1g IV  - UA neg, ++ketones  - UCx: pending  - BCx: pending    # Frequent falls  - likely d/t deconditioning, Severe OA  - PT eval  - Check orthostatics  - O/N BP: 100s/50s, on IVFs  - Denies LOC, head trauma  - Trauma workup negative  - Echo (22): LVEF 60-65%; Grade I diastolic dysf(x) - spectral doppler demonstrates impaired relaxation pattern of LV myocardial filling; trace MV regurg  - Fall precautions    # Rhabdomyolysis  - likely sec to fall  - ->328  - IV hydration   - Monitor renal function, Cr 0.6 today (0.6-0.8)    #Possible hyperthyroidism  - TSH 0.17  - f/u thyroid panel    #Hypomagnesia, resolved  - repleted  - M.6->2.1    # Schizophrenia/Bipolar disorder/anxiety  - continue with olanzapine, sertraline, clonazepam (at bedtime)    # OA  - PT  - Pain control - tylenol PRN  - OP ortho f/u    # Diet: Regular  # Activity: IAT  # GI PPX: NA  # DVT PPX: Lovenox  # Full Code   62 yo F with PMH of bipolar disorder, schizophrenia, gout, OA, chronic lower back pain, recurrent UTI, anxiety presented to the ED s/p fall, 3 days ago from couch. Denies LOC, head trauma. Requesting placement to [facility].     #O/N fevers, Tmax: 101.3  - WBC 8.12   - S/p x1 Vancomycin 750mg IV, x1 Cefepime 1g IV  - UA neg, ++ketones  - UCx: pending  - BCx: pending    # Frequent falls  - likely d/t deconditioning, Severe OA  - Seen by PT: fall precautions, gait abnormality likely secondary to fall, benefit from OT  - Check orthostatics  - O/N BP: 100s/50s, on IVFs  - Denies LOC, head trauma  - Trauma workup negative  - Echo (22): LVEF 60-65%; Grade I diastolic dysf(x) - spectral doppler demonstrates impaired relaxation pattern of LV myocardial filling; trace MV regurg  - Fall precautions    # Rhabdomyolysis  - likely sec to fall  - ->328  - IV hydration   - Monitor renal function, Cr 0.6 today (0.6-0.8)    #Possible hyperthyroidism  - TSH 0.17  - f/u thyroid panel    #Hypomagnesia, resolved  - repleted  - M.6->2.1    # Schizophrenia/Bipolar disorder/anxiety  - continue with olanzapine, sertraline, clonazepam (at bedtime)    # OA  - PT  - Pain control - tylenol PRN  - OP ortho f/u    # Diet: Regular  # Activity: IAT  # GI PPX: NA  # DVT PPX: Lovenox  # Full Code   60 yo F with PMH of bipolar disorder, schizophrenia, gout, OA, chronic lower back pain, recurrent UTI, anxiety presented to the ED s/p fall, 3 days ago from couch. Denies LOC, head trauma. Requesting placement to [facility].     #O/N fevers, Tmax: 101.3  - WBC 8.12   - S/p x1 Vancomycin 750mg IV, x1 Cefepime 1g IV  - UA neg, ++ketones  - UCx: pending  - BCx: pending  - ID consult   - Pt having nausea and vomiting. Last CT abd pelvic in 2021 showed < from: CT Abdomen and Pelvis w/ IV Cont (21 @ 00:22) >  Findings compatible with gastritis and active gastric ulcer disease. No CT evidence for perforation at this time. Nonspecific mildly enlarged gastrohepatic lymph nodes possibly reactive. Nonemergent endoscopy is recommended for further evaluation.  - can start pepcid and maalox   - check dimer and duplex   - monitor off antibiotics for now as wbc stable   - if continues to vomit would recheck CT scan   - LFTs stable    #congestion on cxr- stable on RA      # Frequent falls  - likely d/t deconditioning, Severe OA  - Seen by PT: fall precautions, gait abnormality likely secondary to fall, benefit from OT  - Check orthostatics  - O/N BP: 100s/50s, on IVFs  - Denies LOC, head trauma  - Trauma workup negative  - Echo (22): LVEF 60-65%; Grade I diastolic dysf(x) - spectral doppler demonstrates impaired relaxation pattern of LV myocardial filling; trace MV regurg  - Fall precautions    # Rhabdomyolysis  - likely sec to fall  - ->328  - IV hydration   - Monitor renal function, Cr 0.6 today (0.6-0.8)    #Possible hyperthyroidism  - TSH 0.17  - f/u thyroid panel    #Hypomagnesia, resolved  - repleted  - M.6->2.1    # Schizophrenia/Bipolar disorder/anxiety  - continue with olanzapine, sertraline, clonazepam (at bedtime)    # OA  - PT  - Pain control - tylenol PRN  - OP ortho f/u    # Diet: Regular  # Activity: IAT  # GI PPX: NA  # DVT PPX: Lovenox  # Full Code   60 yo F with PMH of bipolar disorder, schizophrenia, gout, OA, chronic lower back pain, recurrent UTI, anxiety presented to the ED s/p fall, 3 days ago from couch. Denies LOC, head trauma. Requesting placement to [facility].     #O/N fevers, Tmax: 101.3  - WBC 8.12   - S/p x1 Vancomycin 750mg IV, x1 Cefepime 1g IV  - UA neg, ++ketones  - UCx: pending  - BCx: pending  - ID consult   - Pt having nausea and vomiting. Last CT abd pelvic in 2021 showed < from: CT Abdomen and Pelvis w/ IV Cont (21 @ 00:22) >  Findings compatible with gastritis and active gastric ulcer disease. No CT evidence for perforation at this time. Nonspecific mildly enlarged gastrohepatic lymph nodes possibly reactive. Nonemergent endoscopy is recommended for further evaluation.  - can start pepcid and maalox   - check dimer and duplex   - monitor off antibiotics for now as wbc stable   - if continues to vomit would recheck CT scan   - LFTs stable  -RVP    #congestion on cxr- stable on RA      # Frequent falls  - likely d/t deconditioning, Severe OA  - Seen by PT: fall precautions, gait abnormality likely secondary to fall, benefit from OT  - Check orthostatics  - O/N BP: 100s/50s, on IVFs  - Denies LOC, head trauma  - Trauma workup negative  - Echo (22): LVEF 60-65%; Grade I diastolic dysf(x) - spectral doppler demonstrates impaired relaxation pattern of LV myocardial filling; trace MV regurg  - Fall precautions    # Rhabdomyolysis  - likely sec to fall  - ->328  - IV hydration   - Monitor renal function, Cr 0.6 today (0.6-0.8)    #Possible hyperthyroidism  - TSH 0.17  - f/u thyroid panel    #Hypomagnesia, resolved  - repleted  - M.6->2.1    # Schizophrenia/Bipolar disorder/anxiety  - continue with olanzapine, sertraline, clonazepam (at bedtime)    # OA  - PT  - Pain control - tylenol PRN  - OP ortho f/u    # Diet: Regular  # Activity: IAT  # GI PPX: NA  # DVT PPX: Lovenox  # Full Code

## 2022-01-08 LAB
ALBUMIN SERPL ELPH-MCNC: 3 G/DL — LOW (ref 3.5–5.2)
ALP SERPL-CCNC: 79 U/L — SIGNIFICANT CHANGE UP (ref 30–115)
ALT FLD-CCNC: 13 U/L — SIGNIFICANT CHANGE UP (ref 0–41)
ANION GAP SERPL CALC-SCNC: 12 MMOL/L — SIGNIFICANT CHANGE UP (ref 7–14)
AST SERPL-CCNC: 16 U/L — SIGNIFICANT CHANGE UP (ref 0–41)
BASOPHILS # BLD AUTO: 0.08 K/UL — SIGNIFICANT CHANGE UP (ref 0–0.2)
BASOPHILS NFR BLD AUTO: 0.9 % — SIGNIFICANT CHANGE UP (ref 0–1)
BILIRUB SERPL-MCNC: 0.3 MG/DL — SIGNIFICANT CHANGE UP (ref 0.2–1.2)
BUN SERPL-MCNC: 7 MG/DL — LOW (ref 10–20)
CALCIUM SERPL-MCNC: 8.5 MG/DL — SIGNIFICANT CHANGE UP (ref 8.5–10.1)
CHLORIDE SERPL-SCNC: 106 MMOL/L — SIGNIFICANT CHANGE UP (ref 98–110)
CO2 SERPL-SCNC: 22 MMOL/L — SIGNIFICANT CHANGE UP (ref 17–32)
CREAT SERPL-MCNC: 0.5 MG/DL — LOW (ref 0.7–1.5)
CULTURE RESULTS: NO GROWTH — SIGNIFICANT CHANGE UP
EOSINOPHIL # BLD AUTO: 0.37 K/UL — SIGNIFICANT CHANGE UP (ref 0–0.7)
EOSINOPHIL NFR BLD AUTO: 4.2 % — SIGNIFICANT CHANGE UP (ref 0–8)
GLUCOSE SERPL-MCNC: 79 MG/DL — SIGNIFICANT CHANGE UP (ref 70–99)
HCT VFR BLD CALC: 32.6 % — LOW (ref 37–47)
HGB BLD-MCNC: 10.4 G/DL — LOW (ref 12–16)
IMM GRANULOCYTES NFR BLD AUTO: 0.1 % — SIGNIFICANT CHANGE UP (ref 0.1–0.3)
LACTATE SERPL-SCNC: 0.7 MMOL/L — SIGNIFICANT CHANGE UP (ref 0.7–2)
LACTATE SERPL-SCNC: 0.8 MMOL/L — SIGNIFICANT CHANGE UP (ref 0.7–2)
LYMPHOCYTES # BLD AUTO: 3.99 K/UL — HIGH (ref 1.2–3.4)
LYMPHOCYTES # BLD AUTO: 45.4 % — SIGNIFICANT CHANGE UP (ref 20.5–51.1)
MCHC RBC-ENTMCNC: 28.9 PG — SIGNIFICANT CHANGE UP (ref 27–31)
MCHC RBC-ENTMCNC: 31.9 G/DL — LOW (ref 32–37)
MCV RBC AUTO: 90.6 FL — SIGNIFICANT CHANGE UP (ref 81–99)
MONOCYTES # BLD AUTO: 0.62 K/UL — HIGH (ref 0.1–0.6)
MONOCYTES NFR BLD AUTO: 7.1 % — SIGNIFICANT CHANGE UP (ref 1.7–9.3)
NEUTROPHILS # BLD AUTO: 3.72 K/UL — SIGNIFICANT CHANGE UP (ref 1.4–6.5)
NEUTROPHILS NFR BLD AUTO: 42.3 % — SIGNIFICANT CHANGE UP (ref 42.2–75.2)
NRBC # BLD: 0 /100 WBCS — SIGNIFICANT CHANGE UP (ref 0–0)
PLATELET # BLD AUTO: 283 K/UL — SIGNIFICANT CHANGE UP (ref 130–400)
POTASSIUM SERPL-MCNC: 3.7 MMOL/L — SIGNIFICANT CHANGE UP (ref 3.5–5)
POTASSIUM SERPL-SCNC: 3.7 MMOL/L — SIGNIFICANT CHANGE UP (ref 3.5–5)
PROT SERPL-MCNC: 5.3 G/DL — LOW (ref 6–8)
RBC # BLD: 3.6 M/UL — LOW (ref 4.2–5.4)
RBC # FLD: 13.4 % — SIGNIFICANT CHANGE UP (ref 11.5–14.5)
SODIUM SERPL-SCNC: 140 MMOL/L — SIGNIFICANT CHANGE UP (ref 135–146)
SPECIMEN SOURCE: SIGNIFICANT CHANGE UP
WBC # BLD: 8.79 K/UL — SIGNIFICANT CHANGE UP (ref 4.8–10.8)
WBC # FLD AUTO: 8.79 K/UL — SIGNIFICANT CHANGE UP (ref 4.8–10.8)

## 2022-01-08 PROCEDURE — 74177 CT ABD & PELVIS W/CONTRAST: CPT | Mod: 26

## 2022-01-08 PROCEDURE — 93970 EXTREMITY STUDY: CPT | Mod: 26

## 2022-01-08 PROCEDURE — 99233 SBSQ HOSP IP/OBS HIGH 50: CPT

## 2022-01-08 RX ORDER — MORPHINE SULFATE 50 MG/1
2 CAPSULE, EXTENDED RELEASE ORAL ONCE
Refills: 0 | Status: DISCONTINUED | OUTPATIENT
Start: 2022-01-08 | End: 2022-01-08

## 2022-01-08 RX ADMIN — OLANZAPINE 10 MILLIGRAM(S): 15 TABLET, FILM COATED ORAL at 12:07

## 2022-01-08 RX ADMIN — Medication 1 MILLIGRAM(S): at 21:20

## 2022-01-08 RX ADMIN — MORPHINE SULFATE 2 MILLIGRAM(S): 50 CAPSULE, EXTENDED RELEASE ORAL at 18:17

## 2022-01-08 RX ADMIN — MORPHINE SULFATE 2 MILLIGRAM(S): 50 CAPSULE, EXTENDED RELEASE ORAL at 18:50

## 2022-01-08 RX ADMIN — ENOXAPARIN SODIUM 40 MILLIGRAM(S): 100 INJECTION SUBCUTANEOUS at 12:07

## 2022-01-08 RX ADMIN — ONDANSETRON 4 MILLIGRAM(S): 8 TABLET, FILM COATED ORAL at 13:39

## 2022-01-08 RX ADMIN — FAMOTIDINE 20 MILLIGRAM(S): 10 INJECTION INTRAVENOUS at 12:07

## 2022-01-08 RX ADMIN — SERTRALINE 100 MILLIGRAM(S): 25 TABLET, FILM COATED ORAL at 12:07

## 2022-01-08 NOTE — CONSULT NOTE ADULT - ASSESSMENT
ASSESSMENT  62 yo F with PMH of bipolar disorder, schizophrenia, gout, OA, chronic lower back pain, recurrent UTI, anxiety presented to the ED with complaints of fall 3 days ago from bed.     IMPRESSION  #s/p Fall with Rhabdo  #Fevers  - COVID PCR, RVP negative  - Xray Chest 1 View- PORTABLE-Urgent (Xray Chest 1 View- PORTABLE-Urgent .) (01.06.22 @ 01:13) >  - Procalcitonin, Serum: 0.06  (01.07.22 @ 07:23)  - TTE 1/6 without significant valvulopathy     #Bipolar Disorder/Schizophrenia - On olanzapine and sertraline   #s/p Left Hip Nail Fixation   #Hx of Gout    #Abx allergy: azithromycin (Unknown)  moxifloxacin (Unknown)  penicillin (Unknown)    RECOMMENDATIONS  This is a preliminary incomplete pended note, all final recommendations to follow after interview and examination of the patient.    Please call or message on [employer] Teams if with any questions.  Spectra [phone]     ASSESSMENT  60 yo F with PMH of bipolar disorder, schizophrenia, gout, OA, chronic lower back pain, recurrent UTI, anxiety presented to the ED with complaints of fall 3 days ago from bed.     IMPRESSION  #s/p Fall with Rhabdo  #Fevers - unclear source  - COVID PCR, RVP negative  - Xray Chest 1 View- PORTABLE-Urgent (Xray Chest 1 View- PORTABLE-Urgent .) (01.06.22 @ 01:13) >  - Procalcitonin, Serum: 0.06  (01.07.22 @ 07:23)  - TTE 1/6 without significant valvulopathy   - UA 1/5 < 10 WBC     #Bipolar Disorder/Schizophrenia - On olanzapine and sertraline   #s/p Left Hip Nail Fixation   #Hx of Gout    #Abx allergy: azithromycin (Unknown)  moxifloxacin (Unknown)  penicillin - reports nausea    RECOMMENDATIONS  - unclear source of fevers -- main complaint during this encounter was abdominal pain; did report dysuria prior to admission, but UA on 1/5 was unremarkable   - drug fever? on Sertraline and olanzapine which can cause Serotonin syndrome  -- unclear nature of her fall, check reflexes to ensure no hyperreflexia   - follow-up blood and urine Cx for now   - trend fever curve  - if persistently febrile, would obtain CT Abd/Pelvis   - ok to continue current antibiotics while awaiting cultures  - check ESR/CRP     Please call or message on [employer] Teams if with any questions.  Spectra [phone]

## 2022-01-08 NOTE — PROGRESS NOTE ADULT - SUBJECTIVE AND OBJECTIVE BOX
SUBJECTIVE:    Patient is a 61y old Female who presents with a chief complaint of Frequent Falls (2022 05:27)    Currently admitted to medicine with the primary diagnosis of Fall      Today is hospital day 1d. This morning she is resting comfortably in bed and reports feeling cold. Overnight patient reports feeling feverish and she had one episode of vomiting this AM.    PAST MEDICAL & SURGICAL HISTORY  Schizophrenia    Bipolar disorder    Depression    Anxiety    Chronic lower back pain  result of pelvic fracture in the past    Osteoarthritis    Urinary incontinence    Chronic urinary tract infection    History of tremor    History of total knee arthroplasty, left      SOCIAL HISTORY:  Negative for smoking/alcohol/drug use.     ALLERGIES:  azithromycin (Unknown)  moxifloxacin (Unknown)  penicillin (Unknown)  sulfa drugs (Unknown)    MEDICATIONS:  STANDING MEDICATIONS  cefepime   IVPB 2000 milliGRAM(s) IV Intermittent once  clonazePAM  Tablet 1 milliGRAM(s) Oral at bedtime  enoxaparin Injectable 40 milliGRAM(s) SubCutaneous daily  OLANZapine 10 milliGRAM(s) Oral daily  sertraline 100 milliGRAM(s) Oral daily  sodium chloride 0.9%. 1000 milliLiter(s) IV Continuous <Continuous>  vancomycin  IVPB 750 milliGRAM(s) IV Intermittent once    PRN MEDICATIONS  acetaminophen     Tablet .. 650 milliGRAM(s) Oral every 6 hours PRN  aluminum hydroxide/magnesium hydroxide/simethicone Suspension 30 milliLiter(s) Oral every 4 hours PRN  bisacodyl 5 milliGRAM(s) Oral every 12 hours PRN  melatonin 3 milliGRAM(s) Oral at bedtime PRN  ondansetron Injectable 4 milliGRAM(s) IV Push every 8 hours PRN      LABS:                        10.9   8.12  )-----------( 327      ( 2022 07:23 )             35.3     01-07    140  |  108  |  15  ----------------------------<  85  4.1   |  19  |  0.6<L>    Ca    9.0      2022 07:23  Mg     2.1     -    TPro  6.1  /  Alb  3.4<L>  /  TBili  0.4  /  DBili  x   /  AST  23  /  ALT  18  /  AlkPhos  93  -      Urinalysis Basic - ( 2022 21:52 )    Color: Yellow / Appearance: Clear / S.026 / pH: x  Gluc: x / Ketone: Moderate  / Bili: Negative / Urobili: <2 mg/dL   Blood: x / Protein: 30 mg/dL / Nitrite: Negative   Leuk Esterase: Negative / RBC: 3 /HPF / WBC 2 /HPF   Sq Epi: x / Non Sq Epi: 1 /HPF / Bacteria: Negative        Creatine Kinase, Serum: 328 U/L *H* (22 @ 07:23)  Creatine Kinase, Serum: 735 U/L *H* (22 @ 11:50)      CARDIAC MARKERS ( 2022 07:23 )  x     / x     / 328 U/L / x     / x      CARDIAC MARKERS ( 2022 11:50 )  x     / x     / 735 U/L / x     / x      CARDIAC MARKERS ( 2022 22:08 )  x     / <0.01 ng/mL / 1188 U/L / x     / x          RADIOLOGY:    VITALS:   T(F): 97.7, Max: 101.3 (22 @ 00:45)  HR: 80  BP: 100/57  RR: 18  SpO2: --    PHYSICAL EXAM:  GEN: No acute distress  LUNGS: Clear to auscultation bilaterally, no wheezes rhonchi or rales  HEART: Regular rate and rhythm, S1, S2 auscultated, no murmurs, rubs, or gallops  ABD: Soft, non-tender, non-distended.  EXT: No edema, no pallor, pulses 2+ BL.   SKIN: healing lesion on LLE, medial thigh, no erythema or discharge noted. minor bruise noted on R lateral calf.  NEURO: AAOX3    Intravenous access:   NG tube:   Mcduffie Catheter: O/N 900 mL, 20mL this AM  Indwelling Urethral Catheter:     Connect To:  Straight Drainage/[place]    Indication:  Urinary Retention / Obstruction (22 @ 00:36) (not performed) [Active]       CC. Frequent Falls  HPI. Patient reports abdm pain with N/V  afebrile  Offers no other complaints   afebrile overnight               Constitutional: No fever, fatigue or weight loss.  Skin: No rash.  Eyes: No recent vision problems or eye pain.  ENT: No congestion, ear pain, or sore throat.  Endocrine: No thyroid problems.  Cardiovascular: No chest pain or palpation.  Respiratory: No cough, shortness of breath, congestion, or wheezing.  Gastrointestinal: No diarrhea.  Genitourinary: No dysuria.  Musculoskeletal: No joint swelling.  Neurologic: No headache.      Vital Signs Last 24 Hrs  T(C): 36.6 (01-08-22 @ 05:00), Max: 37.2 (01-07-22 @ 18:01)  T(F): 97.9 (01-08-22 @ 05:00), Max: 98.9 (01-07-22 @ 18:01)  HR: 66 (01-08-22 @ 05:00) (66 - 81)  BP: 136/80 (01-08-22 @ 05:00) (136/80 - 137/79)  BP(mean): 103 (01-08-22 @ 05:00) (101 - 103)  RR: 18 (01-08-22 @ 05:00) (18 - 20)  SpO2: 95% (01-07-22 @ 20:12) (95% - 95%)        PHYSICAL EXAM-  GENERAL: NAD,    HEAD:  Atraumatic, Normocephalic  EYES: EOMI, PERRLA, conjunctiva and sclera clear  NECK: Supple, No JVD, Normal thyroid  NERVOUS SYSTEM:  Alert & Oriented X3, Moving all extremities  CHEST/LUNG: Clear to percussion bilaterally; No rales, rhonchi, wheezing, or rubs  HEART: Regular rate and rhythm; No murmurs, rubs, or gallops  ABDOMEN: Soft, Diffuse tenderness min , Nondistended; Bowel sounds present  EXTREMITIES:   No clubbing, cyanosis, or edema  SKIN: No rashes or lesions                                  10.4   8.79  )-----------( 283      ( 08 Jan 2022 06:00 )             32.6     01-08    140  |  106  |  7<L>  ----------------------------<  79  3.7   |  22  |  0.5<L>    Ca    8.5      08 Jan 2022 06:00  Mg     2.1     01-07    TPro  5.3<L>  /  Alb  3.0<L>  /  TBili  0.3  /  DBili  x   /  AST  16  /  ALT  13  /  AlkPhos  79  01-08    CARDIAC MARKERS ( 07 Jan 2022 07:23 )  x     / x     / 328 U/L / x     / x                      MEDICATIONS  (STANDING):  clonazePAM  Tablet 1 milliGRAM(s) Oral at bedtime  enoxaparin Injectable 40 milliGRAM(s) SubCutaneous daily  famotidine    Tablet 20 milliGRAM(s) Oral daily  OLANZapine 10 milliGRAM(s) Oral daily  sertraline 100 milliGRAM(s) Oral daily  sodium chloride 0.9%. 1000 milliLiter(s) (75 mL/Hr) IV Continuous <Continuous>    MEDICATIONS  (PRN):  acetaminophen     Tablet .. 650 milliGRAM(s) Oral every 6 hours PRN Temp greater or equal to 38C (100.4F), Mild Pain (1 - 3), Moderate Pain (4 - 6)  aluminum hydroxide/magnesium hydroxide/simethicone Suspension 30 milliLiter(s) Oral every 4 hours PRN Dyspepsia  bisacodyl 5 milliGRAM(s) Oral every 12 hours PRN Constipation  melatonin 3 milliGRAM(s) Oral at bedtime PRN Insomnia  ondansetron    Tablet 4 milliGRAM(s) Oral four times a day PRN Nausea and/or Vomiting  ondansetron Injectable 4 milliGRAM(s) IV Push every 8 hours PRN Nausea and/or Vomiting          Imaging Personally Reviewed:     [x ] YES  [ ] NO    Consultant(s) Notes Reviewed:  [x ] YES  [ ] NO    Care Discussed with Consultants/Other Providers [x ] YES  [ ] NO

## 2022-01-08 NOTE — PROGRESS NOTE ADULT - ASSESSMENT
60 yo F with PMH of bipolar disorder, schizophrenia, gout, OA, chronic lower back pain, recurrent UTI, anxiety presented to the ED s/p fall, 3 days ago from couch. Denies LOC, head trauma. Requesting placement to [facility].     #O/N fevers, Tmax: 101.3  - WBC 8.12   - S/p x1 Vancomycin 750mg IV, x1 Cefepime 1g IV  - UA neg, ++ketones  - UCx: pending  - BCx: pending  - ID consult   - Pt having nausea and vomiting. Last CT abd pelvic in 2021 showed < from: CT Abdomen and Pelvis w/ IV Cont (21 @ 00:22) >  Findings compatible with gastritis and active gastric ulcer disease. No CT evidence for perforation at this time. Nonspecific mildly enlarged gastrohepatic lymph nodes possibly reactive. Nonemergent endoscopy is recommended for further evaluation.  - can start pepcid and maalox   - check dimer and duplex   - monitor off antibiotics for now as wbc stable   - if continues to vomit would recheck CT scan   - LFTs stable  -RVP    #congestion on cxr- stable on RA      # Frequent falls  - likely d/t deconditioning, Severe OA  - Seen by PT: fall precautions, gait abnormality likely secondary to fall, benefit from OT  - Check orthostatics  - O/N BP: 100s/50s, on IVFs  - Denies LOC, head trauma  - Trauma workup negative  - Echo (22): LVEF 60-65%; Grade I diastolic dysf(x) - spectral doppler demonstrates impaired relaxation pattern of LV myocardial filling; trace MV regurg  - Fall precautions    # Rhabdomyolysis  - likely sec to fall  - ->328  - IV hydration   - Monitor renal function, Cr 0.6 today (0.6-0.8)    #Possible hyperthyroidism  - TSH 0.17  - f/u thyroid panel    #Hypomagnesia, resolved  - repleted  - M.6->2.1    # Schizophrenia/Bipolar disorder/anxiety  - continue with olanzapine, sertraline, clonazepam (at bedtime)    # OA  - PT  - Pain control - tylenol PRN  - OP ortho f/u    # Diet: Regular  # Activity: IAT  # GI PPX: NA  # DVT PPX: Lovenox  # Full Code   62 yo F with PMH of bipolar disorder, schizophrenia, gout, OA, chronic lower back pain, recurrent UTI, anxiety presented to the ED s/p fall, 3 days ago from couch. Denies LOC, head trauma. Requesting placement to [facility].     #Fevers  -COVID 19 negative   -UA negative  -CXR shows no acute process  -BC negative   -elevated D-dimer.  F/U doppler US of LE   -ID to follow up       # abdm pain   - Pt having nausea and vomiting. Last CT abd pelvic in 11/2021 showed < from: CT Abdomen and Pelvis w/ IV Cont (11.11.21 @ 00:22) >  Findings compatible with gastritis and active gastric ulcer disease. No CT evidence for perforation at this time. Nonspecific mildly enlarged gastrohepatic lymph nodes possibly reactive. Nonemergent endoscopy is recommended for further evaluation.  -continue with pepcid and maalox   - LFT ok  -Will obtain abdm CT scan to r/o acute process      #congestion on cxr- stable on RA      # Frequent falls  - likely d/t deconditioning, Severe OA  - Seen by PT: fall precautions, gait abnormality likely secondary to fall, benefit from OT  - f/u orthostatics  - Denies LOC, head trauma  - Trauma workup negative  - Echo (01/06/22): LVEF 60-65%; Grade I diastolic dysf(x) - spectral doppler demonstrates impaired relaxation pattern of LV myocardial filling; trace MV regurg  - Fall precautions    # Rhabdomyolysis  - likely sec to fall  - ->328  - resolved   - renal function stable     #Possible hyperthyroidism  - TSH 0.17  - f/u thyroid panel    #Hypomagnesia, resolved  - replated       # Schizophrenia/Bipolar disorder/anxiety  - continue with olanzapine, sertraline, clonazepam (at bedtime)    # OA  - PT  - Pain control - tylenol PRN  - OP ortho f/u    # Diet: Regular  # Activity: IAT  # GI PPX: NA  # DVT PPX: Lovenox  # Full Code    #Progress Note Handoff  Pending (specify):  CT of the abdm.  PT consult   Family discussion:  plan of care was discussed with patient   in details.  all questions were answered.  seems to understand, and in agreement  Disposition:  unknown

## 2022-01-08 NOTE — DIETITIAN INITIAL EVALUATION ADULT. - ORAL INTAKE PTA/DIET HISTORY
Patient had nausea and vomiting PTA. She admits to not eating much during the day. She usually receives [employer] meal service for dinner which she eats consistently. Sometimes at breakfast she will eat a banana with coffee. She sometimes has difficulty eating because she is missing teeth. She has not consistently taken MVI and Vitamin D supplement. Reports  lbs ~1.5 months ago. NKFA.

## 2022-01-08 NOTE — DIETITIAN INITIAL EVALUATION ADULT. - PERSON TAUGHT/METHOD
Importance of increasing po intake, importance and appropriate use of supplement/verbal instruction/patient instructed

## 2022-01-08 NOTE — DIETITIAN INITIAL EVALUATION ADULT. - PHYSCIAL ASSESSMENT
well nourished Appearance: alert, oriented  GI/Bowel: WDL  Oral: Tolerating regular texture/thin consistency  Skin: per WOCN  - P/U stage 2

## 2022-01-08 NOTE — DIETITIAN INITIAL EVALUATION ADULT. - RD TO REMAIN AVAILABLE
Intervention: meals and snacks, medical food supplements, vitamins and minerals, coordination of care; Monitoring and Evaluation: supplement intake, energy intake, weight, labs (see above), skin status, NFPF/yes

## 2022-01-08 NOTE — DIETITIAN INITIAL EVALUATION ADULT. - ADD RECOMMEND
1) Continue current diet order 2) Recommend Ensure BID and Bc BID 3) Consider supplementation with MVI q24 hrs, Zinc Sulfate 220 mg q24hrs (x14 days) and Vitamin C 500 mg q24hrs to aid in promoting wound healing;  Patient is at moderate nutritional risk; RD to follow up in 4-6 days

## 2022-01-08 NOTE — DIETITIAN INITIAL EVALUATION ADULT. - PERTINENT MEDS FT
MEDICATIONS  (STANDING):  clonazePAM  Tablet 1 milliGRAM(s) Oral at bedtime  enoxaparin Injectable 40 milliGRAM(s) SubCutaneous daily  famotidine    Tablet 20 milliGRAM(s) Oral daily  OLANZapine 10 milliGRAM(s) Oral daily  sertraline 100 milliGRAM(s) Oral daily  sodium chloride 0.9%. 1000 milliLiter(s) (75 mL/Hr) IV Continuous <Continuous>    MEDICATIONS  (PRN):  acetaminophen     Tablet .. 650 milliGRAM(s) Oral every 6 hours PRN Temp greater or equal to 38C (100.4F), Mild Pain (1 - 3), Moderate Pain (4 - 6)  aluminum hydroxide/magnesium hydroxide/simethicone Suspension 30 milliLiter(s) Oral every 4 hours PRN Dyspepsia  bisacodyl 5 milliGRAM(s) Oral every 12 hours PRN Constipation  melatonin 3 milliGRAM(s) Oral at bedtime PRN Insomnia  ondansetron    Tablet 4 milliGRAM(s) Oral four times a day PRN Nausea and/or Vomiting  ondansetron Injectable 4 milliGRAM(s) IV Push every 8 hours PRN Nausea and/or Vomiting

## 2022-01-08 NOTE — CONSULT NOTE ADULT - SUBJECTIVE AND OBJECTIVE BOX
JARRETTCOMFORT  61y, Female  Allergy: azithromycin (Unknown)  moxifloxacin (Unknown)  penicillin (Unknown)  sulfa drugs (Unknown)      CHIEF COMPLAINT: Frequent Falls (07 Jan 2022 09:19)      LOS  2d    HPI:  60 yo F with PMH of bipolar disorder, schizophrenia, gout, OA, chronic lower back pain, recurrent UTI, anxiety presented to the ED with complaints of fall 3 days ago from bed. States that she lives at home by herself and was unable to get up from the floor for the past 3 days until her friend found her today. Denies any head trauma, LOC, no CP, SOB. Complains of nausea and several episodes of vomiting. Pt was admitted several weeks ago to [facility] but left AMA to go home. Requesting to be admitted to [facility].   In the ED trauma workup negative for acute fracture, dislocations or hemorrhage. Labs were significant for CK of 1188. Patient being admitted for placement.  (06 Jan 2022 05:27)      INFECTIOUS DISEASE HISTORY:  History as above.   Admitted for fall, found by friend.   ID consulted for cryptogenic fevers.   COVID and RVP negative  No leukocytosis on admission and negative procalcitonin.   Elevated CK 1188, improving.   Had fevers on 1/6 and 1/7.   Started on vancomycin + cefepime 1/7        PAST MEDICAL & SURGICAL HISTORY:  Schizophrenia    Bipolar disorder    Depression    Anxiety    Chronic lower back pain  result of pelvic fracture in the past    Osteoarthritis    Urinary incontinence    Chronic urinary tract infection    History of tremor    History of total knee arthroplasty, left        FAMILY HISTORY  FH: prostate cancer (Father)    FH: Parkinson&#x27;s disease (Mother)        SOCIAL HISTORY  Social History:  Substance Use (street drugs): ( x ) never used  (  ) other:  Tobacco Usage:  ( x  ) never smoked   (   ) former smoker   (   ) current smoker  (     ) pack year  Alcohol Usage: None (06 Jan 2022 05:27)        ROS  General: Denies rigors, nightsweats  HEENT: Denies headache, rhinorrhea, sore throat, eye pain  CV: Denies CP, palpitations  PULM: Denies wheezing, hemoptysis  GI: Denies hematemesis, hematochezia, melena  : Denies discharge, hematuria  MSK: Denies arthralgias, myalgias  SKIN: Denies rash, lesions  NEURO: Denies paresthesias, weakness  PSYCH: Denies depression, anxiety    VITALS:  T(F): 97.9, Max: 100.5 (01-07-22 @ 14:08)  HR: 66  BP: 136/80  RR: 18Vital Signs Last 24 Hrs  T(C): 36.6 (08 Jan 2022 05:00), Max: 38.1 (07 Jan 2022 14:08)  T(F): 97.9 (08 Jan 2022 05:00), Max: 100.5 (07 Jan 2022 14:08)  HR: 66 (08 Jan 2022 05:00) (66 - 92)  BP: 136/80 (08 Jan 2022 05:00) (136/80 - 172/91)  BP(mean): 103 (08 Jan 2022 05:00) (101 - 116)  RR: 18 (08 Jan 2022 05:00) (18 - 20)  SpO2: 95% (07 Jan 2022 20:12) (95% - 95%)    PHYSICAL EXAM:  Gen: NAD, resting in bed  HEENT: Normocephalic, atraumatic  Neck: supple, no lymphadenopathy  CV: Regular rate & regular rhythm  Lungs: decreased BS at bases, no fremitus  Abdomen: Soft, BS present  Ext: Warm, well perfused  Neuro: non focal, awake  Skin: no rash, no erythema  Lines: no phlebitis    TESTS & MEASUREMENTS:                        10.4   8.79  )-----------( 283      ( 08 Jan 2022 06:00 )             32.6     01-08    140  |  106  |  7<L>  ----------------------------<  79  3.7   |  22  |  0.5<L>    Ca    8.5      08 Jan 2022 06:00  Mg     2.1     01-07    TPro  5.3<L>  /  Alb  3.0<L>  /  TBili  0.3  /  DBili  x   /  AST  16  /  ALT  13  /  AlkPhos  79  01-08    eGFR if Non African American: 103 mL/min/1.73M2 (01-08-22 @ 06:00)  eGFR if : 119 mL/min/1.73M2 (01-08-22 @ 06:00)    LIVER FUNCTIONS - ( 08 Jan 2022 06:00 )  Alb: 3.0 g/dL / Pro: 5.3 g/dL / ALK PHOS: 79 U/L / ALT: 13 U/L / AST: 16 U/L / GGT: x               Culture - Urine (collected 12-17-21 @ 09:00)  Source: Clean Catch Clean Catch (Midstream)  Final Report (12-21-21 @ 13:31):    >100,000 CFU/ml Morganella morganii    <10,000 CFU/ml Normal Urogenital tennille present  Organism: Morganella morganii (12-21-21 @ 13:31)  Organism: Morganella morganii (12-21-21 @ 13:31)      -  Amikacin: S <=16      -  Amoxicillin/Clavulanic Acid: R >16/8      -  Ampicillin: R >16 These ampicillin results predict results for amoxicillin      -  Ampicillin/Sulbactam: S 8/4 Enterobacter, Klebsiella aerogenes, Citrobacter, and Serratia may develop resistance during prolonged therapy (3-4 days)      -  Aztreonam: S <=4      -  Cefazolin: R >16      -  Cefepime: S <=2      -  Cefoxitin: S <=8      -  Ceftriaxone: S <=1 Enterobacter, Klebsiella aerogenes, Citrobacter, and Serratia may develop resistance during prolonged therapy      -  Ciprofloxacin: S <=0.25      -  Ertapenem: S <=0.5      -  Gentamicin: S <=2      -  Imipenem: I 2      -  Levofloxacin: S <=0.5      -  Meropenem: S <=1      -  Nitrofurantoin: R 64 Should not be used to treat pyelonephritis      -  Piperacillin/Tazobactam: S <=8      -  Tigecycline: R <=2      -  Tobramycin: S <=2      -  Trimethoprim/Sulfamethoxazole: S <=0.5/9.5      Method Type: HAILEY    Culture - Blood (collected 12-15-21 @ 23:30)  Source: .Blood Blood-Peripheral  Final Report (12-21-21 @ 09:01):    No Growth Final    Culture - Urine (collected 12-13-21 @ 19:25)  Source: Catheterized Catheterized  Final Report (12-14-21 @ 22:45):    >=3 organisms. Probable collection contamination.    Culture - Urine (collected 11-10-21 @ 19:30)  Source: .Urine None  Final Report (11-13-21 @ 07:59):    >100,000 CFU/ml Klebsiella pneumoniae  Organism: Klebsiella pneumoniae (11-13-21 @ 07:59)  Organism: Klebsiella pneumoniae (11-13-21 @ 07:59)      -  Amikacin: S <=16      -  Amoxicillin/Clavulanic Acid: S <=8/4      -  Ampicillin: R >16 These ampicillin results predict results for amoxicillin      -  Ampicillin/Sulbactam: S <=4/2 Enterobacter, Klebsiella aerogenes, Citrobacter, and Serratia may develop resistance during prolonged therapy (3-4 days)      -  Aztreonam: S <=4      -  Cefazolin: S <=2 (MIC_CL_COM_ENTERIC_CEFAZU) For uncomplicated UTI with K. pneumoniae, E. coli, or P. mirablis: HAILEY <=16 is sensitive and HAILEY >=32 is resistant. This also predicts results for oral agents cefaclor, cefdinir, cefpodoxime, cefprozil, cefuroxime axetil, cephalexin and locarbef for uncomplicated UTI. Note that some isolates may be susceptible to these agents while testing resistant to cefazolin.      -  Cefepime: S <=2      -  Cefoxitin: S <=8      -  Ceftriaxone: S <=1 Enterobacter, Klebsiella aerogenes, Citrobacter, and Serratia may develop resistance during prolonged therapy      -  Ciprofloxacin: S <=0.25      -  Ertapenem: S <=0.5      -  Gentamicin: S <=2      -  Imipenem: S <=1      -  Levofloxacin: S <=0.5      -  Meropenem: S <=1      -  Nitrofurantoin: S <=32 Should not be used to treat pyelonephritis      -  Piperacillin/Tazobactam: S <=8      -  Tigecycline: S <=2      -  Tobramycin: S <=2      -  Trimethoprim/Sulfamethoxazole: R >2/38      Method Type: HAILEY    Culture - Urine (collected 06-22-21 @ 05:46)  Source: .Urine Clean Catch (Midstream)  Final Report (06-26-21 @ 13:26):    >100,000 CFU/ml Klebsiella pneumoniae  Organism: Klebsiella pneumoniae (06-26-21 @ 13:26)  Organism: Klebsiella pneumoniae (06-26-21 @ 13:26)      -  Amikacin: S <=16      -  Amoxicillin/Clavulanic Acid: S <=8/4      -  Ampicillin: R 16 These ampicillin results predict results for amoxicillin      -  Ampicillin/Sulbactam: S <=4/2 Enterobacter, Citrobacter, and Serratia may develop resistance during prolonged therapy (3-4 days)      -  Aztreonam: S <=4      -  Cefazolin: S <=2 (MIC_CL_COM_ENTERIC_CEFAZU) For uncomplicated UTI with K. pneumoniae, E. coli, or P. mirablis: HAILEY <=16 is sensitive and HAILEY >=32 is resistant. This also predicts results for oral agents cefaclor, cefdinir, cefpodoxime, cefprozil, cefuroxime axetil, cephalexin and locarbef for uncomplicated UTI. Note that some isolates may be susceptible to these agents while testing resistant to cefazolin.      -  Cefepime: S <=2      -  Cefoxitin: S <=8      -  Ceftriaxone: S <=1 Enterobacter, Citrobacter, and Serratia may develop resistance during prolonged therapy      -  Ciprofloxacin: S <=0.25      -  Ertapenem: S <=0.5      -  Gentamicin: S <=2      -  Imipenem: S <=1      -  Levofloxacin: S <=0.5      -  Meropenem: S <=1      -  Nitrofurantoin: S <=32 Should not be used to treat pyelonephritis      -  Piperacillin/Tazobactam: S <=8      -  Tigecycline: S <=2      -  Tobramycin: S <=2      -  Trimethoprim/Sulfamethoxazole: S <=0.5/9.5      Method Type: HAILEY    Culture - Blood (collected 05-12-21 @ 16:00)  Source: .Blood Blood  Final Report (05-17-21 @ 22:00):    No Growth Final    Culture - Urine (collected 05-12-21 @ 01:00)  Source: .Urine Clean Catch (Midstream)  Final Report (05-13-21 @ 04:44):    No growth        Lactate, Blood: 0.8 mmol/L (01-08-22 @ 06:00)  Lactate, Blood: 0.7 mmol/L (01-07-22 @ 20:00)      INFECTIOUS DISEASES TESTING  Procalcitonin, Serum: 0.06 ng/mL (01-07-22 @ 07:23)  COVID-19 PCR: NotDetec (01-06-22 @ 00:10)  COVID-19 PCR: NotDetec (12-16-21 @ 17:20)  Procalcitonin, Serum: 0.02 ng/mL (12-15-21 @ 23:30)  COVID-19 PCR: NotDetec (12-13-21 @ 11:37)  COVID-19 PCR: NotDetec (06-25-21 @ 10:28)  Hepatitis C Virus Interpretation: Nonreact (05-12-21 @ 16:00)  Hepatitis B Surface Antigen: Nonreact (05-12-21 @ 16:00)  COVID-19 PCR: NotDetec (05-12-21 @ 01:40)      RADIOLOGY & ADDITIONAL TESTS:  I have personally reviewed the last Chest xray  CXR  Xray Chest 1 View- PORTABLE-Urgent:   ACC: [id number] EXAM:  XR CHEST PORTABLE URGENT 1V                          PROCEDURE DATE:  01/06/2022          INTERPRETATION:  Clinical History / Reason for exam: Trauma    Comparison : Chest radiograph 12/15/2021.    Technique/Positioning: Frontal chest radiograph.    Findings:    Support devices: None.    Cardiac/mediastinum/hilum: Stable    Lung parenchyma/Pleura: Mild pulmonary vascular congestion. No   pneumothorax.    Skeleton/soft tissues: Degenerative changes of the shoulders    Impression:    Mild pulmonary vascular congestion    --- End of Report ---            YARELIS HERNANDEZ MD; Attending Radiologist  This document has been electronically signed. Jan 6 2022  8:26AM (01-06-22 @ 01:13)      CT      CARDIOLOGY TESTING  12 Lead ECG:   Ventricular Rate 105 BPM    Atrial Rate 105 BPM    P-R Interval 136 ms    QRS Duration 64 ms    Q-T Interval 332 ms    QTC Calculation(Bazett) 438 ms    P Axis 29 degrees    R Axis 13 degrees    T Axis -22 degrees    Diagnosis Line Sinus tachycardia  Minimal voltage criteria for LVH, may be normal variant  Septal infarct , age undetermined  ST & T wave abnormality, consider inferior ischemia  Abnormal ECG    Confirmed by Fred Méndez ([id number]) on 1/6/2022 7:47:21 AM (01-05-22 @ 22:42)      MEDICATIONS  clonazePAM  Tablet 1 Oral at bedtime  enoxaparin Injectable 40 SubCutaneous daily  famotidine    Tablet 20 Oral daily  OLANZapine 10 Oral daily  sertraline 100 Oral daily  sodium chloride 0.9%. 1000 IV Continuous <Continuous>      Weight  Weight (kg): 45.4 (01-05-22 @ 19:48)    ANTIBIOTICS:      ALLERGIES:  azithromycin (Unknown)  moxifloxacin (Unknown)  penicillin (Unknown)  sulfa drugs (Unknown)       JARRETTCOMFORT  61y, Female  Allergy: azithromycin (Unknown)  moxifloxacin (Unknown)  penicillin (Unknown)  sulfa drugs (Unknown)      CHIEF COMPLAINT: Frequent Falls (07 Jan 2022 09:19)      LOS  2d    HPI:  62 yo F with PMH of bipolar disorder, schizophrenia, gout, OA, chronic lower back pain, recurrent UTI, anxiety presented to the ED with complaints of fall 3 days ago from bed. States that she lives at home by herself and was unable to get up from the floor for the past 3 days until her friend found her today. Denies any head trauma, LOC, no CP, SOB. Complains of nausea and several episodes of vomiting. Pt was admitted several weeks ago to [facility] but left AMA to go home. Requesting to be admitted to [facility].   In the ED trauma workup negative for acute fracture, dislocations or hemorrhage. Labs were significant for CK of 1188. Patient being admitted for placement.  (06 Jan 2022 05:27)      INFECTIOUS DISEASE HISTORY:  History as above.   Admitted for fall, found by friend.   ID consulted for cryptogenic fevers.   COVID and RVP negative  No leukocytosis on admission and negative procalcitonin.   Elevated CK 1188, improving.   Had fevers on 1/6 and 1/7.   Started on vancomycin + cefepime 1/7.    On ROS, patient reports dysuria several days prior to admission.   Denies any nausea, vomiting, but reports chronic epigastric abdominal pain. She was told she had an ulcer.   She denies any shaking chills.   Denies any night sweats, but reports cyclical weight loss.   She denies any worsening joint pains - she has chronic right and left knee pain -- she is s/p left knee replacement and Left Hip pinning.   She has been on psych meds for 30 years.   No recent travel.       PAST MEDICAL & SURGICAL HISTORY:  Schizophrenia    Bipolar disorder    Depression    Anxiety    Chronic lower back pain  result of pelvic fracture in the past    Osteoarthritis    Urinary incontinence    Chronic urinary tract infection    History of tremor    History of total knee arthroplasty, left        FAMILY HISTORY  FH: prostate cancer (Father)    FH: Parkinson&#x27;s disease (Mother)        SOCIAL HISTORY  Social History:  Substance Use (street drugs): ( x ) never used  (  ) other:  Tobacco Usage:  ( x  ) never smoked   (   ) former smoker   (   ) current smoker  (     ) pack year  Alcohol Usage: None (06 Jan 2022 05:27)        ROS  General: Denies rigors, nightsweats  HEENT: Denies headache, rhinorrhea, sore throat, eye pain  CV: Denies CP, palpitations  PULM: Denies wheezing, hemoptysis  GI: Denies hematemesis, hematochezia, melena  : Denies discharge, hematuria  MSK: Denies arthralgias, myalgias  SKIN: Denies rash, lesions  NEURO: Denies paresthesias, weakness  PSYCH: Denies depression, anxiety    VITALS:  T(F): 97.9, Max: 100.5 (01-07-22 @ 14:08)  HR: 66  BP: 136/80  RR: 18Vital Signs Last 24 Hrs  T(C): 36.6 (08 Jan 2022 05:00), Max: 38.1 (07 Jan 2022 14:08)  T(F): 97.9 (08 Jan 2022 05:00), Max: 100.5 (07 Jan 2022 14:08)  HR: 66 (08 Jan 2022 05:00) (66 - 92)  BP: 136/80 (08 Jan 2022 05:00) (136/80 - 172/91)  BP(mean): 103 (08 Jan 2022 05:00) (101 - 116)  RR: 18 (08 Jan 2022 05:00) (18 - 20)  SpO2: 95% (07 Jan 2022 20:12) (95% - 95%)    PHYSICAL EXAM:  Gen: NAD, resting in bed  HEENT: Normocephalic, atraumatic  Neck: supple, no lymphadenopathy  CV: Regular rate & regular rhythm  Lungs: decreased BS at bases, no fremitus  Abdomen: Soft, BS present  Ext: Warm, well perfused  Neuro: non focal, awake  Skin: no rash, no erythema  Lines: no phlebitis    TESTS & MEASUREMENTS:                        10.4   8.79  )-----------( 283      ( 08 Jan 2022 06:00 )             32.6     01-08    140  |  106  |  7<L>  ----------------------------<  79  3.7   |  22  |  0.5<L>    Ca    8.5      08 Jan 2022 06:00  Mg     2.1     01-07    TPro  5.3<L>  /  Alb  3.0<L>  /  TBili  0.3  /  DBili  x   /  AST  16  /  ALT  13  /  AlkPhos  79  01-08    eGFR if Non African American: 103 mL/min/1.73M2 (01-08-22 @ 06:00)  eGFR if : 119 mL/min/1.73M2 (01-08-22 @ 06:00)    LIVER FUNCTIONS - ( 08 Jan 2022 06:00 )  Alb: 3.0 g/dL / Pro: 5.3 g/dL / ALK PHOS: 79 U/L / ALT: 13 U/L / AST: 16 U/L / GGT: x               Culture - Urine (collected 12-17-21 @ 09:00)  Source: Clean Catch Clean Catch (Midstream)  Final Report (12-21-21 @ 13:31):    >100,000 CFU/ml Morganella morganii    <10,000 CFU/ml Normal Urogenital tennille present  Organism: Morganella morganii (12-21-21 @ 13:31)  Organism: Morganella morganii (12-21-21 @ 13:31)      -  Amikacin: S <=16      -  Amoxicillin/Clavulanic Acid: R >16/8      -  Ampicillin: R >16 These ampicillin results predict results for amoxicillin      -  Ampicillin/Sulbactam: S 8/4 Enterobacter, Klebsiella aerogenes, Citrobacter, and Serratia may develop resistance during prolonged therapy (3-4 days)      -  Aztreonam: S <=4      -  Cefazolin: R >16      -  Cefepime: S <=2      -  Cefoxitin: S <=8      -  Ceftriaxone: S <=1 Enterobacter, Klebsiella aerogenes, Citrobacter, and Serratia may develop resistance during prolonged therapy      -  Ciprofloxacin: S <=0.25      -  Ertapenem: S <=0.5      -  Gentamicin: S <=2      -  Imipenem: I 2      -  Levofloxacin: S <=0.5      -  Meropenem: S <=1      -  Nitrofurantoin: R 64 Should not be used to treat pyelonephritis      -  Piperacillin/Tazobactam: S <=8      -  Tigecycline: R <=2      -  Tobramycin: S <=2      -  Trimethoprim/Sulfamethoxazole: S <=0.5/9.5      Method Type: HAILEY    Culture - Blood (collected 12-15-21 @ 23:30)  Source: .Blood Blood-Peripheral  Final Report (12-21-21 @ 09:01):    No Growth Final    Culture - Urine (collected 12-13-21 @ 19:25)  Source: Catheterized Catheterized  Final Report (12-14-21 @ 22:45):    >=3 organisms. Probable collection contamination.    Culture - Urine (collected 11-10-21 @ 19:30)  Source: .Urine None  Final Report (11-13-21 @ 07:59):    >100,000 CFU/ml Klebsiella pneumoniae  Organism: Klebsiella pneumoniae (11-13-21 @ 07:59)  Organism: Klebsiella pneumoniae (11-13-21 @ 07:59)      -  Amikacin: S <=16      -  Amoxicillin/Clavulanic Acid: S <=8/4      -  Ampicillin: R >16 These ampicillin results predict results for amoxicillin      -  Ampicillin/Sulbactam: S <=4/2 Enterobacter, Klebsiella aerogenes, Citrobacter, and Serratia may develop resistance during prolonged therapy (3-4 days)      -  Aztreonam: S <=4      -  Cefazolin: S <=2 (MIC_CL_COM_ENTERIC_CEFAZU) For uncomplicated UTI with K. pneumoniae, E. coli, or P. mirablis: HAILEY <=16 is sensitive and HAILEY >=32 is resistant. This also predicts results for oral agents cefaclor, cefdinir, cefpodoxime, cefprozil, cefuroxime axetil, cephalexin and locarbef for uncomplicated UTI. Note that some isolates may be susceptible to these agents while testing resistant to cefazolin.      -  Cefepime: S <=2      -  Cefoxitin: S <=8      -  Ceftriaxone: S <=1 Enterobacter, Klebsiella aerogenes, Citrobacter, and Serratia may develop resistance during prolonged therapy      -  Ciprofloxacin: S <=0.25      -  Ertapenem: S <=0.5      -  Gentamicin: S <=2      -  Imipenem: S <=1      -  Levofloxacin: S <=0.5      -  Meropenem: S <=1      -  Nitrofurantoin: S <=32 Should not be used to treat pyelonephritis      -  Piperacillin/Tazobactam: S <=8      -  Tigecycline: S <=2      -  Tobramycin: S <=2      -  Trimethoprim/Sulfamethoxazole: R >2/38      Method Type: HAILEY    Culture - Urine (collected 06-22-21 @ 05:46)  Source: .Urine Clean Catch (Midstream)  Final Report (06-26-21 @ 13:26):    >100,000 CFU/ml Klebsiella pneumoniae  Organism: Klebsiella pneumoniae (06-26-21 @ 13:26)  Organism: Klebsiella pneumoniae (06-26-21 @ 13:26)      -  Amikacin: S <=16      -  Amoxicillin/Clavulanic Acid: S <=8/4      -  Ampicillin: R 16 These ampicillin results predict results for amoxicillin      -  Ampicillin/Sulbactam: S <=4/2 Enterobacter, Citrobacter, and Serratia may develop resistance during prolonged therapy (3-4 days)      -  Aztreonam: S <=4      -  Cefazolin: S <=2 (MIC_CL_COM_ENTERIC_CEFAZU) For uncomplicated UTI with K. pneumoniae, E. coli, or P. mirablis: HAILEY <=16 is sensitive and HAILEY >=32 is resistant. This also predicts results for oral agents cefaclor, cefdinir, cefpodoxime, cefprozil, cefuroxime axetil, cephalexin and locarbef for uncomplicated UTI. Note that some isolates may be susceptible to these agents while testing resistant to cefazolin.      -  Cefepime: S <=2      -  Cefoxitin: S <=8      -  Ceftriaxone: S <=1 Enterobacter, Citrobacter, and Serratia may develop resistance during prolonged therapy      -  Ciprofloxacin: S <=0.25      -  Ertapenem: S <=0.5      -  Gentamicin: S <=2      -  Imipenem: S <=1      -  Levofloxacin: S <=0.5      -  Meropenem: S <=1      -  Nitrofurantoin: S <=32 Should not be used to treat pyelonephritis      -  Piperacillin/Tazobactam: S <=8      -  Tigecycline: S <=2      -  Tobramycin: S <=2      -  Trimethoprim/Sulfamethoxazole: S <=0.5/9.5      Method Type: HAILEY    Culture - Blood (collected 05-12-21 @ 16:00)  Source: .Blood Blood  Final Report (05-17-21 @ 22:00):    No Growth Final    Culture - Urine (collected 05-12-21 @ 01:00)  Source: .Urine Clean Catch (Midstream)  Final Report (05-13-21 @ 04:44):    No growth        Lactate, Blood: 0.8 mmol/L (01-08-22 @ 06:00)  Lactate, Blood: 0.7 mmol/L (01-07-22 @ 20:00)      INFECTIOUS DISEASES TESTING  Procalcitonin, Serum: 0.06 ng/mL (01-07-22 @ 07:23)  COVID-19 PCR: NotDetec (01-06-22 @ 00:10)  COVID-19 PCR: NotDetec (12-16-21 @ 17:20)  Procalcitonin, Serum: 0.02 ng/mL (12-15-21 @ 23:30)  COVID-19 PCR: NotDetec (12-13-21 @ 11:37)  COVID-19 PCR: NotDetec (06-25-21 @ 10:28)  Hepatitis C Virus Interpretation: Nonreact (05-12-21 @ 16:00)  Hepatitis B Surface Antigen: Nonreact (05-12-21 @ 16:00)  COVID-19 PCR: NotDetec (05-12-21 @ 01:40)      RADIOLOGY & ADDITIONAL TESTS:  I have personally reviewed the last Chest xray  CXR  Xray Chest 1 View- PORTABLE-Urgent:   ACC: [id number] EXAM:  XR CHEST PORTABLE URGENT 1V                          PROCEDURE DATE:  01/06/2022          INTERPRETATION:  Clinical History / Reason for exam: Trauma    Comparison : Chest radiograph 12/15/2021.    Technique/Positioning: Frontal chest radiograph.    Findings:    Support devices: None.    Cardiac/mediastinum/hilum: Stable    Lung parenchyma/Pleura: Mild pulmonary vascular congestion. No   pneumothorax.    Skeleton/soft tissues: Degenerative changes of the shoulders    Impression:    Mild pulmonary vascular congestion    --- End of Report ---            YARELIS HERNANDEZ MD; Attending Radiologist  This document has been electronically signed. Jan 6 2022  8:26AM (01-06-22 @ 01:13)      CT      CARDIOLOGY TESTING  12 Lead ECG:   Ventricular Rate 105 BPM    Atrial Rate 105 BPM    P-R Interval 136 ms    QRS Duration 64 ms    Q-T Interval 332 ms    QTC Calculation(Bazett) 438 ms    P Axis 29 degrees    R Axis 13 degrees    T Axis -22 degrees    Diagnosis Line Sinus tachycardia  Minimal voltage criteria for LVH, may be normal variant  Septal infarct , age undetermined  ST & T wave abnormality, consider inferior ischemia  Abnormal ECG    Confirmed by Fred Méndez ([id number]) on 1/6/2022 7:47:21 AM (01-05-22 @ 22:42)      MEDICATIONS  clonazePAM  Tablet 1 Oral at bedtime  enoxaparin Injectable 40 SubCutaneous daily  famotidine    Tablet 20 Oral daily  OLANZapine 10 Oral daily  sertraline 100 Oral daily  sodium chloride 0.9%. 1000 IV Continuous <Continuous>      Weight  Weight (kg): 45.4 (01-05-22 @ 19:48)    ANTIBIOTICS:      ALLERGIES:  azithromycin (Unknown)  moxifloxacin (Unknown)  penicillin (Unknown)  sulfa drugs (Unknown)

## 2022-01-08 NOTE — DIETITIAN INITIAL EVALUATION ADULT. - OTHER INFO
Ntr hx cont: RD observed patient to have eaten >50% of breakfast meal.     Pertinent Medical Information:  Patient is 62 yo female with PMHx of bipolar disorder, schizophrenia, gout, OA, chronic lower back pain, recurrent UTI, anxiety. She is being treated for the following:  #s/p Fall with Rhabdo  #Fevers - unclear source  #Bipolar Disorder/Schizophrenia  #s/p Left Hip Nail Fixation

## 2022-01-08 NOTE — DIETITIAN INITIAL EVALUATION ADULT. - OTHER CALCULATIONS
Based on dosing weight 45.4 kg (1/5); Energy: 1101 kcal/day (MSJ x 1.1); Protein: 45 - 54 gm/day (1-1.2 gm/kg); Fluids: 1589 - 1816  ml/day (35 - 40 ml/kg)

## 2022-01-08 NOTE — DIETITIAN INITIAL EVALUATION ADULT. - ORAL NUTRITION SUPPLEMENTS
Ensure Enlive Two Times a Day (700 kcal, 40 gm Protein) and Bc Two times a day (160 kcal, 5 gm Protein) to aid in meeting estimated nutrient needs and promoting wound healing.

## 2022-01-09 LAB
ALBUMIN SERPL ELPH-MCNC: 3.2 G/DL — LOW (ref 3.5–5.2)
ALP SERPL-CCNC: 83 U/L — SIGNIFICANT CHANGE UP (ref 30–115)
ALT FLD-CCNC: 12 U/L — SIGNIFICANT CHANGE UP (ref 0–41)
ANION GAP SERPL CALC-SCNC: 21 MMOL/L — HIGH (ref 7–14)
APPEARANCE UR: CLEAR — SIGNIFICANT CHANGE UP
AST SERPL-CCNC: 18 U/L — SIGNIFICANT CHANGE UP (ref 0–41)
BASOPHILS # BLD AUTO: 0.08 K/UL — SIGNIFICANT CHANGE UP (ref 0–0.2)
BASOPHILS NFR BLD AUTO: 0.8 % — SIGNIFICANT CHANGE UP (ref 0–1)
BILIRUB SERPL-MCNC: 0.2 MG/DL — SIGNIFICANT CHANGE UP (ref 0.2–1.2)
BILIRUB UR-MCNC: NEGATIVE — SIGNIFICANT CHANGE UP
BUN SERPL-MCNC: 5 MG/DL — LOW (ref 10–20)
CALCIUM SERPL-MCNC: 8.9 MG/DL — SIGNIFICANT CHANGE UP (ref 8.5–10.1)
CHLORIDE SERPL-SCNC: 102 MMOL/L — SIGNIFICANT CHANGE UP (ref 98–110)
CO2 SERPL-SCNC: 18 MMOL/L — SIGNIFICANT CHANGE UP (ref 17–32)
COLOR SPEC: SIGNIFICANT CHANGE UP
CREAT SERPL-MCNC: 0.5 MG/DL — LOW (ref 0.7–1.5)
DIFF PNL FLD: NEGATIVE — SIGNIFICANT CHANGE UP
EOSINOPHIL # BLD AUTO: 0.2 K/UL — SIGNIFICANT CHANGE UP (ref 0–0.7)
EOSINOPHIL NFR BLD AUTO: 2.1 % — SIGNIFICANT CHANGE UP (ref 0–8)
GLUCOSE SERPL-MCNC: 67 MG/DL — LOW (ref 70–99)
GLUCOSE UR QL: NEGATIVE — SIGNIFICANT CHANGE UP
HCT VFR BLD CALC: 38.9 % — SIGNIFICANT CHANGE UP (ref 37–47)
HGB BLD-MCNC: 12.5 G/DL — SIGNIFICANT CHANGE UP (ref 12–16)
IMM GRANULOCYTES NFR BLD AUTO: 0.4 % — HIGH (ref 0.1–0.3)
KETONES UR-MCNC: NEGATIVE — SIGNIFICANT CHANGE UP
LEUKOCYTE ESTERASE UR-ACNC: NEGATIVE — SIGNIFICANT CHANGE UP
LYMPHOCYTES # BLD AUTO: 1.72 K/UL — SIGNIFICANT CHANGE UP (ref 1.2–3.4)
LYMPHOCYTES # BLD AUTO: 18.1 % — LOW (ref 20.5–51.1)
MCHC RBC-ENTMCNC: 28.8 PG — SIGNIFICANT CHANGE UP (ref 27–31)
MCHC RBC-ENTMCNC: 32.1 G/DL — SIGNIFICANT CHANGE UP (ref 32–37)
MCV RBC AUTO: 89.6 FL — SIGNIFICANT CHANGE UP (ref 81–99)
MONOCYTES # BLD AUTO: 0.5 K/UL — SIGNIFICANT CHANGE UP (ref 0.1–0.6)
MONOCYTES NFR BLD AUTO: 5.3 % — SIGNIFICANT CHANGE UP (ref 1.7–9.3)
NEUTROPHILS # BLD AUTO: 6.95 K/UL — HIGH (ref 1.4–6.5)
NEUTROPHILS NFR BLD AUTO: 73.3 % — SIGNIFICANT CHANGE UP (ref 42.2–75.2)
NITRITE UR-MCNC: NEGATIVE — SIGNIFICANT CHANGE UP
NRBC # BLD: 0 /100 WBCS — SIGNIFICANT CHANGE UP (ref 0–0)
PH UR: 7.5 — SIGNIFICANT CHANGE UP (ref 5–8)
PLATELET # BLD AUTO: 349 K/UL — SIGNIFICANT CHANGE UP (ref 130–400)
POTASSIUM SERPL-MCNC: 4 MMOL/L — SIGNIFICANT CHANGE UP (ref 3.5–5)
POTASSIUM SERPL-SCNC: 4 MMOL/L — SIGNIFICANT CHANGE UP (ref 3.5–5)
PROT SERPL-MCNC: 5.6 G/DL — LOW (ref 6–8)
PROT UR-MCNC: NEGATIVE — SIGNIFICANT CHANGE UP
RBC # BLD: 4.34 M/UL — SIGNIFICANT CHANGE UP (ref 4.2–5.4)
RBC # FLD: 13.4 % — SIGNIFICANT CHANGE UP (ref 11.5–14.5)
SODIUM SERPL-SCNC: 141 MMOL/L — SIGNIFICANT CHANGE UP (ref 135–146)
SP GR SPEC: 1.04 — HIGH (ref 1.01–1.03)
UROBILINOGEN FLD QL: SIGNIFICANT CHANGE UP
WBC # BLD: 9.49 K/UL — SIGNIFICANT CHANGE UP (ref 4.8–10.8)
WBC # FLD AUTO: 9.49 K/UL — SIGNIFICANT CHANGE UP (ref 4.8–10.8)

## 2022-01-09 PROCEDURE — 99233 SBSQ HOSP IP/OBS HIGH 50: CPT

## 2022-01-09 RX ORDER — PANTOPRAZOLE SODIUM 20 MG/1
40 TABLET, DELAYED RELEASE ORAL EVERY 12 HOURS
Refills: 0 | Status: DISCONTINUED | OUTPATIENT
Start: 2022-01-09 | End: 2022-01-10

## 2022-01-09 RX ORDER — CLONAZEPAM 1 MG
0.5 TABLET ORAL ONCE
Refills: 0 | Status: DISCONTINUED | OUTPATIENT
Start: 2022-01-09 | End: 2022-01-09

## 2022-01-09 RX ORDER — ONDANSETRON 8 MG/1
4 TABLET, FILM COATED ORAL EVERY 6 HOURS
Refills: 0 | Status: DISCONTINUED | OUTPATIENT
Start: 2022-01-09 | End: 2022-01-15

## 2022-01-09 RX ORDER — CEFEPIME 1 G/1
INJECTION, POWDER, FOR SOLUTION INTRAMUSCULAR; INTRAVENOUS
Refills: 0 | Status: DISCONTINUED | OUTPATIENT
Start: 2022-01-09 | End: 2022-01-11

## 2022-01-09 RX ORDER — CEFEPIME 1 G/1
1000 INJECTION, POWDER, FOR SOLUTION INTRAMUSCULAR; INTRAVENOUS ONCE
Refills: 0 | Status: COMPLETED | OUTPATIENT
Start: 2022-01-09 | End: 2022-01-09

## 2022-01-09 RX ORDER — CEFEPIME 1 G/1
1000 INJECTION, POWDER, FOR SOLUTION INTRAMUSCULAR; INTRAVENOUS EVERY 8 HOURS
Refills: 0 | Status: DISCONTINUED | OUTPATIENT
Start: 2022-01-09 | End: 2022-01-11

## 2022-01-09 RX ADMIN — PANTOPRAZOLE SODIUM 40 MILLIGRAM(S): 20 TABLET, DELAYED RELEASE ORAL at 23:24

## 2022-01-09 RX ADMIN — FAMOTIDINE 20 MILLIGRAM(S): 10 INJECTION INTRAVENOUS at 11:18

## 2022-01-09 RX ADMIN — Medication 650 MILLIGRAM(S): at 00:05

## 2022-01-09 RX ADMIN — Medication 0.5 MILLIGRAM(S): at 13:12

## 2022-01-09 RX ADMIN — Medication 1 MILLIGRAM(S): at 23:04

## 2022-01-09 RX ADMIN — CEFEPIME 100 MILLIGRAM(S): 1 INJECTION, POWDER, FOR SOLUTION INTRAMUSCULAR; INTRAVENOUS at 12:35

## 2022-01-09 RX ADMIN — ENOXAPARIN SODIUM 40 MILLIGRAM(S): 100 INJECTION SUBCUTANEOUS at 11:18

## 2022-01-09 RX ADMIN — PANTOPRAZOLE SODIUM 40 MILLIGRAM(S): 20 TABLET, DELAYED RELEASE ORAL at 13:22

## 2022-01-09 RX ADMIN — CEFEPIME 100 MILLIGRAM(S): 1 INJECTION, POWDER, FOR SOLUTION INTRAMUSCULAR; INTRAVENOUS at 23:04

## 2022-01-09 RX ADMIN — SERTRALINE 100 MILLIGRAM(S): 25 TABLET, FILM COATED ORAL at 11:17

## 2022-01-09 RX ADMIN — Medication 650 MILLIGRAM(S): at 00:40

## 2022-01-09 RX ADMIN — OLANZAPINE 10 MILLIGRAM(S): 15 TABLET, FILM COATED ORAL at 11:18

## 2022-01-09 NOTE — PROGRESS NOTE ADULT - ASSESSMENT
Patient initially with mechanical fall at home (left [facility]); presented to ED requesting another NH dispo; hospital course complicated by fevers    ·	FUO; no clear source yet  ·	Fall/Rhabdomyolysis   ·	Esophagitis  ·	Abnormal Abdominal imaging/+LN  ·	Nausea  ·	Bipolar Disorder/Schizophrenia  ·	s/p Left Hip Nail Fixation   ·	Hx of Gout  ·	Skin decubiti (see NP/wound care Nurse note)    -follow up cultures; continue with IV Cefepime for now; ID follow up  -GI consult placed; may need inpatient EGD; for now added IV PPI twice daily (already on H2 blockers)  -nausea control with IV zofran  -continue with home maintenance psych meds       # Progress Note Handoff  PENDING as follows  consults: ID follow up and GI consult   Test: Cultures   Family discussion: discussed with patient who comprehends her medical care   Disposition: will need NH placement     Attending Physician Dr. Marika Canada # [id number]    Patient initially with mechanical fall at home (left [facility]); presented to ED requesting another NH dispo; hospital course complicated by fevers    ·	FUO; no clear source yet  ·	Fall/Rhabdomyolysis   ·	Esophagitis  ·	Abnormal Abdominal imaging/+LN  ·	Nausea  ·	Bipolar Disorder/Schizophrenia  ·	s/p Left Hip Nail Fixation   ·	Hx of Gout  ·	Skin decubiti (see NP/wound care Nurse note)    -follow up cultures; continue with IV Cefepime for now; ID follow up  -GI consult placed; may need inpatient EGD; for now added IV PPI twice daily (already on H2 blockers)  -nausea control with IV zofran  -continue with home maintenance psych meds   -CK levels improved to 300s from > 1100   -rehab/pt as tolerated       # Progress Note Handoff  PENDING as follows  consults: ID follow up and GI consult   Test: Cultures   Family discussion: discussed with patient who comprehends her medical care   Disposition: will need NH placement     Attending Physician Dr. Marika Canada # [id number]    Patient initially with mechanical fall at home (left [facility]); presented to ED requesting another NH dispo; hospital course complicated by fevers; my initial encounter with the patient     ·	FUO; no clear source yet  ·	Fall/Rhabdomyolysis   ·	Esophagitis  ·	Abnormal Abdominal imaging/+LN  ·	Nausea  ·	Bipolar Disorder/Schizophrenia  ·	s/p Left Hip Nail Fixation   ·	Hx of Gout  ·	Skin decubiti (see NP/wound care Nurse note)    -prelim blood cultures negative; check urine cultures; continue with IV Cefepime for now; ID follow up  -GI consult placed; may need inpatient EGD; for now added IV PPI twice daily (already on H2 blockers)  -nausea control with IV zofran  -continue with home maintenance psych meds   -CK levels improved to 300s from > 1100   -rehab/pt as tolerated       # Progress Note Handoff  PENDING as follows  consults: ID follow up and GI consult   Test: Cultures   Family discussion: discussed with patient who comprehends her medical care   Disposition: will need NH placement     Attending Physician Dr. Marika Canada # [id number]

## 2022-01-09 NOTE — CONSULT NOTE ADULT - SUBJECTIVE AND OBJECTIVE BOX
Gastroenterology Consultation:    Patient is a 61y old  Female who presents with a chief complaint of Frequent Falls (09 Jan 2022 10:40)      Admitted on: 01-06-22  HPI:  62 yo F with PMH of bipolar disorder, schizophrenia, gout, OA, chronic lower back pain, recurrent UTI, anxiety presented to the ED with complaints of fall 3 days ago from bed. States that she lives at home by herself and was unable to get up from the floor for the past 3 days until her friend found her today. Denies any head trauma, LOC, no CP, SOB. Complains of nausea and several episodes of vomiting. Patient also complains of intermittent epigastric burning pain , no dysphagia , patient reports history of GERD. Patient also complains of constipation.  In the ED trauma workup negative for acute fracture, dislocations or hemorrhage. Labs were significant for CK of 1188. Patient being admitted for placement.  (06 Jan 2022 05:27)      Prior records Reviewed (Y/N):  History obtained from person other than patient (Y/N):    Prior EGD: none in charts   Prior Colonoscopy:  none in charts       PAST MEDICAL & SURGICAL HISTORY:  Schizophrenia    Bipolar disorder    Depression    Anxiety    Chronic lower back pain  result of pelvic fracture in the past    Osteoarthritis    Urinary incontinence    Chronic urinary tract infection    History of tremor    History of total knee arthroplasty, left        FAMILY HISTORY:  FH: prostate cancer (Father)    FH: Parkinson&#x27;s disease (Mother)      Home Medications:  acetaminophen 325 mg oral tablet: 2 tab(s) orally every 6 hours, As needed, Temp greater or equal to 38C (100.4F), Mild Pain (1 - 3) (14 Dec 2021 13:05)  bisacodyl 5 mg oral delayed release tablet: 1 tab(s) orally every 12 hours, As needed, Constipation (17 Dec 2021 10:48)  clonazePAM 1 mg oral tablet: 1 tab(s) orally once a day (13 Dec 2021 09:42)  OLANZapine 10 mg oral tablet: 1 tab(s) orally once a day (13 Dec 2021 09:42)  sertraline 100 mg oral tablet: 1 tab(s) orally once a day (13 Dec 2021 09:42)    MEDICATIONS  (STANDING):  cefepime   IVPB      cefepime   IVPB 1000 milliGRAM(s) IV Intermittent every 8 hours  clonazePAM  Tablet 1 milliGRAM(s) Oral at bedtime  enoxaparin Injectable 40 milliGRAM(s) SubCutaneous daily  famotidine    Tablet 20 milliGRAM(s) Oral daily  OLANZapine 10 milliGRAM(s) Oral daily  pantoprazole  Injectable 40 milliGRAM(s) IV Push every 12 hours  sertraline 100 milliGRAM(s) Oral daily    MEDICATIONS  (PRN):  acetaminophen     Tablet .. 650 milliGRAM(s) Oral every 6 hours PRN Temp greater or equal to 38C (100.4F), Mild Pain (1 - 3), Moderate Pain (4 - 6)  aluminum hydroxide/magnesium hydroxide/simethicone Suspension 30 milliLiter(s) Oral every 4 hours PRN Dyspepsia  bisacodyl 5 milliGRAM(s) Oral every 12 hours PRN Constipation  melatonin 3 milliGRAM(s) Oral at bedtime PRN Insomnia  ondansetron Injectable 4 milliGRAM(s) IV Push every 6 hours PRN Nausea and/or Vomiting      Allergies  azithromycin (Unknown)  moxifloxacin (Unknown)  penicillin (Unknown)  sulfa drugs (Unknown)      Review of Systems:   Constitutional:  low grade Fever, No Chills  ENT/Mouth:  No Hearing Changes,  No Difficulty Swallowing  Eyes:  No Eye Pain, No Vision Changes  Cardiovascular:  No Chest Pain, No Palpitations  Respiratory:  No Cough, No Dyspnea  Gastrointestinal:  As described in HPI  Skin:  No Skin Lesions, No Jaundice  Neuro:  No Syncope, No Dizziness  Heme/Lymph:  No Bruising, No Bleeding.          Physical Examination:  T(C): 36.6 (01-09-22 @ 12:31), Max: 38 (01-08-22 @ 21:49)  HR: 77 (01-09-22 @ 12:31) (53 - 83)  BP: 190/88 (01-09-22 @ 12:31) (138/71 - 190/88)  RR: 20 (01-09-22 @ 12:31) (18 - 20)  SpO2: 97% (01-08-22 @ 21:49) (97% - 97%)  Height (cm): 149.9 (01-08-22 @ 14:34)    01-07-22 @ 07:01  -  01-08-22 @ 07:00  --------------------------------------------------------  IN: 1500 mL / OUT: 1050 mL / NET: 450 mL        Constitutional: No acute distress.  Eyes:. Conjunctivae are clear, Sclera is non-icteric.  Ears Nose and Throat: The external ears are normal appearing,  Oral mucosa is pink and moist.  Respiratory:  No signs of respiratory distress. Lung sounds are clear bilaterally.  Cardiovascular:  S1 S2, Regular rate and rhythm.  GI: Abdomen is soft, symmetric, and non-tender without distention.  Bowel sounds are present and normoactive in all four quadrants.   Neuro: No Tremor, No involuntary movements  Skin: No rashes, No Jaundice.          Data: (reviewed by attending)                        12.5   9.49  )-----------( 349      ( 09 Jan 2022 04:30 )             38.9     Hgb Trend:  12.5  01-09-22 @ 04:30  10.4  01-08-22 @ 06:00  10.9  01-07-22 @ 07:23        01-09    141  |  102  |  5<L>  ----------------------------<  67<L>  4.0   |  18  |  0.5<L>    Ca    8.9      09 Jan 2022 04:30    TPro  5.6<L>  /  Alb  3.2<L>  /  TBili  0.2  /  DBili  x   /  AST  18  /  ALT  12  /  AlkPhos  83  01-09    Liver panel trend:  TBili 0.2   /   AST 18   /   ALT 12   /   AlkP 83   /   Tptn 5.6   /   Alb 3.2    /   DBili --      01-09  TBili 0.3   /   AST 16   /   ALT 13   /   AlkP 79   /   Tptn 5.3   /   Alb 3.0    /   DBili --      01-08  TBili 0.4   /   AST 23   /   ALT 18   /   AlkP 93   /   Tptn 6.1   /   Alb 3.4    /   DBili --      01-07  TBili 0.3   /   AST 35   /   ALT 21   /   AlkP 99   /   Tptn 6.0   /   Alb 3.5    /   DBili --      01-06  TBili 0.4   /   AST 43   /   ALT 25   /   AlkP 118   /   Tptn 7.0   /   Alb 4.1    /   DBili --      01-05          Culture - Blood (collected 07 Jan 2022 07:23)  Source: .Blood Blood-Peripheral  Preliminary Report (08 Jan 2022 14:01):    No growth to date.    Culture - Blood (collected 07 Jan 2022 07:23)  Source: .Blood Blood-Peripheral  Preliminary Report (08 Jan 2022 14:01):    No growth to date.    Culture - Blood (collected 07 Jan 2022 02:49)  Source: .Blood Blood-Peripheral  Preliminary Report (08 Jan 2022 14:01):    No growth to date.          Radiology:(reviewed by attending)  CT Abdomen and Pelvis w/ IV Cont:   ACC: [id number] EXAM:  CT ABDOMEN AND PELVIS IC                          PROCEDURE DATE:  01/08/2022          INTERPRETATION:  CLINICAL STATEMENT: Abdominal pain and nausea/vomiting.    TECHNIQUE: Contiguous axial CT images were obtained from the lower chest   to the pubic symphysis following administration of 100cc Omnipaque 350   intravenous contrast.  Oral contrast was not administered.  Reformatted   images in the coronal and sagittal planes were acquired.    COMPARISON CT: CT abdomen and pelvis 11/11/2021.    OTHER STUDIES USED FOR CORRELATION: None.      FINDINGS:    LOWER CHEST: Bibasilar subsegmental atelectasis.    HEPATOBILIARY: Unremarkable.    SPLEEN: Unremarkable.    PANCREAS: Unremarkable.    ADRENAL GLANDS: Unremarkable.    KIDNEYS: Symmetric bilateral renal enhancement. Left renal hypodensities   too small to further characterize. No hydronephrosis.    ABDOMINOPELVIC NODES: Unremarkable.    PELVIC ORGANS: Mcduffie catheter is noted within the urinary bladder. Air is   also noted within urinary bladder likely secondary to instrumentation.    PERITONEUM/MESENTERY/BOWEL: No evidence of bowel obstruction, free air or   ascites. Normal appearing appendix. Moderate in size paraesophageal   hiatal hernia with associated circumferential wall thickening of the   distal esophagus. Several prominent adjacent gastroesophageal lymph nodes.    BONES/SOFT TISSUES: Status post left femoral surgical fixation.   Multilevel degenerative changes of the spine. Chronic compression   deformity of T12 vertebral body. Levoscoliosis of the thoracolumbar spine.    OTHER: Atherosclerotic disease of the aorta and its branches.      IMPRESSION:      Moderate in size paraesophageal hiatal hernia with associated   circumferential wall thickening of the distal esophagus. This most likely   reflects presence of esophagitis. Neoplasm is not excluded. Several   prominent adjacent gastroesophageal lymph nodes. Further evaluation with   upper endoscopy is recommended.    Otherwise, no CT evidence of acute abdominopelvic pathology.    --- End of Report ---          RAGHAVENDRA CALIX MD; Resident Radiologist  This document has been electronically signed.  ANGIE WEBER MD; Attending Radiologist  This document has been electronically signed. Jan8 2022 11:49PM (01-08-22 @ 21:58)

## 2022-01-09 NOTE — CONSULT NOTE ADULT - ASSESSMENT
60 yo F with PMH of bipolar disorder, schizophrenia, gout, OA, chronic lower back pain, recurrent UTI, anxiety presented to the ED with complaints of fall 3 days ago from bed. GI consulted for recurrent nausea , vomiting , and epigastric pain. CT showed Moderate in size paraesophageal hiatal hernia with associated circumferential wall thickening of the distal esophagus.      #r/o esophagitis versus esophageal cancer versus others     REC:  NPO after midnight   EGD in am   Protonix 40 mg po bid   Miralax bid for constipation   plan of care discussed with primary team   patient advised to have outpatient colonoscopy for screening

## 2022-01-09 NOTE — PROGRESS NOTE ADULT - SUBJECTIVE AND OBJECTIVE BOX
COMFORT FARIAS  61y  Female      Patient is a 61y old  Female who presents with a chief complaint of Frequent Falls (08 Jan 2022 16:57)      INTERVAL HPI/OVERNIGHT EVENTS:    pt seen and examined this morning; sitting up in bed; eating breakfast with slight nausea   -CT abdomen reviewed; will place GI consult for possible inpatient EGD   -IV abx added this morning; need ID to follow up as patient still spiking fever  -CM was working on NH dispo; will be on hold as patient is acute   -rehab/pt as tolerated     REVIEW OF SYSTEMS:  -nausea present (started after eating scrambled eggs); no vomiting  -no other complaints     -Vital Signs Last 24 Hrs  T(C): 36.2 (09 Jan 2022 05:07), Max: 38 (08 Jan 2022 21:49)  T(F): 97.1 (09 Jan 2022 05:07), Max: 100.4 (08 Jan 2022 21:49)  HR: 53 (09 Jan 2022 05:07) (53 - 83)  BP: 138/71 (09 Jan 2022 05:07) (138/71 - 180/88)  RR: 18 (09 Jan 2022 05:07) (18 - 18)  SpO2: 97% (08 Jan 2022 21:49) (97% - 97%)    PHYSICAL EXAM:  GENERAL: NAD, speaking appropriately; slightly nauseous this am  HEAD:  Atraumatic, Normocephalic  EYES: EOMI, PERRLA, conjunctiva and sclera clear  NERVOUS SYSTEM:  Alert & Oriented X 3  CHEST/LUNG: Clear to percussion bilaterally; No rales, rhonchi, wheezing, or rubs  CV/HEART: Regular rate and rhythm; No murmurs, rubs, or gallops  GI/ABDOMEN: Soft, Nontender, Nondistended; Bowel sounds present  EXTREMITIES:  2+ Peripheral Pulses, No clubbing, cyanosis, or edema  SKIN: Skin decubiti POA     LAB:                        10.4   8.79  )-----------( 283      ( 08 Jan 2022 06:00 )             32.6     01-09    141  |  102  |  5<L>  ----------------------------<  67<L>  4.0   |  18  |  0.5<L>    Ca    8.9      09 Jan 2022 04:30    TPro  5.6<L>  /  Alb  3.2<L>  /  TBili  0.2  /  DBili  x   /  AST  18  /  ALT  12  /  AlkPhos  83  01-09    Daily Height in cm: 149.86 (08 Jan 2022 14:34)    Daily   CAPILLARY BLOOD GLUCOSE    LIVER FUNCTIONS - ( 09 Jan 2022 04:30 )  Alb: 3.2 g/dL / Pro: 5.6 g/dL / ALK PHOS: 83 U/L / ALT: 12 U/L / AST: 18 U/L / GGT: x           RADIOLOGY:    Imaging Personally visualized and Reviewed:  [y] YES  [ ] NO    HEALTH ISSUES - PROBLEM Dx:    MEDS:  acetaminophen     Tablet .. 650 milliGRAM(s) Oral every 6 hours PRN  aluminum hydroxide/magnesium hydroxide/simethicone Suspension 30 milliLiter(s) Oral every 4 hours PRN  bisacodyl 5 milliGRAM(s) Oral every 12 hours PRN  cefepime   IVPB      clonazePAM  Tablet 1 milliGRAM(s) Oral at bedtime  enoxaparin Injectable 40 milliGRAM(s) SubCutaneous daily  famotidine    Tablet 20 milliGRAM(s) Oral daily  melatonin 3 milliGRAM(s) Oral at bedtime PRN  OLANZapine 10 milliGRAM(s) Oral daily  ondansetron Injectable 4 milliGRAM(s) IV Push every 6 hours PRN  ondansetron Injectable 4 milliGRAM(s) IV Push every 8 hours PRN  pantoprazole  Injectable 40 milliGRAM(s) IV Push every 12 hours  sertraline 100 milliGRAM(s) Oral daily

## 2022-01-10 ENCOUNTER — TRANSCRIPTION ENCOUNTER (OUTPATIENT)
Age: 62
End: 2022-01-10

## 2022-01-10 ENCOUNTER — RESULT REVIEW (OUTPATIENT)
Age: 62
End: 2022-01-10

## 2022-01-10 LAB
ALBUMIN SERPL ELPH-MCNC: 3 G/DL — LOW (ref 3.5–5.2)
ALP SERPL-CCNC: 82 U/L — SIGNIFICANT CHANGE UP (ref 30–115)
ALT FLD-CCNC: 10 U/L — SIGNIFICANT CHANGE UP (ref 0–41)
ANION GAP SERPL CALC-SCNC: 14 MMOL/L — SIGNIFICANT CHANGE UP (ref 7–14)
APTT BLD: 31.2 SEC — SIGNIFICANT CHANGE UP (ref 27–39.2)
AST SERPL-CCNC: 13 U/L — SIGNIFICANT CHANGE UP (ref 0–41)
BASOPHILS # BLD AUTO: 0.08 K/UL — SIGNIFICANT CHANGE UP (ref 0–0.2)
BASOPHILS NFR BLD AUTO: 0.9 % — SIGNIFICANT CHANGE UP (ref 0–1)
BILIRUB SERPL-MCNC: 0.2 MG/DL — SIGNIFICANT CHANGE UP (ref 0.2–1.2)
BUN SERPL-MCNC: 12 MG/DL — SIGNIFICANT CHANGE UP (ref 10–20)
CALCIUM SERPL-MCNC: 9.1 MG/DL — SIGNIFICANT CHANGE UP (ref 8.5–10.1)
CHLORIDE SERPL-SCNC: 100 MMOL/L — SIGNIFICANT CHANGE UP (ref 98–110)
CO2 SERPL-SCNC: 24 MMOL/L — SIGNIFICANT CHANGE UP (ref 17–32)
CREAT SERPL-MCNC: 0.8 MG/DL — SIGNIFICANT CHANGE UP (ref 0.7–1.5)
CULTURE RESULTS: NO GROWTH — SIGNIFICANT CHANGE UP
EOSINOPHIL # BLD AUTO: 0.37 K/UL — SIGNIFICANT CHANGE UP (ref 0–0.7)
EOSINOPHIL NFR BLD AUTO: 4.3 % — SIGNIFICANT CHANGE UP (ref 0–8)
GLUCOSE BLDC GLUCOMTR-MCNC: 92 MG/DL — SIGNIFICANT CHANGE UP (ref 70–99)
GLUCOSE SERPL-MCNC: 103 MG/DL — HIGH (ref 70–99)
HCT VFR BLD CALC: 36 % — LOW (ref 37–47)
HGB BLD-MCNC: 11.6 G/DL — LOW (ref 12–16)
IMM GRANULOCYTES NFR BLD AUTO: 0.6 % — HIGH (ref 0.1–0.3)
INR BLD: 1.03 RATIO — SIGNIFICANT CHANGE UP (ref 0.65–1.3)
LYMPHOCYTES # BLD AUTO: 3.11 K/UL — SIGNIFICANT CHANGE UP (ref 1.2–3.4)
LYMPHOCYTES # BLD AUTO: 36 % — SIGNIFICANT CHANGE UP (ref 20.5–51.1)
MCHC RBC-ENTMCNC: 29.2 PG — SIGNIFICANT CHANGE UP (ref 27–31)
MCHC RBC-ENTMCNC: 32.2 G/DL — SIGNIFICANT CHANGE UP (ref 32–37)
MCV RBC AUTO: 90.7 FL — SIGNIFICANT CHANGE UP (ref 81–99)
MONOCYTES # BLD AUTO: 0.61 K/UL — HIGH (ref 0.1–0.6)
MONOCYTES NFR BLD AUTO: 7.1 % — SIGNIFICANT CHANGE UP (ref 1.7–9.3)
NEUTROPHILS # BLD AUTO: 4.41 K/UL — SIGNIFICANT CHANGE UP (ref 1.4–6.5)
NEUTROPHILS NFR BLD AUTO: 51.1 % — SIGNIFICANT CHANGE UP (ref 42.2–75.2)
NRBC # BLD: 0 /100 WBCS — SIGNIFICANT CHANGE UP (ref 0–0)
PLATELET # BLD AUTO: 337 K/UL — SIGNIFICANT CHANGE UP (ref 130–400)
POTASSIUM SERPL-MCNC: 3.7 MMOL/L — SIGNIFICANT CHANGE UP (ref 3.5–5)
POTASSIUM SERPL-SCNC: 3.7 MMOL/L — SIGNIFICANT CHANGE UP (ref 3.5–5)
PROT SERPL-MCNC: 5.5 G/DL — LOW (ref 6–8)
PROTHROM AB SERPL-ACNC: 11.8 SEC — SIGNIFICANT CHANGE UP (ref 9.95–12.87)
RBC # BLD: 3.97 M/UL — LOW (ref 4.2–5.4)
RBC # FLD: 13.4 % — SIGNIFICANT CHANGE UP (ref 11.5–14.5)
SODIUM SERPL-SCNC: 138 MMOL/L — SIGNIFICANT CHANGE UP (ref 135–146)
SPECIMEN SOURCE: SIGNIFICANT CHANGE UP
WBC # BLD: 8.63 K/UL — SIGNIFICANT CHANGE UP (ref 4.8–10.8)
WBC # FLD AUTO: 8.63 K/UL — SIGNIFICANT CHANGE UP (ref 4.8–10.8)

## 2022-01-10 PROCEDURE — 99233 SBSQ HOSP IP/OBS HIGH 50: CPT

## 2022-01-10 PROCEDURE — 88312 SPECIAL STAINS GROUP 1: CPT | Mod: 26

## 2022-01-10 PROCEDURE — 88305 TISSUE EXAM BY PATHOLOGIST: CPT | Mod: 26

## 2022-01-10 PROCEDURE — 43239 EGD BIOPSY SINGLE/MULTIPLE: CPT

## 2022-01-10 RX ORDER — POLYETHYLENE GLYCOL 3350 17 G/17G
17 POWDER, FOR SOLUTION ORAL DAILY
Refills: 0 | Status: DISCONTINUED | OUTPATIENT
Start: 2022-01-10 | End: 2022-01-15

## 2022-01-10 RX ORDER — PANTOPRAZOLE SODIUM 20 MG/1
40 TABLET, DELAYED RELEASE ORAL
Refills: 0 | Status: DISCONTINUED | OUTPATIENT
Start: 2022-01-10 | End: 2022-01-15

## 2022-01-10 RX ORDER — FLUCONAZOLE 150 MG/1
400 TABLET ORAL ONCE
Refills: 0 | Status: COMPLETED | OUTPATIENT
Start: 2022-01-10 | End: 2022-01-10

## 2022-01-10 RX ORDER — SENNA PLUS 8.6 MG/1
2 TABLET ORAL AT BEDTIME
Refills: 0 | Status: DISCONTINUED | OUTPATIENT
Start: 2022-01-10 | End: 2022-01-15

## 2022-01-10 RX ORDER — FLUCONAZOLE 150 MG/1
200 TABLET ORAL DAILY
Refills: 0 | Status: DISCONTINUED | OUTPATIENT
Start: 2022-01-11 | End: 2022-01-15

## 2022-01-10 RX ADMIN — ENOXAPARIN SODIUM 40 MILLIGRAM(S): 100 INJECTION SUBCUTANEOUS at 12:13

## 2022-01-10 RX ADMIN — Medication 650 MILLIGRAM(S): at 08:29

## 2022-01-10 RX ADMIN — POLYETHYLENE GLYCOL 3350 17 GRAM(S): 17 POWDER, FOR SOLUTION ORAL at 17:23

## 2022-01-10 RX ADMIN — CEFEPIME 100 MILLIGRAM(S): 1 INJECTION, POWDER, FOR SOLUTION INTRAMUSCULAR; INTRAVENOUS at 13:09

## 2022-01-10 RX ADMIN — Medication 1 MILLIGRAM(S): at 21:27

## 2022-01-10 RX ADMIN — CEFEPIME 100 MILLIGRAM(S): 1 INJECTION, POWDER, FOR SOLUTION INTRAMUSCULAR; INTRAVENOUS at 21:29

## 2022-01-10 RX ADMIN — PANTOPRAZOLE SODIUM 40 MILLIGRAM(S): 20 TABLET, DELAYED RELEASE ORAL at 06:06

## 2022-01-10 RX ADMIN — SERTRALINE 100 MILLIGRAM(S): 25 TABLET, FILM COATED ORAL at 12:13

## 2022-01-10 RX ADMIN — FAMOTIDINE 20 MILLIGRAM(S): 10 INJECTION INTRAVENOUS at 12:12

## 2022-01-10 RX ADMIN — OLANZAPINE 10 MILLIGRAM(S): 15 TABLET, FILM COATED ORAL at 12:14

## 2022-01-10 RX ADMIN — Medication 650 MILLIGRAM(S): at 19:56

## 2022-01-10 RX ADMIN — Medication 650 MILLIGRAM(S): at 08:52

## 2022-01-10 RX ADMIN — Medication 5 MILLIGRAM(S): at 17:31

## 2022-01-10 RX ADMIN — FLUCONAZOLE 400 MILLIGRAM(S): 150 TABLET ORAL at 17:18

## 2022-01-10 RX ADMIN — PANTOPRAZOLE SODIUM 40 MILLIGRAM(S): 20 TABLET, DELAYED RELEASE ORAL at 17:23

## 2022-01-10 RX ADMIN — Medication 650 MILLIGRAM(S): at 21:29

## 2022-01-10 RX ADMIN — SENNA PLUS 2 TABLET(S): 8.6 TABLET ORAL at 21:29

## 2022-01-10 RX ADMIN — CEFEPIME 100 MILLIGRAM(S): 1 INJECTION, POWDER, FOR SOLUTION INTRAMUSCULAR; INTRAVENOUS at 06:06

## 2022-01-10 NOTE — PROGRESS NOTE ADULT - SUBJECTIVE AND OBJECTIVE BOX
SUBJECTIVE:    Patient is a 61y old Female who presents with a chief complaint of Frequent Falls (2022 13:22)    Currently admitted to medicine with the primary diagnosis of Fall      Today is hospital day 4d. This morning she is resting comfortably in bed and reports no new issues or overnight events. Patient states her appetite is good and denies n/v this AM.     PAST MEDICAL & SURGICAL HISTORY  Schizophrenia    Bipolar disorder    Depression    Anxiety    Chronic lower back pain  result of pelvic fracture in the past    Osteoarthritis    Urinary incontinence    Chronic urinary tract infection    History of tremor    History of total knee arthroplasty, left      SOCIAL HISTORY:  Negative for smoking/alcohol/drug use.     ALLERGIES:  azithromycin (Unknown)  moxifloxacin (Unknown)  penicillin (Unknown)    MEDICATIONS:  STANDING MEDICATIONS  cefepime   IVPB      cefepime   IVPB 1000 milliGRAM(s) IV Intermittent every 8 hours  clonazePAM  Tablet 1 milliGRAM(s) Oral at bedtime  enoxaparin Injectable 40 milliGRAM(s) SubCutaneous daily  famotidine    Tablet 20 milliGRAM(s) Oral daily  OLANZapine 10 milliGRAM(s) Oral daily  pantoprazole  Injectable 40 milliGRAM(s) IV Push every 12 hours  sertraline 100 milliGRAM(s) Oral daily    PRN MEDICATIONS  acetaminophen     Tablet .. 650 milliGRAM(s) Oral every 6 hours PRN  aluminum hydroxide/magnesium hydroxide/simethicone Suspension 30 milliLiter(s) Oral every 4 hours PRN  artificial  tears Solution 1 Drop(s) Right EYE three times a day PRN  bisacodyl 5 milliGRAM(s) Oral every 12 hours PRN  melatonin 3 milliGRAM(s) Oral at bedtime PRN  ondansetron Injectable 4 milliGRAM(s) IV Push every 6 hours PRN      LABS:                        11.6   8.63  )-----------( 337      ( 10 Shun 2022 04:30 )             36.0     01-10    138  |  100  |  12  ----------------------------<  103<H>  3.7   |  24  |  0.8    Ca    9.1      10 Shun 2022 04:30    TPro  5.5<L>  /  Alb  3.0<L>  /  TBili  0.2  /  DBili  x   /  AST  13  /  ALT  10  /  AlkPhos  82  01-10    PT/INR - ( 10 Shun 2022 07:00 )   PT: 11.80 sec;   INR: 1.03 ratio         PTT - ( 10 Shun 2022 07:00 )  PTT:31.2 sec  Urinalysis Basic - ( 2022 17:15 )    Color: Light Yellow / Appearance: Clear / S.041 / pH: x  Gluc: x / Ketone: Negative  / Bili: Negative / Urobili: <2 mg/dL   Blood: x / Protein: Negative / Nitrite: Negative   Leuk Esterase: Negative / RBC: x / WBC x   Sq Epi: x / Non Sq Epi: x / Bacteria: x                RADIOLOGY:    VITALS:   T(F): 98.2, Max: 99.4 (22 @ 20:15)  HR: 76  BP: 106/66  RR: 19  SpO2: 98%  PHYSICAL EXAM:  GEN: No acute distress  LUNGS: Clear to auscultation bilaterally, no wheezes rhonchi or rales  HEART: Regular rate and rhythm, S1, S2 auscultated, no murmurs, rubs, or gallops  ABD: Soft, non-tender, non-distended.  EXT: No edema, no pallor, pulses 2+ BL.   NEURO: AAOX3    Intravenous access:   NG tube:   Mcduffie Catheter:   Indwelling Urethral Catheter:     Connect To:  Straight Drainage/Ironside    Indication:  Urinary Retention / Obstruction (22 @ 00:36) (not performed) [Active]

## 2022-01-10 NOTE — PROGRESS NOTE ADULT - ASSESSMENT
60 yo F with PMH of bipolar disorder, schizophrenia, gout, OA, chronic lower back pain, recurrent UTI, anxiety presented to the ED s/p fall, 3 days ago from couch. Denies LOC, head trauma. Requesting placement to [facility].     #Fevers  -COVID 19 negative   -UA negative  -CXR shows no acute process  -BC negative   -elevated D-dimer  - Doppler US of LE: negative for DVTsin b/l LEs and negative for superficial thrompheblitits  -ID to follow up       # abd pain   - Pt having nausea and vomiting. Last CT abd pelvic in 11/2021 showed < from: CT Abdomen and Pelvis w/ IV Cont (11.11.21 @ 00:22) >  Findings compatible with gastritis and active gastric ulcer disease. No CT evidence for perforation at this time. Nonspecific mildly enlarged gastrohepatic lymph nodes possibly reactive. Nonemergent endoscopy is recommended for further evaluation.  -continue with pepcid and maalox   - LFT ok  - CT abd/pelvis IV cont: Moderate in size paraesophageal hiatal hernia with associated   circumferential wall thickening of the distal esophagus. This most likely   reflects presence of esophagitis. Neoplasm is not excluded. Several   prominent adjacent gastroesophageal lymph nodes. Further evaluation with   upper endoscopy is recommended.  Otherwise, no CT evidence of acute abdominopelvic pathology.  - Per GI, EGD done in AM - Esophageal candidiasis + 1.5 cm pigmented ulcer; start on fluconazole 400mg today, 200 mg for x21d after and protonix 40mg x2wks, 20mg for 6wks after     #congestion on cxr- stable on RA    #low TSH,   - TSH 0.17 (0.66 in 12/21); Thyroid panel-> T4: 7.4; Free thyroxine: 1.4; T3: 54    # Frequent falls  - likely d/t deconditioning, Severe OA  - Seen by PT: fall precautions, gait abnormality likely secondary to fall, benefit from OT  - f/u orthostatics  - Denies LOC, head trauma  - Trauma workup negative  - Echo (01/06/22): LVEF 60-65%; Grade I diastolic dysf(x) - spectral doppler demonstrates impaired relaxation pattern of LV myocardial filling; trace MV regurg  - Fall precautions    # Rhabdomyolysis  - likely sec to fall  - ->328  - resolved   - renal function stable     #Hypomagnesia, resolved  - replated    # Schizophrenia/Bipolar disorder/anxiety  - continue with olanzapine, sertraline, clonazepam (at bedtime)    # OA  - PT  - Pain control - tylenol PRN  - OP ortho f/u    # Diet: Regular  # Activity: IAT  # GI PPX: NA  # DVT PPX: Lovenox  # Full Code    #Progress Note Handoff  Pending (specify):  CT of the abdm.  PT consult   Family discussion:  plan of care was discussed with patient   in details.  all questions were answered.  seems to understand, and in agreement  Disposition:  unknown

## 2022-01-10 NOTE — PROGRESS NOTE ADULT - ASSESSMENT
Patient initially with mechanical fall at home (left [facility] [place]); presented to ED requesting another NH dispo; hospital course complicated by fevers; my initial encounter with the patient     ·	FUO; no clear source yet  ·	Fall/Rhabdomyolysis   ·	Esophagitis  ·	Abnormal Abdominal imaging/+LN  ·	Nausea  ·	Bipolar Disorder/Schizophrenia  ·	s/p Left Hip Nail Fixation   ·	Hx of Gout  ·	Skin decubiti (see NP/wound care Nurse note)    -prelim blood cultures negative; continue with IV Cefepime for now; mild fever, ID follow up  -GI following; EGD today; IV PPI twice daily (already on H2 blockers)  -nausea control with IV Zofran  -continue with home maintenance psych meds   -CK levels improved to 300s from > 1100   -rehab/pt as tolerated (appears deconditioned)       # Progress Note Handoff  PENDING as follows  consults: ID follow up and GI consult   Test: EGD today  Family discussion: discussed with patient who comprehends her medical care   Disposition: will need NH placement when clinically stable; not discharge ready    Attending Physician Dr. Marika Canada # [id number]

## 2022-01-10 NOTE — PRE-ANESTHESIA EVALUATION ADULT - NSANTHOSAYNRD_GEN_A_CORE
No. HARMEET screening performed.  STOP BANG Legend: 0-2 = LOW Risk; 3-4 = INTERMEDIATE Risk; 5-8 = HIGH Risk

## 2022-01-10 NOTE — PROGRESS NOTE ADULT - SUBJECTIVE AND OBJECTIVE BOX
COMFORT FARIAS  61y  Female      Patient is a 61y old  Female who presents with a chief complaint of Frequent Falls (08 Jan 2022 16:57)      INTERVAL HPI/OVERNIGHT EVENTS:    pt seen and examined this morning  -NPO for EGD today; will follow   -continue with IV abx  -rehab/pt as tolerated     REVIEW OF SYSTEMS:  -nausea present (started after eating scrambled eggs); no vomiting  -no other complaints     Vital Signs Last 24 Hrs  T(C): 36.8 (10 Shun 2022 11:42), Max: 37.4 (09 Jan 2022 20:15)  T(F): 98.2 (10 Shun 2022 11:42), Max: 99.4 (09 Jan 2022 20:15)  HR: 76 (10 Shun 2022 11:57) (76 - 88)  BP: 106/66 (10 Shun 2022 11:57) (80/50 - 141/82)  RR: 19 (10 Shun 2022 11:57) (16 - 20)  SpO2: 98% (10 Shun 2022 11:57) (95% - 99%)    PHYSICAL EXAM:  GENERAL: NAD, speaking appropriately; slightly nauseous this am  HEAD:  Atraumatic, Normocephalic  EYES: EOMI, PERRLA, conjunctiva and sclera clear  NERVOUS SYSTEM:  Alert & Oriented X 3  CHEST/LUNG: Clear to percussion bilaterally; No rales, rhonchi, wheezing, or rubs  CV/HEART: Regular rate and rhythm; No murmurs, rubs, or gallops  GI/ABDOMEN: Soft, Nontender, Nondistended; Bowel sounds present  EXTREMITIES:  2+ Peripheral Pulses, No clubbing, cyanosis, or edema  SKIN: Skin decubiti POA     LAB:                        11.6   8.63  )-----------( 337      ( 10 Shun 2022 04:30 )             36.0              01-10    138  |  100  |  12  ----------------------------<  103<H>  3.7   |  24  |  0.8    Ca    9.1      10 Shun 2022 04:30    TPro  5.5<L>  /  Alb  3.0<L>  /  TBili  0.2  /  DBili  x   /  AST  13  /  ALT  10  /  AlkPhos  82  01-10              Daily Height in cm: 149.86 (08 Jan 2022 14:34)    Daily   CAPILLARY BLOOD GLUCOSE    LIVER FUNCTIONS - ( 09 Jan 2022 04:30 )  Alb: 3.2 g/dL / Pro: 5.6 g/dL / ALK PHOS: 83 U/L / ALT: 12 U/L / AST: 18 U/L / GGT: x           RADIOLOGY:    Imaging Personally visualized and Reviewed:  [y] YES  [ ] NO    HEALTH ISSUES - PROBLEM Dx:    MEDICATIONS  (STANDING):  cefepime   IVPB      cefepime   IVPB 1000 milliGRAM(s) IV Intermittent every 8 hours  clonazePAM  Tablet 1 milliGRAM(s) Oral at bedtime  enoxaparin Injectable 40 milliGRAM(s) SubCutaneous daily  famotidine    Tablet 20 milliGRAM(s) Oral daily  OLANZapine 10 milliGRAM(s) Oral daily  pantoprazole  Injectable 40 milliGRAM(s) IV Push every 12 hours  sertraline 100 milliGRAM(s) Oral daily    MEDICATIONS  (PRN):  acetaminophen     Tablet .. 650 milliGRAM(s) Oral every 6 hours PRN Temp greater or equal to 38C (100.4F), Mild Pain (1 - 3), Moderate Pain (4 - 6)  aluminum hydroxide/magnesium hydroxide/simethicone Suspension 30 milliLiter(s) Oral every 4 hours PRN Dyspepsia  artificial  tears Solution 1 Drop(s) Right EYE three times a day PRN Dry Eyes  bisacodyl 5 milliGRAM(s) Oral every 12 hours PRN Constipation  melatonin 3 milliGRAM(s) Oral at bedtime PRN Insomnia  ondansetron Injectable 4 milliGRAM(s) IV Push every 6 hours PRN Nausea and/or Vomiting

## 2022-01-11 LAB
ALBUMIN SERPL ELPH-MCNC: 3.1 G/DL — LOW (ref 3.5–5.2)
ALP SERPL-CCNC: 82 U/L — SIGNIFICANT CHANGE UP (ref 30–115)
ALT FLD-CCNC: 11 U/L — SIGNIFICANT CHANGE UP (ref 0–41)
ANION GAP SERPL CALC-SCNC: 14 MMOL/L — SIGNIFICANT CHANGE UP (ref 7–14)
AST SERPL-CCNC: 17 U/L — SIGNIFICANT CHANGE UP (ref 0–41)
BASOPHILS # BLD AUTO: 0.07 K/UL — SIGNIFICANT CHANGE UP (ref 0–0.2)
BASOPHILS NFR BLD AUTO: 0.8 % — SIGNIFICANT CHANGE UP (ref 0–1)
BILIRUB SERPL-MCNC: 0.3 MG/DL — SIGNIFICANT CHANGE UP (ref 0.2–1.2)
BUN SERPL-MCNC: 9 MG/DL — LOW (ref 10–20)
CALCIUM SERPL-MCNC: 9 MG/DL — SIGNIFICANT CHANGE UP (ref 8.5–10.1)
CHLORIDE SERPL-SCNC: 104 MMOL/L — SIGNIFICANT CHANGE UP (ref 98–110)
CO2 SERPL-SCNC: 23 MMOL/L — SIGNIFICANT CHANGE UP (ref 17–32)
CREAT SERPL-MCNC: 0.5 MG/DL — LOW (ref 0.7–1.5)
EOSINOPHIL # BLD AUTO: 0.55 K/UL — SIGNIFICANT CHANGE UP (ref 0–0.7)
EOSINOPHIL NFR BLD AUTO: 6.3 % — SIGNIFICANT CHANGE UP (ref 0–8)
GLUCOSE SERPL-MCNC: 86 MG/DL — SIGNIFICANT CHANGE UP (ref 70–99)
HCT VFR BLD CALC: 34.9 % — LOW (ref 37–47)
HGB BLD-MCNC: 11.3 G/DL — LOW (ref 12–16)
HIV 1 & 2 AB SERPL IA.RAPID: SIGNIFICANT CHANGE UP
IMM GRANULOCYTES NFR BLD AUTO: 0.3 % — SIGNIFICANT CHANGE UP (ref 0.1–0.3)
LYMPHOCYTES # BLD AUTO: 3.36 K/UL — SIGNIFICANT CHANGE UP (ref 1.2–3.4)
LYMPHOCYTES # BLD AUTO: 38.4 % — SIGNIFICANT CHANGE UP (ref 20.5–51.1)
MCHC RBC-ENTMCNC: 29.1 PG — SIGNIFICANT CHANGE UP (ref 27–31)
MCHC RBC-ENTMCNC: 32.4 G/DL — SIGNIFICANT CHANGE UP (ref 32–37)
MCV RBC AUTO: 89.9 FL — SIGNIFICANT CHANGE UP (ref 81–99)
MONOCYTES # BLD AUTO: 0.55 K/UL — SIGNIFICANT CHANGE UP (ref 0.1–0.6)
MONOCYTES NFR BLD AUTO: 6.3 % — SIGNIFICANT CHANGE UP (ref 1.7–9.3)
NEUTROPHILS # BLD AUTO: 4.19 K/UL — SIGNIFICANT CHANGE UP (ref 1.4–6.5)
NEUTROPHILS NFR BLD AUTO: 47.9 % — SIGNIFICANT CHANGE UP (ref 42.2–75.2)
NRBC # BLD: 0 /100 WBCS — SIGNIFICANT CHANGE UP (ref 0–0)
PLATELET # BLD AUTO: 352 K/UL — SIGNIFICANT CHANGE UP (ref 130–400)
POTASSIUM SERPL-MCNC: 4.3 MMOL/L — SIGNIFICANT CHANGE UP (ref 3.5–5)
POTASSIUM SERPL-SCNC: 4.3 MMOL/L — SIGNIFICANT CHANGE UP (ref 3.5–5)
PROT SERPL-MCNC: 5.5 G/DL — LOW (ref 6–8)
RBC # BLD: 3.88 M/UL — LOW (ref 4.2–5.4)
RBC # FLD: 13.7 % — SIGNIFICANT CHANGE UP (ref 11.5–14.5)
SODIUM SERPL-SCNC: 141 MMOL/L — SIGNIFICANT CHANGE UP (ref 135–146)
SURGICAL PATHOLOGY STUDY: SIGNIFICANT CHANGE UP
WBC # BLD: 8.75 K/UL — SIGNIFICANT CHANGE UP (ref 4.8–10.8)
WBC # FLD AUTO: 8.75 K/UL — SIGNIFICANT CHANGE UP (ref 4.8–10.8)

## 2022-01-11 PROCEDURE — 99233 SBSQ HOSP IP/OBS HIGH 50: CPT

## 2022-01-11 RX ADMIN — CEFEPIME 100 MILLIGRAM(S): 1 INJECTION, POWDER, FOR SOLUTION INTRAMUSCULAR; INTRAVENOUS at 06:01

## 2022-01-11 RX ADMIN — OLANZAPINE 10 MILLIGRAM(S): 15 TABLET, FILM COATED ORAL at 11:54

## 2022-01-11 RX ADMIN — Medication 650 MILLIGRAM(S): at 20:00

## 2022-01-11 RX ADMIN — FAMOTIDINE 20 MILLIGRAM(S): 10 INJECTION INTRAVENOUS at 11:54

## 2022-01-11 RX ADMIN — ONDANSETRON 4 MILLIGRAM(S): 8 TABLET, FILM COATED ORAL at 12:24

## 2022-01-11 RX ADMIN — ENOXAPARIN SODIUM 40 MILLIGRAM(S): 100 INJECTION SUBCUTANEOUS at 11:55

## 2022-01-11 RX ADMIN — PANTOPRAZOLE SODIUM 40 MILLIGRAM(S): 20 TABLET, DELAYED RELEASE ORAL at 17:36

## 2022-01-11 RX ADMIN — Medication 1 MILLIGRAM(S): at 21:50

## 2022-01-11 RX ADMIN — FLUCONAZOLE 200 MILLIGRAM(S): 150 TABLET ORAL at 11:53

## 2022-01-11 RX ADMIN — SERTRALINE 100 MILLIGRAM(S): 25 TABLET, FILM COATED ORAL at 11:52

## 2022-01-11 RX ADMIN — SENNA PLUS 2 TABLET(S): 8.6 TABLET ORAL at 21:50

## 2022-01-11 RX ADMIN — PANTOPRAZOLE SODIUM 40 MILLIGRAM(S): 20 TABLET, DELAYED RELEASE ORAL at 06:01

## 2022-01-11 NOTE — PROGRESS NOTE ADULT - ASSESSMENT
Patient initially with mechanical fall at home (left [facility]); presented to ED requesting another NH dispo; hospital course complicated by fevers; my initial encounter with the patient     ·	FUO; no clear source yet  ·	Esophageal candidiasis   ·	Gastric ulcer   ·	Fall/Rhabdomyolysis   ·	Esophagitis  ·	Abnormal Abdominal imaging/+LN  ·	Nausea  ·	Bipolar Disorder/Schizophrenia  ·	s/p Left Hip Nail Fixation   ·	Hx of Gout  ·	Skin decubiti (see NP/wound care Nurse note)    -prelim blood cultures negative; IV Cefepime discontinued and will monitor off abx; ID follow up appreciated   -GI following; s/p EGD yesterday; switch to oral PPI twice daily (already on H2 blockers); added fluconazole   -nausea control with IV Zofran  -continue with home maintenance psych meds   -CK levels improved to 300s from > 1100   -rehab/pt as tolerated (appears deconditioned)       # Progress Note Handoff  PENDING as follows  consults: ID follow up noted  Family discussion: discussed with patient who comprehends her medical care and agreeable for current plan of care   SIGN OUT/Disposition: will need NH placement when clinically stable; not discharge ready; now exposure quarantine; monitor off abx and check for fevers. (patient initially came in with fall requesting NH facility other than [facility])    Attending Physician Dr. Marika Canada # [id number]

## 2022-01-11 NOTE — PROGRESS NOTE ADULT - ASSESSMENT
ASSESSMENT  62 yo F with PMH of bipolar disorder, schizophrenia, gout, OA, chronic lower back pain, recurrent UTI, anxiety presented to the ED with complaints of fall 3 days ago from bed.     IMPRESSION  #s/p Fall with Rhabdo  #Fevers - unclear source  - COVID PCR, RVP negative  - Xray Chest 1 View- PORTABLE-Urgent (Xray Chest 1 View- PORTABLE-Urgent .) (01.06.22 @ 01:13) >  - Procalcitonin, Serum: 0.06  (01.07.22 @ 07:23)  - TTE 1/6 without significant valvulopathy   - UA 1/5 < 10 WBC   - Blood Cx and Urine Cx negative   - CT Abd/Pelvis with hiatal hernia - neoplasms not excluded  - s/p EGD 1/10 with esophageal candidiasis and noted ulcers    #Bipolar Disorder/Schizophrenia - On olanzapine and sertraline   #s/p Left Hip Nail Fixation   #Hx of Gout    #Abx allergy: azithromycin (Unknown)  moxifloxacin (Unknown)  penicillin - reports nausea    RECOMMENDATIONS  - monitor off antibiotics   - fluconazole for GI   - check HIV for completenses    I will be away tomorrow and will be available on Thursday.   I will be unavailable by phone. Please message on Teams if with questions.  Spectra [phone]

## 2022-01-11 NOTE — PROGRESS NOTE ADULT - SUBJECTIVE AND OBJECTIVE BOX
COMFORT FARIAS  61y  Female      Patient is a 61y old  Female who presents with a chief complaint of Frequent Falls (08 Jan 2022 16:57)      INTERVAL HPI/OVERNIGHT EVENTS:    pt seen and examined this morning  -overnight events noted  -patient has covid exposure in the hospital and is now on quarantine  -EGD done yesterday; now on antifungals and will continue PPI   -rehab/pt as tolerated (patient will be NH discharge when ready)    REVIEW OF SYSTEMS:  -generalized weakness     Vital Signs Last 24 Hrs  T(C): 37 (11 Jan 2022 14:22), Max: 37 (10 Shun 2022 18:00)  T(F): 98.6 (11 Jan 2022 14:22), Max: 98.6 (10 Shun 2022 18:00)  HR: 98 (11 Jan 2022 14:22) (77 - 98)  BP: 105/59 (11 Jan 2022 14:22) (105/59 - 120/70)  RR: 19 (11 Jan 2022 14:22) (18 - 19)    PHYSICAL EXAM:  GENERAL: NAD, speaking appropriately; tired   HEAD:  Atraumatic, Normocephalic  EYES: EOMI, PERRLA, conjunctiva and sclera clear  NERVOUS SYSTEM:  Alert & Oriented X 3  CHEST/LUNG: Clear to percussion bilaterally; No rales, rhonchi, wheezing, or rubs  CV/HEART: Regular rate and rhythm; No murmurs, rubs, or gallops  GI/ABDOMEN: Soft, Nontender, Nondistended; Bowel sounds present  EXTREMITIES:  2+ Peripheral Pulses, No clubbing, cyanosis, or edema  SKIN: Skin decubiti POA     LAB:                          11.3   8.75  )-----------( 352      ( 11 Jan 2022 07:02 )             34.9               01-11    141  |  104  |  9<L>  ----------------------------<  86  4.3   |  23  |  0.5<L>    Ca    9.0      11 Jan 2022 07:02    TPro  5.5<L>  /  Alb  3.1<L>  /  TBili  0.3  /  DBili  x   /  AST  17  /  ALT  11  /  AlkPhos  82  01-11              Daily Height in cm: 149.86 (08 Jan 2022 14:34)    Daily   CAPILLARY BLOOD GLUCOSE    LIVER FUNCTIONS - ( 09 Jan 2022 04:30 )  Alb: 3.2 g/dL / Pro: 5.6 g/dL / ALK PHOS: 83 U/L / ALT: 12 U/L / AST: 18 U/L / GGT: x           RADIOLOGY:    Imaging Personally visualized and Reviewed:  [y] YES  [ ] NO    HEALTH ISSUES - PROBLEM Dx:    MEDICATIONS  (STANDING):  clonazePAM  Tablet 1 milliGRAM(s) Oral at bedtime  enoxaparin Injectable 40 milliGRAM(s) SubCutaneous daily  famotidine    Tablet 20 milliGRAM(s) Oral daily  fluconAZOLE   Tablet 200 milliGRAM(s) Oral daily  OLANZapine 10 milliGRAM(s) Oral daily  pantoprazole    Tablet 40 milliGRAM(s) Oral two times a day  senna 2 Tablet(s) Oral at bedtime  sertraline 100 milliGRAM(s) Oral daily    MEDICATIONS  (PRN):  acetaminophen     Tablet .. 650 milliGRAM(s) Oral every 6 hours PRN Temp greater or equal to 38C (100.4F), Mild Pain (1 - 3), Moderate Pain (4 - 6)  aluminum hydroxide/magnesium hydroxide/simethicone Suspension 30 milliLiter(s) Oral every 4 hours PRN Dyspepsia  artificial  tears Solution 1 Drop(s) Right EYE three times a day PRN Dry Eyes  bisacodyl 5 milliGRAM(s) Oral every 12 hours PRN Constipation  melatonin 3 milliGRAM(s) Oral at bedtime PRN Insomnia  ondansetron Injectable 4 milliGRAM(s) IV Push every 6 hours PRN Nausea and/or Vomiting  polyethylene glycol 3350 17 Gram(s) Oral daily PRN Constipation

## 2022-01-11 NOTE — PROGRESS NOTE ADULT - ASSESSMENT
60 yo F with PMH of bipolar disorder, schizophrenia, gout, OA, chronic lower back pain, recurrent UTI, anxiety presented to the ED s/p fall, 3 days ago from couch. Denies LOC, head trauma. Requesting placement to [facility].     #Fevers  - temp wnl today (01/11)  -COVID 19 negative   -UA negative  -CXR shows no acute process  -BCx negative x2  -UCx negative  -elevated D-dimer  - Doppler US of LE: negative for DVTsin b/l LEs and negative for superficial thrompheblitits  - Per ID, d/c cefepime      # abd pain   - Pt having nausea and vomiting. Last CT abd pelvic in 11/2021 showed < from: CT Abdomen and Pelvis w/ IV Cont (11.11.21 @ 00:22) >  Findings compatible with gastritis and active gastric ulcer disease. No CT evidence for perforation at this time. Nonspecific mildly enlarged gastrohepatic lymph nodes possibly reactive. Nonemergent endoscopy is recommended for further evaluation.  -continue with pepcid and maalox   - LFT ok  - CT abd/pelvis IV cont: Moderate in size paraesophageal hiatal hernia with associated   circumferential wall thickening of the distal esophagus. This most likely   reflects presence of esophagitis. Neoplasm is not excluded. Several   prominent adjacent gastroesophageal lymph nodes. Further evaluation with   upper endoscopy is recommended.  Otherwise, no CT evidence of acute abdominopelvic pathology.  - Per GI, EGD done in AM - Esophageal candidiasis + 1.5 cm pigmented ulcer; start on fluconazole 400mg today, 200 mg for x21d after and protonix 40mg x2wks, 40mg for 6wks after     #congestion on cxr- stable on RA    #low TSH,   - TSH 0.17 (0.66 in 12/21); Thyroid panel-> T4: 7.4; Free thyroxine: 1.4; T3: 54    # Frequent falls  - likely d/t deconditioning, Severe OA  - Seen by PT: fall precautions, gait abnormality likely secondary to fall, benefit from OT  - f/u orthostatics  - Denies LOC, head trauma  - Trauma workup negative  - Echo (01/06/22): LVEF 60-65%; Grade I diastolic dysf(x) - spectral doppler demonstrates impaired relaxation pattern of LV myocardial filling; trace MV regurg  - Fall precautions    # Rhabdomyolysis  - likely sec to fall  - ->328  - resolved   - renal function stable     #Hypomagnesia, resolved  - replated    # Schizophrenia/Bipolar disorder/anxiety  - continue with olanzapine, sertraline, clonazepam (at bedtime)    # OA  - PT  - Pain control - tylenol PRN  - OP ortho f/u    # Diet: Regular  # Activity: IAT  # GI PPX: NA  # DVT PPX: Lovenox  # Full Code    #Progress Note Handoff  Pending (specify):  CT of the abdm.  PT consult   Family discussion:  plan of care was discussed with patient   in details.  all questions were answered.  seems to understand, and in agreement  Disposition:  unknown

## 2022-01-11 NOTE — PROGRESS NOTE ADULT - TIME BILLING
direct patient care  -coordinated current plan of care with medical staff on MDR
I have personally seen and examined this patient.    I have reviewed all pertinent clinical information and reviewed all relevant imaging and diagnostic studies personally.   I counseled the patient about diagnostic testing and treatment plan. All questions were answered.   I discussed recommendations with the primary team.
#Progress Note Handoff  Pending (specify): follow up fever work up. ID consult and PT  Family discussion: house staff updated pt family  Disposition: medicine 48-72 hrs
direct patient care  -coordinated current plan of care with medical staff on MDR
direct patient care  -coordinated current plan of care with medical staff on MDR
#Progress Note Handoff  Pending (specify): follow up fever work up. ID consult and PT  Family discussion: house staff updated pt family  Disposition: medicine 48-72 hrs

## 2022-01-11 NOTE — PROGRESS NOTE ADULT - SUBJECTIVE AND OBJECTIVE BOX
JARRETTCOMFORT  61y, Female  Allergy: azithromycin (Unknown)  moxifloxacin (Unknown)  penicillin (Unknown)      LOS  5d    CHIEF COMPLAINT: Frequent Falls (2022 11:38)      INTERVAL EVENTS/HPI  - No acute events overnight  - T(F): , Max: 98.6 (01-10-22 @ 18:00)  - Denies any worsening symptoms  - Tolerating medication  - WBC Count: 8.75 (22 @ 07:02)  WBC Count: 8.63 (01-10-22 @ 04:30)     - Creatinine, Serum: 0.5 (22 @ 07:02)  Creatinine, Serum: 0.8 (01-10-22 @ 04:30)       ROS  General: Denies rigors, nightsweats  HEENT: Denies headache, rhinorrhea, sore throat, eye pain  CV: Denies CP, palpitations  PULM: Denies wheezing, hemoptysis  GI: Denies hematemesis, hematochezia, melena  : Denies discharge, hematuria  MSK: Denies arthralgias, myalgias  SKIN: Denies rash, lesions  NEURO: Denies paresthesias, weakness  PSYCH: Denies depression, anxiety    VITALS:  T(F): 98.4, Max: 98.6 (01-10-22 @ 18:00)  HR: 88  BP: 111/65  RR: 18Vital Signs Last 24 Hrs  T(C): 36.9 (2022 05:09), Max: 37 (10 Shun 2022 18:00)  T(F): 98.4 (2022 05:09), Max: 98.6 (10 Shun 2022 18:00)  HR: 88 (2022 05:09) (77 - 88)  BP: 111/65 (2022 05:09) (111/65 - 120/70)  BP(mean): --  RR: 18 (2022 05:09) (18 - 18)  SpO2: --    PHYSICAL EXAM:  Gen: NAD, resting in bed  HEENT: Normocephalic, atraumatic  Neck: supple, no lymphadenopathy  CV: Regular rate & regular rhythm  Lungs: decreased BS at bases, no fremitus  Abdomen: Soft, BS present  Ext: Warm, well perfused  Neuro: non focal, awake  Skin: no rash, no erythema  Lines: no phlebitis    FH: Non-contributory  Social Hx: Non-contributory    TESTS & MEASUREMENTS:                        11.3   8.75  )-----------( 352      ( 2022 07:02 )             34.9         141  |  104  |  9<L>  ----------------------------<  86  4.3   |  23  |  0.5<L>    Ca    9.0      2022 07:02    TPro  5.5<L>  /  Alb  3.1<L>  /  TBili  0.3  /  DBili  x   /  AST  17  /  ALT  11  /  AlkPhos  82      eGFR if Non African American: 103 mL/min/1.73M2 (22 @ 07:02)  eGFR if African American: 119 mL/min/1.73M2 (22 @ 07:02)    LIVER FUNCTIONS - ( 2022 07:02 )  Alb: 3.1 g/dL / Pro: 5.5 g/dL / ALK PHOS: 82 U/L / ALT: 11 U/L / AST: 17 U/L / GGT: x           Urinalysis Basic - ( 2022 17:15 )    Color: Light Yellow / Appearance: Clear / S.041 / pH: x  Gluc: x / Ketone: Negative  / Bili: Negative / Urobili: <2 mg/dL   Blood: x / Protein: Negative / Nitrite: Negative   Leuk Esterase: Negative / RBC: x / WBC x   Sq Epi: x / Non Sq Epi: x / Bacteria: x        Culture - Urine (collected 22 @ 17:15)  Source: Clean Catch Clean Catch (Midstream)  Final Report (01-10-22 @ 21:36):    No growth    Culture - Blood (collected 22 @ 07:23)  Source: .Blood Blood-Peripheral  Preliminary Report (22 @ 14:01):    No growth to date.    Culture - Blood (collected 22 @ 07:23)  Source: .Blood Blood-Peripheral  Preliminary Report (22 @ 14:01):    No growth to date.    Culture - Blood (collected 22 @ 02:49)  Source: .Blood Blood-Peripheral  Preliminary Report (22 @ 14:01):    No growth to date.    Culture - Urine (collected 22 @ 21:52)  Source: Clean Catch Clean Catch (Midstream)  Final Report (22 @ 13:03):    No growth    Culture - Urine (collected 21 @ 09:00)  Source: Clean Catch Clean Catch (Midstream)  Final Report (21 @ 13:31):    >100,000 CFU/ml Morganella morganii    <10,000 CFU/ml Normal Urogenital tennille present  Organism: Morganella morganii (21 @ 13:31)  Organism: Morganella morganii (21 @ 13:31)      -  Amikacin: S <=16      -  Amoxicillin/Clavulanic Acid: R >16/8      -  Ampicillin: R >16 These ampicillin results predict results for amoxicillin      -  Ampicillin/Sulbactam: S 8/4 Enterobacter, Klebsiella aerogenes, Citrobacter, and Serratia may develop resistance during prolonged therapy (3-4 days)      -  Aztreonam: S <=4      -  Cefazolin: R >16      -  Cefepime: S <=2      -  Cefoxitin: S <=8      -  Ceftriaxone: S <=1 Enterobacter, Klebsiella aerogenes, Citrobacter, and Serratia may develop resistance during prolonged therapy      -  Ciprofloxacin: S <=0.25      -  Ertapenem: S <=0.5      -  Gentamicin: S <=2      -  Imipenem: I 2      -  Levofloxacin: S <=0.5      -  Meropenem: S <=1      -  Nitrofurantoin: R 64 Should not be used to treat pyelonephritis      -  Piperacillin/Tazobactam: S <=8      -  Tigecycline: R <=2      -  Tobramycin: S <=2      -  Trimethoprim/Sulfamethoxazole: S <=0.5/9.5      Method Type: HAILEY    Culture - Blood (collected 12-15-21 @ 23:30)  Source: .Blood Blood-Peripheral  Final Report (21 @ 09:01):    No Growth Final    Culture - Urine (collected 21 @ 19:25)  Source: Catheterized Catheterized  Final Report (21 @ 22:45):    >=3 organisms. Probable collection contamination.        Lactate, Blood: 0.8 mmol/L (22 @ 06:00)  Lactate, Blood: 0.7 mmol/L (22 @ 20:00)      INFECTIOUS DISEASES TESTING  Rapid RVP Result: NotDetec (22 @ 17:09)  Procalcitonin, Serum: 0.06 (22 @ 07:23)  COVID-19 PCR: NotDetec (22 @ 00:10)  COVID-19 PCR: NotDetec (21 @ 17:20)  Procalcitonin, Serum: 0.02 (12-15-21 @ 23:30)  COVID-19 PCR: NotDetec (21 @ 11:37)  COVID-19 PCR: NotDetec (21 @ 10:28)  COVID-19 PCR: NotDetec (21 @ 01:40)      INFLAMMATORY MARKERS  Sedimentation Rate, Erythrocyte: 16 mm/Hr (12-15-21 @ 23:30)      RADIOLOGY & ADDITIONAL TESTS:  I have personally reviewed the last available Chest xray  CXR      CT  CT Abdomen and Pelvis w/ IV Cont:   ACC: [id number] EXAM:  CT ABDOMEN AND PELVIS IC                          PROCEDURE DATE:  2022          INTERPRETATION:  CLINICAL STATEMENT: Abdominal pain and nausea/vomiting.    TECHNIQUE: Contiguous axial CT images were obtained from the lower chest   to the pubic symphysis following administration of 100cc Omnipaque 350   intravenous contrast.  Oral contrast was not administered.  Reformatted   images in the coronal and sagittal planes were acquired.    COMPARISON CT: CT abdomen and pelvis 2021.    OTHER STUDIES USED FOR CORRELATION: None.      FINDINGS:    LOWER CHEST: Bibasilar subsegmental atelectasis.    HEPATOBILIARY: Unremarkable.    SPLEEN: Unremarkable.    PANCREAS: Unremarkable.    ADRENAL GLANDS: Unremarkable.    KIDNEYS: Symmetric bilateral renal enhancement. Left renal hypodensities   too small to further characterize. No hydronephrosis.    ABDOMINOPELVIC NODES: Unremarkable.    PELVIC ORGANS: Mcduffie catheter is noted within the urinary bladder. Air is   also noted within urinary bladder likely secondary to instrumentation.    PERITONEUM/MESENTERY/BOWEL: No evidence of bowel obstruction, free air or   ascites. Normal appearing appendix. Moderate in size paraesophageal   hiatal hernia with associated circumferential wall thickening of the   distal esophagus. Several prominent adjacent gastroesophageal lymph nodes.    BONES/SOFT TISSUES: Status post left femoral surgical fixation.   Multilevel degenerative changes of the spine. Chronic compression   deformity of T12 vertebral body. Levoscoliosis of the thoracolumbar spine.    OTHER: Atherosclerotic disease of the aorta and its branches.      IMPRESSION:      Moderate in size paraesophageal hiatal hernia with associated   circumferential wall thickeningof the distal esophagus. This most likely   reflects presence of esophagitis. Neoplasm is not excluded. Several   prominent adjacent gastroesophageal lymph nodes. Further evaluation with   upper endoscopy is recommended.    Otherwise, no CT evidence of acute abdominopelvic pathology.    --- End of Report ---          RAGHAVENDRA CALIX MD; Resident Radiologist  This document has been electronically signed.  ANGIE WEBER MD; Attending Radiologist  This document has been electronically signed. 2022 11:49PM (22 @ 21:58)      CARDIOLOGY TESTING  12 Lead ECG:   Ventricular Rate 105 BPM    Atrial Rate 105 BPM    P-R Interval 136 ms    QRS Duration 64 ms    Q-T Interval 332 ms    QTC Calculation(Bazett) 438 ms    P Axis 29 degrees    R Axis 13 degrees    T Axis -22 degrees    Diagnosis Line Sinus tachycardia  Minimal voltage criteria for LVH, may be normal variant  Septal infarct , age undetermined  ST & T wave abnormality, consider inferior ischemia  Abnormal ECG    Confirmed by Fred Méndez ([id number]) on 2022 7:47:21 AM (22 @ 22:42)      MEDICATIONS  clonazePAM  Tablet 1 Oral at bedtime  enoxaparin Injectable 40 SubCutaneous daily  famotidine    Tablet 20 Oral daily  fluconAZOLE   Tablet 200 Oral daily  OLANZapine 10 Oral daily  pantoprazole    Tablet 40 Oral two times a day  senna 2 Oral at bedtime  sertraline 100 Oral daily      WEIGHT  Weight (kg): 49.4 (01-10-22 @ 11:11)  Creatinine, Serum: 0.5 mg/dL (22 @ 07:02)      ANTIBIOTICS:  fluconAZOLE   Tablet 200 milliGRAM(s) Oral daily      All available historical records have been reviewed

## 2022-01-12 ENCOUNTER — TRANSCRIPTION ENCOUNTER (OUTPATIENT)
Age: 62
End: 2022-01-12

## 2022-01-12 LAB
ALBUMIN SERPL ELPH-MCNC: 3.3 G/DL — LOW (ref 3.5–5.2)
ALP SERPL-CCNC: 96 U/L — SIGNIFICANT CHANGE UP (ref 30–115)
ALT FLD-CCNC: 12 U/L — SIGNIFICANT CHANGE UP (ref 0–41)
ANION GAP SERPL CALC-SCNC: 12 MMOL/L — SIGNIFICANT CHANGE UP (ref 7–14)
AST SERPL-CCNC: 16 U/L — SIGNIFICANT CHANGE UP (ref 0–41)
BASOPHILS # BLD AUTO: 0.08 K/UL — SIGNIFICANT CHANGE UP (ref 0–0.2)
BASOPHILS NFR BLD AUTO: 1 % — SIGNIFICANT CHANGE UP (ref 0–1)
BILIRUB SERPL-MCNC: <0.2 MG/DL — SIGNIFICANT CHANGE UP (ref 0.2–1.2)
BUN SERPL-MCNC: 15 MG/DL — SIGNIFICANT CHANGE UP (ref 10–20)
CALCIUM SERPL-MCNC: 9.6 MG/DL — SIGNIFICANT CHANGE UP (ref 8.5–10.1)
CHLORIDE SERPL-SCNC: 103 MMOL/L — SIGNIFICANT CHANGE UP (ref 98–110)
CO2 SERPL-SCNC: 23 MMOL/L — SIGNIFICANT CHANGE UP (ref 17–32)
CREAT SERPL-MCNC: 0.8 MG/DL — SIGNIFICANT CHANGE UP (ref 0.7–1.5)
CULTURE RESULTS: SIGNIFICANT CHANGE UP
EOSINOPHIL # BLD AUTO: 0.72 K/UL — HIGH (ref 0–0.7)
EOSINOPHIL NFR BLD AUTO: 8.7 % — HIGH (ref 0–8)
GLUCOSE SERPL-MCNC: 109 MG/DL — HIGH (ref 70–99)
HCT VFR BLD CALC: 37.1 % — SIGNIFICANT CHANGE UP (ref 37–47)
HGB BLD-MCNC: 11.4 G/DL — LOW (ref 12–16)
HIV 1+2 AB+HIV1 P24 AG SERPL QL IA: SIGNIFICANT CHANGE UP
IMM GRANULOCYTES NFR BLD AUTO: 0.6 % — HIGH (ref 0.1–0.3)
LYMPHOCYTES # BLD AUTO: 3.55 K/UL — HIGH (ref 1.2–3.4)
LYMPHOCYTES # BLD AUTO: 43 % — SIGNIFICANT CHANGE UP (ref 20.5–51.1)
MCHC RBC-ENTMCNC: 28.9 PG — SIGNIFICANT CHANGE UP (ref 27–31)
MCHC RBC-ENTMCNC: 30.7 G/DL — LOW (ref 32–37)
MCV RBC AUTO: 94.2 FL — SIGNIFICANT CHANGE UP (ref 81–99)
MONOCYTES # BLD AUTO: 0.6 K/UL — SIGNIFICANT CHANGE UP (ref 0.1–0.6)
MONOCYTES NFR BLD AUTO: 7.3 % — SIGNIFICANT CHANGE UP (ref 1.7–9.3)
NEUTROPHILS # BLD AUTO: 3.26 K/UL — SIGNIFICANT CHANGE UP (ref 1.4–6.5)
NEUTROPHILS NFR BLD AUTO: 39.4 % — LOW (ref 42.2–75.2)
NRBC # BLD: 0 /100 WBCS — SIGNIFICANT CHANGE UP (ref 0–0)
PLATELET # BLD AUTO: 378 K/UL — SIGNIFICANT CHANGE UP (ref 130–400)
POTASSIUM SERPL-MCNC: 4.1 MMOL/L — SIGNIFICANT CHANGE UP (ref 3.5–5)
POTASSIUM SERPL-SCNC: 4.1 MMOL/L — SIGNIFICANT CHANGE UP (ref 3.5–5)
PROT SERPL-MCNC: 5.7 G/DL — LOW (ref 6–8)
RBC # BLD: 3.94 M/UL — LOW (ref 4.2–5.4)
RBC # FLD: 14.2 % — SIGNIFICANT CHANGE UP (ref 11.5–14.5)
SARS-COV-2 RNA SPEC QL NAA+PROBE: SIGNIFICANT CHANGE UP
SODIUM SERPL-SCNC: 138 MMOL/L — SIGNIFICANT CHANGE UP (ref 135–146)
SPECIMEN SOURCE: SIGNIFICANT CHANGE UP
WBC # BLD: 8.26 K/UL — SIGNIFICANT CHANGE UP (ref 4.8–10.8)
WBC # FLD AUTO: 8.26 K/UL — SIGNIFICANT CHANGE UP (ref 4.8–10.8)

## 2022-01-12 PROCEDURE — 99239 HOSP IP/OBS DSCHRG MGMT >30: CPT

## 2022-01-12 RX ORDER — IBUPROFEN 200 MG
200 TABLET ORAL ONCE
Refills: 0 | Status: COMPLETED | OUTPATIENT
Start: 2022-01-12 | End: 2022-01-12

## 2022-01-12 RX ADMIN — Medication 200 MILLIGRAM(S): at 13:41

## 2022-01-12 RX ADMIN — FLUCONAZOLE 200 MILLIGRAM(S): 150 TABLET ORAL at 11:09

## 2022-01-12 RX ADMIN — Medication 650 MILLIGRAM(S): at 09:27

## 2022-01-12 RX ADMIN — SERTRALINE 100 MILLIGRAM(S): 25 TABLET, FILM COATED ORAL at 11:08

## 2022-01-12 RX ADMIN — FAMOTIDINE 20 MILLIGRAM(S): 10 INJECTION INTRAVENOUS at 11:08

## 2022-01-12 RX ADMIN — SENNA PLUS 2 TABLET(S): 8.6 TABLET ORAL at 21:10

## 2022-01-12 RX ADMIN — Medication 200 MILLIGRAM(S): at 12:53

## 2022-01-12 RX ADMIN — Medication 5 MILLIGRAM(S): at 18:27

## 2022-01-12 RX ADMIN — ENOXAPARIN SODIUM 40 MILLIGRAM(S): 100 INJECTION SUBCUTANEOUS at 11:07

## 2022-01-12 RX ADMIN — PANTOPRAZOLE SODIUM 40 MILLIGRAM(S): 20 TABLET, DELAYED RELEASE ORAL at 06:23

## 2022-01-12 RX ADMIN — Medication 650 MILLIGRAM(S): at 01:14

## 2022-01-12 RX ADMIN — OLANZAPINE 10 MILLIGRAM(S): 15 TABLET, FILM COATED ORAL at 11:08

## 2022-01-12 RX ADMIN — POLYETHYLENE GLYCOL 3350 17 GRAM(S): 17 POWDER, FOR SOLUTION ORAL at 06:23

## 2022-01-12 RX ADMIN — PANTOPRAZOLE SODIUM 40 MILLIGRAM(S): 20 TABLET, DELAYED RELEASE ORAL at 18:26

## 2022-01-12 RX ADMIN — Medication 5 MILLIGRAM(S): at 06:23

## 2022-01-12 RX ADMIN — Medication 650 MILLIGRAM(S): at 10:22

## 2022-01-12 RX ADMIN — Medication 1 MILLIGRAM(S): at 21:10

## 2022-01-12 NOTE — DISCHARGE NOTE NURSING/CASE MANAGEMENT/SOCIAL WORK - NSDCPEFALRISK_GEN_ALL_CORE
For information on Fall & Injury Prevention, visit: [email or web address] OR  [email or web address] OR  https://www.cdc.gov/steadi/patient.html

## 2022-01-12 NOTE — DISCHARGE NOTE NURSING/CASE MANAGEMENT/SOCIAL WORK - PATIENT PORTAL LINK FT
You can access the FollowMyHealth Patient Portal offered by [facility] by registering at the following website: [email or web address]. By joining [employer]’s FollowMyHealth portal, you will also be able to view your health information using other applications (apps) compatible with our system.

## 2022-01-12 NOTE — PROGRESS NOTE ADULT - SUBJECTIVE AND OBJECTIVE BOX
COMFORT FARIAS 61y Female  MRN#: [id number]   CODE STATUS: FULL     Hospital Day: 6d    Pt is currently admitted with the primary diagnosis of frequent falls     SUBJECTIVE  HPI: 59 y/o female with PMHx bipolar disorder with hypomanic episodes, schizophrenia, IBS, anxiety, depression, severe right knee osteoarthritis, low back pain, recurrent UTI, presented to ED for inability to care for herself and poor living conditions that has been going on for few months  patient was admitted back in june 2021 for the same chief complaint and got dc to [facility] for STR. she said that her niece was not able to take care of her anymore. patient usually uses a walker to ambulate, but recently she was having trouble walking even with a rolling walker in view of her severe pain in her right knee. she mentions that she was taking diclofenac and her pain was well controlled at that time but recently she went to [facility] and her doctor stopped her diclofenac. to note that she had several falls over the last few months but she denied any head trauma, or LOC. she had a contusion on her breastbone. she also has chronic recurrent UTI and c/o urgency and intermittency and most of the time, she cannot make it to the bathroom in view of her inability to ambulate  patient will be admitted for disposition plan, to r/o UTI and to check for right knee OA progression     Overnight events: none    Interval hx/Subjective complaints: Pt feels well, no complaints. Wants to be d/c home rather than rehab.     Pt denies any fevers, chills, fatigue, CP, palpitations, cough, SOB, abdominal pain, n/v/d, hematochezia, melena, weakness, numbness, tingling, or other FND.                                           ----------------------------------------------------------  OBJECTIVE  PAST MEDICAL & SURGICAL HISTORY  Schizophrenia    Bipolar disorder    Depression    Anxiety    Chronic lower back pain  result of pelvic fracture in the past    Osteoarthritis    Urinary incontinence    Chronic urinary tract infection    History of tremor    History of total knee arthroplasty, left                                              -----------------------------------------------------------  ALLERGIES:  azithromycin (Unknown)  moxifloxacin (Unknown)  penicillin (Unknown)                                            ------------------------------------------------------------    HOME MEDICATIONS  Home Medications:  acetaminophen 325 mg oral tablet: 2 tab(s) orally every 6 hours, As needed, Temp greater or equal to 38C (100.4F), Mild Pain (1 - 3) (14 Dec 2021 13:05)  bisacodyl 5 mg oral delayed release tablet: 1 tab(s) orally every 12 hours, As needed, Constipation (17 Dec 2021 10:48)  clonazePAM 1 mg oral tablet: 1 tab(s) orally once a day (13 Dec 2021 09:42)  OLANZapine 10 mg oral tablet: 1 tab(s) orally once a day (13 Dec 2021 09:42)  sertraline 100 mg oral tablet: 1 tab(s) orally once a day (13 Dec 2021 09:42)                           MEDICATIONS:  STANDING MEDICATIONS  clonazePAM  Tablet 1 milliGRAM(s) Oral at bedtime  enoxaparin Injectable 40 milliGRAM(s) SubCutaneous daily  famotidine    Tablet 20 milliGRAM(s) Oral daily  fluconAZOLE   Tablet 200 milliGRAM(s) Oral daily  OLANZapine 10 milliGRAM(s) Oral daily  pantoprazole    Tablet 40 milliGRAM(s) Oral two times a day  senna 2 Tablet(s) Oral at bedtime  sertraline 100 milliGRAM(s) Oral daily    PRN MEDICATIONS  acetaminophen     Tablet .. 650 milliGRAM(s) Oral every 6 hours PRN  aluminum hydroxide/magnesium hydroxide/simethicone Suspension 30 milliLiter(s) Oral every 4 hours PRN  artificial  tears Solution 1 Drop(s) Right EYE three times a day PRN  bisacodyl 5 milliGRAM(s) Oral every 12 hours PRN  melatonin 3 milliGRAM(s) Oral at bedtime PRN  ondansetron Injectable 4 milliGRAM(s) IV Push every 6 hours PRN  polyethylene glycol 3350 17 Gram(s) Oral daily PRN                                            ------------------------------------------------------------  VITAL SIGNS: Last 24 Hours  T(C): 35.7 (12 Jan 2022 05:01), Max: 37 (11 Jan 2022 14:22)  T(F): 96.3 (12 Jan 2022 05:01), Max: 98.6 (11 Jan 2022 14:22)  HR: 69 (12 Jan 2022 05:01) (69 - 98)  BP: 103/59 (12 Jan 2022 05:01) (96/50 - 105/59)  BP(mean): --  RR: 18 (12 Jan 2022 05:01) (18 - 19)  SpO2: --                                             --------------------------------------------------------------  LABS:                        11.4   8.26  )-----------( 378      ( 12 Jan 2022 04:30 )             37.1     01-12    138  |  103  |  15  ----------------------------<  109<H>  4.1   |  23  |  0.8    Ca    9.6      12 Jan 2022 04:30    TPro  5.7<L>  /  Alb  3.3<L>  /  TBili  <0.2  /  DBili  x   /  AST  16  /  ALT  12  /  AlkPhos  96  01-12                Culture - Urine (collected 09 Jan 2022 17:15)  Source: Clean Catch Clean Catch (Midstream)  Final Report (10 Shun 2022 21:36):    No growth                                                    -------------------------------------------------------------  RADIOLOGY:                                            --------------------------------------------------------------  PHYSICAL EXAM:  General: Woman laying in bed in nad  HEENT: ncat, eomi, mmm  LUNGS: ctab  HEART: s1s2, rrr  ABDOMEN: soft, ntnd, bs+  EXT: wwp, no cyanosis, clubbing, edema  NEURO: aox4, moves all extremities   SKIN: intact, no rashes or lesions                                          --------------------------------------------------------------    ASSESSMENT & PLAN  60 yo F with PMH of bipolar disorder, schizophrenia, gout, OA, chronic lower back pain, recurrent UTI, anxiety presented to the ED s/p fall, 3 days ago from couch. Denies LOC, head trauma. Requesting placement to [facility].     #FUO, no clear source   - last fever 1/8   - COVID 19 negative   - UA negative  - CXR shows no acute process  - BCx negative x2  - UCx negative  - elevated D-dimer  - Doppler US of LE: negative for DVTsin b/l LEs and negative for superficial thrombophlebitis  - Per ID, d/c cefepime    #Abdominal pain, likely 2/2 gastric ulcer   #Esophageal Candidiases   - Pt having nausea and vomiting.   - CTAP 1/8: mod paraesophageal hiatal hernia a/w thickening of distal esophagus Enlarged gastroesophageal lymph nodes, malignancy not excluded   - EGD 1/10 showing esophageal candidiasis, nonerosive gastritis, ulcer in cardia, normal duodenal mucosa (bx taken)  - c/w with Pepcid and Maalox   - LFT ok  - Per GI, EGD done in AM - Esophageal candidiasis + 1.5 cm pigmented ulcer; start on fluconazole 400mg today, 200 mg for x21d after and Protonix 40mg x2wks, 40mg for 6wks after    #Hypothyroidism   - TSH 0.17 (0.66 in 12/21); Thyroid panel-> T4: 7.4; Free thyroxine: 1.4; T3: 54    # Frequent falls  - likely d/t deconditioning, Severe OA  - Seen by PT: fall precautions, gait abnormality likely secondary to fall, benefit from OT  - Denies LOC, head trauma  - Trauma workup negative  - Echo (01/06/22): LVEF 60-65%; Grade I diastolic dysf(x) - spectral doppler demonstrates impaired relaxation pattern of LV myocardial filling; trace MV regurg  - Fall precautions    # Rhabdomyolysis  - likely sec to fall  - ->328  - resolved   - renal function stable     #Hypomagnesia, resolved  - monitor mg, repleat prn     #Schizophrenia/Bipolar disorder/anxiety  - continue with olanzapine, sertraline, clonazepam (at bedtime)    #OA  - PT  - Pain control - tylenol PRN  - OP ortho f/u    # Diet: Regular  # Activity: IAT  # GI PPX: NA  # DVT PPX: Lovenox  # Full Code    #Progress Note Handoff  Pending (specify): PT eval   Disposition:  pt refusing IP rehab, to be d/c home if cleared by PT   Note; pt roommate tested positive for COVID, will need to quarantine until

## 2022-01-13 LAB
ALBUMIN SERPL ELPH-MCNC: 3.3 G/DL — LOW (ref 3.5–5.2)
ALP SERPL-CCNC: 88 U/L — SIGNIFICANT CHANGE UP (ref 30–115)
ALT FLD-CCNC: 11 U/L — SIGNIFICANT CHANGE UP (ref 0–41)
ANION GAP SERPL CALC-SCNC: 11 MMOL/L — SIGNIFICANT CHANGE UP (ref 7–14)
AST SERPL-CCNC: 16 U/L — SIGNIFICANT CHANGE UP (ref 0–41)
BASOPHILS # BLD AUTO: 0.09 K/UL — SIGNIFICANT CHANGE UP (ref 0–0.2)
BASOPHILS NFR BLD AUTO: 1 % — SIGNIFICANT CHANGE UP (ref 0–1)
BILIRUB SERPL-MCNC: <0.2 MG/DL — SIGNIFICANT CHANGE UP (ref 0.2–1.2)
BUN SERPL-MCNC: 17 MG/DL — SIGNIFICANT CHANGE UP (ref 10–20)
CALCIUM SERPL-MCNC: 9.4 MG/DL — SIGNIFICANT CHANGE UP (ref 8.5–10.1)
CHLORIDE SERPL-SCNC: 104 MMOL/L — SIGNIFICANT CHANGE UP (ref 98–110)
CO2 SERPL-SCNC: 25 MMOL/L — SIGNIFICANT CHANGE UP (ref 17–32)
CREAT SERPL-MCNC: 0.6 MG/DL — LOW (ref 0.7–1.5)
EOSINOPHIL # BLD AUTO: 0.66 K/UL — SIGNIFICANT CHANGE UP (ref 0–0.7)
EOSINOPHIL NFR BLD AUTO: 7.7 % — SIGNIFICANT CHANGE UP (ref 0–8)
GLUCOSE SERPL-MCNC: 90 MG/DL — SIGNIFICANT CHANGE UP (ref 70–99)
HCT VFR BLD CALC: 36.1 % — LOW (ref 37–47)
HGB BLD-MCNC: 11.3 G/DL — LOW (ref 12–16)
IMM GRANULOCYTES NFR BLD AUTO: 0.6 % — HIGH (ref 0.1–0.3)
LYMPHOCYTES # BLD AUTO: 3.77 K/UL — HIGH (ref 1.2–3.4)
LYMPHOCYTES # BLD AUTO: 43.8 % — SIGNIFICANT CHANGE UP (ref 20.5–51.1)
MAGNESIUM SERPL-MCNC: 1.8 MG/DL — SIGNIFICANT CHANGE UP (ref 1.8–2.4)
MCHC RBC-ENTMCNC: 29 PG — SIGNIFICANT CHANGE UP (ref 27–31)
MCHC RBC-ENTMCNC: 31.3 G/DL — LOW (ref 32–37)
MCV RBC AUTO: 92.8 FL — SIGNIFICANT CHANGE UP (ref 81–99)
MONOCYTES # BLD AUTO: 0.59 K/UL — SIGNIFICANT CHANGE UP (ref 0.1–0.6)
MONOCYTES NFR BLD AUTO: 6.9 % — SIGNIFICANT CHANGE UP (ref 1.7–9.3)
NEUTROPHILS # BLD AUTO: 3.45 K/UL — SIGNIFICANT CHANGE UP (ref 1.4–6.5)
NEUTROPHILS NFR BLD AUTO: 40 % — LOW (ref 42.2–75.2)
NRBC # BLD: 0 /100 WBCS — SIGNIFICANT CHANGE UP (ref 0–0)
PLATELET # BLD AUTO: 373 K/UL — SIGNIFICANT CHANGE UP (ref 130–400)
POTASSIUM SERPL-MCNC: 4.1 MMOL/L — SIGNIFICANT CHANGE UP (ref 3.5–5)
POTASSIUM SERPL-SCNC: 4.1 MMOL/L — SIGNIFICANT CHANGE UP (ref 3.5–5)
PROT SERPL-MCNC: 5.8 G/DL — LOW (ref 6–8)
RBC # BLD: 3.89 M/UL — LOW (ref 4.2–5.4)
RBC # FLD: 14.2 % — SIGNIFICANT CHANGE UP (ref 11.5–14.5)
SODIUM SERPL-SCNC: 140 MMOL/L — SIGNIFICANT CHANGE UP (ref 135–146)
WBC # BLD: 8.61 K/UL — SIGNIFICANT CHANGE UP (ref 4.8–10.8)
WBC # FLD AUTO: 8.61 K/UL — SIGNIFICANT CHANGE UP (ref 4.8–10.8)

## 2022-01-13 PROCEDURE — 99232 SBSQ HOSP IP/OBS MODERATE 35: CPT

## 2022-01-13 RX ORDER — DICLOFENAC SODIUM 75 MG/1
25 TABLET, DELAYED RELEASE ORAL ONCE
Refills: 0 | Status: COMPLETED | OUTPATIENT
Start: 2022-01-13 | End: 2022-01-14

## 2022-01-13 RX ADMIN — SENNA PLUS 2 TABLET(S): 8.6 TABLET ORAL at 21:18

## 2022-01-13 RX ADMIN — Medication 1 MILLIGRAM(S): at 21:18

## 2022-01-13 RX ADMIN — PANTOPRAZOLE SODIUM 40 MILLIGRAM(S): 20 TABLET, DELAYED RELEASE ORAL at 17:37

## 2022-01-13 RX ADMIN — Medication 650 MILLIGRAM(S): at 22:00

## 2022-01-13 RX ADMIN — Medication 650 MILLIGRAM(S): at 15:16

## 2022-01-13 RX ADMIN — OLANZAPINE 10 MILLIGRAM(S): 15 TABLET, FILM COATED ORAL at 11:17

## 2022-01-13 RX ADMIN — POLYETHYLENE GLYCOL 3350 17 GRAM(S): 17 POWDER, FOR SOLUTION ORAL at 06:10

## 2022-01-13 RX ADMIN — SERTRALINE 100 MILLIGRAM(S): 25 TABLET, FILM COATED ORAL at 11:16

## 2022-01-13 RX ADMIN — Medication 650 MILLIGRAM(S): at 21:18

## 2022-01-13 RX ADMIN — Medication 650 MILLIGRAM(S): at 03:50

## 2022-01-13 RX ADMIN — FLUCONAZOLE 200 MILLIGRAM(S): 150 TABLET ORAL at 11:16

## 2022-01-13 RX ADMIN — FAMOTIDINE 20 MILLIGRAM(S): 10 INJECTION INTRAVENOUS at 11:16

## 2022-01-13 RX ADMIN — PANTOPRAZOLE SODIUM 40 MILLIGRAM(S): 20 TABLET, DELAYED RELEASE ORAL at 06:10

## 2022-01-13 RX ADMIN — Medication 10 MILLIGRAM(S): at 11:17

## 2022-01-13 RX ADMIN — ENOXAPARIN SODIUM 40 MILLIGRAM(S): 100 INJECTION SUBCUTANEOUS at 11:17

## 2022-01-13 RX ADMIN — Medication 650 MILLIGRAM(S): at 16:00

## 2022-01-13 RX ADMIN — Medication 650 MILLIGRAM(S): at 03:19

## 2022-01-14 ENCOUNTER — TRANSCRIPTION ENCOUNTER (OUTPATIENT)
Age: 62
End: 2022-01-14

## 2022-01-14 LAB
ALBUMIN SERPL ELPH-MCNC: 3.4 G/DL — LOW (ref 3.5–5.2)
ALP SERPL-CCNC: 92 U/L — SIGNIFICANT CHANGE UP (ref 30–115)
ALT FLD-CCNC: 11 U/L — SIGNIFICANT CHANGE UP (ref 0–41)
ANION GAP SERPL CALC-SCNC: 15 MMOL/L — HIGH (ref 7–14)
AST SERPL-CCNC: 16 U/L — SIGNIFICANT CHANGE UP (ref 0–41)
BASOPHILS # BLD AUTO: 0.05 K/UL — SIGNIFICANT CHANGE UP (ref 0–0.2)
BASOPHILS NFR BLD AUTO: 0.5 % — SIGNIFICANT CHANGE UP (ref 0–1)
BILIRUB SERPL-MCNC: <0.2 MG/DL — SIGNIFICANT CHANGE UP (ref 0.2–1.2)
BUN SERPL-MCNC: 12 MG/DL — SIGNIFICANT CHANGE UP (ref 10–20)
CALCIUM SERPL-MCNC: 9.3 MG/DL — SIGNIFICANT CHANGE UP (ref 8.5–10.1)
CHLORIDE SERPL-SCNC: 102 MMOL/L — SIGNIFICANT CHANGE UP (ref 98–110)
CO2 SERPL-SCNC: 21 MMOL/L — SIGNIFICANT CHANGE UP (ref 17–32)
CREAT SERPL-MCNC: 0.6 MG/DL — LOW (ref 0.7–1.5)
EOSINOPHIL # BLD AUTO: 0.38 K/UL — SIGNIFICANT CHANGE UP (ref 0–0.7)
EOSINOPHIL NFR BLD AUTO: 3.9 % — SIGNIFICANT CHANGE UP (ref 0–8)
GLUCOSE BLDC GLUCOMTR-MCNC: 117 MG/DL — HIGH (ref 70–99)
GLUCOSE SERPL-MCNC: 95 MG/DL — SIGNIFICANT CHANGE UP (ref 70–99)
HCT VFR BLD CALC: 34.4 % — LOW (ref 37–47)
HGB BLD-MCNC: 10.9 G/DL — LOW (ref 12–16)
IMM GRANULOCYTES NFR BLD AUTO: 0.3 % — SIGNIFICANT CHANGE UP (ref 0.1–0.3)
LYMPHOCYTES # BLD AUTO: 3.04 K/UL — SIGNIFICANT CHANGE UP (ref 1.2–3.4)
LYMPHOCYTES # BLD AUTO: 31.4 % — SIGNIFICANT CHANGE UP (ref 20.5–51.1)
MAGNESIUM SERPL-MCNC: 1.8 MG/DL — SIGNIFICANT CHANGE UP (ref 1.8–2.4)
MCHC RBC-ENTMCNC: 28.9 PG — SIGNIFICANT CHANGE UP (ref 27–31)
MCHC RBC-ENTMCNC: 31.7 G/DL — LOW (ref 32–37)
MCV RBC AUTO: 91.2 FL — SIGNIFICANT CHANGE UP (ref 81–99)
MONOCYTES # BLD AUTO: 0.66 K/UL — HIGH (ref 0.1–0.6)
MONOCYTES NFR BLD AUTO: 6.8 % — SIGNIFICANT CHANGE UP (ref 1.7–9.3)
NEUTROPHILS # BLD AUTO: 5.51 K/UL — SIGNIFICANT CHANGE UP (ref 1.4–6.5)
NEUTROPHILS NFR BLD AUTO: 57.1 % — SIGNIFICANT CHANGE UP (ref 42.2–75.2)
NRBC # BLD: 0 /100 WBCS — SIGNIFICANT CHANGE UP (ref 0–0)
PLATELET # BLD AUTO: 407 K/UL — HIGH (ref 130–400)
POTASSIUM SERPL-MCNC: 4.4 MMOL/L — SIGNIFICANT CHANGE UP (ref 3.5–5)
POTASSIUM SERPL-SCNC: 4.4 MMOL/L — SIGNIFICANT CHANGE UP (ref 3.5–5)
PROT SERPL-MCNC: 5.8 G/DL — LOW (ref 6–8)
RBC # BLD: 3.77 M/UL — LOW (ref 4.2–5.4)
RBC # FLD: 14.2 % — SIGNIFICANT CHANGE UP (ref 11.5–14.5)
SODIUM SERPL-SCNC: 138 MMOL/L — SIGNIFICANT CHANGE UP (ref 135–146)
WBC # BLD: 9.67 K/UL — SIGNIFICANT CHANGE UP (ref 4.8–10.8)
WBC # FLD AUTO: 9.67 K/UL — SIGNIFICANT CHANGE UP (ref 4.8–10.8)

## 2022-01-14 PROCEDURE — 99232 SBSQ HOSP IP/OBS MODERATE 35: CPT

## 2022-01-14 RX ORDER — CLONAZEPAM 1 MG
1 TABLET ORAL AT BEDTIME
Refills: 0 | Status: DISCONTINUED | OUTPATIENT
Start: 2022-01-14 | End: 2022-01-15

## 2022-01-14 RX ORDER — TAMSULOSIN HYDROCHLORIDE 0.4 MG/1
1 CAPSULE ORAL
Qty: 0 | Refills: 0 | DISCHARGE
Start: 2022-01-14

## 2022-01-14 RX ORDER — CLONAZEPAM 1 MG
0.5 TABLET ORAL ONCE
Refills: 0 | Status: DISCONTINUED | OUTPATIENT
Start: 2022-01-14 | End: 2022-01-14

## 2022-01-14 RX ORDER — TAMSULOSIN HYDROCHLORIDE 0.4 MG/1
0.4 CAPSULE ORAL AT BEDTIME
Refills: 0 | Status: DISCONTINUED | OUTPATIENT
Start: 2022-01-14 | End: 2022-01-15

## 2022-01-14 RX ORDER — PANTOPRAZOLE SODIUM 20 MG/1
1 TABLET, DELAYED RELEASE ORAL
Qty: 60 | Refills: 0
Start: 2022-01-14 | End: 2022-02-12

## 2022-01-14 RX ORDER — FLUCONAZOLE 150 MG/1
1 TABLET ORAL
Qty: 17 | Refills: 0
Start: 2022-01-14 | End: 2022-01-30

## 2022-01-14 RX ORDER — FAMOTIDINE 10 MG/ML
1 INJECTION INTRAVENOUS
Qty: 0 | Refills: 0 | DISCHARGE
Start: 2022-01-14

## 2022-01-14 RX ADMIN — FAMOTIDINE 20 MILLIGRAM(S): 10 INJECTION INTRAVENOUS at 11:05

## 2022-01-14 RX ADMIN — TAMSULOSIN HYDROCHLORIDE 0.4 MILLIGRAM(S): 0.4 CAPSULE ORAL at 21:15

## 2022-01-14 RX ADMIN — PANTOPRAZOLE SODIUM 40 MILLIGRAM(S): 20 TABLET, DELAYED RELEASE ORAL at 18:31

## 2022-01-14 RX ADMIN — Medication 0.5 MILLIGRAM(S): at 11:19

## 2022-01-14 RX ADMIN — Medication 650 MILLIGRAM(S): at 22:34

## 2022-01-14 RX ADMIN — Medication 3 MILLIGRAM(S): at 22:33

## 2022-01-14 RX ADMIN — TAMSULOSIN HYDROCHLORIDE 0.4 MILLIGRAM(S): 0.4 CAPSULE ORAL at 09:26

## 2022-01-14 RX ADMIN — DICLOFENAC SODIUM 25 MILLIGRAM(S): 75 TABLET, DELAYED RELEASE ORAL at 09:25

## 2022-01-14 RX ADMIN — SERTRALINE 100 MILLIGRAM(S): 25 TABLET, FILM COATED ORAL at 11:04

## 2022-01-14 RX ADMIN — DICLOFENAC SODIUM 25 MILLIGRAM(S): 75 TABLET, DELAYED RELEASE ORAL at 09:53

## 2022-01-14 RX ADMIN — OLANZAPINE 10 MILLIGRAM(S): 15 TABLET, FILM COATED ORAL at 11:05

## 2022-01-14 RX ADMIN — Medication 650 MILLIGRAM(S): at 23:00

## 2022-01-14 RX ADMIN — Medication 1 MILLIGRAM(S): at 21:15

## 2022-01-14 RX ADMIN — FLUCONAZOLE 200 MILLIGRAM(S): 150 TABLET ORAL at 11:05

## 2022-01-14 RX ADMIN — PANTOPRAZOLE SODIUM 40 MILLIGRAM(S): 20 TABLET, DELAYED RELEASE ORAL at 06:47

## 2022-01-14 RX ADMIN — Medication 650 MILLIGRAM(S): at 09:53

## 2022-01-14 RX ADMIN — Medication 650 MILLIGRAM(S): at 09:26

## 2022-01-14 RX ADMIN — ENOXAPARIN SODIUM 40 MILLIGRAM(S): 100 INJECTION SUBCUTANEOUS at 11:05

## 2022-01-14 RX ADMIN — SENNA PLUS 2 TABLET(S): 8.6 TABLET ORAL at 21:15

## 2022-01-14 NOTE — DISCHARGE NOTE PROVIDER - NSDCMRMEDTOKEN_GEN_ALL_CORE_FT
acetaminophen 325 mg oral tablet: 2 tab(s) orally every 6 hours, As needed, Temp greater or equal to 38C (100.4F), Mild Pain (1 - 3)  bisacodyl 5 mg oral delayed release tablet: 1 tab(s) orally every 12 hours, As needed, Constipation  clonazePAM 1 mg oral tablet: 1 tab(s) orally once a day  OLANZapine 10 mg oral tablet: 1 tab(s) orally once a day  sertraline 100 mg oral tablet: 1 tab(s) orally once a day  tamsulosin 0.4 mg oral capsule: 1 cap(s) orally once a day (at bedtime)   acetaminophen 325 mg oral tablet: 2 tab(s) orally every 6 hours, As needed, Temp greater or equal to 38C (100.4F), Mild Pain (1 - 3)  aluminum hydroxide-magnesium hydroxide 200 mg-200 mg/5 mL oral suspension: 30 milliliter(s) orally every 4 hours, As needed, Dyspepsia  bisacodyl 5 mg oral delayed release tablet: 1 tab(s) orally every 12 hours, As needed, Constipation  clonazePAM 1 mg oral tablet: 1 tab(s) orally once a day  famotidine 20 mg oral tablet: 1 tab(s) orally once a day  fluconazole 200 mg oral tablet: 1 tab(s) orally once a day. Take until 1/31  OLANZapine 10 mg oral tablet: 1 tab(s) orally once a day  pantoprazole 40 mg oral delayed release tablet: 1 tab(s) orally 2 times a day  sertraline 100 mg oral tablet: 1 tab(s) orally once a day  tamsulosin 0.4 mg oral capsule: 1 cap(s) orally once a day (at bedtime)

## 2022-01-14 NOTE — CHART NOTE - NSCHARTNOTEFT_GEN_A_CORE
Registered Dietitian Follow-Up     Patient Profile Reviewed                           Yes [x]   No []     Nutrition History Previously Obtained        Yes [x]  No []       Pertinent Subjective Information:     Pertinent Medical Interventions:  #Exposure to covid  #Abdominal pain, likely 2/2 gastric ulcer   #Esophageal Candidiases - Per GI, EGD done in AM - Esophageal candidiasis + 1.5 cm pigmented ulc  #Hypomagnesia, resolved     Diet order: Diet, Regular:   Bc(7 Gm Arginine/7 Gm Glut/1.2 Gm HMB     Qty per Day:  2  Supplement Feeding Modality:  Oral  Ensure Enlive Cans or Servings Per Day:  1       Frequency:  Two Times a day (01-08-22 @ 15:12) [Active]       Anthropometrics:  - Ht. 149.9  - Wt. 49.4  - %wt change  - BMI 22.0  - IBW 44.3 kg      Pertinent Lab Data:   1/13: RBC: 3.89, H/H: 11.3/36.1, Creatinine: 0.6      Pertinent Meds:  MEDICATIONS  (STANDING):  famotidine    Tablet 20 milliGRAM(s) Oral daily  pantoprazole    Tablet 40 milliGRAM(s) Oral two times a day  senna 2 Tablet(s) Oral at bedtime  sertraline 100 milliGRAM(s) Oral daily    MEDICATIONS  (PRN):  aluminum hydroxide/magnesium hydroxide/simethicone Suspension 30 milliLiter(s) Oral every 4 hours PRN Dyspepsia  bisacodyl Suppository 10 milliGRAM(s) Rectal daily PRN Constipation  ondansetron Injectable 4 milliGRAM(s) IV Push every 6 hours PRN Nausea and/or Vomiting  polyethylene glycol 3350 17 Gram(s) Oral daily PRN Constipation       Physical Findings:  - Appearance: alert and oriented x4   - GI function: no gastrointestinal signs/symptoms, LBM: 1/13   - Tubes: N/A  - Oral/Mouth cavity: No chewing/swallowing difficulties   - Skin: sacrum stage 2, no edema noted      Nutrition Requirements  Weight Used: 45.4 kg       Based on dosing weight 45.4 kg (1/5); Energy: 1101 kcal/day (MSJ x 1.1); Protein: 45 - 54 gm/day (1-1.2 gm/kg); Fluids: 1589 - 1816  ml/day (35 - 40 ml/kg)       Nutrient Intake: PO intake ~50 % of meals, has not been consuming ensure consistently,, however will try per pt        [] Previous Nutrition Diagnosis:  Inadequate Oral Intake.             [x] Ongoing          [] Resolved       Intervention: meals and snacks, medical food supplements, vitamins and minerals, coordination of care;     Monitoring and Evaluation: supplement intake, energy intake, weight, labs (see above), skin status, NFPF       Goal/Expected Outcome:  Achieve po intake of meals, snacks and supplement >75% within 6 days.     1) Continue current diet order 2) continue Ensure BID and Bc BID 3) Consider supplementation with MVI q24 hrs, Zinc Sulfate 220 mg q24hrs (x14 days) and Vitamin C 500 mg q24hrs to aid in promoting wound healing;  Patient is at moderate nutritional risk; RD to follow up in 4-6 days

## 2022-01-14 NOTE — PROGRESS NOTE ADULT - SUBJECTIVE AND OBJECTIVE BOX
COMFORT FARIAS 61y Female  MRN#: [id number]   CODE STATUS: FULL     Hospital Day: 8d    Pt is currently admitted with the primary diagnosis of frequent falls    SUBJECTIVE  Hospital Course:  59 y/o female with PMHx bipolar disorder with hypomanic episodes, schizophrenia, IBS, anxiety, depression, severe right knee osteoarthritis, low back pain, recurrent UTI, presented to ED for inability to care for herself and poor living conditions that has been going on for few months  patient was admitted back in june 2021 for the same chief complaint and got dc to [facility] for STR. she said that her niece was not able to take care of her anymore. patient usually uses a walker to ambulate, but recently she was having trouble walking even with a rolling walker in view of her severe pain in her right knee. she mentions that she was taking diclofenac and her pain was well controlled at that time but recently she went to [facility] and her doctor stopped her diclofenac. to note that she had several falls over the last few months but she denied any head trauma, or LOC. she had a contusion on her breastbone. she also has chronic recurrent UTI and c/o urgency and intermittency and most of the time, she cannot make it to the bathroom in view of her inability to ambulate  patient will be admitted for disposition plan, to r/o UTI and to check for right knee OA progression     Overnight events: none    Interval hx/Subjective complaints: Pt feeling well this morning, no complaints.     Pt denies any fevers, chills, fatigue, CP, palpitations, cough, SOB, abdominal pain, n/v/d, hematochezia, melena, weakness, numbness, tingling, or other FND.                                           ----------------------------------------------------------  OBJECTIVE  PAST MEDICAL & SURGICAL HISTORY  Schizophrenia    Bipolar disorder    Depression    Anxiety    Chronic lower back pain  result of pelvic fracture in the past    Osteoarthritis    Urinary incontinence    Chronic urinary tract infection    History of tremor    History of total knee arthroplasty, left                                              -----------------------------------------------------------  ALLERGIES:  azithromycin (Unknown)  moxifloxacin (Unknown)  penicillin (Unknown)                                            ------------------------------------------------------------    HOME MEDICATIONS  Home Medications:  acetaminophen 325 mg oral tablet: 2 tab(s) orally every 6 hours, As needed, Temp greater or equal to 38C (100.4F), Mild Pain (1 - 3) (14 Dec 2021 13:05)  bisacodyl 5 mg oral delayed release tablet: 1 tab(s) orally every 12 hours, As needed, Constipation (17 Dec 2021 10:48)  clonazePAM 1 mg oral tablet: 1 tab(s) orally once a day (13 Dec 2021 09:42)  OLANZapine 10 mg oral tablet: 1 tab(s) orally once a day (13 Dec 2021 09:42)  sertraline 100 mg oral tablet: 1 tab(s) orally once a day (13 Dec 2021 09:42)                           MEDICATIONS:  STANDING MEDICATIONS  enoxaparin Injectable 40 milliGRAM(s) SubCutaneous daily  famotidine    Tablet 20 milliGRAM(s) Oral daily  fluconAZOLE   Tablet 200 milliGRAM(s) Oral daily  OLANZapine 10 milliGRAM(s) Oral daily  pantoprazole    Tablet 40 milliGRAM(s) Oral two times a day  senna 2 Tablet(s) Oral at bedtime  sertraline 100 milliGRAM(s) Oral daily  tamsulosin 0.4 milliGRAM(s) Oral at bedtime    PRN MEDICATIONS  acetaminophen     Tablet .. 650 milliGRAM(s) Oral every 6 hours PRN  aluminum hydroxide/magnesium hydroxide/simethicone Suspension 30 milliLiter(s) Oral every 4 hours PRN  artificial  tears Solution 1 Drop(s) Right EYE three times a day PRN  bisacodyl Suppository 10 milliGRAM(s) Rectal daily PRN  melatonin 3 milliGRAM(s) Oral at bedtime PRN  ondansetron Injectable 4 milliGRAM(s) IV Push every 6 hours PRN  polyethylene glycol 3350 17 Gram(s) Oral daily PRN                                            ------------------------------------------------------------  VITAL SIGNS: Last 24 Hours  T(C): 36.4 (14 Jan 2022 12:40), Max: 37.2 (13 Jan 2022 20:48)  T(F): 97.6 (14 Jan 2022 12:40), Max: 98.9 (13 Jan 2022 20:48)  HR: 97 (14 Jan 2022 12:40) (55 - 97)  BP: 107/59 (14 Jan 2022 12:40) (107/59 - 126/72)  BP(mean): --  RR: 18 (14 Jan 2022 12:40) (18 - 19)  SpO2: --      01-13-22 @ 07:01  -  01-14-22 @ 07:00  --------------------------------------------------------  IN: 0 mL / OUT: 750 mL / NET: -750 mL    01-14-22 @ 07:01  -  01-14-22 @ 12:42  --------------------------------------------------------  IN: 0 mL / OUT: 300 mL / NET: -300 mL                                             --------------------------------------------------------------  LABS:                        10.9   9.67  )-----------( 407      ( 14 Jan 2022 08:48 )             34.4     01-14    138  |  102  |  12  ----------------------------<  95  4.4   |  21  |  0.6<L>    Ca    9.3      14 Jan 2022 08:48  Mg     1.8     01-14    TPro  5.8<L>  /  Alb  3.4<L>  /  TBili  <0.2  /  DBili  x   /  AST  16  /  ALT  11  /  AlkPhos  92  01-14                                              -------------------------------------------------------------  RADIOLOGY:                                            --------------------------------------------------------------  PHYSICAL EXAM:  General: Woman laying in bed in nad  HEENT: ncat, eomi, mmm  LUNGS: ctab  HEART: s1/s2, rrr, no m/r/g  ABDOMEN: soft, ntnd, bs+  EXT: wwp, no cyanosis, clubbing, edema  NEURO: aox4, moves all extremities   SKIN: intact, no rashes or lesions                                          --------------------------------------------------------------    ASSESSMENT & PLAN  60 yo F with PMH of bipolar disorder, schizophrenia, gout, OA, chronic lower back pain, recurrent UTI, anxiety presented to the ED s/p fall, 3 days ago from couch. Denies LOC, head trauma.     #FUO, no clear source   #Exposure to covid  - last fever 1/8   - COVID 19 negative   - UA negative  - CXR shows no acute process  - BCx negative x2  - UCx negative  - elevated D-dimer  - Doppler US of LE: negative for DVTsin b/l LEs and negative for superficial thrombophlebitis  - Per ID, d/c cefepime  - note; pt's roommate tested positive for COVID 1/11, will need to quarantine for 10 days    #Abdominal pain 2/2 gastric ulcer   #Esophageal Candidiases   - Pt having nausea and vomiting.   - CTAP 1/8: mod paraesophageal hiatal hernia a/w thickening of distal esophagus Enlarged gastroesophageal lymph nodes, malignancy not excluded   - EGD 1/10 showing esophageal candidiasis, nonerosive gastritis, ulcer in cardia, normal duodenal mucosa (bx taken)  - c/w with Pepcid and Maalox   - c/w fluconazole     #Hypothyroidism   - TSH 0.17 (0.66 in 12/21); Thyroid panel-> T4: 7.4; Free thyroxine: 1.4; T3: 54    #Frequent falls  - likely d/t deconditioning, Severe OA  - Seen by PT: fall precautions, gait abnormality likely secondary to fall, benefit from OT  - Denies LOC, head trauma  - Trauma workup negative  - Echo (01/06/22): LVEF 60-65%; Grade I diastolic dysf(x) - spectral doppler demonstrates impaired relaxation pattern of LV myocardial filling; trace MV regurg  - Fall precautions    # Rhabdomyolysis- resolved   - likely sec to fall  - ->328  - resolved   - renal function stable     #Hypomagnesia, resolved  - monitor mg, repleat prn     #Schizophrenia/Bipolar disorder/anxiety  - continue with olanzapine, sertraline, clonazepam (at bedtime)    #OA  - PT  - Pain control - tylenol PRN  - OP ortho f/u    #Diet: Regular  #Activity: IAT  #GI PPX: NA  #DVT PPX: Lovenox  #Full Code    #Progress Note Handoff  Disposition:  pt refusing IP rehab, not safe discharge home, will continue to work with pt    Note; pt roommate tested positive for COVID, will need to quarantine until 1/21.

## 2022-01-14 NOTE — DISCHARGE NOTE PROVIDER - PROVIDER TOKENS
PROVIDER:[TOKEN:[[id number]],FOLLOWUP:[2 weeks]],PROVIDER:[TOKEN:[[id number]],FOLLOWUP:[2 weeks]],PROVIDER:[TOKEN:[[id number]],FOLLOWUP:[Routine]] PROVIDER:[TOKEN:[[id number]],FOLLOWUP:[2 weeks]],PROVIDER:[TOKEN:[[id number]],FOLLOWUP:[2 weeks]],PROVIDER:[TOKEN:[[id number]],FOLLOWUP:[Routine]],PROVIDER:[TOKEN:[[id number]],FOLLOWUP:[2 weeks]]

## 2022-01-14 NOTE — DISCHARGE NOTE PROVIDER - HOSPITAL COURSE
59 y/o female with PMHx bipolar disorder with hypomanic episodes, schizophrenia, IBS, anxiety, depression, severe right knee osteoarthritis, low back pain, recurrent UTI, presented to ED for inability to care for herself and poor living conditions that has been going on for few months patient was admitted back in Juna 2021 for the same chief complaint and got dc to [facility] for STR. she said that her niece was not able to take care of her anymore. patient usually uses a walker to ambulate, but recently she was having trouble walking even with a rolling walker in view of her severe pain in her right knee. she mentions that she was taking diclofenac and her pain was well controlled at that time but recently she went to [facility] and her doctor stopped her diclofenac. to note that she had several falls over the last few months but she denied any head trauma, or LOC. she had a contusion on her breastbone. She also has chronic recurrent UTI and c/o urgency and intermittency and most of the time, she cannot make it to the bathroom in view of her inability to ambulate. Patient will be admitted for disposition plan, to r/o UTI and to check for right knee OA progression. Frequent falls likely 2/2 deconditioning, severe OA. Trauma wkup negative, Echo (01/06/22): LVEF 60-65%; Grade I diastolic dysf(x) - spectral doppler demonstrates impaired relaxation pattern of LV myocardial filling; trace MV regurg.  Admission c/b rhabdomyolysis which resolved, renal function stable. Admission c/b abdominal pain 2/2 gastric ulcer. CTAP 1/8: mod paraesophageal hiatal hernia a/w thickening of distal esophagus Enlarged gastroesophageal lymph nodes, malignancy not excluded. EGD 1/10 showing esophageal candidiasis, nonerosive gastritis, ulcer in cardia, normal duodenal mucosa (bx taken). Pt started on fluconazole for esophageal candidiasis.    59 y/o female with PMHx bipolar disorder with hypomanic episodes, schizophrenia, IBS, anxiety, depression, severe right knee osteoarthritis, low back pain, recurrent UTI, presented to ED for inability to care for herself and poor living conditions that has been going on for few months patient was admitted back in Juna 2021 for the same chief complaint and got dc to [facility] for STR. she said that her niece was not able to take care of her anymore. patient usually uses a walker to ambulate, but recently she was having trouble walking even with a rolling walker in view of her severe pain in her right knee. she mentions that she was taking diclofenac and her pain was well controlled at that time but recently she went to [facility] and her doctor stopped her diclofenac. to note that she had several falls over the last few months but she denied any head trauma, or LOC. she had a contusion on her breastbone. She also has chronic recurrent UTI and c/o urgency and intermittency and most of the time, she cannot make it to the bathroom in view of her inability to ambulate. Patient will be admitted for disposition plan, to r/o UTI and to check for right knee OA progression. Frequent falls likely 2/2 deconditioning, severe OA. Trauma wkup negative, Echo (01/06/22): LVEF 60-65%; Grade I diastolic dysf(x) - spectral doppler demonstrates impaired relaxation pattern of LV myocardial filling; trace MV regurg.  Admission c/b rhabdomyolysis which resolved, renal function stable. Admission c/b abdominal pain 2/2 gastric ulcer. CTAP 1/8: mod paraesophageal hiatal hernia a/w thickening of distal esophagus Enlarged gastroesophageal lymph nodes, malignancy not excluded. EGD 1/10 showing esophageal candidiasis, nonerosive gastritis, ulcer in cardia, normal duodenal mucosa (bx taken). Pt started on fluconazole for esophageal candidiasis.

## 2022-01-14 NOTE — DISCHARGE NOTE PROVIDER - NSDCCPCAREPLAN_GEN_ALL_CORE_FT
PRINCIPAL DISCHARGE DIAGNOSIS  Diagnosis: Fall  Assessment and Plan of Treatment: You came into the hospital for evaluation of frequent falls. We did a trauma workup and it was neagtive. We did a syncope workup looking for causes of falls and found your falls are most likely mechanical, meaning due to deconditioning and your osteoarthritis. We are discharging you to a reahab facility for your saftey in order to increase your strength and mobility so it is safe for you to return home.      SECONDARY DISCHARGE DIAGNOSES  Diagnosis: Gastric ulcer  Assessment and Plan of Treatment: During your hospital stay, you were having abdominal pain so we had our GI doctors see you and they reccomended you get a upper endoscopy, a procedure where they look at your esophagus, stomach, and first part of your intestine. We found you to have a stomach ulcer as well as esophageal candadiasis, a fungal infection of your esophagus. Please take your medicaitons as prescribed and follow with gastroenterology as a outpatient.    Diagnosis: Rhabdomyolysis  Assessment and Plan of Treatment: You came into the hospital with rhabdomyolysis, a condition where your skeltal muscle breaks down due to frequent falls. This is a dangerous condition that can lead to renal failure. We treated you with fluids and your condition subseuqntly improved.    Diagnosis: Osteoarthritis  Assessment and Plan of Treatment: You have severe osteoarthritis limiting your mobility and is contributing to your frequent falls. Please continue taking tylenol to help with the pain and follow with orthopedic surgery as a outpatient for future management of this condition.     PRINCIPAL DISCHARGE DIAGNOSIS  Diagnosis: Fall  Assessment and Plan of Treatment: You came into the hospital for evaluation of frequent falls. We did a trauma workup and it was neagtive. We did a syncope workup looking for causes of falls and found your falls are most likely mechanical, meaning due to deconditioning and your osteoarthritis. We are discharging you to a reahab facility for your saftey in order to increase your strength and mobility so it is safe for you to return home.      SECONDARY DISCHARGE DIAGNOSES  Diagnosis: Gastric ulcer  Assessment and Plan of Treatment: During your hospital stay, you were having abdominal pain so we had our GI doctors see you and they reccomended you get a upper endoscopy, a procedure where they look at your esophagus, stomach, and first part of your intestine. We found you to have a stomach ulcer as well as esophageal candadiasis, a fungal infection of your esophagus. Please take your medicaitons as prescribed and follow with gastroenterology as a outpatient.    Diagnosis: Rhabdomyolysis  Assessment and Plan of Treatment: You came into the hospital with rhabdomyolysis, a condition where your skeltal muscle breaks down due to frequent falls. This is a dangerous condition that can lead to renal failure. We treated you with fluids and your condition subseuqntly improved.    Diagnosis: Osteoarthritis  Assessment and Plan of Treatment: You have severe osteoarthritis limiting your mobility and is contributing to your frequent falls. Please continue taking tylenol to help with the pain and follow with orthopedic surgery as a outpatient for future management of this condition.    Diagnosis: Urinary retention  Assessment and Plan of Treatment: During your hospital stay you developed urinary retention, requiring you to have a lin catheter placed. We started you on flomax, a medicine to help you urinate as well as are discharging you with a lin catheter in place. Please follow up as a outpatient with urology for management of this condition.

## 2022-01-14 NOTE — DISCHARGE NOTE PROVIDER - CARE PROVIDERS DIRECT ADDRESSES
,[email or web address]

## 2022-01-14 NOTE — DISCHARGE NOTE PROVIDER - CARE PROVIDER_API CALL
Flakito Hawley)  Internal Medicine  [address] [place] [address]  Phone: [phone]  Fax: [phone]  Follow Up Time: 2 weeks    Sage Hurst ()  Gastroenterology  [address] [place] [address]  Phone: [phone]  Fax: [phone]  Follow Up Time: 2 weeks    Jm Chance)  [facility]  [facility] [place] [address]  Phone: [phone]  Fax: [phone]  Follow Up Time: Routine   Flakito Hawley)  Internal Medicine  [address] [place] [address]  Phone: [phone]  Fax: [phone]  Follow Up Time: 2 weeks    Sage Hurst ()  Gastroenterology  [address] [place] [address]  Phone: [phone]  Fax: [phone]  Follow Up Time: 2 weeks    Jm Chance)  [facility]  [facility] [place] [address]  Phone: [phone]  Fax: [phone]  Follow Up Time: Routine    Jakub Crawford)  Urology  [address] [address]  Phone: [phone]  Fax: [phone]  Follow Up Time: 2 weeks

## 2022-01-15 VITALS
DIASTOLIC BLOOD PRESSURE: 61 MMHG | RESPIRATION RATE: 18 BRPM | SYSTOLIC BLOOD PRESSURE: 122 MMHG | HEART RATE: 88 BPM | TEMPERATURE: 99 F

## 2022-01-15 PROCEDURE — 99238 HOSP IP/OBS DSCHRG MGMT 30/<: CPT

## 2022-01-15 RX ORDER — BNT162B2 0.23 MG/2.25ML
0.3 INJECTION, SUSPENSION INTRAMUSCULAR ONCE
Refills: 0 | Status: DISCONTINUED | OUTPATIENT
Start: 2022-01-15 | End: 2022-01-15

## 2022-01-15 RX ADMIN — PANTOPRAZOLE SODIUM 40 MILLIGRAM(S): 20 TABLET, DELAYED RELEASE ORAL at 05:40

## 2022-01-15 RX ADMIN — OLANZAPINE 10 MILLIGRAM(S): 15 TABLET, FILM COATED ORAL at 13:27

## 2022-01-15 RX ADMIN — Medication 650 MILLIGRAM(S): at 05:40

## 2022-01-15 RX ADMIN — ENOXAPARIN SODIUM 40 MILLIGRAM(S): 100 INJECTION SUBCUTANEOUS at 13:28

## 2022-01-15 RX ADMIN — SERTRALINE 100 MILLIGRAM(S): 25 TABLET, FILM COATED ORAL at 13:27

## 2022-01-15 RX ADMIN — FAMOTIDINE 20 MILLIGRAM(S): 10 INJECTION INTRAVENOUS at 13:26

## 2022-01-15 NOTE — PROGRESS NOTE ADULT - ATTENDING COMMENTS
60 yo F with PMH of bipolar disorder, schizophrenia, gout, OA, chronic lower back pain, recurrent UTI, anxiety presented to the ED s/p fall, 3 days ago from couch. Denies LOC, head trauma. Requesting placement to [facility].     #Fevers, no course   - last fever 1/8   - COVID 19 negative   - UA negative  - CXR shows no acute process  - BCx negative x2  - UCx negative  - elevated D-dimer  - Doppler US of LE: negative for DVTsin b/l LEs and negative for superficial thrombophlebitis  - Per ID, d/c cefepime    #Abdominal pain, likely 2/2 gastric ulcer   #Esophageal Candidiases   - Pt having nausea and vomiting.   - CTAP 1/8: mod paraesophageal hiatal hernia a/w thickening of distal esophagus Enlarged gastroesophageal lymph nodes, malignancy not excluded   - EGD 1/10 showing esophageal candidiasis, nonerosive gastritis, ulcer in cardia, normal duodenal mucosa (bx taken)  - c/w with Pepcid and Maalox   - EGD showed - esophageal candidiasis + 1.5 cm pigmented ulcer; started on fluconazole 400mg 200 mg for x21d after and Protonix 40mg x2wks, 40mg for 6wks after    #Hypothyroidism     # Frequent falls  - likely d/t deconditioning, Severe OA  - Seen by PT: fall precautions, gait abnormality likely secondary to fall, benefit from OT  - Denies LOC, head trauma  - Trauma workup negative  - Echo (01/06/22): LVEF 60-65%; Grade I diastolic dysf(x) - spectral doppler demonstrates impaired relaxation pattern of LV myocardial filling; trace MV regurg  - Fall precautions    #Hypomagnesia, resolved  - monitor     #Schizophrenia/Bipolar disorder/anxiety  - continue with olanzapine, sertraline, clonazepam (at bedtime)      # DVT PPX: Lovenox       #Progress Note Handoff  Pending (specify): PT eval   Disposition: home
60 yo F with PMH of bipolar disorder, schizophrenia, gout, OA, chronic lower back pain, recurrent UTI, anxiety presented to the ED s/p fall, 3 days ago from couch. Denies LOC, head trauma. Requesting placement to [facility].     #Fevers, no course   - last fever 1/8   - COVID 19 negative   - UA negative  - CXR shows no acute process  - BCx negative x2  - UCx negative  - elevated D-dimer  - Doppler US of LE: negative for DVTsin b/l LEs and negative for superficial thrombophlebitis  - Per ID, d/c cefepime    #Abdominal pain, likely 2/2 gastric ulcer   #Esophageal Candidiases   - Pt having nausea and vomiting.   - CTAP 1/8: mod paraesophageal hiatal hernia a/w thickening of distal esophagus Enlarged gastroesophageal lymph nodes, malignancy not excluded   - EGD 1/10 showing esophageal candidiasis, nonerosive gastritis, ulcer in cardia, normal duodenal mucosa (bx taken)  - c/w with Pepcid and Maalox   - EGD showed - esophageal candidiasis + 1.5 cm pigmented ulcer; started on fluconazole 400mg 200 mg for x21d after and Protonix 40mg x2wks, 40mg for 6wks after    #Hypothyroidism     # Frequent falls  - likely d/t deconditioning, Severe OA  - Seen by PT: fall precautions, gait abnormality likely secondary to fall, benefit from OT  - Denies LOC, head trauma  - Trauma workup negative  - Echo (01/06/22): LVEF 60-65%; Grade I diastolic dysf(x) - spectral doppler demonstrates impaired relaxation pattern of LV myocardial filling; trace MV regurg  - Fall precautions    #Hypomagnesia, resolved  - monitor     #Schizophrenia/Bipolar disorder/anxiety  - continue with olanzapine, sertraline, clonazepam (at bedtime)      # DVT PPX: Lovenox       #Progress Note Handoff  Pending (specify): STR placement
62 yo F with PMH of bipolar disorder, schizophrenia, gout, OA, chronic lower back pain, recurrent UTI, anxiety presented to the ED s/p fall, 3 days ago from couch. Denies LOC, head trauma. Requesting placement to [facility].     #Fevers, no course   - last fever 1/8   - COVID 19 negative   - UA negative  - CXR shows no acute process  - BCx negative x2  - UCx negative  - elevated D-dimer  - Doppler US of LE: negative for DVTsin b/l LEs and negative for superficial thrombophlebitis  - Per ID, d/c cefepime    #Abdominal pain, likely 2/2 gastric ulcer   #Esophageal Candidiases   - Pt having nausea and vomiting.   - CTAP 1/8: mod paraesophageal hiatal hernia a/w thickening of distal esophagus Enlarged gastroesophageal lymph nodes, malignancy not excluded   - EGD 1/10 showing esophageal candidiasis, nonerosive gastritis, ulcer in cardia, normal duodenal mucosa (bx taken)  - c/w with Pepcid and Maalox   - EGD showed - esophageal candidiasis + 1.5 cm pigmented ulcer; started on fluconazole 400mg 200 mg for x21d after and Protonix 40mg x2wks, 40mg for 6wks after    #Hypothyroidism     # Frequent falls  - likely d/t deconditioning, Severe OA  - Seen by PT: fall precautions, gait abnormality likely secondary to fall, benefit from OT  - Denies LOC, head trauma  - Trauma workup negative  - Echo (01/06/22): LVEF 60-65%; Grade I diastolic dysf(x) - spectral doppler demonstrates impaired relaxation pattern of LV myocardial filling; trace MV regurg  - Fall precautions    #Hypomagnesia, resolved  - monitor     #Schizophrenia/Bipolar disorder/anxiety  - continue with olanzapine, sertraline, clonazepam (at bedtime)      # DVT PPX: Lovenox       #Progress Note Handoff  Pending (specify): STR placement

## 2022-01-15 NOTE — PROGRESS NOTE ADULT - REASON FOR ADMISSION
Frequent Falls

## 2022-01-15 NOTE — PROGRESS NOTE ADULT - SUBJECTIVE AND OBJECTIVE BOX
COMFORT FARIAS 61y Female  MRN#: [id number]   CODE STATUS: FULL     Hospital Day: 9d    Pt is currently admitted with the primary diagnosis of frequent falls     SUBJECTIVE  Hospital Course: 61 y/o female with PMHx bipolar disorder with hypomanic episodes, schizophrenia, IBS, anxiety, depression, severe right knee osteoarthritis, low back pain, recurrent UTI, presented to ED for inability to care for herself and poor living conditions that has been going on for few months patient was admitted back in Juna 2021 for the same chief complaint and got dc to [facility] for STR. she said that her niece was not able to take care of her anymore. patient usually uses a walker to ambulate, but recently she was having trouble walking even with a rolling walker in view of her severe pain in her right knee. she mentions that she was taking diclofenac and her pain was well controlled at that time but recently she went to [facility] and her doctor stopped her diclofenac. to note that she had several falls over the last few months but she denied any head trauma, or LOC. she had a contusion on her breastbone. She also has chronic recurrent UTI and c/o urgency and intermittency and most of the time, she cannot make it to the bathroom in view of her inability to ambulate. Patient will be admitted for disposition plan, to r/o UTI and to check for right knee OA progression. Frequent falls likely 2/2 deconditioning, severe OA. Trauma wkup negative, Echo (01/06/22): LVEF 60-65%; Grade I diastolic dysf(x) - spectral doppler demonstrates impaired relaxation pattern of LV myocardial filling; trace MV regurg.  Admission c/b rhabdomyolysis which resolved, renal function stable. Admission c/b abdominal pain 2/2 gastric ulcer. CTAP 1/8: mod paraesophageal hiatal hernia a/w thickening of distal esophagus Enlarged gastroesophageal lymph nodes, malignancy not excluded. EGD 1/10 showing esophageal candidiasis, nonerosive gastritis, ulcer in cardia, normal duodenal mucosa (bx taken). Pt started on fluconazole for esophageal candidiasis.     Overnight events: none    Interval hx/Subjective complaints: Pt feels well this morning, no complaints.     Pt denies any fevers, chills, fatigue, CP, palpitations, cough, SOB, abdominal pain, n/v/d, hematochezia, melena, weakness, numbness, tingling, or other FND.  ----------------------------------------------------------  OBJECTIVE  PAST MEDICAL & SURGICAL HISTORY  Schizophrenia    Bipolar disorder    Depression    Anxiety    Chronic lower back pain  result of pelvic fracture in the past    Osteoarthritis    Urinary incontinence    Chronic urinary tract infection    History of tremor    History of total knee arthroplasty, left                                              -----------------------------------------------------------  ALLERGIES:  azithromycin (Unknown)  moxifloxacin (Unknown)  penicillin (Unknown)                                            ------------------------------------------------------------    HOME MEDICATIONS  Home Medications:  acetaminophen 325 mg oral tablet: 2 tab(s) orally every 6 hours, As needed, Temp greater or equal to 38C (100.4F), Mild Pain (1 - 3) (14 Dec 2021 13:05)  aluminum hydroxide-magnesium hydroxide 200 mg-200 mg/5 mL oral suspension: 30 milliliter(s) orally every 4 hours, As needed, Dyspepsia (14 Jan 2022 15:54)  bisacodyl 5 mg oral delayed release tablet: 1 tab(s) orally every 12 hours, As needed, Constipation (17 Dec 2021 10:48)  clonazePAM 1 mg oral tablet: 1 tab(s) orally once a day (13 Dec 2021 09:42)  famotidine 20 mg oral tablet: 1 tab(s) orally once a day (14 Jan 2022 15:54)  OLANZapine 10 mg oral tablet: 1 tab(s) orally once a day (13 Dec 2021 09:42)  sertraline 100 mg oral tablet: 1 tab(s) orally once a day (13 Dec 2021 09:42)  tamsulosin 0.4 mg oral capsule: 1 cap(s) orally once a day (at bedtime) (14 Jan 2022 15:35)                           MEDICATIONS:  STANDING MEDICATIONS  clonazePAM  Tablet 1 milliGRAM(s) Oral at bedtime  enoxaparin Injectable 40 milliGRAM(s) SubCutaneous daily  famotidine    Tablet 20 milliGRAM(s) Oral daily  fluconAZOLE   Tablet 200 milliGRAM(s) Oral daily  OLANZapine 10 milliGRAM(s) Oral daily  pantoprazole    Tablet 40 milliGRAM(s) Oral two times a day  senna 2 Tablet(s) Oral at bedtime  sertraline 100 milliGRAM(s) Oral daily  tamsulosin 0.4 milliGRAM(s) Oral at bedtime    PRN MEDICATIONS  acetaminophen     Tablet .. 650 milliGRAM(s) Oral every 6 hours PRN  aluminum hydroxide/magnesium hydroxide/simethicone Suspension 30 milliLiter(s) Oral every 4 hours PRN  artificial  tears Solution 1 Drop(s) Right EYE three times a day PRN  bisacodyl Suppository 10 milliGRAM(s) Rectal daily PRN  melatonin 3 milliGRAM(s) Oral at bedtime PRN  ondansetron Injectable 4 milliGRAM(s) IV Push every 6 hours PRN  polyethylene glycol 3350 17 Gram(s) Oral daily PRN                                            ------------------------------------------------------------  VITAL SIGNS: Last 24 Hours  T(C): 35.9 (15 Shun 2022 06:02), Max: 36.7 (14 Jan 2022 20:02)  T(F): 96.7 (15 Shun 2022 06:02), Max: 98 (14 Jan 2022 20:02)  HR: 60 (15 Shun 2022 06:02) (60 - 97)  BP: 141/63 (15 Shun 2022 06:02) (107/59 - 141/63)  BP(mean): --  RR: 18 (15 Shun 2022 06:02) (18 - 18)  SpO2: --      01-14-22 @ 07:01  -  01-15-22 @ 07:00  --------------------------------------------------------  IN: 0 mL / OUT: 950 mL / NET: -950 mL                                             --------------------------------------------------------------  LABS:                        10.9   9.67  )-----------( 407      ( 14 Jan 2022 08:48 )             34.4     01-14    138  |  102  |  12  ----------------------------<  95  4.4   |  21  |  0.6<L>    Ca    9.3      14 Jan 2022 08:48  Mg     1.8     01-14    TPro  5.8<L>  /  Alb  3.4<L>  /  TBili  <0.2  /  DBili  x   /  AST  16  /  ALT  11  /  AlkPhos  92  01-14                                          -------------------------------------------------------------  RADIOLOGY:                                            --------------------------------------------------------------  PHYSICAL EXAM:  General: Well appearing, laying in bed resting comfortably in nad  HEENT: ncat, eomi, mmm  LUNGS: ctab  HEART: s1/s2, rrr  ABDOMEN: soft, ntnd, bs+  EXT: wwp, no cyanosis, clubbing, edema  NEURO: aox4, moves all extremities   SKIN: intact, no rashes or lesions                                          --------------------------------------------------------------    ASSESSMENT & PLAN  62 yo F with PMH of bipolar disorder, schizophrenia, gout, OA, chronic lower back pain, recurrent UTI, anxiety presented to the ED s/p fall, 3 days ago from couch. Denies LOC, head trauma.     #FUO, no clear source   #Exposure to COVID   - afebrile since 1/8  - COVID 19 negative   - UA negative  - CXR shows no acute process  - BCx negative x2  - UCx negative  - elevated D-dimer  - Doppler US of LE: negative for DVTsin b/l LEs and negative for superficial thrombophlebitis  - Per ID, d/c cefepime  - note; pt's roommate tested positive for COVID 1/11, pt covid negative     #Abdominal pain 2/2 gastric ulcer   #Esophageal Candidiases   - Pt having nausea and vomiting.   - CTAP 1/8: mod paraesophageal hiatal hernia a/w thickening of distal esophagus Enlarged gastroesophageal lymph nodes, malignancy not excluded   - EGD 1/10 showing esophageal candidiasis, nonerosive gastritis, ulcer in cardia, normal duodenal mucosa (bx taken)  - c/w with Pepcid and Maalox   - c/w fluconazole until 1/31     #Hypothyroidism   - TSH 0.17 (0.66 in 12/21); Thyroid panel-> T4: 7.4; Free thyroxine: 1.4; T3: 54    #Frequent falls  - likely d/t deconditioning, Severe OA  - Seen by PT: fall precautions, gait abnormality likely secondary to fall, benefit from OT  - Denies LOC, head trauma  - Trauma workup negative  - Echo (01/06/22): LVEF 60-65%; Grade I diastolic dysf(x) - spectral doppler demonstrates impaired relaxation pattern of LV myocardial filling; trace MV regurg  - Fall precautions    # Rhabdomyolysis- resolved   - likely sec to fall  - ->328  - resolved   - renal function stable     #Hypomagnesia, resolved  - monitor mg, repleat prn     #Schizophrenia/Bipolar disorder/anxiety  - continue with olanzapine, sertraline, clonazepam (at bedtime)    #OA  - PT  - Pain control - tylenol PRN  - OP ortho f/u    #Diet: Regular  #Activity: IAT  #GI PPX: NA  #DVT PPX: Lovenox  #Full Code    #Progress Note Handoff  Disposition: IP rehab   Note; pt roommate tested positive for COVID, however pt covid negative

## 2022-01-24 DIAGNOSIS — R33.9 RETENTION OF URINE, UNSPECIFIED: ICD-10-CM

## 2022-01-24 DIAGNOSIS — Z88.0 ALLERGY STATUS TO PENICILLIN: ICD-10-CM

## 2022-01-24 DIAGNOSIS — L89.152 PRESSURE ULCER OF SACRAL REGION, STAGE 2: ICD-10-CM

## 2022-01-24 DIAGNOSIS — M54.50 LOW BACK PAIN, UNSPECIFIED: ICD-10-CM

## 2022-01-24 DIAGNOSIS — F20.9 SCHIZOPHRENIA, UNSPECIFIED: ICD-10-CM

## 2022-01-24 DIAGNOSIS — M10.9 GOUT, UNSPECIFIED: ICD-10-CM

## 2022-01-24 DIAGNOSIS — M19.09 PRIMARY OSTEOARTHRITIS, OTHER SPECIFIED SITE: ICD-10-CM

## 2022-01-24 DIAGNOSIS — K25.9 GASTRIC ULCER, UNSPECIFIED AS ACUTE OR CHRONIC, WITHOUT HEMORRHAGE OR PERFORATION: ICD-10-CM

## 2022-01-24 DIAGNOSIS — M62.82 RHABDOMYOLYSIS: ICD-10-CM

## 2022-01-24 DIAGNOSIS — F31.9 BIPOLAR DISORDER, UNSPECIFIED: ICD-10-CM

## 2022-01-24 DIAGNOSIS — Z88.2 ALLERGY STATUS TO SULFONAMIDES: ICD-10-CM

## 2022-01-24 DIAGNOSIS — Z20.822 CONTACT WITH AND (SUSPECTED) EXPOSURE TO COVID-19: ICD-10-CM

## 2022-01-24 DIAGNOSIS — E03.9 HYPOTHYROIDISM, UNSPECIFIED: ICD-10-CM

## 2022-01-24 DIAGNOSIS — Z96.652 PRESENCE OF LEFT ARTIFICIAL KNEE JOINT: ICD-10-CM

## 2022-01-24 DIAGNOSIS — K44.9 DIAPHRAGMATIC HERNIA WITHOUT OBSTRUCTION OR GANGRENE: ICD-10-CM

## 2022-01-24 DIAGNOSIS — B37.81 CANDIDAL ESOPHAGITIS: ICD-10-CM

## 2022-01-24 DIAGNOSIS — M17.11 UNILATERAL PRIMARY OSTEOARTHRITIS, RIGHT KNEE: ICD-10-CM

## 2022-01-24 DIAGNOSIS — K29.60 OTHER GASTRITIS WITHOUT BLEEDING: ICD-10-CM

## 2022-01-24 DIAGNOSIS — R29.6 REPEATED FALLS: ICD-10-CM

## 2022-01-24 DIAGNOSIS — G89.29 OTHER CHRONIC PAIN: ICD-10-CM

## 2022-01-24 DIAGNOSIS — I34.0 NONRHEUMATIC MITRAL (VALVE) INSUFFICIENCY: ICD-10-CM

## 2022-01-24 DIAGNOSIS — K22.10 ULCER OF ESOPHAGUS WITHOUT BLEEDING: ICD-10-CM

## 2022-01-24 DIAGNOSIS — R50.9 FEVER, UNSPECIFIED: ICD-10-CM

## 2022-01-24 DIAGNOSIS — E83.42 HYPOMAGNESEMIA: ICD-10-CM

## 2022-01-30 ENCOUNTER — INPATIENT (INPATIENT)
Facility: HOSPITAL | Age: 62
LOS: 2 days | Discharge: NURSING, REHAB OR LONG-TERM CARE | End: 2022-02-02
Attending: STUDENT IN AN ORGANIZED HEALTH CARE EDUCATION/TRAINING PROGRAM | Admitting: STUDENT IN AN ORGANIZED HEALTH CARE EDUCATION/TRAINING PROGRAM
Payer: MEDICARE

## 2022-01-30 VITALS
DIASTOLIC BLOOD PRESSURE: 81 MMHG | RESPIRATION RATE: 18 BRPM | TEMPERATURE: 97 F | HEART RATE: 86 BPM | HEIGHT: 59 IN | OXYGEN SATURATION: 97 % | SYSTOLIC BLOOD PRESSURE: 142 MMHG

## 2022-01-30 DIAGNOSIS — Z96.652 PRESENCE OF LEFT ARTIFICIAL KNEE JOINT: Chronic | ICD-10-CM

## 2022-01-30 LAB
ALBUMIN SERPL ELPH-MCNC: 3.6 G/DL — SIGNIFICANT CHANGE UP (ref 3.5–5.2)
ALP SERPL-CCNC: 87 U/L — SIGNIFICANT CHANGE UP (ref 30–115)
ALT FLD-CCNC: 10 U/L — SIGNIFICANT CHANGE UP (ref 0–41)
ANION GAP SERPL CALC-SCNC: 8 MMOL/L — SIGNIFICANT CHANGE UP (ref 7–14)
AST SERPL-CCNC: 20 U/L — SIGNIFICANT CHANGE UP (ref 0–41)
BASOPHILS # BLD AUTO: 0.07 K/UL — SIGNIFICANT CHANGE UP (ref 0–0.2)
BASOPHILS NFR BLD AUTO: 0.8 % — SIGNIFICANT CHANGE UP (ref 0–1)
BILIRUB SERPL-MCNC: <0.2 MG/DL — SIGNIFICANT CHANGE UP (ref 0.2–1.2)
BUN SERPL-MCNC: 13 MG/DL — SIGNIFICANT CHANGE UP (ref 10–20)
CALCIUM SERPL-MCNC: 9.4 MG/DL — SIGNIFICANT CHANGE UP (ref 8.5–10.1)
CHLORIDE SERPL-SCNC: 103 MMOL/L — SIGNIFICANT CHANGE UP (ref 98–110)
CO2 SERPL-SCNC: 24 MMOL/L — SIGNIFICANT CHANGE UP (ref 17–32)
CREAT SERPL-MCNC: 0.5 MG/DL — LOW (ref 0.7–1.5)
EOSINOPHIL # BLD AUTO: 0.43 K/UL — SIGNIFICANT CHANGE UP (ref 0–0.7)
EOSINOPHIL NFR BLD AUTO: 5 % — SIGNIFICANT CHANGE UP (ref 0–8)
GLUCOSE SERPL-MCNC: 85 MG/DL — SIGNIFICANT CHANGE UP (ref 70–99)
HCT VFR BLD CALC: 34.4 % — LOW (ref 37–47)
HGB BLD-MCNC: 11 G/DL — LOW (ref 12–16)
IMM GRANULOCYTES NFR BLD AUTO: 0.2 % — SIGNIFICANT CHANGE UP (ref 0.1–0.3)
LYMPHOCYTES # BLD AUTO: 2.81 K/UL — SIGNIFICANT CHANGE UP (ref 1.2–3.4)
LYMPHOCYTES # BLD AUTO: 32.4 % — SIGNIFICANT CHANGE UP (ref 20.5–51.1)
MCHC RBC-ENTMCNC: 28.6 PG — SIGNIFICANT CHANGE UP (ref 27–31)
MCHC RBC-ENTMCNC: 32 G/DL — SIGNIFICANT CHANGE UP (ref 32–37)
MCV RBC AUTO: 89.6 FL — SIGNIFICANT CHANGE UP (ref 81–99)
MONOCYTES # BLD AUTO: 0.56 K/UL — SIGNIFICANT CHANGE UP (ref 0.1–0.6)
MONOCYTES NFR BLD AUTO: 6.5 % — SIGNIFICANT CHANGE UP (ref 1.7–9.3)
NEUTROPHILS # BLD AUTO: 4.77 K/UL — SIGNIFICANT CHANGE UP (ref 1.4–6.5)
NEUTROPHILS NFR BLD AUTO: 55.1 % — SIGNIFICANT CHANGE UP (ref 42.2–75.2)
NRBC # BLD: 0 /100 WBCS — SIGNIFICANT CHANGE UP (ref 0–0)
PLATELET # BLD AUTO: 301 K/UL — SIGNIFICANT CHANGE UP (ref 130–400)
POTASSIUM SERPL-MCNC: 4.3 MMOL/L — SIGNIFICANT CHANGE UP (ref 3.5–5)
POTASSIUM SERPL-SCNC: 4.3 MMOL/L — SIGNIFICANT CHANGE UP (ref 3.5–5)
PROT SERPL-MCNC: 6.3 G/DL — SIGNIFICANT CHANGE UP (ref 6–8)
RBC # BLD: 3.84 M/UL — LOW (ref 4.2–5.4)
RBC # FLD: 14.1 % — SIGNIFICANT CHANGE UP (ref 11.5–14.5)
SARS-COV-2 RNA SPEC QL NAA+PROBE: SIGNIFICANT CHANGE UP
SODIUM SERPL-SCNC: 135 MMOL/L — SIGNIFICANT CHANGE UP (ref 135–146)
WBC # BLD: 8.66 K/UL — SIGNIFICANT CHANGE UP (ref 4.8–10.8)
WBC # FLD AUTO: 8.66 K/UL — SIGNIFICANT CHANGE UP (ref 4.8–10.8)

## 2022-01-30 PROCEDURE — 99285 EMERGENCY DEPT VISIT HI MDM: CPT

## 2022-01-30 PROCEDURE — 99223 1ST HOSP IP/OBS HIGH 75: CPT

## 2022-01-30 RX ORDER — ACETAMINOPHEN 500 MG
650 TABLET ORAL ONCE
Refills: 0 | Status: COMPLETED | OUTPATIENT
Start: 2022-01-30 | End: 2022-01-30

## 2022-01-30 RX ORDER — CLONAZEPAM 1 MG
1 TABLET ORAL ONCE
Refills: 0 | Status: DISCONTINUED | OUTPATIENT
Start: 2022-01-30 | End: 2022-01-30

## 2022-01-30 RX ADMIN — Medication 1 MILLIGRAM(S): at 20:59

## 2022-01-30 RX ADMIN — Medication 650 MILLIGRAM(S): at 23:16

## 2022-01-30 NOTE — H&P ADULT - ASSESSMENT
61 yr old female with Hx bipolar disorder with hypomanic episodes, schizophrenia, IBS, anxiety, depression, severe right knee osteoarthritis, low back pain, recurrent UTI presents to the ED from [facility] for placement elsewhere.    # Muscular deconditioning  # Severe R knee OA  # Recent fall  - recent admission after a fall at home, previously used RW at home  - discharged to [facility] ~2 weeks ago  - PT eval  - SW/CM consult  - needs placement to new STR    # Recurrent UTI  - cont flomax    # Esophageal candidiasis  # Hx of PUD  - CT A/P 01/08: moderate paraesophageal hiatal hernia w/ thickening of distal esophagus. Enlarged gastroesophageal lymph nodes.  - EGD 1/10 found esophageal candidiasis, nonerosive gastritis, ulcer in cardia, normal duodenal mucosa  - cont PPI BID, pepcid, and maalox PRN  - cont fluconazole until today 1/31    # Schizophrenia  # Bipolar disorder  # Generalized anxiety disorder  - cont olanzapine, sertraline, clonazepam    # Hypothyroidism  - TSH 0.17 (01/06/2022)  - cont synthroid    # IBS-C  - cont bisacodyl 5mg q12h PRN    # Stage 2 coccyx pressure ulcer  - s/p wound care RN eval prior admission  - wound care orders placed    #DVT PPx- lovenox  #GI PPx- protonix BID  #Diet- regular diet w/ supplementation per prior dietician eval  #CHG  #Activity- IAT  #Dispo- STR  #Code- full    #Pending - placement   61 yr old female with Hx bipolar disorder with hypomanic episodes, schizophrenia, IBS, anxiety, depression, severe right knee osteoarthritis, low back pain, recurrent UTI presents to the ED from [facility] for placement elsewhere.    # Muscular deconditioning  # Severe R knee OA  # Recent fall  - recent admission after a fall at home, previously used RW at home  - discharged to [facility] ~2 weeks ago  - being seen by Dr. Lyman per pt for elective R knee replacement  - PT eval  - SW/CM consult  - needs placement to new [facility]    # Recurrent UTI  - cont flomax    # Esophageal candidiasis  # Hx of PUD  - CT A/P 01/08: moderate paraesophageal hiatal hernia w/ thickening of distal esophagus. Enlarged gastroesophageal lymph nodes.  - EGD 1/10 found esophageal candidiasis, nonerosive gastritis, ulcer in cardia, normal duodenal mucosa  - cont PPI BID, pepcid, and maalox PRN  - cont fluconazole until today 1/31    # Schizophrenia  # Bipolar disorder  # Generalized anxiety disorder  - cont olanzapine, sertraline, clonazepam    # ?H/o hypothyroidism  - TSH 0.17 (01/06/2022), T4 7.4, free T4 1.4 (01/07/2022)  - off synthroid    # IBS-C  - cont bisacodyl 5mg q12h PRN    # Stage 2 coccyx pressure ulcer  - s/p wound care RN eval prior admission  - wound care orders placed    #DVT PPx- lovenox  #GI PPx- protonix BID  #Diet- regular diet w/ supplementation per prior dietician eval  #CHG  #Activity- IAT  #Dispo- STR  #Code- full    #Pending - placement

## 2022-01-30 NOTE — ED PROVIDER NOTE - ATTENDING CONTRIBUTION TO CARE
61-year-old female past medical history of osteoarthritis, chronic right knee pain, depression,/anxiety, bipolar disease, schizophrenia presenting from nursing home complaining that she is not being given her medications and is not happy with the living arrangements there.  Refuses to go back, would like to go to another facility.  Does not have any acute complaints since her recent hospital discharge.  According to the patient, she is scheduled to undergo right knee replacement sometime in February.  Middle-aged female appears older than stated age, resting on the stretcher in no acute distress, head AT/NC, MMM, PERRL, pink conjunctiva, normal work of breathing, lungs clear to auscultation bilaterally, speaking full sentences, RRR, well-perfused extremities, distal pulses intact, abdomen soft nontender to palpation, no calf tenderness to palpation or pitting leg edema, awake alert, no gross neuro deficits.  Plan,: Labs, plan to admit for placement.

## 2022-01-30 NOTE — ED PROVIDER NOTE - OBJECTIVE STATEMENT
60 y/o female with a PMH of bipolar disorder with hypomanic episodes, schizophrenia, IBS, anxiety, depression, severe right knee osteoarthritis, low back pain, recurrent UTI presents to the ED from [facility] for placement elsewhere. pt reports she is unhappy with the care at [facility]. pt reports she is not being given her zyprexa (however, I called [facility], and spoke with staff who reports she is getting her medications as prescribed, and this was confirmed on pt faxed sheets of medication). pt is unhappy with the people who are in her room. pt reports no one has been helping her and she feels unsafe there. pt was recently admitted 01/06/20221-01/14/2022 for inability to care for herself and poor living conditions. pt was also admitted back in June 2021 for the same chief complaint and got dc to [facility] for STR. pt denies fever, chills, chest pain, sob, back pain, neck pain, headache, dizziness, weakness, numbness, or tingling.

## 2022-01-30 NOTE — ED PROVIDER NOTE - PHYSICAL EXAMINATION
Physical Exam    Vital Signs: I have reviewed the initial vital signs.  Constitutional: well-nourished, appears stated age, no acute distress  Eyes: Conjunctiva pink, Sclera clear, PERRLA, EOMI without pain.  Cardiovascular: S1 and S2, regular rate, regular rhythm, well-perfused extremities, radial pulses equal and 2+ b/l.   Respiratory: unlabored respiratory effort, clear to auscultation bilaterally no wheezing, rales and rhonchi. pt is speaking full sentences. no accessory muscle use.   Gastrointestinal: soft, non-tender, nondistended abdomen, no pulsatile mass, normal bowl sounds, no rebound, no guarding  Musculoskeletal: supple neck, no lower extremity edema, no calf tenderness.   Integumentary: warm, dry, no rash  Neurologic: awake, alert, cranial nerves II-XII grossly intact, extremities’ motor and sensory functions grossly intact.   Psychiatric: appropriate mood, appropriate affect

## 2022-01-30 NOTE — H&P ADULT - ATTENDING COMMENTS
IN PROGRESS    61 yr old female with Hx bipolar disorder with hypomanic episodes, schizophrenia, IBS, anxiety, depression, severe right knee osteoarthritis, low back pain, recurrent UTI presents to the ED from [facility] for placement elsewhere. pt reports she is unhappy with the care at [facility]. Pt reports she is not unhappy with the people who are in her room. Pt reports no one has been helping her and she feels unsafe there. Pt denies fever, chills, chest pain, sob, back pain, neck pain, headache, dizziness, weakness, numbness, or tingling.    # Disposition   - social consult for placement     # Schizophrenia/Bipolar disorder/anxiety  - c/w olanzapine, sertraline, clonazepam    # Hypothyroid  - c/w levothyroxine       # DVT Ppx  - start Lovenox    # Regular diet     # Ambulate as tolerated    # Anticipate for discharge     # Full code 61 yr old female with Hx bipolar disorder with hypomanic episodes, schizophrenia, IBS, anxiety, depression, severe right knee osteoarthritis, low back pain, recurrent UTI presents to the ED from [facility] for placement elsewhere. pt reports she is unhappy with the care at [facility]. Pt reports she is not unhappy with the people who are in her room. Pt reports no one has been helping her and she feels unsafe there so called 911. Pt states she is unsteady on her feet, and fell last week. She denies fever, chills, chest pain, sob, back pain, neck pain, headache, dizziness, weakness, numbness, or tingling.    # Muscular Deconditioning   # Mech fall 1 week ago   - PT eval     # Disposition   - social consult for placement     # Esophageal Candidiases   - CTAP 1/8: mod paraesophageal hiatal hernia a/w thickening of distal esophagus Enlarged gastroesophageal lymph nodes  - EGD 1/10 showing esophageal candidiasis, nonerosive gastritis, ulcer in cardia, normal duodenal mucosa (bx taken)  - Repeat CT Abdomen and Pelvis w/ IV Cont (01.08.22): Moderate in size paraesophageal hiatal hernia with associated circumferential wall thickeningof the distal esophagus. This most likely reflects presence of esophagitis. Neoplasm is not excluded. Several prominent adjacent gastroesophageal lymph nodes.   - c/w with Pepcid and Maalox   - c/w fluconazole until 1/31     # Schizophrenia/Bipolar disorder/anxiety  - c/w olanzapine, sertraline, clonazepam    # Hypothyroid  - c/w levothyroxine       # DVT Ppx  - start Lovenox    # Regular diet     # Ambulate as tolerated  - fall precaution   - PT eval    # Anticipate for discharge     # Full code      **Patient seen by me on 01/30/21

## 2022-01-30 NOTE — ED PROVIDER NOTE - PROGRESS NOTE DETAILS
EP: Patient is requesting her evening dose of Klonopin, labs appear within normal limits, admit for placement..

## 2022-01-30 NOTE — H&P ADULT - NSHPPHYSICALEXAM_GEN_ALL_CORE
GENERAL: NAD, well-developed, Non-toxic, stated age   HEAD:  Atraumatic, Normocephalic  EYES: EOMI, Sclera White   NECK: Supple, No JVD  CHEST/LUNG: clear b/l breath sounds; No wheezing, rhonchi, or crackles  HEART: Regular rate and rhythm; s1, s2, No murmurs, rubs, or gallops  ABDOMEN: Soft, Nontender, Nondistended; Bowel sounds present, No rebound or guarding noted   EXTREMITIES:  No lower extremity edema or calf tenderness to palpation.  No clubbing or cyanosis  PSYCH: AAOx3, pleasant, cooperative, not anxious  NEUROLOGY: CN intact, 5/5 strength in all extremities, Sensation grossly intact.   SKIN: No rashes or lesions GENERAL: NAD, well-developed, Non-toxic, appears older than age  HEAD:  Atraumatic, Normocephalic  EYES: EOMI, Sclera White   NECK: Supple, No JVD  CHEST/LUNG: clear b/l breath sounds; No wheezing, rhonchi, or crackles  HEART: Regular rate and rhythm; s1, s2, No murmurs, rubs, or gallops  ABDOMEN: Soft, Nontender, Nondistended; Bowel sounds present, No rebound or guarding noted   EXTREMITIES:  No lower extremity edema or calf tenderness to palpation.  No clubbing or cyanosis  PSYCH: AAOx3, cooperative  NEUROLOGY: CN intact, 5/5 strength in all extremities, Sensation grossly intact.   SKIN: ~3cm pressure injury to medial b/l lower leg, just inferior to knee. Stage 2 coccyx pressure ulcer.

## 2022-01-30 NOTE — ED PROVIDER NOTE - NS ED ROS FT
Please have patient schedule an appointment CONST: No fever, chills or bodyaches  EYES: No pain, redness, drainage or visual changes.  ENT: No ear pain or discharge, nasal discharge or congestion. No sore throat  CARD: No chest pain, palpitations  RESP: No SOB, cough, hemoptysis. No hx of asthma or COPD  GI: No abdominal pain, N/V/D  : No urinary symptoms  MS: No joint pain, back pain. (+) chronic right knee pain  SKIN: No rashes  NEURO: No headache, dizziness, paresthesias or LOC

## 2022-01-30 NOTE — H&P ADULT - NSHPSOCIALHISTORY_GEN_ALL_CORE
non smoker  non alcohol consumer  no illicit drug use former smoker, quit 12 years ago  non alcohol consumer  no illicit drug use

## 2022-01-30 NOTE — H&P ADULT - NSHPLABSRESULTS_GEN_ALL_CORE
11.0   8.66  )-----------( 301      ( 30 Jan 2022 18:33 )             34.4       01-30    135  |  103  |  13  ----------------------------<  85  4.3   |  24  |  0.5<L>    Ca    9.4      30 Jan 2022 18:33    TPro  6.3  /  Alb  3.6  /  TBili  <0.2  /  DBili  x   /  AST  20  /  ALT  10  /  AlkPhos  87  01-30 11.0   8.66  )-----------( 301      ( 30 Jan 2022 18:33 )             34.4       01-30    135  |  103  |  13  ----------------------------<  85  4.3   |  24  |  0.5<L>    Ca    9.4      30 Jan 2022 18:33    TPro  6.3  /  Alb  3.6  /  TBili  <0.2  /  DBili  x   /  AST  20  /  ALT  10  /  AlkPhos  87  01-30      CT Abdomen and Pelvis w/ IV Cont (01.08.22): Moderate in size paraesophageal hiatal hernia with associated circumferential wall thickeningof the distal esophagus. This most likely reflects presence of esophagitis. Neoplasm is not excluded. Several prominent adjacent gastroesophageal lymph nodes.

## 2022-01-30 NOTE — H&P ADULT - HISTORY OF PRESENT ILLNESS
60 yo F with PMH of bipolar disorder, schizophrenia, gout, OA, chronic lower back pain, recurrent UTI, anxiety presented to the ED from [facility] for placement. [facility] not [facility] her [facility]. Of note, pt was recently admitted from 01/06-01/14 after a fall at home. She was following with Dr. Lyman for R knee replacement. Pt has no acute complaints at this time. She eventually hopes to return home. No nursing home documentation available in chart to review.    Denies chest pain, SOB, HA, N/V/C/D, abdominal pain. Denies suicidal I/I/P    ED Course: CBC, CMP, Mag wnl. VSS. on RA saturating well.

## 2022-01-31 LAB
A1C WITH ESTIMATED AVERAGE GLUCOSE RESULT: 5.4 % — SIGNIFICANT CHANGE UP (ref 4–5.6)
ALBUMIN SERPL ELPH-MCNC: 3.9 G/DL — SIGNIFICANT CHANGE UP (ref 3.5–5.2)
ALP SERPL-CCNC: 97 U/L — SIGNIFICANT CHANGE UP (ref 30–115)
ALT FLD-CCNC: 10 U/L — SIGNIFICANT CHANGE UP (ref 0–41)
ANION GAP SERPL CALC-SCNC: 17 MMOL/L — HIGH (ref 7–14)
AST SERPL-CCNC: 17 U/L — SIGNIFICANT CHANGE UP (ref 0–41)
BASOPHILS # BLD AUTO: 0.08 K/UL — SIGNIFICANT CHANGE UP (ref 0–0.2)
BASOPHILS NFR BLD AUTO: 1.1 % — HIGH (ref 0–1)
BILIRUB SERPL-MCNC: <0.2 MG/DL — SIGNIFICANT CHANGE UP (ref 0.2–1.2)
BUN SERPL-MCNC: 13 MG/DL — SIGNIFICANT CHANGE UP (ref 10–20)
CALCIUM SERPL-MCNC: 9.6 MG/DL — SIGNIFICANT CHANGE UP (ref 8.5–10.1)
CHLORIDE SERPL-SCNC: 102 MMOL/L — SIGNIFICANT CHANGE UP (ref 98–110)
CO2 SERPL-SCNC: 19 MMOL/L — SIGNIFICANT CHANGE UP (ref 17–32)
CREAT SERPL-MCNC: 0.5 MG/DL — LOW (ref 0.7–1.5)
EOSINOPHIL # BLD AUTO: 0.48 K/UL — SIGNIFICANT CHANGE UP (ref 0–0.7)
EOSINOPHIL NFR BLD AUTO: 6.6 % — SIGNIFICANT CHANGE UP (ref 0–8)
ESTIMATED AVERAGE GLUCOSE: 108 MG/DL — SIGNIFICANT CHANGE UP (ref 68–114)
GLUCOSE SERPL-MCNC: 156 MG/DL — HIGH (ref 70–99)
HCT VFR BLD CALC: 36.9 % — LOW (ref 37–47)
HGB BLD-MCNC: 11.8 G/DL — LOW (ref 12–16)
IMM GRANULOCYTES NFR BLD AUTO: 0.3 % — SIGNIFICANT CHANGE UP (ref 0.1–0.3)
LYMPHOCYTES # BLD AUTO: 2.09 K/UL — SIGNIFICANT CHANGE UP (ref 1.2–3.4)
LYMPHOCYTES # BLD AUTO: 28.7 % — SIGNIFICANT CHANGE UP (ref 20.5–51.1)
MAGNESIUM SERPL-MCNC: 1.8 MG/DL — SIGNIFICANT CHANGE UP (ref 1.8–2.4)
MCHC RBC-ENTMCNC: 28.8 PG — SIGNIFICANT CHANGE UP (ref 27–31)
MCHC RBC-ENTMCNC: 32 G/DL — SIGNIFICANT CHANGE UP (ref 32–37)
MCV RBC AUTO: 90 FL — SIGNIFICANT CHANGE UP (ref 81–99)
MONOCYTES # BLD AUTO: 0.39 K/UL — SIGNIFICANT CHANGE UP (ref 0.1–0.6)
MONOCYTES NFR BLD AUTO: 5.4 % — SIGNIFICANT CHANGE UP (ref 1.7–9.3)
NEUTROPHILS # BLD AUTO: 4.22 K/UL — SIGNIFICANT CHANGE UP (ref 1.4–6.5)
NEUTROPHILS NFR BLD AUTO: 57.9 % — SIGNIFICANT CHANGE UP (ref 42.2–75.2)
NRBC # BLD: 0 /100 WBCS — SIGNIFICANT CHANGE UP (ref 0–0)
PLATELET # BLD AUTO: 302 K/UL — SIGNIFICANT CHANGE UP (ref 130–400)
POTASSIUM SERPL-MCNC: 4.5 MMOL/L — SIGNIFICANT CHANGE UP (ref 3.5–5)
POTASSIUM SERPL-SCNC: 4.5 MMOL/L — SIGNIFICANT CHANGE UP (ref 3.5–5)
PROT SERPL-MCNC: 6.5 G/DL — SIGNIFICANT CHANGE UP (ref 6–8)
RBC # BLD: 4.1 M/UL — LOW (ref 4.2–5.4)
RBC # FLD: 14.3 % — SIGNIFICANT CHANGE UP (ref 11.5–14.5)
SODIUM SERPL-SCNC: 138 MMOL/L — SIGNIFICANT CHANGE UP (ref 135–146)
WBC # BLD: 7.28 K/UL — SIGNIFICANT CHANGE UP (ref 4.8–10.8)
WBC # FLD AUTO: 7.28 K/UL — SIGNIFICANT CHANGE UP (ref 4.8–10.8)

## 2022-01-31 PROCEDURE — 99232 SBSQ HOSP IP/OBS MODERATE 35: CPT

## 2022-01-31 RX ORDER — CHLORHEXIDINE GLUCONATE 213 G/1000ML
1 SOLUTION TOPICAL
Refills: 0 | Status: DISCONTINUED | OUTPATIENT
Start: 2022-01-31 | End: 2022-02-02

## 2022-01-31 RX ORDER — LANOLIN ALCOHOL/MO/W.PET/CERES
3 CREAM (GRAM) TOPICAL ONCE
Refills: 0 | Status: COMPLETED | OUTPATIENT
Start: 2022-01-31 | End: 2022-01-31

## 2022-01-31 RX ORDER — FLUCONAZOLE 150 MG/1
200 TABLET ORAL ONCE
Refills: 0 | Status: COMPLETED | OUTPATIENT
Start: 2022-01-31 | End: 2022-01-31

## 2022-01-31 RX ORDER — PANTOPRAZOLE SODIUM 20 MG/1
40 TABLET, DELAYED RELEASE ORAL
Refills: 0 | Status: DISCONTINUED | OUTPATIENT
Start: 2022-01-31 | End: 2022-02-02

## 2022-01-31 RX ORDER — LANOLIN ALCOHOL/MO/W.PET/CERES
3 CREAM (GRAM) TOPICAL AT BEDTIME
Refills: 0 | Status: DISCONTINUED | OUTPATIENT
Start: 2022-01-31 | End: 2022-02-02

## 2022-01-31 RX ORDER — OLANZAPINE 15 MG/1
10 TABLET, FILM COATED ORAL DAILY
Refills: 0 | Status: DISCONTINUED | OUTPATIENT
Start: 2022-01-31 | End: 2022-02-02

## 2022-01-31 RX ORDER — ACETAMINOPHEN 500 MG
650 TABLET ORAL EVERY 6 HOURS
Refills: 0 | Status: DISCONTINUED | OUTPATIENT
Start: 2022-01-31 | End: 2022-02-02

## 2022-01-31 RX ORDER — TAMSULOSIN HYDROCHLORIDE 0.4 MG/1
0.4 CAPSULE ORAL AT BEDTIME
Refills: 0 | Status: DISCONTINUED | OUTPATIENT
Start: 2022-01-31 | End: 2022-02-02

## 2022-01-31 RX ORDER — FAMOTIDINE 10 MG/ML
20 INJECTION INTRAVENOUS DAILY
Refills: 0 | Status: DISCONTINUED | OUTPATIENT
Start: 2022-01-31 | End: 2022-02-02

## 2022-01-31 RX ORDER — CLONAZEPAM 1 MG
1 TABLET ORAL DAILY
Refills: 0 | Status: DISCONTINUED | OUTPATIENT
Start: 2022-01-31 | End: 2022-02-02

## 2022-01-31 RX ORDER — ENOXAPARIN SODIUM 100 MG/ML
40 INJECTION SUBCUTANEOUS DAILY
Refills: 0 | Status: DISCONTINUED | OUTPATIENT
Start: 2022-01-31 | End: 2022-02-02

## 2022-01-31 RX ORDER — SERTRALINE 25 MG/1
100 TABLET, FILM COATED ORAL DAILY
Refills: 0 | Status: DISCONTINUED | OUTPATIENT
Start: 2022-01-31 | End: 2022-02-02

## 2022-01-31 RX ADMIN — SERTRALINE 100 MILLIGRAM(S): 25 TABLET, FILM COATED ORAL at 11:01

## 2022-01-31 RX ADMIN — Medication 650 MILLIGRAM(S): at 21:30

## 2022-01-31 RX ADMIN — FAMOTIDINE 20 MILLIGRAM(S): 10 INJECTION INTRAVENOUS at 11:01

## 2022-01-31 RX ADMIN — ENOXAPARIN SODIUM 40 MILLIGRAM(S): 100 INJECTION SUBCUTANEOUS at 11:01

## 2022-01-31 RX ADMIN — OLANZAPINE 10 MILLIGRAM(S): 15 TABLET, FILM COATED ORAL at 11:01

## 2022-01-31 RX ADMIN — FLUCONAZOLE 200 MILLIGRAM(S): 150 TABLET ORAL at 07:33

## 2022-01-31 RX ADMIN — TAMSULOSIN HYDROCHLORIDE 0.4 MILLIGRAM(S): 0.4 CAPSULE ORAL at 21:36

## 2022-01-31 RX ADMIN — Medication 1 MILLIGRAM(S): at 11:02

## 2022-01-31 RX ADMIN — Medication 3 MILLIGRAM(S): at 21:36

## 2022-01-31 RX ADMIN — Medication 650 MILLIGRAM(S): at 07:33

## 2022-01-31 RX ADMIN — Medication 650 MILLIGRAM(S): at 20:15

## 2022-01-31 RX ADMIN — PANTOPRAZOLE SODIUM 40 MILLIGRAM(S): 20 TABLET, DELAYED RELEASE ORAL at 17:33

## 2022-01-31 NOTE — PATIENT PROFILE ADULT - OVER THE PAST TWO WEEKS HAVE YOU FELT DOWN, DEPRESSED OR HOPELESS?
Dispo update:  Received e-mail notice that [employer] has denied request for pre-cert for STR at [facility]. As an aside, e-mail received was 3:44 pm on March 4, 2020, and voice message was 5:14pm, but the deadline for call-back for peer to peer was 3:30 pm yesterday (March 4, 2020), which was already past the deadline. Updated Dr. Cecilia Zhang MD and Ms. Sharon Gaviria NP. Dr. Cecilia Zhang MD said to call [employer] on his behalf. Called [phone], option 5. [employer] is not open yet. Will try back shortly. Dr. Cecilia Zhang MD still wants Mr. Jo [place] to go to [facility]. Addendum:  Called Chary around 8:30 am today. rep Kelsea at [facility] says the opportunity for a physician [id number] has passed as of 3:30 pm yesterday. However, pointed out that we did not receive notification from [employer] until AFTER the deadline had passed. No matter, Chary says no way to do P2P, but patient can ask for expedited review. Requested a fax copy of the denial, and as of now (12:09pm), still do not have a written denial.  Updated [facility] administration. Also updated Ms. Donah Goodell, [address] liaison (includes [facility]). no

## 2022-01-31 NOTE — PHYSICAL THERAPY INITIAL EVALUATION ADULT - ADDITIONAL COMMENTS
Patient claims to be living alone in house with no steps to enter. States she was independent in ADL's and ambulation using RW. Patient admitted from [facility] stating she was not happy with the care she is receiving from [facility]

## 2022-01-31 NOTE — PATIENT PROFILE ADULT - FALL HARM RISK - HARM RISK INTERVENTIONS

## 2022-01-31 NOTE — PHYSICAL THERAPY INITIAL EVALUATION ADULT - PERTINENT HX OF CURRENT PROBLEM, REHAB EVAL
60 yo F with PMH of bipolar disorder, schizophrenia, gout, OA, chronic lower back pain, recurrent UTI, anxiety presented to the ED from [facility] for placement. Pt was unhappy with care she was receiving at [facility], stating that she was not receiving her medications. Of note, pt was recently admitted from 01/06-01/14 after a fall at home. She was following with Dr. Lyman for R knee replacement. Pt has no acute complaints at this time. She eventually hopes to return home.

## 2022-01-31 NOTE — PHYSICAL THERAPY INITIAL EVALUATION ADULT - TRANSFER SAFETY CONCERNS NOTED: SIT/STAND, REHAB EVAL
decreased balance during turns/decreased safety awareness/decreased sequencing ability/stepping too close to front of assistive device/decreased weight-shifting ability

## 2022-01-31 NOTE — PHYSICAL THERAPY INITIAL EVALUATION ADULT - GAIT DEVIATIONS NOTED, PT EVAL
NBOS, unsteady. antalgic gait/increased time in double stance/decreased step length/decreased stride length/increased stride width

## 2022-01-31 NOTE — PROGRESS NOTE ADULT - ATTENDING COMMENTS
61 yr old female with Hx bipolar disorder with hypomanic episodes, schizophrenia, IBS, anxiety, depression, severe right knee osteoarthritis, low back pain, recurrent UTI presents to the ED from [facility] for placement elsewhere.    Plan of care discussed with resident and with case management.  PT consulted  continue home medications  Awaiting placement

## 2022-02-01 ENCOUNTER — TRANSCRIPTION ENCOUNTER (OUTPATIENT)
Age: 62
End: 2022-02-01

## 2022-02-01 VITALS
TEMPERATURE: 99 F | HEART RATE: 91 BPM | RESPIRATION RATE: 18 BRPM | DIASTOLIC BLOOD PRESSURE: 57 MMHG | SYSTOLIC BLOOD PRESSURE: 112 MMHG

## 2022-02-01 LAB
ALBUMIN SERPL ELPH-MCNC: 3.3 G/DL — LOW (ref 3.5–5.2)
ALP SERPL-CCNC: 83 U/L — SIGNIFICANT CHANGE UP (ref 30–115)
ALT FLD-CCNC: 8 U/L — SIGNIFICANT CHANGE UP (ref 0–41)
ANION GAP SERPL CALC-SCNC: 9 MMOL/L — SIGNIFICANT CHANGE UP (ref 7–14)
AST SERPL-CCNC: 12 U/L — SIGNIFICANT CHANGE UP (ref 0–41)
BASOPHILS # BLD AUTO: 0.1 K/UL — SIGNIFICANT CHANGE UP (ref 0–0.2)
BASOPHILS NFR BLD AUTO: 1.6 % — HIGH (ref 0–1)
BILIRUB SERPL-MCNC: 0.2 MG/DL — SIGNIFICANT CHANGE UP (ref 0.2–1.2)
BUN SERPL-MCNC: 16 MG/DL — SIGNIFICANT CHANGE UP (ref 10–20)
CALCIUM SERPL-MCNC: 9.5 MG/DL — SIGNIFICANT CHANGE UP (ref 8.5–10.1)
CHLORIDE SERPL-SCNC: 106 MMOL/L — SIGNIFICANT CHANGE UP (ref 98–110)
CO2 SERPL-SCNC: 24 MMOL/L — SIGNIFICANT CHANGE UP (ref 17–32)
CREAT SERPL-MCNC: 0.6 MG/DL — LOW (ref 0.7–1.5)
EOSINOPHIL # BLD AUTO: 0.54 K/UL — SIGNIFICANT CHANGE UP (ref 0–0.7)
EOSINOPHIL NFR BLD AUTO: 8.4 % — HIGH (ref 0–8)
GLUCOSE SERPL-MCNC: 102 MG/DL — HIGH (ref 70–99)
HCT VFR BLD CALC: 34.3 % — LOW (ref 37–47)
HGB BLD-MCNC: 10.8 G/DL — LOW (ref 12–16)
IMM GRANULOCYTES NFR BLD AUTO: 0.5 % — HIGH (ref 0.1–0.3)
LYMPHOCYTES # BLD AUTO: 2.54 K/UL — SIGNIFICANT CHANGE UP (ref 1.2–3.4)
LYMPHOCYTES # BLD AUTO: 39.6 % — SIGNIFICANT CHANGE UP (ref 20.5–51.1)
MAGNESIUM SERPL-MCNC: 1.8 MG/DL — SIGNIFICANT CHANGE UP (ref 1.8–2.4)
MCHC RBC-ENTMCNC: 28.6 PG — SIGNIFICANT CHANGE UP (ref 27–31)
MCHC RBC-ENTMCNC: 31.5 G/DL — LOW (ref 32–37)
MCV RBC AUTO: 91 FL — SIGNIFICANT CHANGE UP (ref 81–99)
MONOCYTES # BLD AUTO: 0.45 K/UL — SIGNIFICANT CHANGE UP (ref 0.1–0.6)
MONOCYTES NFR BLD AUTO: 7 % — SIGNIFICANT CHANGE UP (ref 1.7–9.3)
NEUTROPHILS # BLD AUTO: 2.76 K/UL — SIGNIFICANT CHANGE UP (ref 1.4–6.5)
NEUTROPHILS NFR BLD AUTO: 42.9 % — SIGNIFICANT CHANGE UP (ref 42.2–75.2)
NRBC # BLD: 0 /100 WBCS — SIGNIFICANT CHANGE UP (ref 0–0)
PLATELET # BLD AUTO: 293 K/UL — SIGNIFICANT CHANGE UP (ref 130–400)
POTASSIUM SERPL-MCNC: 4.3 MMOL/L — SIGNIFICANT CHANGE UP (ref 3.5–5)
POTASSIUM SERPL-SCNC: 4.3 MMOL/L — SIGNIFICANT CHANGE UP (ref 3.5–5)
PROT SERPL-MCNC: 5.6 G/DL — LOW (ref 6–8)
RBC # BLD: 3.77 M/UL — LOW (ref 4.2–5.4)
RBC # FLD: 14.4 % — SIGNIFICANT CHANGE UP (ref 11.5–14.5)
SODIUM SERPL-SCNC: 139 MMOL/L — SIGNIFICANT CHANGE UP (ref 135–146)
WBC # BLD: 6.42 K/UL — SIGNIFICANT CHANGE UP (ref 4.8–10.8)
WBC # FLD AUTO: 6.42 K/UL — SIGNIFICANT CHANGE UP (ref 4.8–10.8)

## 2022-02-01 PROCEDURE — 99231 SBSQ HOSP IP/OBS SF/LOW 25: CPT

## 2022-02-01 RX ORDER — CELECOXIB 200 MG/1
1 CAPSULE ORAL
Qty: 0 | Refills: 0 | DISCHARGE
Start: 2022-02-01

## 2022-02-01 RX ORDER — CELECOXIB 200 MG/1
200 CAPSULE ORAL DAILY
Refills: 0 | Status: DISCONTINUED | OUTPATIENT
Start: 2022-02-01 | End: 2022-02-02

## 2022-02-01 RX ORDER — ALPRAZOLAM 0.25 MG
0.5 TABLET ORAL ONCE
Refills: 0 | Status: DISCONTINUED | OUTPATIENT
Start: 2022-02-01 | End: 2022-02-01

## 2022-02-01 RX ADMIN — Medication 650 MILLIGRAM(S): at 13:25

## 2022-02-01 RX ADMIN — PANTOPRAZOLE SODIUM 40 MILLIGRAM(S): 20 TABLET, DELAYED RELEASE ORAL at 17:11

## 2022-02-01 RX ADMIN — FAMOTIDINE 20 MILLIGRAM(S): 10 INJECTION INTRAVENOUS at 11:10

## 2022-02-01 RX ADMIN — ENOXAPARIN SODIUM 40 MILLIGRAM(S): 100 INJECTION SUBCUTANEOUS at 11:10

## 2022-02-01 RX ADMIN — PANTOPRAZOLE SODIUM 40 MILLIGRAM(S): 20 TABLET, DELAYED RELEASE ORAL at 06:05

## 2022-02-01 RX ADMIN — SERTRALINE 100 MILLIGRAM(S): 25 TABLET, FILM COATED ORAL at 11:10

## 2022-02-01 RX ADMIN — CELECOXIB 200 MILLIGRAM(S): 200 CAPSULE ORAL at 17:42

## 2022-02-01 RX ADMIN — CELECOXIB 200 MILLIGRAM(S): 200 CAPSULE ORAL at 18:14

## 2022-02-01 RX ADMIN — TAMSULOSIN HYDROCHLORIDE 0.4 MILLIGRAM(S): 0.4 CAPSULE ORAL at 21:29

## 2022-02-01 RX ADMIN — Medication 650 MILLIGRAM(S): at 14:12

## 2022-02-01 RX ADMIN — Medication 1 MILLIGRAM(S): at 12:09

## 2022-02-01 RX ADMIN — Medication 0.5 MILLIGRAM(S): at 15:28

## 2022-02-01 RX ADMIN — OLANZAPINE 10 MILLIGRAM(S): 15 TABLET, FILM COATED ORAL at 11:09

## 2022-02-01 NOTE — DISCHARGE NOTE NURSING/CASE MANAGEMENT/SOCIAL WORK - NSDCPEFALRISK_GEN_ALL_CORE
For information on Fall & Injury Prevention, visit: [email or web address] OR  [email or web address] OR  https://www.cdc.gov/steadi/patient.html

## 2022-02-01 NOTE — DISCHARGE NOTE PROVIDER - CARE PROVIDER_API CALL
Flakito Hawley)  Internal Medicine  [address] [place] [address]  Phone: [phone]  Fax: [phone]  Follow Up Time: 1 week

## 2022-02-01 NOTE — DISCHARGE NOTE PROVIDER - HOSPITAL COURSE
62 yo F with PMH of bipolar disorder, schizophrenia, gout, OA, chronic lower back pain, recurrent UTI, anxiety presented to the ED from [facility] for placement. Pt was unhappy with care she was receiving at [facility], stating that she was not receiving her medications. Of note, pt was recently admitted from 01/06-01/14 after a fall at home. She was following with Dr. Lyman for R knee replacement. Pt has no acute complaints at this time. She eventually hopes to return home. No nursing home documentation available in chart to review.    Denies chest pain, SOB, HA, N/V/C/D, abdominal pain. Denies suicidal I/I/P    ED Course: CBC, CMP, Mag wnl. VSS. on RA saturating well.    # Muscular deconditioning  # Severe R knee OA  # Recent fall  - recent admission after a fall at home, previously used RW at home  - discharged to [facility] ~2 weeks ago  - being seen by Dr. Lyman per pt for elective R knee replacement  - PT eval appreciated  - SW/CM consult appreciated  - needs placement to new STR    # Recurrent UTI  - cont flomax    # Esophageal candidiasis  # Hx of PUD  - CT A/P 01/08: moderate paraesophageal hiatal hernia w/ thickening of distal esophagus. Enlarged gastroesophageal lymph nodes.  - EGD 1/10 found esophageal candidiasis, nonerosive gastritis, ulcer in cardia, normal duodenal mucosa  - cont PPI BID, pepcid, and maalox PRN  - cont fluconazole until today 1/31    # Schizophrenia  # Bipolar disorder  # Generalized anxiety disorder  - cont olanzapine, sertraline, clonazepam    # ?H/o hypothyroidism  - TSH 0.17 (01/06/2022), T4 7.4, free T4 1.4 (01/07/2022)  - off synthroid    # IBS-C  - cont bisacodyl 5mg q12h PRN    # Stage 2 coccyx pressure ulcer  - s/p wound care RN eval prior admission  - wound care orders placed    Pt is medically stable to be discharged to SNF

## 2022-02-01 NOTE — ADVANCED PRACTICE NURSE CONSULT - RECOMMEDATIONS
PI Prevention:  -Continue to assist patient w/ turning/positioning from side-to-side q2h while in bed, q1h when/if OOB chair, or in accordance w/ pt's plan of care. Utilize pillows and/or fluidized positioner to assist w/ turning/positioning. When/if OOB chair, utilize pillows or chair cushion to offload pressure.   -Offload heels from bed surface with soft pillow under calfs or by applying offloading boots to BLEs.   -Continue applying Coloplast Laura Protect moisture barrier cream to buttock and perineal area daily and prn after each incontinent episode.    -Continue utilizing one underpad underneath patient to contain incontinence episodes; change pad when saturated/soiled.   -Continue utilizing female incontinence device/PrimaFit (if patient candidate) to contain urinary incontinence.  -Assess skin qshift, report changes to primary provider.     Plan of Care: Primary RN Sunita made aware of above recs. Signing off on patient. No further needs/recs from [facility] service at this time. Staff RN to perform routine skin/wound assessment and manage wound care. Questions or concerns or if wound worsens and reconsult needed, please contact [facility], [phone].

## 2022-02-01 NOTE — DISCHARGE NOTE NURSING/CASE MANAGEMENT/SOCIAL WORK - PATIENT PORTAL LINK FT
You can access the FollowMyHealth Patient Portal offered by [facility] by registering at the following website: [email or web address]. By joining [employer]’s FollowMyHealth portal, you will also be able to view your health information using other applications (apps) compatible with our system.

## 2022-02-01 NOTE — DISCHARGE NOTE PROVIDER - NSDCMRMEDTOKEN_GEN_ALL_CORE_FT
bisacodyl 5 mg oral delayed release tablet: 1 tab(s) orally every 12 hours, As needed, Constipation  clonazePAM 1 mg oral tablet: 1 tab(s) orally once a day  famotidine 20 mg oral tablet: 1 tab(s) orally once a day  OLANZapine 10 mg oral tablet: 1 tab(s) orally once a day  pantoprazole 40 mg oral delayed release tablet: 1 tab(s) orally 2 times a day  sertraline 100 mg oral tablet: 1 tab(s) orally once a day  tamsulosin 0.4 mg oral capsule: 1 cap(s) orally once a day (at bedtime)   bisacodyl 5 mg oral delayed release tablet: 1 tab(s) orally every 12 hours, As needed, Constipation  celecoxib 200 mg oral capsule: 1 cap(s) orally once a day  clonazePAM 1 mg oral tablet: 1 tab(s) orally once a day  famotidine 20 mg oral tablet: 1 tab(s) orally once a day  OLANZapine 10 mg oral tablet: 1 tab(s) orally once a day  pantoprazole 40 mg oral delayed release tablet: 1 tab(s) orally 2 times a day  sertraline 100 mg oral tablet: 1 tab(s) orally once a day  tamsulosin 0.4 mg oral capsule: 1 cap(s) orally once a day (at bedtime)

## 2022-02-01 NOTE — ADVANCED PRACTICE NURSE CONSULT - ASSESSMENT
62 yo F with PMH of bipolar disorder, schizophrenia, gout, OA, chronic lower back pain, recurrent UTI, anxiety presented to the ED (1/30) from [facility] for placement. Pt was unhappy with care she was receiving at [facility], stating that she was not receiving her medications. Of note, pt was recently admitted from 01/06-01/14 after a fall at home. She was following with Dr. Lyman for R knee replacement. Pt has no acute complaints at this time. She eventually hopes to return home. No nursing home documentation available in chart to review.  Denies chest pain, SOB, HA, N/V/C/D, abdominal pain. Denies suicidal I/I/P  ED Course: CBC, CMP, Mag wnl. VSS. on RA saturating well.    Currently admitted to medicine, today is hospital day-2d; on 3B, being managed for Muscular deconditioning; Severe R knee OA; Recent fall; Recurrent UTI;  Esophageal candidiasis; Hx of PUD; Schizophrenia; Bipolar disorder; Generalized anxiety disorder; ?H/o hypothyroidism; IBS-C;  Stage 2 coccyx pressure ulcer.     Received patient on 3B, laying supine in bed, HOB elevated 30 degrees. Pt awake, A&Ox3, made aware of purpose of WOCN visit, agreeable to consult. With minimal assistance from,  patient turned to right side for skin assessment.     Sacral & coccyx skin intact. Healed dark pink-purple hyperpigmented skin in linear pattern to right buttock area. No open areas or pressure injuries present. Bilateral heels intact.      Patient bedbound, able to turn/position in bed w/ minimal assistance, incontinent of urine, + PrimaFit and formed stool during WOCN visit. Ordered for regular diet, adequate intake as per reported Ramon score.

## 2022-02-01 NOTE — DISCHARGE NOTE PROVIDER - NSDCCPCAREPLAN_GEN_ALL_CORE_FT
PRINCIPAL DISCHARGE DIAGNOSIS  Diagnosis: Hospital admission due to social situation  Assessment and Plan of Treatment: You were admitted to the hospital in order to find a new nursing home. A nursing home was successfully found on 2/1/22. Please continue taking the medications prescribed to you. Please follow up with your primary care doctor in 1 week.

## 2022-02-02 PROCEDURE — 99238 HOSP IP/OBS DSCHRG MGMT 30/<: CPT

## 2022-02-02 NOTE — PROGRESS NOTE ADULT - SUBJECTIVE AND OBJECTIVE BOX
COMFORT FARIAS 61y Female  MRN#: [id number]   CODE STATUS: FULL      SUBJECTIVE  Patient is a 61y old Female who presents with a chief complaint of placement (01 Feb 2022 13:19)    Today is hospital day 3d,   INTERVAL HPI/OVERNIGHT EVENTS:    Examined pt at bedside this AM. There were no acute events o/n.    Present Today:           Mcduffie Catheter (x)No/ ()Yes?   Indication:             Central Line (x)No/ ()Yes?   Indication:          IV Fluids (x)No/ ()Yes? Type:  Rate:  Indication:    OBJECTIVE  PAST MEDICAL & SURGICAL HISTORY  Schizophrenia    Bipolar disorder    Depression    Anxiety    Chronic lower back pain  result of pelvic fracture in the past    Osteoarthritis    Urinary incontinence    Chronic urinary tract infection    History of tremor    History of total knee arthroplasty, left      ALLERGIES:  azithromycin (Unknown)  moxifloxacin (Unknown)  penicillin (Unknown)    HOME MEDICATIONS:  Home Medications:  bisacodyl 5 mg oral delayed release tablet: 1 tab(s) orally every 12 hours, As needed, Constipation (17 Dec 2021 10:48)  celecoxib 200 mg oral capsule: 1 cap(s) orally once a day (01 Feb 2022 14:16)  clonazePAM 1 mg oral tablet: 1 tab(s) orally once a day (13 Dec 2021 09:42)  famotidine 20 mg oral tablet: 1 tab(s) orally once a day (14 Jan 2022 15:54)  OLANZapine 10 mg oral tablet: 1 tab(s) orally once a day (13 Dec 2021 09:42)  sertraline 100 mg oral tablet: 1 tab(s) orally once a day (13 Dec 2021 09:42)  tamsulosin 0.4 mg oral capsule: 1 cap(s) orally once a day (at bedtime) (14 Jan 2022 15:35)    MEDICATIONS:  STANDING MEDICATIONS  celecoxib 200 milliGRAM(s) Oral daily  chlorhexidine 4% Liquid 1 Application(s) Topical <User Schedule>  enoxaparin Injectable 40 milliGRAM(s) SubCutaneous daily  famotidine    Tablet 20 milliGRAM(s) Oral daily  OLANZapine 10 milliGRAM(s) Oral daily  pantoprazole    Tablet 40 milliGRAM(s) Oral two times a day  sertraline 100 milliGRAM(s) Oral daily  tamsulosin 0.4 milliGRAM(s) Oral at bedtime    PRN MEDICATIONS  acetaminophen     Tablet .. 650 milliGRAM(s) Oral every 6 hours PRN  aluminum hydroxide/magnesium hydroxide/simethicone Suspension 30 milliLiter(s) Oral every 4 hours PRN  bisacodyl 5 milliGRAM(s) Oral every 12 hours PRN  clonazePAM  Tablet 1 milliGRAM(s) Oral daily PRN  melatonin 3 milliGRAM(s) Oral at bedtime PRN      VITAL SIGNS: Last 24 Hours  T(C): 37.3 (01 Feb 2022 21:48), Max: 37.3 (01 Feb 2022 21:48)  T(F): 99.2 (01 Feb 2022 21:48), Max: 99.2 (01 Feb 2022 21:48)  HR: 91 (01 Feb 2022 21:48) (60 - 91)  BP: 112/57 (01 Feb 2022 21:48) (112/57 - 134/79)  BP(mean): --  RR: 18 (01 Feb 2022 21:48) (18 - 18)  SpO2: --    LABS:                        10.8   6.42  )-----------( 293      ( 01 Feb 2022 04:30 )             34.3     02-01    139  |  106  |  16  ----------------------------<  102<H>  4.3   |  24  |  0.6<L>    Ca    9.5      01 Feb 2022 04:30  Mg     1.8     02-01    TPro  5.6<L>  /  Alb  3.3<L>  /  TBili  0.2  /  DBili  x   /  AST  12  /  ALT  8   /  AlkPhos  83  02-01        PHYSICAL EXAM:  GENERAL: NAD, well-developed, AAOx3  HEENT:  Atraumatic, Normocephalic  PULMONARY: Clear to auscultation bilaterally  CARDIOVASCULAR: Regular rate and rhythm  GASTROINTESTINAL: Soft, Nontender, Nondistended; Bowel sounds present  MUSCULOSKELETAL:  2+ Peripheral Pulses, No clubbing, cyanosis, or edema  NEUROLOGY: no focal defecits  SKIN: No rashes or lesions  
COMFORT FARIAS 61y Female  MRN#: [id number]   CODE STATUS: FULL      SUBJECTIVE  Patient is a 61y old Female who presents with a chief complaint of placement (31 Jan 2022 12:58)    Today is hospital day 2d,   INTERVAL HPI/OVERNIGHT EVENTS:    Examined pt at bedside this AM. There were no acute events o/n.    OBJECTIVE  PAST MEDICAL & SURGICAL HISTORY  Schizophrenia    Bipolar disorder    Depression    Anxiety    Chronic lower back pain  result of pelvic fracture in the past    Osteoarthritis    Urinary incontinence    Chronic urinary tract infection    History of tremor    History of total knee arthroplasty, left      ALLERGIES:  azithromycin (Unknown)  moxifloxacin (Unknown)  penicillin (Unknown)    HOME MEDICATIONS:  Home Medications:  acetaminophen 325 mg oral tablet: 2 tab(s) orally every 6 hours, As needed, Temp greater or equal to 38C (100.4F), Mild Pain (1 - 3) (14 Dec 2021 13:05)  aluminum hydroxide-magnesium hydroxide 200 mg-200 mg/5 mL oral suspension: 30 milliliter(s) orally every 4 hours, As needed, Dyspepsia (14 Jan 2022 15:54)  bisacodyl 5 mg oral delayed release tablet: 1 tab(s) orally every 12 hours, As needed, Constipation (17 Dec 2021 10:48)  clonazePAM 1 mg oral tablet: 1 tab(s) orally once a day (13 Dec 2021 09:42)  famotidine 20 mg oral tablet: 1 tab(s) orally once a day (14 Jan 2022 15:54)  OLANZapine 10 mg oral tablet: 1 tab(s) orally once a day (13 Dec 2021 09:42)  sertraline 100 mg oral tablet: 1 tab(s) orally once a day (13 Dec 2021 09:42)  tamsulosin 0.4 mg oral capsule: 1 cap(s) orally once a day (at bedtime) (14 Jan 2022 15:35)    MEDICATIONS:  STANDING MEDICATIONS  chlorhexidine 4% Liquid 1 Application(s) Topical <User Schedule>  enoxaparin Injectable 40 milliGRAM(s) SubCutaneous daily  famotidine    Tablet 20 milliGRAM(s) Oral daily  OLANZapine 10 milliGRAM(s) Oral daily  pantoprazole    Tablet 40 milliGRAM(s) Oral two times a day  sertraline 100 milliGRAM(s) Oral daily  tamsulosin 0.4 milliGRAM(s) Oral at bedtime    PRN MEDICATIONS  acetaminophen     Tablet .. 650 milliGRAM(s) Oral every 6 hours PRN  aluminum hydroxide/magnesium hydroxide/simethicone Suspension 30 milliLiter(s) Oral every 4 hours PRN  bisacodyl 5 milliGRAM(s) Oral every 12 hours PRN  clonazePAM  Tablet 1 milliGRAM(s) Oral daily PRN  melatonin 3 milliGRAM(s) Oral at bedtime PRN      VITAL SIGNS: Last 24 Hours  T(C): 36.1 (01 Feb 2022 05:44), Max: 36.8 (31 Jan 2022 16:46)  T(F): 97 (01 Feb 2022 05:44), Max: 98.3 (31 Jan 2022 16:46)  HR: 68 (01 Feb 2022 05:44) (68 - 94)  BP: 129/75 (01 Feb 2022 05:44) (129/75 - 137/77)  BP(mean): --  RR: 18 (01 Feb 2022 05:44) (18 - 18)  SpO2: 97% (31 Jan 2022 15:15) (97% - 97%)    LABS:                        11.8   7.28  )-----------( 302      ( 31 Jan 2022 11:00 )             36.9     01-31    138  |  102  |  13  ----------------------------<  156<H>  4.5   |  19  |  0.5<L>    Ca    9.6      31 Jan 2022 11:00  Mg     1.8     01-31    TPro  6.5  /  Alb  3.9  /  TBili  <0.2  /  DBili  x   /  AST  17  /  ALT  10  /  AlkPhos  97  01-31      PHYSICAL EXAM:  GENERAL: NAD, well-developed, AAOx3  HEENT:  Atraumatic, Normocephalic  PULMONARY: Clear to auscultation bilaterally  CARDIOVASCULAR: Regular rate and rhythm  GASTROINTESTINAL: Soft, Nontender, Nondistended; Bowel sounds present  MUSCULOSKELETAL:  2+ Peripheral Pulses, No clubbing, cyanosis, or edema  NEUROLOGY: no focal defecits  SKIN: No rashes or lesions  
SUBJECTIVE:    Patient is a 61y old Female who presents with a chief complaint of placement (30 Jan 2022 23:24)      HPI:  60 yo F with PMH of bipolar disorder, schizophrenia, gout, OA, chronic lower back pain, recurrent UTI, anxiety presented to the ED from [facility] for placement. Pt was unhappy with care she was receiving at [facility], stating that she was not receiving her medications. Of note, pt was recently admitted from 01/06-01/14 after a fall at home. She was following with Dr. Lyman for R knee replacement. Pt has no acute complaints at this time. She eventually hopes to return home. No nursing home documentation available in chart to review.    Denies chest pain, SOB, HA, N/V/C/D, abdominal pain. Denies suicidal I/I/P    ED Course: CBC, CMP, Mag wnl. VSS. on RA saturating well. (30 Jan 2022 23:24)    Currently admitted to medicine with the primary diagnosis of Hospital admission due to social situation      Besides the pertinent positives and negatives described above, the ROS was within normal limits.    OVERNIGHT EVENTS:    No overnight events. Pending ambulation with PT (consult placed). Tolerating PO diet. F/u with  regarding placement    PAST MEDICAL & SURGICAL HISTORY  Schizophrenia    Bipolar disorder    Depression    Anxiety    Chronic lower back pain  result of pelvic fracture in the past    Osteoarthritis    Urinary incontinence    Chronic urinary tract infection    History of tremor    History of total knee arthroplasty, left      SOCIAL HISTORY:    ALLERGIES:  azithromycin (Unknown)  moxifloxacin (Unknown)  penicillin (Unknown)    MEDICATIONS:  STANDING MEDICATIONS  chlorhexidine 4% Liquid 1 Application(s) Topical <User Schedule>  enoxaparin Injectable 40 milliGRAM(s) SubCutaneous daily  famotidine    Tablet 20 milliGRAM(s) Oral daily  melatonin 3 milliGRAM(s) Oral once  OLANZapine 10 milliGRAM(s) Oral daily  pantoprazole    Tablet 40 milliGRAM(s) Oral two times a day  sertraline 100 milliGRAM(s) Oral daily  tamsulosin 0.4 milliGRAM(s) Oral at bedtime    PRN MEDICATIONS  acetaminophen     Tablet .. 650 milliGRAM(s) Oral every 6 hours PRN  aluminum hydroxide/magnesium hydroxide/simethicone Suspension 30 milliLiter(s) Oral every 4 hours PRN  bisacodyl 5 milliGRAM(s) Oral every 12 hours PRN  clonazePAM  Tablet 1 milliGRAM(s) Oral daily PRN  melatonin 3 milliGRAM(s) Oral at bedtime PRN    VITALS:   T(F): 97.3  HR: 66  BP: 179/86  RR: 18  SpO2: 97%    LABS:                        11.0   8.66  )-----------( 301      ( 30 Jan 2022 18:33 )             34.4     01-30    135  |  103  |  13  ----------------------------<  85  4.3   |  24  |  0.5<L>    Ca    9.4      30 Jan 2022 18:33    TPro  6.3  /  Alb  3.6  /  TBili  <0.2  /  DBili  x   /  AST  20  /  ALT  10  /  AlkPhos  87  01-30      RADIOLOGY:    No new imaging    PHYSICAL EXAM:  GEN: No acute distress  LUNGS: Clear to auscultation bilaterally   HEART: Regular  ABD: Soft, non-tender, non-distended.  EXT: NC/NC/NE/2+PP/DOTSON/Skin Intact.   NEURO: AAOX3, CN 2-12 grossly intact, gait to be assessed with help of PT later today

## 2022-02-02 NOTE — PROGRESS NOTE ADULT - ASSESSMENT
61 yr old female with Hx bipolar disorder with hypomanic episodes, schizophrenia, IBS, anxiety, depression, severe right knee osteoarthritis, low back pain, recurrent UTI presents to the ED from [facility] for placement elsewhere.    # Muscular deconditioning  # Severe R knee OA  # Recent fall  - recent admission after a fall at home, previously used RW at home  - discharged to [facility] ~2 weeks ago  - being seen by Dr. Lyman per pt for elective R knee replacement  - PT eval  - SW/CM consult  - needs placement to new [facility]    # Recurrent UTI  - cont flomax    # Esophageal candidiasis  # Hx of PUD  - CT A/P 01/08: moderate paraesophageal hiatal hernia w/ thickening of distal esophagus. Enlarged gastroesophageal lymph nodes.  - EGD 1/10 found esophageal candidiasis, nonerosive gastritis, ulcer in cardia, normal duodenal mucosa  - cont PPI BID, pepcid, and maalox PRN  - cont fluconazole until today 1/31    # Schizophrenia  # Bipolar disorder  # Generalized anxiety disorder  - cont olanzapine, sertraline, clonazepam    # ?H/o hypothyroidism  - TSH 0.17 (01/06/2022), T4 7.4, free T4 1.4 (01/07/2022)  - off synthroid    # IBS-C  - cont bisacodyl 5mg q12h PRN    # Stage 2 coccyx pressure ulcer  - s/p wound care RN eval prior admission  - wound care orders placed    #DVT PPx- lovenox  #GI PPx- protonix BID  #Diet- regular diet w/ supplementation per prior dietician eval  #CHG  #Activity- IAT  #Dispo- STR  #Code- full    
61 yr old female with Hx bipolar disorder with hypomanic episodes, schizophrenia, IBS, anxiety, depression, severe right knee osteoarthritis, low back pain, recurrent UTI presents to the ED from [facility] for placement elsewhere.    # Muscular deconditioning  # Severe R knee OA  # Recent fall  - recent admission after a fall at home, previously used RW at home  - discharged to [facility] ~2 weeks ago  - being seen by Dr. Lyman per pt for elective R knee replacement  - PT eval appreciated  - SW/CM consult appreciated  - needs placement to new [facility]    # Recurrent UTI  - cont flomax    # Esophageal candidiasis  # Hx of PUD  - CT A/P 01/08: moderate paraesophageal hiatal hernia w/ thickening of distal esophagus. Enlarged gastroesophageal lymph nodes.  - EGD 1/10 found esophageal candidiasis, nonerosive gastritis, ulcer in cardia, normal duodenal mucosa  - cont PPI BID, pepcid, and maalox PRN  - cont fluconazole until today 1/31    # Schizophrenia  # Bipolar disorder  # Generalized anxiety disorder  - cont olanzapine, sertraline, clonazepam    # ?H/o hypothyroidism  - TSH 0.17 (01/06/2022), T4 7.4, free T4 1.4 (01/07/2022)  - off synthroid    # IBS-C  - cont bisacodyl 5mg q12h PRN    # Stage 2 coccyx pressure ulcer  - s/p wound care RN eval prior admission  - wound care orders placed    #DVT PPx- lovenox  #GI PPx- protonix BID  #Diet- regular diet  #Activity- IAT  #Dispo- SNF  #Code- full    Handoff: D/c to NH
61 yr old female with Hx bipolar disorder with hypomanic episodes, schizophrenia, IBS, anxiety, depression, severe right knee osteoarthritis, low back pain, recurrent UTI presents to the ED from [facility] for placement elsewhere.    # Muscular deconditioning  # Severe R knee OA  # Recent fall  - recent admission after a fall at home, previously used RW at home  - discharged to [facility] ~2 weeks ago  - being seen by Dr. Lyman per pt for elective R knee replacement  - PT eval appreciated  - SW/CM consult appreciated  - needs placement to new [facility]    # Recurrent UTI  - cont flomax    # Esophageal candidiasis  # Hx of PUD  - CT A/P 01/08: moderate paraesophageal hiatal hernia w/ thickening of distal esophagus. Enlarged gastroesophageal lymph nodes.  - EGD 1/10 found esophageal candidiasis, nonerosive gastritis, ulcer in cardia, normal duodenal mucosa  - cont PPI BID, pepcid, and maalox PRN  - cont fluconazole until today 1/31    # Schizophrenia  # Bipolar disorder  # Generalized anxiety disorder  - cont olanzapine, sertraline, clonazepam    # ?H/o hypothyroidism  - TSH 0.17 (01/06/2022), T4 7.4, free T4 1.4 (01/07/2022)  - off synthroid    # IBS-C  - cont bisacodyl 5mg q12h PRN    # Stage 2 coccyx pressure ulcer  - s/p wound care RN eval prior admission  - wound care orders placed    #DVT PPx- lovenox  #GI PPx- protonix BID  #Diet- regular diet w/ supplementation per prior dietician eval  #CHG  #Activity- IAT  #Dispo- STR  #Code- full

## 2022-02-04 DIAGNOSIS — M54.50 LOW BACK PAIN, UNSPECIFIED: ICD-10-CM

## 2022-02-04 DIAGNOSIS — F20.9 SCHIZOPHRENIA, UNSPECIFIED: ICD-10-CM

## 2022-02-04 DIAGNOSIS — N39.0 URINARY TRACT INFECTION, SITE NOT SPECIFIED: ICD-10-CM

## 2022-02-04 DIAGNOSIS — F31.9 BIPOLAR DISORDER, UNSPECIFIED: ICD-10-CM

## 2022-02-04 DIAGNOSIS — M62.89 OTHER SPECIFIED DISORDERS OF MUSCLE: ICD-10-CM

## 2022-02-04 DIAGNOSIS — M17.11 UNILATERAL PRIMARY OSTEOARTHRITIS, RIGHT KNEE: ICD-10-CM

## 2022-02-04 DIAGNOSIS — Z60.9 PROBLEM RELATED TO SOCIAL ENVIRONMENT, UNSPECIFIED: ICD-10-CM

## 2022-02-04 DIAGNOSIS — K58.9 IRRITABLE BOWEL SYNDROME WITHOUT DIARRHEA: ICD-10-CM

## 2022-02-04 DIAGNOSIS — R53.1 WEAKNESS: ICD-10-CM

## 2022-02-04 DIAGNOSIS — G89.29 OTHER CHRONIC PAIN: ICD-10-CM

## 2022-02-04 DIAGNOSIS — Z88.1 ALLERGY STATUS TO OTHER ANTIBIOTIC AGENTS STATUS: ICD-10-CM

## 2022-02-04 DIAGNOSIS — Z88.0 ALLERGY STATUS TO PENICILLIN: ICD-10-CM

## 2022-02-04 DIAGNOSIS — Z75.1 PERSON AWAITING ADMISSION TO ADEQUATE FACILITY ELSEWHERE: ICD-10-CM

## 2022-02-04 DIAGNOSIS — M10.9 GOUT, UNSPECIFIED: ICD-10-CM

## 2022-02-04 DIAGNOSIS — K25.9 GASTRIC ULCER, UNSPECIFIED AS ACUTE OR CHRONIC, WITHOUT HEMORRHAGE OR PERFORATION: ICD-10-CM

## 2022-02-04 DIAGNOSIS — K44.9 DIAPHRAGMATIC HERNIA WITHOUT OBSTRUCTION OR GANGRENE: ICD-10-CM

## 2022-02-04 DIAGNOSIS — E03.9 HYPOTHYROIDISM, UNSPECIFIED: ICD-10-CM

## 2022-02-04 DIAGNOSIS — F41.1 GENERALIZED ANXIETY DISORDER: ICD-10-CM

## 2022-02-04 SDOH — SOCIAL STABILITY - SOCIAL INSECURITY: PROBLEM RELATED TO SOCIAL ENVIRONMENT, UNSPECIFIED: Z60.9

## 2022-06-21 ENCOUNTER — APPOINTMENT (OUTPATIENT)
Dept: ORTHOPEDIC SURGERY | Facility: CLINIC | Age: 62
End: 2022-06-21
Payer: SUBSIDIZED

## 2022-06-21 DIAGNOSIS — M17.11 UNILATERAL PRIMARY OSTEOARTHRITIS, RIGHT KNEE: ICD-10-CM

## 2022-06-21 PROCEDURE — 20610 DRAIN/INJ JOINT/BURSA W/O US: CPT

## 2022-06-21 PROCEDURE — 99213 OFFICE O/P EST LOW 20 MIN: CPT | Mod: 25

## 2022-06-21 NOTE — HISTORY OF PRESENT ILLNESS
[de-identified] : right knee pain secondary to OA\par brought in by nursing home\par reports pain with activity\par using a wheelchair on todays visit\par \par exam:\par ttp over the knee\par ROM 0-90\par crepitation\par \par   Impression is right knee arthritis.\par \par plan\par \par   Continue NSAIDs.\par   Continue therapy. \par Right knee injected with cortisone.\par \par   Prior to injection, risks and benefits of the procedure were discussed, including but not limited to pain, swelling, failure to improve symptoms and in rare cases infection or allergic reaction. \par The knee was prepped and draped with betadine and alcohol.  Using sterile technique 1cc of 40mg/cc kenalog solution mixed with 4cc of 1% lidocaine was injected thru an anterolateral portal using a 22guage needle.  Upon withdrawing the needle pressure was applied with to the injection site for approximately 1 minute.  Once hemostasis was achieved a band-aid dressing was applied.  The patient tolerated the procedure well.\par \par \par   Follow-up as needed.

## 2022-06-30 NOTE — PRE-ANESTHESIA EVALUATION ADULT - WEIGHT IN KG
Pre-Operative Progress Note


H&P Reviewed


The H&P was reviewed, patient examined and no changes noted.


Date Seen by Provider:  Jun 30, 2022


Time Seen by Provider:  09:51


Date H&P Reviewed:  Jun 30, 2022


Time H&P Reviewed:  09:51


Pre-Operative Diagnosis:  incisional hernia











NANY PHILIP DO              Jun 30, 2022 09:51 49.4

## 2022-09-12 NOTE — PRE-OP CHECKLIST - ADVANCE DIRECTIVE ADDRESSED/READDRESSED
Comprehensive Nutrition Assessment    Type and Reason for Visit:  RD Nutrition Re-Screen/LOS    Nutrition Recommendations/Plan:   Continue minced and moist diet  Add Ensure enlive TID  Encourage PO intakes of meals and ONS  Monitor nutrition adequacy, pertinent labs, bowel habits, wt changes, and clinical progress     Malnutrition Assessment:  Malnutrition Status:  Severe malnutrition (09/12/22 1233)    Context:  Chronic Illness     Findings of the 6 clinical characteristics of malnutrition:  Energy Intake:  75% or less estimated energy requirements for 1 month or longer  Weight Loss:  Unable to assess     Body Fat Loss:  Severe body fat loss Orbital, Buccal region, Triceps   Muscle Mass Loss:  Severe muscle mass loss Temples (temporalis), Clavicles (pectoralis & deltoids)    Nutrition Assessment:    LOS assessment. Pt admitted with R foot osteomyelitis. 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 S/p R TMA on 9/9. Currently on minced and moist diet with PO intakes 1-75% at meals. Pt edentulous, states she has had dentures in the past but didn't like how they feel. Pt relies on mostly Ritz crackers (states they dissolve easily) w/ PB and ONS at home. Wt fluctuations per EMR, difficult to assess for weight loss. Pt with notable muscle and fat loss. Pt requesting ONS instead of meals. Discussed importance of adequate PO intakes of both meals and ONS. Pt voiced understanding. Agreeable to minced and moist diet. Will order Ensure enlive w/ meals. Continue to monitor. Nutrition Related Findings:    Na 133. BG 76-187mg/dl x24hrs. BM 9/10 Wound Type: Surgical Incision       Current Nutrition Intake & Therapies:    Average Meal Intake: 1-25%, 26-50%, 51-75%  Average Supplements Intake: None Ordered  ADULT DIET; Dysphagia - Minced and Moist    Anthropometric Measures:  Height: 5' 3\" (160 cm)  Ideal Body Weight (IBW): 115 lbs (52 kg)       Current Body Weight: 125 lb (56.7 kg), 108.7 % IBW.  Weight Source: Bed Scale  Current BMI (kg/m2): 22.1                          BMI Categories: Normal Weight (BMI 22.0 to 24.9) age over 72    Estimated Daily Nutrient Needs:  Energy Requirements Based On: Kcal/kg (25-30)  Weight Used for Energy Requirements: Current  Energy (kcal/day): 5452-7039  Weight Used for Protein Requirements: Current (1.2-1.4)  Protein (g/day): 68-80  Method Used for Fluid Requirements: 1 ml/kcal  Fluid (ml/day): 1822-4095    Nutrition Diagnosis:   Severe malnutrition related to inadequate protein-energy intake, biting/chewing (masticatory) difficulty as evidenced by intake 26-50%, intake 0-25%, poor intake prior to admission, severe muscle loss, severe loss of subcutaneous fat    Nutrition Interventions:   Food and/or Nutrient Delivery: Continue Current Diet, Start Oral Nutrition Supplement  Nutrition Education/Counseling: No recommendation at this time  Coordination of Nutrition Care: Continue to monitor while inpatient  Plan of Care discussed with: Patient    Goals:     Goals: PO intake 50% or greater, prior to discharge       Nutrition Monitoring and Evaluation:      Food/Nutrient Intake Outcomes: Food and Nutrient Intake, Supplement Intake  Physical Signs/Symptoms Outcomes: Biochemical Data, Nutrition Focused Physical Findings, Skin, Weight, Chewing or Swallowing    Discharge Planning:    Continue current diet, Continue Oral Nutrition Supplement     [address], [address]  Contact: [phone] done

## 2022-10-04 ENCOUNTER — EMERGENCY (EMERGENCY)
Facility: HOSPITAL | Age: 62
LOS: 0 days | Discharge: HOME | End: 2022-10-04
Attending: EMERGENCY MEDICINE | Admitting: EMERGENCY MEDICINE

## 2022-10-04 VITALS
SYSTOLIC BLOOD PRESSURE: 192 MMHG | HEART RATE: 69 BPM | TEMPERATURE: 96 F | DIASTOLIC BLOOD PRESSURE: 74 MMHG | HEIGHT: 59 IN | RESPIRATION RATE: 17 BRPM | OXYGEN SATURATION: 100 %

## 2022-10-04 DIAGNOSIS — Z88.0 ALLERGY STATUS TO PENICILLIN: ICD-10-CM

## 2022-10-04 DIAGNOSIS — M19.90 UNSPECIFIED OSTEOARTHRITIS, UNSPECIFIED SITE: ICD-10-CM

## 2022-10-04 DIAGNOSIS — Z87.891 PERSONAL HISTORY OF NICOTINE DEPENDENCE: ICD-10-CM

## 2022-10-04 DIAGNOSIS — Z87.820 PERSONAL HISTORY OF TRAUMATIC BRAIN INJURY: ICD-10-CM

## 2022-10-04 DIAGNOSIS — M54.50 LOW BACK PAIN, UNSPECIFIED: ICD-10-CM

## 2022-10-04 DIAGNOSIS — Z96.652 PRESENCE OF LEFT ARTIFICIAL KNEE JOINT: Chronic | ICD-10-CM

## 2022-10-04 DIAGNOSIS — H54.61 UNQUALIFIED VISUAL LOSS, RIGHT EYE, NORMAL VISION LEFT EYE: ICD-10-CM

## 2022-10-04 DIAGNOSIS — F20.9 SCHIZOPHRENIA, UNSPECIFIED: ICD-10-CM

## 2022-10-04 DIAGNOSIS — F41.9 ANXIETY DISORDER, UNSPECIFIED: ICD-10-CM

## 2022-10-04 DIAGNOSIS — F31.9 BIPOLAR DISORDER, UNSPECIFIED: ICD-10-CM

## 2022-10-04 DIAGNOSIS — Z88.1 ALLERGY STATUS TO OTHER ANTIBIOTIC AGENTS STATUS: ICD-10-CM

## 2022-10-04 DIAGNOSIS — H57.89 OTHER SPECIFIED DISORDERS OF EYE AND ADNEXA: ICD-10-CM

## 2022-10-04 DIAGNOSIS — G89.29 OTHER CHRONIC PAIN: ICD-10-CM

## 2022-10-04 DIAGNOSIS — Z20.822 CONTACT WITH AND (SUSPECTED) EXPOSURE TO COVID-19: ICD-10-CM

## 2022-10-04 LAB
FLUAV AG NPH QL: SIGNIFICANT CHANGE UP
FLUBV AG NPH QL: SIGNIFICANT CHANGE UP
RSV RNA NPH QL NAA+NON-PROBE: SIGNIFICANT CHANGE UP
SARS-COV-2 RNA SPEC QL NAA+PROBE: SIGNIFICANT CHANGE UP

## 2022-10-04 PROCEDURE — 99283 EMERGENCY DEPT VISIT LOW MDM: CPT | Mod: FS

## 2022-10-04 RX ORDER — ACETAMINOPHEN 500 MG
650 TABLET ORAL ONCE
Refills: 0 | Status: DISCONTINUED | OUTPATIENT
Start: 2022-10-04 | End: 2022-10-04

## 2022-10-04 NOTE — ED PROVIDER NOTE - PATIENT PORTAL LINK FT
You can access the FollowMyHealth Patient Portal offered by [facility] by registering at the following website: [email or web address]. By joining [employer]’s FollowMyHealth portal, you will also be able to view your health information using other applications (apps) compatible with our system.

## 2022-10-04 NOTE — ED PROVIDER NOTE - NS ED ATTENDING STATEMENT MOD
This was a shared visit with the SAMANTHA. I reviewed and verified the documentation and independently performed the documented:

## 2022-10-04 NOTE — ED ADULT NURSE NOTE - INTERVENTIONS DEFINITIONS
[place] to call system/Instruct patient to call for assistance/Non-slip footwear when patient is off stretcher/Physically safe environment: no spills, clutter or unnecessary equipment/Stretcher in lowest position, wheels locked, appropriate side rails in place/Monitor for mental status changes and reorient to person, place, and time/Reinforce activity limits and safety measures with patient and family

## 2022-10-04 NOTE — ED ADULT TRIAGE NOTE - CHIEF COMPLAINT QUOTE
Pt BIBA from rehab center, complaining of b/l eye burning when she stays in a certain room. denies headache.

## 2022-10-04 NOTE — ED PROVIDER NOTE - CLINICAL SUMMARY MEDICAL DECISION MAKING FREE TEXT BOX
62-year-old woman underlying psychiatric disease presents from patient psychiatric psychiatry with concern for COVID-19.  Patient presents with complaint my eyes are burning.  Patient otherwise has no complaints.  Patient's eyes look normal.  Patient's examination is normal.  Patient's vital signs are normal.  Patient stable for discharge.  Reassurance and discharge.  Swab for COVID which appears negative.  Discharged

## 2022-10-04 NOTE — ED PROVIDER NOTE - NSFOLLOWUPINSTRUCTIONS_ED_ALL_ED_FT
Please follow up with your primary ophthalmologist in 1-3 days      Photophobia      Photophobia is an eye condition that means your eyes are very sensitive to light. You may feel like your eyes hurt after exposure to sunlight or light from light bulbs. Other symptoms include:  •Squinting or covering your eyes, even in dim light.      •Taking a long time for your eyes to adjust from low light to bright light.      This condition can happen in one eye or both eyes. It may be caused by eye inflammation from infections or other disorders, migraines, or neurological problems such as tumors. Your health care provider will do an eye exam and a physical exam. Other testing may include blood tests or imaging tests such as a CT scan or an MRI to find the cause.      Follow these instructions at home:  A person wearing a wide-brim hat, dark sunglasses, and clothing to keep the sun from harming the skin and eyes.   Pay attention to any changes in your symptoms. Take these actions to help with your discomfort:  •Take or apply over-the-counter or prescription medicines only as told by your health care provider. This includes eye drops.      •Stay away from flickering lights.      •Avoid bright lights.    •Protect your eyes from the light. When you are outside, consider wearing:  •Dark sunglasses.      •A hat that has a wide brim.      •An eye patch.        •Keep all follow-up visits. This is important.        Where to find support:    •Photophobic Society of [place]: www.photophobics.org        Contact a health care provider if:    •Your eyes feel dry or itchy.      •You have eye pain in one eye or both eyes.      •You have discharge coming from one eye or both eyes.      •Your condition does not improve.    •You have photophobia and you also have:  •Blurry vision.      •A headache.      •Red eyes.          Get help right away if:    •You have severe eye pain even when you are not exposed to bright lights.      •You have severe neck pain or stiffness.      These symptoms may be an emergency. Get help right away. Call 911.   • Do not wait to see if the symptoms will go away.       • Do not drive yourself to the hospital.         Summary    •Photophobia is an eye condition that means your eyes are very sensitive to light.      •It can be caused by a number of disorders including eye and neurological problems.      •When you are outside, consider wearing dark sunglasses, a hat, or an eye patch.      •Take or apply over-the-counter or prescription medicines only as told by your health care provider. This includes eye drops.

## 2022-10-04 NOTE — ED PROVIDER NOTE - CARE PROVIDER_API CALL
Osman Miller)  Ophthalmology  [address] [place] [address]  Phone: [phone]  Fax: [phone]  Follow Up Time:

## 2022-10-04 NOTE — ED PROVIDER NOTE - PHYSICAL EXAMINATION
VITAL SIGNS: I have reviewed nursing notes and confirm.  CONSTITUTIONAL:  in no acute distress.  SKIN: Skin exam is warm and dry, no acute rash.  HEAD: Normocephalic; atraumatic.  EYES: right pupil no reactive, EOM intact; conjunctiva and sclera clear.  ENT: No nasal discharge; airway clear. TMs clear.  NECK: Supple; non tender.  CARD: S1, S2 normal; no murmurs, gallops, or rubs. Regular rate and rhythm.  RESP: No wheezes, rales or rhonchi. Speaking in full sentences.   ABD: soft; non-distended; non-tender  NEURO: Alert, oriented  PSYCH: Cooperative, appropriate.

## 2022-10-04 NOTE — ED PROVIDER NOTE - OBJECTIVE STATEMENT
62 F hx of schizophrenia, depression, bipolar, chronic dry eyes presents to ED from rehab center for burning sensation to b/l eyes. pt states for the last 2 days she has had dry burning sensation to eyes. pt states she has artifical tear medication, but only uses it few times a week. pt states last time she used them was 3 days ago. 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 denies HA, trauma to eyes, itching sensation, N,V, CP,SOB

## 2023-01-11 ENCOUNTER — APPOINTMENT (OUTPATIENT)
Dept: ORTHOPEDIC SURGERY | Facility: CLINIC | Age: 63
End: 2023-01-11

## 2023-02-20 NOTE — ED PROVIDER NOTE - PRINCIPAL DIAGNOSIS
COVID Screening completed. Patient denies complaints, no changes to PMH or medications. Patient tolerates exercise well. Discussed strength and balance exercises with patient. Handout provided in Greenlandic. Patient viewed [employer] video \"Dining Out Part 1\". All equipment used in the care for this patient has been cleaned.  Electronically signed by Zunilda Loving RN on 2/20/2023 at 4:05 PM     Hospital admission due to social situation

## 2023-02-27 NOTE — ED PROVIDER NOTE - IV ALTEPLASE DOOR HIDDEN
show Imiquimod Counseling:  I discussed with the patient the risks of imiquimod including but not limited to erythema, scaling, itching, weeping, crusting, and pain.  Patient understands that the inflammatory response to imiquimod is variable from person to person and was educated regarded proper titration schedule.  If flu-like symptoms develop, patient knows to discontinue the medication and contact us.

## 2023-07-17 NOTE — OCCUPATIONAL THERAPY INITIAL EVALUATION ADULT - SHORT TERM MEMORY, REHAB EVAL
intact Benzoyl Peroxide Counseling: Patient counseled that medicine may cause skin irritation and bleach clothing.  In the event of skin irritation, the patient was advised to reduce the amount of the drug applied or use it less frequently.   The patient verbalized understanding of the proper use and possible adverse effects of benzoyl peroxide.  All of the patient's questions and concerns were addressed.

## 2023-09-24 NOTE — ED PROVIDER NOTE - CCCP TRG CHIEF CMPLNT
vomiting
Please follow up with:  - Urology Dr Ignacio  - Cardiology Dr Arguelles  - Infectious Diseases Dr Chacon

## 2023-12-08 NOTE — PRE-ANESTHESIA EVALUATION ADULT - MALLAMPATI CLASS
Class III - visualization of the soft palate and the base of the uvula Price (Do Not Change): 0.00 Instructions: This plan will send the code FBSD to the PM system.  DO NOT or CHANGE the price. Detail Level: Simple

## 2024-02-08 NOTE — PATIENT PROFILE ADULT - NSTOBACCOCESSATIONEDU2_GEN_A_NUR
ANNUAL GYNECOLOGY VISIT    Chief Complaint  No chief complaint on file.      Subjective  Emili Awad is a 34 y.o. female who presents today for Annual Exam.  She has been in good health for the last year. She has the Mirena IUD in place and has not complaints. She spots monthly. 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 Since last year she has received the HPV vaccine.     Preventive Care   Immunization History   Administered Date(s) Administered    9VHPV VACCINE 2-3 DOSE IM (GARDASIL 9) 02/16/2023, 05/03/2023, 12/14/2023    Dtap Vaccine 1987, 1987, 1987, 04/27/1992    Hepatitis A Vaccine, Adult 10/15/2013    Hepatitis B Vaccine (Adol/Adult) 06/13/2011, 07/13/2011, 12/13/2011    Hib Vaccine (Prp-d) - HISTORICAL DATA 03/25/1991    Influenza (IM) Preservative Free - HISTORICAL DATA 03/29/2011, 10/18/2012, 11/10/2016    Influenza Seasonal Injectable - Historical Data 02/14/2012    Influenza Vaccine Adult HD 09/30/2022    Influenza Vaccine H1N1 - HISTORICAL DATA 11/10/2009    Influenza Vaccine Quad Inj (Pf) 10/04/2013, 10/04/2014, 11/10/2016, 11/21/2017, 10/05/2018, 11/02/2019, 09/29/2020, 09/28/2023    Influenza Vaccine Quad Inj (Preserved) 10/20/2015    MMR Vaccine 01/04/1989, 04/27/1992    MODERNA SARS-COV-2 VACCINE (12+) 12/30/2020, 01/29/2021, 09/22/2021    OPV TRIVALENT - HISTORICAL DATA (GIVEN PRIOR TO MAY 2016) 1987, 1987, 04/27/1992    TD Vaccine 09/03/2003    Tdap Vaccine 06/13/2011, 12/13/2016    Tuberculin Skin Test 11/01/2013     Last Mammogram: Not yet indicated    Gynecology History and ROS  Current Sexual Activity: Yes  History of sexually transmitted diseases?  no  Abnormal discharge? No  Current Contraception:  Mirena IUD    Menstrual History  No LMP recorded (lmp unknown). Patient has had an implant.  She spots monthly, no complaints about period    Pap History  Last pap smear: 2023  History of moderate or severe dysplasia:  LSIL    Cancer  Risk Assessement:  Family history of:   - Breast cancer: no   - Ovarian cancer: no   - Uterine cancer: no   - Colon cancer: no    Obstetric History  OB History    Para Term  AB Living   1 0 0 0 1 0   SAB IAB Ectopic Molar Multiple Live Births   0 1 0   0        # Outcome Date GA Lbr Sammy/2nd Weight Sex Delivery Anes PTL Lv   1 IAB                Past Medical History  Past Medical History:   Diagnosis Date    Cardiac arrhythmia     Chickenpox     Eczema     Episodic lightheadedness 2017    H. pylori infection     treated    Hypokalemia     IUD (intrauterine device) in place     Needle stick injury     took medications, pt was tested negative       Past Surgical History  Past Surgical History:   Procedure Laterality Date    DENTAL SURGERY      wisdom teeth extraction    DILATION AND CURETTAGE      D & C, election  in        Social History  Social History     Socioeconomic History    Marital status:      Spouse name: Not on file    Number of children: Not on file    Years of education: Not on file    Highest education level: Associate degree: occupational, technical, or vocational program   Occupational History    Occupation: [employer] tech   Tobacco Use    Smoking status: Never    Smokeless tobacco: Never   Vaping Use    Vaping Use: Never used   Substance and Sexual Activity    Alcohol use: Yes     Alcohol/week: 0.0 oz     Comment: occasional, 3x week    Drug use: No    Sexual activity: Yes     Partners: Male     Birth control/protection: I.U.D.     Comment: [employer] in , works at [employer]   Other Topics Concern    Not on file   Social History Narrative    , no children.      Social Determinants of Health     Financial Resource Strain: Low Risk  (2022)    Overall Financial Resource Strain (CARDIA)     Difficulty of Paying Living Expenses: Not very hard   Food Insecurity: No Food Insecurity (2022)    Hunger Vital Sign     Worried About Running Out  of Food in the Last Year: Never true     Ran Out of Food in the Last Year: Never true   Transportation Needs: No Transportation Needs (12/26/2022)    PRAPARE - Transportation     Lack of Transportation (Medical): No     Lack of Transportation (Non-Medical): No   Physical Activity: Sufficiently Active (12/26/2022)    Exercise Vital Sign     Days of Exercise per Week: 7 days     Minutes of Exercise per Session: 60 min   Stress: No Stress Concern Present (12/26/2022)    [place] [employer] - Occupational Stress Questionnaire     Feeling of Stress : Only a little   Social Connections: Moderately Isolated (12/26/2022)    Social Connection and Isolation Panel [NHANES]     Frequency of Communication with Friends and Family: Three times a week     Frequency of Social Gatherings with Friends and Family: Twice a week     Attends Christianity Services: Never     Active Member of Clubs or Organizations: No     Attends Club or Organization Meetings: Never     Marital Status:    Intimate Partner Violence: Not on file   Housing Stability: Low Risk  (12/26/2022)    Housing Stability Vital Sign     Unable to Pay for Housing in the Last Year: No     Number of Places Lived in the Last Year: 1     Unstable Housing in the Last Year: No       Family History  Family History   Problem Relation Age of Onset    Diabetes Mother     Hypertension Father     Cancer Paternal Uncle         lymphoma       Home Medications  Current Outpatient Medications on File Prior to Visit   Medication Sig Dispense Refill    Ascorbic Acid (VITAMIN C PO) Take  by mouth.      Multiple Vitamins-Minerals (MULTIVITAMIN PO) Take  by mouth.      Probiotic Product (PROBIOTIC DAILY PO) Take  by mouth.      fluticasone (FLONASE) 50 MCG/ACT nasal spray Administer 1 Spray into affected nostril(S) every day. (Patient not taking: Reported on 2/8/2024) 16 g 0    albuterol 108 (90 Base) MCG/ACT Aero Soln inhalation aerosol Inhale 2 Puffs every 6 hours  as needed for Shortness of Breath. (Patient not taking: Reported on 2/8/2024) 8.5 g 0    benzonatate (TESSALON) 100 MG Cap Take 1 Capsule by mouth 3 times a day as needed for Cough. (Patient not taking: Reported on 2/8/2024) 30 Capsule 0    ondansetron (ZOFRAN) 4 MG Tab tablet Take 1 Tablet by mouth every four hours as needed for Nausea/Vomiting. (Patient not taking: Reported on 12/22/2023) 20 Tablet 0     No current facility-administered medications on file prior to visit.       Allergies/Reactions  Allergies   Allergen Reactions    Macrobid [Nitrofurantoin] Vomiting       ROS  Positive ROS: no  Gen: no fevers or chills, no significant weight loss or gain, excessive fatigue  Respiratory:  no cough or dyspnea  Cardiac:  no chest pain, no palpitations, no syncope  Breast: no breast discharge, pain, lump or skin changes  GI:  no heartburn, no abdominal pain, no nausea or vomiting  Urinary: no dysuria, urgency, frequency, incontinence   Psych: no depression or anxiety  Neuro: no migraines with aura, fainting spells, numbness or tingling  Extremities: no joint pain, persistently swollen ankles, recurrent leg cramps      Physical Examination:  Vital Signs:   Vitals:    02/08/24 0948   BP: 108/66   BP Location: Right arm   Patient Position: Sitting   Weight: 130 lb     Body mass index is 20.98 kg/m².    Constitutional: The patient is well developed and well nourished.  Psychiatric: Patient is oriented to time place and person.   Skin: No rash observed.  Neck: Appears symmetric. Thyroid normal size  Respiratory: normal effort  Breast: Inspection reveals no asymetry or nipple discharge, no skin thickening, dimpling or erythema.  Palpation demonstrates no masses.  Abdomen: Soft, non-tender.  Pelvic Exam:     Vulva: external female genitalia are normal in appearance. No lesions    Urethra - no lesions, no erythema    Vagina: moist, pink, normal ruggae    Cervix: pink, smooth, no lesions, no CMT    Uterus - non-tender, normal  "size, shape, contour, mobile, anteverted    Ovaries: non-tender, no appreciable masses  Pap Smear performed: Yes  Extremeties: Legs are symmetric and without tenderness. There is no edema present.    Labs/Imaging:  HDL (mg/dL)   Date Value   12/22/2022 76     LDL (mg/dL)   Date Value   12/22/2022 74     No components found for: \"A1C1\"  WBC (K/uL)   Date Value   03/22/2023 5.7     Lab Results   Component Value Date/Time    CO2 23 12/22/2022 1045    BUN 11 12/22/2022 1045       Assessment & Plan  Emili Awad is a 34 y.o. female who presents today for Annual Gyn Exam.     1. Women's annual routine gynecological examination  - Last year's pap/colpo showed LSIL, repeat pap smear was performed today  - She is s/p full course of Gardisil this year  - She is not yet due for mammogram  - She is not yet due to start colon cancer screening    2. Screening for cervical cancer  - THINPREP PAP W/HPV AND CTNG; Future    3. History of LSIL    4. IUD in place  - Placed 4/2021    Return: Annually or PRN    Frank Hernandez M.D.        " Develop coping skills.  For example, strategies and lifestyle changes that reduce negative moods, stress, and exposure to smoking cues.

## 2024-11-26 ENCOUNTER — INPATIENT (INPATIENT)
Facility: HOSPITAL | Age: 64
LOS: 6 days | Discharge: HOME | DRG: 441 | End: 2024-12-03
Attending: INTERNAL MEDICINE | Admitting: STUDENT IN AN ORGANIZED HEALTH CARE EDUCATION/TRAINING PROGRAM
Payer: MEDICARE

## 2024-11-26 VITALS
SYSTOLIC BLOOD PRESSURE: 116 MMHG | OXYGEN SATURATION: 97 % | WEIGHT: 115.08 LBS | DIASTOLIC BLOOD PRESSURE: 75 MMHG | HEART RATE: 82 BPM | TEMPERATURE: 99 F | RESPIRATION RATE: 18 BRPM

## 2024-11-26 DIAGNOSIS — Z96.652 PRESENCE OF LEFT ARTIFICIAL KNEE JOINT: Chronic | ICD-10-CM

## 2024-11-26 LAB
ALBUMIN SERPL ELPH-MCNC: 3.3 G/DL — LOW (ref 3.5–5.2)
ALP SERPL-CCNC: 563 U/L — HIGH (ref 30–115)
ALT FLD-CCNC: 350 U/L — HIGH (ref 0–41)
ANION GAP SERPL CALC-SCNC: 12 MMOL/L — SIGNIFICANT CHANGE UP (ref 7–14)
APPEARANCE UR: ABNORMAL
AST SERPL-CCNC: 188 U/L — HIGH (ref 0–41)
BASOPHILS # BLD AUTO: 0.09 K/UL — SIGNIFICANT CHANGE UP (ref 0–0.2)
BASOPHILS NFR BLD AUTO: 1 % — SIGNIFICANT CHANGE UP (ref 0–1)
BILIRUB DIRECT SERPL-MCNC: 1.8 MG/DL — HIGH (ref 0–0.3)
BILIRUB INDIRECT FLD-MCNC: 1.2 MG/DL — SIGNIFICANT CHANGE UP (ref 0.2–1.2)
BILIRUB SERPL-MCNC: 3 MG/DL — HIGH (ref 0.2–1.2)
BILIRUB UR-MCNC: ABNORMAL
BUN SERPL-MCNC: 28 MG/DL — HIGH (ref 10–20)
CALCIUM SERPL-MCNC: 9.1 MG/DL — SIGNIFICANT CHANGE UP (ref 8.4–10.5)
CHLORIDE SERPL-SCNC: 107 MMOL/L — SIGNIFICANT CHANGE UP (ref 98–110)
CO2 SERPL-SCNC: 21 MMOL/L — SIGNIFICANT CHANGE UP (ref 17–32)
COLOR SPEC: SIGNIFICANT CHANGE UP
CREAT SERPL-MCNC: 0.9 MG/DL — SIGNIFICANT CHANGE UP (ref 0.7–1.5)
DIFF PNL FLD: ABNORMAL
EGFR: 71 ML/MIN/1.73M2 — SIGNIFICANT CHANGE UP
EOSINOPHIL # BLD AUTO: 0.09 K/UL — SIGNIFICANT CHANGE UP (ref 0–0.7)
EOSINOPHIL NFR BLD AUTO: 1 % — SIGNIFICANT CHANGE UP (ref 0–8)
GLUCOSE SERPL-MCNC: 112 MG/DL — HIGH (ref 70–99)
GLUCOSE UR QL: NEGATIVE MG/DL — SIGNIFICANT CHANGE UP
HCT VFR BLD CALC: 36.7 % — LOW (ref 37–47)
HGB BLD-MCNC: 11.6 G/DL — LOW (ref 12–16)
IMM GRANULOCYTES NFR BLD AUTO: 2.5 % — HIGH (ref 0.1–0.3)
KETONES UR-MCNC: 15 MG/DL
LACTATE SERPL-SCNC: 0.9 MMOL/L — SIGNIFICANT CHANGE UP (ref 0.7–2)
LEUKOCYTE ESTERASE UR-ACNC: ABNORMAL
LIDOCAIN IGE QN: 92 U/L — HIGH (ref 7–60)
LYMPHOCYTES # BLD AUTO: 2.26 K/UL — SIGNIFICANT CHANGE UP (ref 1.2–3.4)
LYMPHOCYTES # BLD AUTO: 24.7 % — SIGNIFICANT CHANGE UP (ref 20.5–51.1)
MCHC RBC-ENTMCNC: 30.1 PG — SIGNIFICANT CHANGE UP (ref 27–31)
MCHC RBC-ENTMCNC: 31.6 G/DL — LOW (ref 32–37)
MCV RBC AUTO: 95.3 FL — SIGNIFICANT CHANGE UP (ref 81–99)
MONOCYTES # BLD AUTO: 0.73 K/UL — HIGH (ref 0.1–0.6)
MONOCYTES NFR BLD AUTO: 8 % — SIGNIFICANT CHANGE UP (ref 1.7–9.3)
NEUTROPHILS # BLD AUTO: 5.74 K/UL — SIGNIFICANT CHANGE UP (ref 1.4–6.5)
NEUTROPHILS NFR BLD AUTO: 62.8 % — SIGNIFICANT CHANGE UP (ref 42.2–75.2)
NITRITE UR-MCNC: POSITIVE
NRBC # BLD: 0 /100 WBCS — SIGNIFICANT CHANGE UP (ref 0–0)
PH UR: 6 — SIGNIFICANT CHANGE UP (ref 5–8)
PLATELET # BLD AUTO: 359 K/UL — SIGNIFICANT CHANGE UP (ref 130–400)
PMV BLD: 10.4 FL — SIGNIFICANT CHANGE UP (ref 7.4–10.4)
POTASSIUM SERPL-MCNC: 4.7 MMOL/L — SIGNIFICANT CHANGE UP (ref 3.5–5)
POTASSIUM SERPL-SCNC: 4.7 MMOL/L — SIGNIFICANT CHANGE UP (ref 3.5–5)
PROT SERPL-MCNC: 6.2 G/DL — SIGNIFICANT CHANGE UP (ref 6–8)
PROT UR-MCNC: >=1000 MG/DL
RBC # BLD: 3.85 M/UL — LOW (ref 4.2–5.4)
RBC # FLD: 13.9 % — SIGNIFICANT CHANGE UP (ref 11.5–14.5)
SODIUM SERPL-SCNC: 140 MMOL/L — SIGNIFICANT CHANGE UP (ref 135–146)
SP GR SPEC: 1.02 — SIGNIFICANT CHANGE UP (ref 1–1.03)
UROBILINOGEN FLD QL: 1 MG/DL — SIGNIFICANT CHANGE UP (ref 0.2–1)
WBC # BLD: 9.14 K/UL — SIGNIFICANT CHANGE UP (ref 4.8–10.8)
WBC # FLD AUTO: 9.14 K/UL — SIGNIFICANT CHANGE UP (ref 4.8–10.8)

## 2024-11-26 PROCEDURE — 99285 EMERGENCY DEPT VISIT HI MDM: CPT | Mod: FS

## 2024-11-26 RX ORDER — 0.9 % SODIUM CHLORIDE 0.9 %
1000 INTRAVENOUS SOLUTION INTRAVENOUS ONCE
Refills: 0 | Status: COMPLETED | OUTPATIENT
Start: 2024-11-26 | End: 2024-11-26

## 2024-11-26 RX ADMIN — Medication 1000 MILLILITER(S): at 21:35

## 2024-11-26 NOTE — ED ADULT TRIAGE NOTE - CHIEF COMPLAINT QUOTE
pt JET from [facility], pt has elevated AST levels, fever today (102), pt has been being treating with abx for UTI

## 2024-11-26 NOTE — ED ADULT NURSE NOTE - CHIEF COMPLAINT QUOTE
pt JET from [facility], pt has elevated AST levels, fever today (102), pt has been being treating with abx for UTI

## 2024-11-26 NOTE — ED ADULT NURSE NOTE - OBJECTIVE STATEMENT
Pt BIBEMS from [facility], pt has elevated AST levels, fever today (102), pt has been being treating with abx for UTI. pt is awake, lathergic and  confused.

## 2024-11-27 DIAGNOSIS — N39.0 URINARY TRACT INFECTION, SITE NOT SPECIFIED: ICD-10-CM

## 2024-11-27 LAB
ALBUMIN SERPL ELPH-MCNC: 3.4 G/DL — LOW (ref 3.5–5.2)
ALP SERPL-CCNC: 550 U/L — HIGH (ref 30–115)
ALT FLD-CCNC: 355 U/L — HIGH (ref 0–41)
ANION GAP SERPL CALC-SCNC: 14 MMOL/L — SIGNIFICANT CHANGE UP (ref 7–14)
AST SERPL-CCNC: 154 U/L — HIGH (ref 0–41)
BASOPHILS # BLD AUTO: 0.12 K/UL — SIGNIFICANT CHANGE UP (ref 0–0.2)
BASOPHILS NFR BLD AUTO: 1 % — SIGNIFICANT CHANGE UP (ref 0–1)
BILIRUB SERPL-MCNC: 3.2 MG/DL — HIGH (ref 0.2–1.2)
BUN SERPL-MCNC: 20 MG/DL — SIGNIFICANT CHANGE UP (ref 10–20)
CALCIUM SERPL-MCNC: 9.3 MG/DL — SIGNIFICANT CHANGE UP (ref 8.4–10.4)
CHLORIDE SERPL-SCNC: 110 MMOL/L — SIGNIFICANT CHANGE UP (ref 98–110)
CO2 SERPL-SCNC: 22 MMOL/L — SIGNIFICANT CHANGE UP (ref 17–32)
CREAT SERPL-MCNC: 0.6 MG/DL — LOW (ref 0.7–1.5)
EGFR: 99 ML/MIN/1.73M2 — SIGNIFICANT CHANGE UP
EOSINOPHIL # BLD AUTO: 0.08 K/UL — SIGNIFICANT CHANGE UP (ref 0–0.7)
EOSINOPHIL NFR BLD AUTO: 0.7 % — SIGNIFICANT CHANGE UP (ref 0–8)
GLUCOSE SERPL-MCNC: 107 MG/DL — HIGH (ref 70–99)
HCT VFR BLD CALC: 34.4 % — LOW (ref 37–47)
HCT VFR BLD CALC: 36.3 % — LOW (ref 37–47)
HGB BLD-MCNC: 10.9 G/DL — LOW (ref 12–16)
HGB BLD-MCNC: 11.3 G/DL — LOW (ref 12–16)
IMM GRANULOCYTES NFR BLD AUTO: 3.2 % — HIGH (ref 0.1–0.3)
LYMPHOCYTES # BLD AUTO: 17.2 % — LOW (ref 20.5–51.1)
LYMPHOCYTES # BLD AUTO: 2.01 K/UL — SIGNIFICANT CHANGE UP (ref 1.2–3.4)
MAGNESIUM SERPL-MCNC: 2.2 MG/DL — SIGNIFICANT CHANGE UP (ref 1.8–2.4)
MCHC RBC-ENTMCNC: 30.1 PG — SIGNIFICANT CHANGE UP (ref 27–31)
MCHC RBC-ENTMCNC: 30.3 PG — SIGNIFICANT CHANGE UP (ref 27–31)
MCHC RBC-ENTMCNC: 31.1 G/DL — LOW (ref 32–37)
MCHC RBC-ENTMCNC: 31.7 G/DL — LOW (ref 32–37)
MCV RBC AUTO: 95 FL — SIGNIFICANT CHANGE UP (ref 81–99)
MCV RBC AUTO: 97.3 FL — SIGNIFICANT CHANGE UP (ref 81–99)
MONOCYTES # BLD AUTO: 0.65 K/UL — HIGH (ref 0.1–0.6)
MONOCYTES NFR BLD AUTO: 5.6 % — SIGNIFICANT CHANGE UP (ref 1.7–9.3)
NEUTROPHILS # BLD AUTO: 8.47 K/UL — HIGH (ref 1.4–6.5)
NEUTROPHILS NFR BLD AUTO: 72.3 % — SIGNIFICANT CHANGE UP (ref 42.2–75.2)
NRBC # BLD: 0 /100 WBCS — SIGNIFICANT CHANGE UP (ref 0–0)
NRBC # BLD: 0 /100 WBCS — SIGNIFICANT CHANGE UP (ref 0–0)
PLATELET # BLD AUTO: 270 K/UL — SIGNIFICANT CHANGE UP (ref 130–400)
PLATELET # BLD AUTO: 316 K/UL — SIGNIFICANT CHANGE UP (ref 130–400)
PMV BLD: 10.8 FL — HIGH (ref 7.4–10.4)
PMV BLD: 11.9 FL — HIGH (ref 7.4–10.4)
POTASSIUM SERPL-MCNC: 4.7 MMOL/L — SIGNIFICANT CHANGE UP (ref 3.5–5)
POTASSIUM SERPL-SCNC: 4.7 MMOL/L — SIGNIFICANT CHANGE UP (ref 3.5–5)
PROT SERPL-MCNC: 6.5 G/DL — SIGNIFICANT CHANGE UP (ref 6–8)
RBC # BLD: 3.62 M/UL — LOW (ref 4.2–5.4)
RBC # BLD: 3.73 M/UL — LOW (ref 4.2–5.4)
RBC # FLD: 14.2 % — SIGNIFICANT CHANGE UP (ref 11.5–14.5)
RBC # FLD: 14.2 % — SIGNIFICANT CHANGE UP (ref 11.5–14.5)
SODIUM SERPL-SCNC: 146 MMOL/L — SIGNIFICANT CHANGE UP (ref 135–146)
WBC # BLD: 11.71 K/UL — HIGH (ref 4.8–10.8)
WBC # BLD: 12.34 K/UL — HIGH (ref 4.8–10.8)
WBC # FLD AUTO: 11.71 K/UL — HIGH (ref 4.8–10.8)
WBC # FLD AUTO: 12.34 K/UL — HIGH (ref 4.8–10.8)

## 2024-11-27 PROCEDURE — 86901 BLOOD TYPING SEROLOGIC RH(D): CPT

## 2024-11-27 PROCEDURE — 99222 1ST HOSP IP/OBS MODERATE 55: CPT

## 2024-11-27 PROCEDURE — 97110 THERAPEUTIC EXERCISES: CPT | Mod: GP

## 2024-11-27 PROCEDURE — 82378 CARCINOEMBRYONIC ANTIGEN: CPT

## 2024-11-27 PROCEDURE — 70450 CT HEAD/BRAIN W/O DYE: CPT | Mod: MC

## 2024-11-27 PROCEDURE — 87086 URINE CULTURE/COLONY COUNT: CPT

## 2024-11-27 PROCEDURE — 80048 BASIC METABOLIC PNL TOTAL CA: CPT

## 2024-11-27 PROCEDURE — 85025 COMPLETE CBC W/AUTO DIFF WBC: CPT

## 2024-11-27 PROCEDURE — 83735 ASSAY OF MAGNESIUM: CPT

## 2024-11-27 PROCEDURE — 86301 IMMUNOASSAY TUMOR CA 19-9: CPT

## 2024-11-27 PROCEDURE — 85027 COMPLETE CBC AUTOMATED: CPT

## 2024-11-27 PROCEDURE — 86644 CMV ANTIBODY: CPT

## 2024-11-27 PROCEDURE — 86645 CMV ANTIBODY IGM: CPT

## 2024-11-27 PROCEDURE — 86665 EPSTEIN-BARR CAPSID VCA: CPT

## 2024-11-27 PROCEDURE — 74177 CT ABD & PELVIS W/CONTRAST: CPT | Mod: 26,MC

## 2024-11-27 PROCEDURE — 86850 RBC ANTIBODY SCREEN: CPT

## 2024-11-27 PROCEDURE — 87529 HSV DNA AMP PROBE: CPT

## 2024-11-27 PROCEDURE — 80074 ACUTE HEPATITIS PANEL: CPT

## 2024-11-27 PROCEDURE — 97162 PT EVAL MOD COMPLEX 30 MIN: CPT | Mod: GP

## 2024-11-27 PROCEDURE — 85730 THROMBOPLASTIN TIME PARTIAL: CPT

## 2024-11-27 PROCEDURE — 80053 COMPREHEN METABOLIC PANEL: CPT

## 2024-11-27 PROCEDURE — 85610 PROTHROMBIN TIME: CPT

## 2024-11-27 PROCEDURE — 86664 EPSTEIN-BARR NUCLEAR ANTIGEN: CPT

## 2024-11-27 PROCEDURE — 80076 HEPATIC FUNCTION PANEL: CPT

## 2024-11-27 PROCEDURE — 95819 EEG AWAKE AND ASLEEP: CPT

## 2024-11-27 PROCEDURE — 76856 US EXAM PELVIC COMPLETE: CPT | Mod: 26

## 2024-11-27 PROCEDURE — 76705 ECHO EXAM OF ABDOMEN: CPT

## 2024-11-27 PROCEDURE — 81001 URINALYSIS AUTO W/SCOPE: CPT

## 2024-11-27 PROCEDURE — 86304 IMMUNOASSAY TUMOR CA 125: CPT

## 2024-11-27 PROCEDURE — 76705 ECHO EXAM OF ABDOMEN: CPT | Mod: 26

## 2024-11-27 PROCEDURE — 82306 VITAMIN D 25 HYDROXY: CPT

## 2024-11-27 PROCEDURE — 87040 BLOOD CULTURE FOR BACTERIA: CPT

## 2024-11-27 PROCEDURE — 86900 BLOOD TYPING SEROLOGIC ABO: CPT

## 2024-11-27 PROCEDURE — 86663 EPSTEIN-BARR ANTIBODY: CPT

## 2024-11-27 PROCEDURE — 82140 ASSAY OF AMMONIA: CPT

## 2024-11-27 PROCEDURE — 36415 COLL VENOUS BLD VENIPUNCTURE: CPT

## 2024-11-27 RX ORDER — FAMOTIDINE 20 MG/1
20 TABLET, FILM COATED ORAL
Refills: 0 | Status: DISCONTINUED | OUTPATIENT
Start: 2024-11-27 | End: 2024-12-03

## 2024-11-27 RX ORDER — BENZTROPINE MESYLATE 2 MG
1 TABLET ORAL
Refills: 0 | DISCHARGE

## 2024-11-27 RX ORDER — ACETAMINOPHEN, DIPHENHYDRAMINE HCL, PHENYLEPHRINE HCL 325; 25; 5 MG/1; MG/1; MG/1
1 TABLET ORAL
Refills: 0 | DISCHARGE

## 2024-11-27 RX ORDER — CEFTRIAXONE SODIUM 1 G
2000 VIAL (EA) INJECTION EVERY 24 HOURS
Refills: 0 | Status: DISCONTINUED | OUTPATIENT
Start: 2024-11-27 | End: 2024-12-03

## 2024-11-27 RX ORDER — DICLOFENAC POTASSIUM 50 MG
25 TABLET ORAL DAILY
Refills: 0 | Status: DISCONTINUED | OUTPATIENT
Start: 2024-11-27 | End: 2024-11-27

## 2024-11-27 RX ORDER — CLONAZEPAM 0.12 MG/1
1 TABLET, ORALLY DISINTEGRATING ORAL DAILY
Refills: 0 | Status: DISCONTINUED | OUTPATIENT
Start: 2024-11-27 | End: 2024-11-28

## 2024-11-27 RX ORDER — FAMOTIDINE 20 MG/1
40 TABLET, FILM COATED ORAL DAILY
Refills: 0 | Status: DISCONTINUED | OUTPATIENT
Start: 2024-11-27 | End: 2024-11-27

## 2024-11-27 RX ORDER — OLANZAPINE 20 MG/1
5 TABLET ORAL
Refills: 0 | Status: DISCONTINUED | OUTPATIENT
Start: 2024-11-27 | End: 2024-12-03

## 2024-11-27 RX ORDER — GABAPENTIN 300 MG/1
400 CAPSULE ORAL THREE TIMES A DAY
Refills: 0 | Status: DISCONTINUED | OUTPATIENT
Start: 2024-11-27 | End: 2024-11-28

## 2024-11-27 RX ORDER — CARBIDOPA AND LEVODOPA 10; 100 MG/1; MG/1
1 TABLET ORAL
Refills: 0 | DISCHARGE

## 2024-11-27 RX ORDER — MEROPENEM 500 MG/1
1000 INJECTION, POWDER, FOR SOLUTION INTRAVENOUS EVERY 8 HOURS
Refills: 0 | Status: DISCONTINUED | OUTPATIENT
Start: 2024-11-27 | End: 2024-11-27

## 2024-11-27 RX ORDER — POVIDONE, POLYVINYL ALCOHOL 20; 27 G/1000ML; G/1000ML
1 SOLUTION OPHTHALMIC DAILY
Refills: 0 | Status: DISCONTINUED | OUTPATIENT
Start: 2024-11-27 | End: 2024-12-03

## 2024-11-27 RX ORDER — LACTULOSE 10 G/15ML
10 SOLUTION ORAL
Refills: 0 | Status: DISCONTINUED | OUTPATIENT
Start: 2024-11-27 | End: 2024-12-03

## 2024-11-27 RX ORDER — HEPARIN SODIUM,PORCINE 1000/ML
5000 VIAL (ML) INJECTION EVERY 12 HOURS
Refills: 0 | Status: DISCONTINUED | OUTPATIENT
Start: 2024-11-27 | End: 2024-11-27

## 2024-11-27 RX ORDER — BENZTROPINE MESYLATE 2 MG
1 TABLET ORAL DAILY
Refills: 0 | Status: DISCONTINUED | OUTPATIENT
Start: 2024-11-27 | End: 2024-12-03

## 2024-11-27 RX ORDER — CEFEPIME 2 G/1
1000 INJECTION, POWDER, FOR SOLUTION INTRAVENOUS ONCE
Refills: 0 | Status: COMPLETED | OUTPATIENT
Start: 2024-11-27 | End: 2024-11-27

## 2024-11-27 RX ORDER — CARBIDOPA AND LEVODOPA 10; 100 MG/1; MG/1
1 TABLET ORAL THREE TIMES A DAY
Refills: 0 | Status: DISCONTINUED | OUTPATIENT
Start: 2024-11-27 | End: 2024-12-03

## 2024-11-27 RX ORDER — METRONIDAZOLE 500 MG/1
500 TABLET ORAL ONCE
Refills: 0 | Status: COMPLETED | OUTPATIENT
Start: 2024-11-27 | End: 2024-11-27

## 2024-11-27 RX ADMIN — CEFEPIME 100 MILLIGRAM(S): 2 INJECTION, POWDER, FOR SOLUTION INTRAVENOUS at 04:49

## 2024-11-27 RX ADMIN — Medication 1 MILLIGRAM(S): at 14:10

## 2024-11-27 RX ADMIN — POVIDONE, POLYVINYL ALCOHOL 1 DROP(S): 20; 27 SOLUTION OPHTHALMIC at 14:11

## 2024-11-27 RX ADMIN — CARBIDOPA AND LEVODOPA 1 TABLET(S): 10; 100 TABLET ORAL at 14:20

## 2024-11-27 RX ADMIN — METRONIDAZOLE 100 MILLIGRAM(S): 500 TABLET ORAL at 04:51

## 2024-11-27 RX ADMIN — OLANZAPINE 5 MILLIGRAM(S): 20 TABLET ORAL at 17:33

## 2024-11-27 RX ADMIN — CLONAZEPAM 1 MILLIGRAM(S): 0.12 TABLET, ORALLY DISINTEGRATING ORAL at 14:11

## 2024-11-27 RX ADMIN — Medication 100 MILLIGRAM(S): at 17:33

## 2024-11-27 RX ADMIN — CARBIDOPA AND LEVODOPA 1 TABLET(S): 10; 100 TABLET ORAL at 22:32

## 2024-11-27 RX ADMIN — GABAPENTIN 400 MILLIGRAM(S): 300 CAPSULE ORAL at 22:32

## 2024-11-27 RX ADMIN — LACTULOSE 10 GRAM(S): 10 SOLUTION ORAL at 17:33

## 2024-11-27 RX ADMIN — GABAPENTIN 400 MILLIGRAM(S): 300 CAPSULE ORAL at 14:19

## 2024-11-27 NOTE — CONSULT NOTE ADULT - ASSESSMENT
65 yo F with PMHx recurrent  UTI, panic disorder, anxiety, schizophrenia, questionable Parkinson's Disease, generalized muscle weakness, osteoarthritis, vitamin D deficiency, arrived to the ED last night from the [facility] due to persistent fever (>101F on ibuprofen), positive UTI, elevated WBC on CBC, confusion, loss of appetite. In the ED, pt is afebrile but c/o generalized weakness and pain, nausea, and diarrhea. Patient was admitted for transaminitis as well as UTI.     #Cystitis Complicated UTI Hx Recurrent UTI r/o Pyelonephritis  #Dec po intake 5 days  -Transabd US: Evaluation of the bladder demonstrates intraluminal echogenic material   without internal vascularity could represent bladder hematoma given the   urinalysis demonstrated large blood. Neoplastic process cannot be entirely excluded.  -Persistent Hematuria with clots  -CT A/P: Edematous bladder likely cystitis and small enhancing lesions in uterus likely fibroid  - Abx as per ID    #Possible Bladder Hematoma vs Neoplastic   #Vaginal Bleeding   -Transabd US: Evaluation of the bladder demonstrates intraluminal echogenic material   without internal vascularity could represent bladder hematoma given the   urinalysis demonstrated large blood. Neoplastic process cannot be entirely excluded.  - work up undergoing    #Transaminitis 2/2 DILI?  #Choledocolithiasis   -LABS: Alk phos 563     -RUQ: negative  - work up undergoing    #AMS 2/2 UTI   #Schizophrenia/ Anxiety/ Panic Disorder  -Clonazepam, Olanzapine    #Parkinson's Disease  -Sinemet, benztropine    #ACP/HCP          Appreciate consult. Discussed with the primary team.    Monse Washburn MD, [facility]-C     65 yo F with PMHx recurrent  UTI, panic disorder, anxiety, schizophrenia, questionable Parkinson's Disease, generalized muscle weakness, osteoarthritis, vitamin D deficiency, arrived to the ED last night from the [facility] due to persistent fever (>101F on ibuprofen), positive UTI, elevated WBC on CBC, confusion, loss of appetite. In the ED, pt is afebrile but c/o generalized weakness and pain, nausea, and diarrhea. Patient was admitted for transaminitis as well as UTI.     #Cystitis Complicated UTI Hx Recurrent UTI r/o Pyelonephritis  #Dec po intake 5 days  -Transabd US: Evaluation of the bladder demonstrates intraluminal echogenic material   without internal vascularity could represent bladder hematoma given the   urinalysis demonstrated large blood. Neoplastic process cannot be entirely excluded.  -Persistent Hematuria with clots  -CT A/P: Edematous bladder likely cystitis and small enhancing lesions in uterus likely fibroid  - Abx as per ID    #Possible Bladder Hematoma vs Neoplastic   #Vaginal Bleeding   -Transabd US: Evaluation of the bladder demonstrates intraluminal echogenic material   without internal vascularity could represent bladder hematoma given the   urinalysis demonstrated large blood. Neoplastic process cannot be entirely excluded.  - work up undergoing    #Transaminitis 2/2 DILI?  #Choledocolithiasis   -LABS: Alk phos 563     -RUQ: negative  - work up undergoing    #AMS 2/2 UTI   #Schizophrenia/ Anxiety/ Panic Disorder  -Clonazepam, Olanzapine    #Parkinson's Disease  -Sinemet, benztropine    #ACP/HCP  pt does not have capacity for GOC discussion. Team needs to identify surrogate decision maker. If none available, then two physician decision making would be implemented.     Appreciate consult. Discussed with the primary team.    Monse Washburn MD, [facility]

## 2024-11-27 NOTE — CONSULT NOTE ADULT - NS ATTEST RISK PROBLEM GEN_ALL_CORE FT
-My assessment required an independent historian and is independent of the teaching service  -I independently interpreted the most recent imaging ( CXR ) and labs ( CBC, CMP) and cultures ( along with the sensitivities / HAILEY )  -I discussed my recommendations with the primary team housestaff/Attending  -I assisted with initiation of antibiotics  -Pt is on ABx therapy requiring intensive monitoring for toxicity

## 2024-11-27 NOTE — H&P ADULT - HISTORY OF PRESENT ILLNESS
H&P was obtained from a combination of speaking with the patient and [facility] nursing home ([phone])    Pt Edie Gould is a 65 yo, with PMHx panic disorder, anxiety, schizophrenia, questionable Parkinson's Disease, generalized muscle weakness, osteoarthritis, vitamin D deficiency, arrived to the ED last night from the [facility] due to persistent fever (>101F on ibuprofen), positive UTI, elevated WBC on CBC, confusion, loss of appetite. Pt was given Macrobid x 5 days with non-resolution of symptoms, and so was switched to Ertapenem 1 gm solution injectable which she took 1 dose yesterday. Pt has hx recurrent UTIs and had morganella infection in the past which was sensitive for rocephin. Pt sustained a mechanical fall 10 days ago at the nursing home but did not develop any bruises. Nursing check was done and pt was cleared, but no imaging was done. In the ED, pt c/o generalized weakness and pain, nausea, and diarrhea.     H&P was obtained from a combination of speaking with the patient and [facility] nursing home ([phone])    Edie Gould is a 65 yo, with PMHx recurrent UTI, panic disorder, anxiety, schizophrenia, Parkinson's Disease?, generalized muscle weakness, osteoarthritis, vitamin D deficiency who arrived to the ED last night from the [facility] due to persistent fever (>101F on ibuprofen), positive UTI, elevated WBC on CBC, confusion, loss of appetite. 5 days ago patient had positive outpatient  UA, pt completed Macrobid x 5 days with non-resolution of symptoms, and so was switched to IV Ertapenem 1 gm solution which she took 1 dose yesterday. Pt has hx recurrent UTIs  previous culture grew morganella infection in the past which was sensitive for rocephin. Patient denies any dysuria, urinary frequency. Patient was found to have vaginal bleeding in nursing home a few days ago. Patient does not have any associated pain. Nursing home states that patient has been confused for 5 days since onset of UTI as well as decreased po intake. Patient was afebrile in nursing home only developed fevers in ED.     Pt sustained a mechanical fall 10 days ago at the nursing home. Patient was getting out of bed and fell but did not lose consciousness. No imaging was done.      In the ED, pt c/o generalized weakness and pain, nausea, and diarrhea.      Vitals  Temp 99F  116/75 HR 82  18 RR O2 97        Labs  HG 11.7   Alk Phos 563      T Bili 3.0  Bili 1.8  Lipase 92      UA:  + Nitrites, Large Bilirubin Large Blood, Porteinuria    Imaging  Transabdominal US:   Neither ovary is visualized. Uterus delineated but not well evaluated on   this limited study. Measures approximately 4.1 x 2.5 x 3.6 cm.   Endometrium not well delineated. No definite correlate to CT finding.    Evaluation of the bladder demonstrates intraluminal echogenic material   without internal vascularity could represent bladder hematoma given the   urinalysis demonstrated large blood. Neoplastic process cannot be   entirely excluded.      CT A/P:   1.  Edematous bladder wall thickening with mucosal hyperemia compatible   with cystitis.  2.  Mild stool distended rectum, correlate for constipation or fecal   impaction. No bowel obstruction.  3.  Small enhancing lesion in the uterus likely fibroid though polyp   cannot be excluded. Limited assessment on CT.    RUQ US:  Unremarkable right upper quadrant abdominal ultrasound.   H&P was obtained from a combination of speaking with the patient and [facility] nursing home ([phone])    Edie Gould is a 65 yo, with PMHx recurrent UTI, panic disorder, anxiety, schizophrenia, Parkinson's Disease?, generalized muscle weakness, osteoarthritis, vitamin D deficiency who arrived to the ED last night from the [facility] due to persistent fever (>101F on ibuprofen), positive UTI, elevated WBC on CBC, confusion, loss of appetite. 5 days ago patient had positive outpatient  UA, pt completed Macrobid x 5 days with non-resolution of symptoms, and so was switched to IV Ertapenem 1 gm solution which she took 1 dose yesterday. Pt has hx recurrent UTIs  previous culture grew morganella infection in the past which was sensitive for rocephin. Patient denies any dysuria, urinary frequency. Patient was found to have vaginal bleeding in nursing home a few days ago. Patient does not have any associated pain. Nursing home states that patient has been confused for 5 days since onset of UTI as well as decreased po intake. Patient was afebrile in nursing home only developed fevers in ED.     Pt sustained a mechanical fall 10 days ago at the nursing home. Patient was getting out of bed and fell but did not lose consciousness. No imaging was done.      In the ED, pt c/o generalized weakness and pain, nausea, and diarrhea.      Vitals  Temp 99F  116/75 HR 82  18 RR O2 97        Labs  HG 11.7   Alk Phos 563      T Bili 3.0  Bili 1.8  Lipase 92      UA:  + Nitrites, Large Bilirubin Large Blood, Proteinuria    Imaging  Transabdominal US:   Neither ovary is visualized. Uterus delineated but not well evaluated on   this limited study. Measures approximately 4.1 x 2.5 x 3.6 cm.   Endometrium not well delineated. No definite correlate to CT finding.    Evaluation of the bladder demonstrates intraluminal echogenic material   without internal vascularity could represent bladder hematoma given the   urinalysis demonstrated large blood. Neoplastic process cannot be   entirely excluded.      CT A/P:   1.  Edematous bladder wall thickening with mucosal hyperemia compatible   with cystitis.  2.  Mild stool distended rectum, correlate for constipation or fecal   impaction. No bowel obstruction.  3.  Small enhancing lesion in the uterus likely fibroid though polyp   cannot be excluded. Limited assessment on CT.    RUQ US:  Unremarkable right upper quadrant abdominal ultrasound.

## 2024-11-27 NOTE — H&P ADULT - NSHPREVIEWOFSYSTEMS_GEN_ALL_CORE
General: Generalized weakness  HEENT: No vision changes, no hearing changes  CV: No CP, chest pressure, palpitations   Resp: No SOB, wheezing  GI: nausea with RUQ tenderness, no vomiting  Ext: No arm or leg swelling or pain  Neuro: confusion, burning in thighs bilateral, no headache, focal weakness  Skin: No rashes or lesions

## 2024-11-27 NOTE — H&P ADULT - ASSESSMENT
Pt Edie Gould is a 65 yo, with PMHx panic disorder, anxiety, schizophrenia, questionable Parkinson's Disease, generalized muscle weakness, osteoarthritis, vitamin D deficiency, arrived to the ED last night from the [facility] due to persistent fever (>101F on ibuprofen), positive UTI, elevated WBC on CBC, confusion, loss of appetite. In the ED, pt is afebrile but c/o generalized weakness and pain, nausea, and diarrhea.        #UTI     #    #    # Edie Gould is a 63 yo, with PMHx recurrent  UTI, panic disorder, anxiety, schizophrenia, questionable Parkinson's Disease, generalized muscle weakness, osteoarthritis, vitamin D deficiency, arrived to the ED last night from the [facility] due to persistent fever (>101F on ibuprofen), positive UTI, elevated WBC on CBC, confusion, loss of appetite. In the ED, pt is afebrile but c/o generalized weakness and pain, nausea, and diarrhea. Patient was admitted for transaminitis as well as UTI.       #Complicated UTI  #Recurrent UTI   #Possible Bladder Hematoma vs Neoplastic   #Dec po intake 5 days  -Hx: morganella sensitive to Rocephin  -s/p 5days Macrobid , 1 dose Ertapenem   -Hg 11.7  -Transabd US: Evaluation of the bladder demonstrates intraluminal echogenic material   without internal vascularity could represent bladder hematoma given the   urinalysis demonstrated large blood. Neoplastic process cannot be entirely excluded.  -Persistent Hematuria with clots  -CT A/P: Fibroid  -LABS: UA: positive nitrites, large blood, proteinuria   -Exam: gross clots in vaginal vault  PLAN  -GYN eval: low suspicion for acute gyn bleed  -Urology eval: pending  -Start Rocephin   -Transvaginal US  -Keep active type and screen, monitor H/H    #Transaminitis 2/2 DILI?  -LABS: Alk phos 563     -RUQ: negative  PLAN  -Hepatitis Panel: pending  - avoid hepatotoxic medications    #AMS 2/2 UTI   #Schizophrenia/ Anxiety/ Panic Disorder  -Continue home medications: Clonazepam and Olanzapine    #Vitamin D def  #OA  -Continue with home medications    DVT ppx: Lovenox  GI ppx: Pepcid  Dispo: from [facility]    Pending:  Edie Gould is a 65 yo, with PMHx recurrent  UTI, panic disorder, anxiety, schizophrenia, questionable Parkinson's Disease, generalized muscle weakness, osteoarthritis, vitamin D deficiency, arrived to the ED last night from the [facility] due to persistent fever (>101F on ibuprofen), positive UTI, elevated WBC on CBC, confusion, loss of appetite. In the ED, pt is afebrile but c/o generalized weakness and pain, nausea, and diarrhea. Patient was admitted for transaminitis as well as UTI.       #Complicated UTI  #Recurrent UTI   #Possible Bladder Hematoma vs Neoplastic   #Dec po intake 5 days  -Hx: morganella sensitive to Rocephin  -s/p 5days Macrobid , 1 dose Ertapenem   -Hg 11.7  -Transabd US: Evaluation of the bladder demonstrates intraluminal echogenic material   without internal vascularity could represent bladder hematoma given the   urinalysis demonstrated large blood. Neoplastic process cannot be entirely excluded.  -Persistent Hematuria with clots  -CT A/P: Fibroid  -LABS: UA: positive nitrites, large blood, proteinuria   -Exam: gross clots in vaginal vault  PLAN  -GYN eval: low suspicion for acute gyn bleed  -Urology eval: pending  -Start Rocephin   -Transvaginal US  -Keep active type and screen, monitor H/H    #Transaminitis 2/2 DILI?  -LABS: Alk phos 563     -RUQ: negative  PLAN  -Hepatitis Panel: pending  - avoid hepatotoxic medications    #AMS 2/2 UTI   #Schizophrenia/ Anxiety/ Panic Disorder  -Continue home medications: Clonazepam and Olanzapine    #Parkinsons Disease  -Continue with Sinemet and Benztropine    #Vitamin D def  #OA  -Continue with home medications  -Vit D level pending    DVT ppx: Lovenox   GI ppx: Pepcid  Dispo: from [facility]    Pending:  Edie Gould is a 63 yo, with PMHx recurrent  UTI, panic disorder, anxiety, schizophrenia, questionable Parkinson's Disease, generalized muscle weakness, osteoarthritis, vitamin D deficiency, arrived to the ED last night from the [facility] due to persistent fever (>101F on ibuprofen), positive UTI, elevated WBC on CBC, confusion, loss of appetite. In the ED, pt is afebrile but c/o generalized weakness and pain, nausea, and diarrhea. Patient was admitted for transaminitis as well as UTI.       #Complicated UTI  #Recurrent UTI   #Possible Bladder Hematoma vs Neoplastic   #Dec po intake 5 days  -Hx: morganella sensitive to Rocephin  -s/p 5days Macrobid , 1 dose Ertapenem   -Hg 11.7  -Transabd US: Evaluation of the bladder demonstrates intraluminal echogenic material   without internal vascularity could represent bladder hematoma given the   urinalysis demonstrated large blood. Neoplastic process cannot be entirely excluded.  -Persistent Hematuria with clots  -CT A/P: Fibroid  -LABS: UA: positive nitrites, large blood, proteinuria   -Exam: gross clots in vaginal vault  PLAN  -GYN eval: low suspicion for acute gyn bleed  -Urology eval: pending  -Start Rocephin   -Transvaginal US, Pending Ucx  -Keep active type and screen, monitor H/H    #Transaminitis 2/2 DILI?  -LABS: Alk phos 563     -RUQ: negative  PLAN  -Hepatitis Panel: pending  - avoid hepatotoxic medications    #AMS 2/2 UTI   #Schizophrenia/ Anxiety/ Panic Disorder  -Continue home medications: Clonazepam and Olanzapine    #Parkinson's Disease  -Continue with Sinemet and Benztropine    #Vitamin D def  #OA  -Continue with home medications  -Vit D level pending    DVT ppx: Lovenox   GI ppx: Pepcid  Dispo: from [facility]    Pending:  Edie Gould is a 63 yo, with PMHx recurrent  UTI, panic disorder, anxiety, schizophrenia, questionable Parkinson's Disease, generalized muscle weakness, osteoarthritis, vitamin D deficiency, arrived to the ED last night from the [facility] due to persistent fever (>101F on ibuprofen), positive UTI, elevated WBC on CBC, confusion, loss of appetite. In the ED, pt is afebrile but c/o generalized weakness and pain, nausea, and diarrhea. Patient was admitted for transaminitis as well as UTI.       #Complicated UTI HxRecurrent UTI   #Possible Bladder Hematoma vs Neoplastic   #Vaginal Bleeding   #Dec po intake 5 days  -Hx: morganella sensitive to Rocephin  -s/p 5days Macrobid , 1 dose Ertapenem   -Hg 11.7  -Transabd US: Evaluation of the bladder demonstrates intraluminal echogenic material   without internal vascularity could represent bladder hematoma given the   urinalysis demonstrated large blood. Neoplastic process cannot be entirely excluded.  -Persistent Hematuria with clots  -CT A/P: Fibroid  -LABS: UA: positive nitrites, large blood, proteinuria   -Exam: gross clots in vaginal vault  PLAN  -GYN eval: low suspicion for acute gyn bleed  -Urology eval: pending  -Start Rocephin   -Transvaginal US, Pending Ucx  -Keep active type and screen, monitor H/H    #Transaminitis 2/2 DILI?  -LABS: Alk phos 563     -RUQ: negative  PLAN  -Hepatitis Panel: pending  - avoid hepatotoxic medications    #AMS 2/2 UTI   #Schizophrenia/ Anxiety/ Panic Disorder  -Continue home medications: Clonazepam and Olanzapine    #Parkinson's Disease  -Continue with Sinemet and Benztropine    #Vitamin D def  #OA  -Continue with home medications  -Vit D level pending    DVT ppx: Lovenox   GI ppx: Pepcid  Dispo: from [facility]    Pending:  Edie Gould is a 63 yo, with PMHx recurrent  UTI, panic disorder, anxiety, schizophrenia, questionable Parkinson's Disease, generalized muscle weakness, osteoarthritis, vitamin D deficiency, arrived to the ED last night from the [facility] due to persistent fever (>101F on ibuprofen), positive UTI, elevated WBC on CBC, confusion, loss of appetite. In the ED, pt is afebrile but c/o generalized weakness and pain, nausea, and diarrhea. Patient was admitted for transaminitis as well as UTI.       #Complicated UTI HxRecurrent UTI   #Possible Bladder Hematoma vs Neoplastic   #Vaginal Bleeding   #Dec po intake 5 days  -Hx: morganella sensitive to Rocephin  -s/p 5days Macrobid , 1 dose Ertapenem   -Hg 11.7  -Transabd US: Evaluation of the bladder demonstrates intraluminal echogenic material   without internal vascularity could represent bladder hematoma given the   urinalysis demonstrated large blood. Neoplastic process cannot be entirely excluded.  -Persistent Hematuria with clots  -CT A/P: Edematous bladder likely cystitis and small enhancing lesions in uterus likely fibroid  -LABS: UA: positive nitrites, large blood, proteinuria   -Exam: gross clots in vaginal vault  PLAN  -GYN eval: low suspicion for acute gyn bleed  -Urology eval: pending  -Start Rocephin   -Transvaginal US, Pending Ucx  -Keep active type and screen, monitor H/H    #Transaminitis 2/2 DILI?  -LABS: Alk phos 563     -RUQ: negative  PLAN  -Hepatitis Panel: pending  - avoid hepatotoxic medications    #AMS 2/2 UTI   #Schizophrenia/ Anxiety/ Panic Disorder  -Continue home medications: Clonazepam and Olanzapine    #Parkinson's Disease  -Continue with Sinemet and Benztropine    #Vitamin D def  #OA  -Continue with home medications  -Vit D level pending    DVT ppx: Lovenox   GI ppx: Pepcid  Dispo: from [facility]    Pending:  Edie Gould is a 63 yo, with PMHx recurrent  UTI, panic disorder, anxiety, schizophrenia, questionable Parkinson's Disease, generalized muscle weakness, osteoarthritis, vitamin D deficiency, arrived to the ED last night from the [facility] due to persistent fever (>101F on ibuprofen), positive UTI, elevated WBC on CBC, confusion, loss of appetite. In the ED, pt is afebrile but c/o generalized weakness and pain, nausea, and diarrhea. Patient was admitted for transaminitis as well as UTI.       #Complicated UTI HxRecurrent UTI   #Possible Bladder Hematoma vs Neoplastic   #Vaginal Bleeding   #Dec po intake 5 days  -Hx: morganella sensitive to Rocephin  -s/p 5days Macrobid , 1 dose Ertapenem   -Hg 11.7  -Transabd US: Evaluation of the bladder demonstrates intraluminal echogenic material   without internal vascularity could represent bladder hematoma given the   urinalysis demonstrated large blood. Neoplastic process cannot be entirely excluded.  -Persistent Hematuria with clots  -CT A/P: Edematous bladder likely cystitis and small enhancing lesions in uterus likely fibroid  -LABS: UA: positive nitrites, large blood, proteinuria   -Exam: gross clots in vaginal vault  PLAN  -GYN eval: low suspicion for acute gyn bleed, f/u regarding bleeding  -Urology eval: pending  -Start Meropenem  -Pending Ucx, , CA 19-9, CEA  -Transvaginal US  -Keep active type and screen, monitor H/H    #Transaminitis 2/2 DILI?  -LABS: Alk phos 563     -RUQ: negative  PLAN  -Hepatitis Panel: pending  -Consider MRCP  - avoid hepatotoxic medications    #AMS 2/2 UTI   #Schizophrenia/ Anxiety/ Panic Disorder  -Continue home medications: Clonazepam and Olanzapine    #Parkinson's Disease  -Continue with Sinemet and Benztropine    #Vitamin D def  #OA  -Continue with home medications  -Vit D level pending    DVT ppx: Lovenox   GI ppx: Pepcid  Dispo: from [facility]    Pending:  Edie Gould is a 63 yo, with PMHx recurrent  UTI, panic disorder, anxiety, schizophrenia, questionable Parkinson's Disease, generalized muscle weakness, osteoarthritis, vitamin D deficiency, arrived to the ED last night from the [facility] due to persistent fever (>101F on ibuprofen), positive UTI, elevated WBC on CBC, confusion, loss of appetite. In the ED, pt is afebrile but c/o generalized weakness and pain, nausea, and diarrhea. Patient was admitted for transaminitis as well as UTI.       #Complicated UTI HxRecurrent UTI   #Possible Bladder Hematoma vs Neoplastic   #Vaginal Bleeding   #Dec po intake 5 days  -Hx: morganella sensitive to Rocephin  -s/p 5days Macrobid , 1 dose Ertapenem   -Hg 11.7  -Transabd US: Evaluation of the bladder demonstrates intraluminal echogenic material   without internal vascularity could represent bladder hematoma given the   urinalysis demonstrated large blood. Neoplastic process cannot be entirely excluded.  -Persistent Hematuria with clots  -CT A/P: Edematous bladder likely cystitis and small enhancing lesions in uterus likely fibroid  -LABS: UA: positive nitrites, large blood, proteinuria   -Exam: gross clots in vaginal vault  PLAN  -GYN eval: low suspicion for acute gyn bleed, f/u regarding bleeding  -Urology eval: pending  -Start Meropenem  -Pending Ucx, , CA 19-9, CEA  -Transvaginal US  -ID consult: pending  -Keep active type and screen, monitor H/H    #Transaminitis 2/2 DILI?  -LABS: Alk phos 563     -RUQ: negative  PLAN  -Hepatitis Panel: pending  -Consider MRCP  - avoid hepatotoxic medications    #AMS 2/2 UTI   #Schizophrenia/ Anxiety/ Panic Disorder  -Continue home medications: Clonazepam and Olanzapine    #Parkinson's Disease  -Continue with Sinemet and Benztropine    #Vitamin D def  #OA  -Continue with home medications  -Vit D level pending    DVT ppx:Heparin Sub q if H/H downtrending hold   GI ppx: Pepcid  Dispo: from [facility]    Pending: Transvaginal US, Neoplastic Labs, CBC H/H, GYN f/u, Hepatitis Panel , ID consult Edie Gould is a 65 yo, with PMHx recurrent  UTI, panic disorder, anxiety, schizophrenia, questionable Parkinson's Disease, generalized muscle weakness, osteoarthritis, vitamin D deficiency, arrived to the ED last night from the [facility] due to persistent fever (>101F on ibuprofen), positive UTI, elevated WBC on CBC, confusion, loss of appetite. In the ED, pt is afebrile but c/o generalized weakness and pain, nausea, and diarrhea. Patient was admitted for transaminitis as well as UTI.       #Cystitis Complicated UTI Hx Recurrent UTI r/o Pyelonephritis  #Dec po intake 5 days  -Hx: morganella sensitive to Rocephin  -s/p 5days Macrobid , 1 dose Ertapenem   -Transabd US: Evaluation of the bladder demonstrates intraluminal echogenic material   without internal vascularity could represent bladder hematoma given the   urinalysis demonstrated large blood. Neoplastic process cannot be entirely excluded.  -Persistent Hematuria with clots  -CT A/P: Edematous bladder likely cystitis and small enhancing lesions in uterus likely fibroid  -LABS: UA: positive nitrites, large blood, proteinuria   PLAN  -Urology eval: pending  -Start Meropenem  -Pending Ucx, Bcx  -ID consult: pending    #Possible Bladder Hematoma vs Neoplastic   #Vaginal Bleeding   -Transabd US: Evaluation of the bladder demonstrates intraluminal echogenic material   without internal vascularity could represent bladder hematoma given the   urinalysis demonstrated large blood. Neoplastic process cannot be entirely excluded.  -Hg 11.7  PLAN  -GYN eval: low suspicion for acute gyn bleed, f/u regarding bleeding  -Urology eval: pending  -Pending Ucx, Bcx, , CA 19-9, CEA  -Transvaginal US  -Keep active type and screen, monitor H/H, transfuse if less 7      #Transaminitis 2/2 DILI?  #Choledocolithiasis   -LABS: Alk phos 563     -RUQ: negative  PLAN  -Hepatitis Panel: pending  -Consider MRCP  - avoid hepatotoxic medications    #AMS 2/2 UTI   #Schizophrenia/ Anxiety/ Panic Disorder  -Continue home medications: Clonazepam and Olanzapine    #Parkinson's Disease  -Continue with Sinemet and Benztropine    #Vitamin D def  #OA  -Continue with home medications  -Vit D level pending    DVT ppx:Heparin Sub q if H/H downtrending hold   GI ppx: Pepcid  Dispo: from [facility]    Pending: Transvaginal US, Neoplastic Labs, CBC H/H, GYN f/u, Hepatitis Panel , ID consult Edie Gould is a 63 yo, with PMHx recurrent  UTI, panic disorder, anxiety, schizophrenia, questionable Parkinson's Disease, generalized muscle weakness, osteoarthritis, vitamin D deficiency, arrived to the ED last night from the [facility] due to persistent fever (>101F on ibuprofen), positive UTI, elevated WBC on CBC, confusion, loss of appetite. In the ED, pt is afebrile but c/o generalized weakness and pain, nausea, and diarrhea. Patient was admitted for transaminitis as well as UTI.       #Cystitis Complicated UTI Hx Recurrent UTI r/o Pyelonephritis  #Dec po intake 5 days  -Hx: morganella sensitive to Rocephin  -s/p 5days Macrobid , 1 dose Ertapenem   -Transabd US: Evaluation of the bladder demonstrates intraluminal echogenic material   without internal vascularity could represent bladder hematoma given the   urinalysis demonstrated large blood. Neoplastic process cannot be entirely excluded.  -Persistent Hematuria with clots  -CT A/P: Edematous bladder likely cystitis and small enhancing lesions in uterus likely fibroid  -LABS: UA: positive nitrites, large blood, proteinuria   PLAN  -Urology eval: pending  -Start Meropenem  -Pending Ucx, Bcx  -ID consult: pending    #Possible Bladder Hematoma vs Neoplastic   #Vaginal Bleeding   -Transabd US: Evaluation of the bladder demonstrates intraluminal echogenic material   without internal vascularity could represent bladder hematoma given the   urinalysis demonstrated large blood. Neoplastic process cannot be entirely excluded.  -Hg 11.7  PLAN  -GYN eval: low suspicion for acute gyn bleed, f/u regarding bleeding  -Urology eval: pending  -Pending Ucx, Bcx, , CA 19-9, CEA  -Transvaginal US  -Keep active type and screen, monitor H/H, transfuse if less 7      #Transaminitis 2/2 DILI?  #Choledocolithiasis   -LABS: Alk phos 563     -RUQ: negative  PLAN  -Hepatitis Panel: pending  -Consider MRCP  -GI consult: pending  - avoid hepatotoxic medications    #AMS 2/2 UTI   #Schizophrenia/ Anxiety/ Panic Disorder  -Continue home medications: Clonazepam and Olanzapine    #Parkinson's Disease  -Continue with Sinemet and Benztropine    #Vitamin D def  #OA  -Continue with home medications  -Vit D level pending    DVT ppx:Heparin Sub q if H/H downtrending hold   GI ppx: Pepcid  Dispo: from [facility]    Pending: Transvaginal US, Neoplastic Labs, CBC H/H, GYN f/u, Hepatitis Panel , ID consult

## 2024-11-27 NOTE — CONSULT NOTE ADULT - SUBJECTIVE AND OBJECTIVE BOX
Gastroenterology Consultation:    Patient is a 64y old  Female who presents with a chief complaint of Transaminitis, UTI, vaginal bleeding (27 Nov 2024 13:51)        Admitted on: 11-27-24      HPI:     63 yo Female  with PMHx recurrent UTI, panic disorder, anxiety, schizophrenia, Parkinson's Disease, osteoarthritis, vitamin D deficiency [place] [facility] [place] (>101F on ibuprofen), positive UTI, elevated WBC on CBC, confusion, loss of appetite. 5 days ago patient had positive outpatient  UA, pt completed Macrobid x 5 days with non-resolution of symptoms, and so was switched to IV Ertapenem 1 gm solution which she took 1 dose yesterday. Pt has hx recurrent UTIs  previous culture grew morganella infection in the past which was sensitive for rocephin. Patient currently AAx2. Hepatology consulted for elevated liver enzymes. On med rec , patient is on diclofenac BID. Unable to obtain further history.       Prior EGD: 1/22 for abdominal pain and esophageal thickening: esophageal candidiasis (biopsy reflux esophagitis), non erosive gastritis , ulcer in the cardia , normal duodenum (bx duodenitis)    Prior Colonoscopy: none documented      PAST MEDICAL & SURGICAL HISTORY:  Schizophrenia      Bipolar disorder      Depression      Anxiety      Chronic lower back pain  result of pelvic fracture in the past      Osteoarthritis      Urinary incontinence      Chronic urinary tract infection      History of tremor      History of total knee arthroplasty, left            FAMILY HISTORY:  FH: prostate cancer (Father)    FH: Parkinson's disease (Mother)        Social History: unable to obtain  Tobacco:  Alcohol:  Drugs:    Home Medications:  Artificial Tears ophthalmic solution: 1 drop(s) in each eye (27 Nov 2024 09:20)  benztropine 1 mg oral tablet: 1 tab(s) orally once a day (27 Nov 2024 09:20)  clonazePAM 1 mg oral tablet: 1 tab(s) orally once a day (13 Dec 2021 09:42)  diclofenac potassium 25 mg oral tablet: 1 tab(s) orally 2 times a day (27 Nov 2024 09:21)  famotidine 20 mg oral tablet: 1 tab(s) orally once a day (14 Jan 2022 15:54)  gabapentin 400 mg oral tablet: 400 milligram(s) orally 3 times a day (27 Nov 2024 09:16)  lactulose 10 g/15 mL oral syrup: 15 milliliter(s) orally 2 times a day (27 Nov 2024 09:20)  melatonin 10 mg oral tablet: 1 tab(s) orally once (at bedtime) (27 Nov 2024 09:20)  OLANZapine 10 mg oral tablet: 1 tab(s) orally once a day (13 Dec 2021 09:42)  Sinemet 25 mg-100 mg oral tablet: 1 tab(s) orally 3 times a day (27 Nov 2024 09:20)        MEDICATIONS  (STANDING):  artificial  tears Solution 1 Drop(s) Both EYES daily  benztropine 1 milliGRAM(s) Oral daily  carbidopa/levodopa  25/100 1 Tablet(s) Oral three times a day  clonazePAM  Tablet 1 milliGRAM(s) Oral daily  famotidine    Tablet 20 milliGRAM(s) Oral every 48 hours  gabapentin 400 milliGRAM(s) Oral three times a day  heparin   Injectable 5000 Unit(s) SubCutaneous every 12 hours  lactulose Syrup 10 Gram(s) Oral two times a day  meropenem  IVPB 1000 milliGRAM(s) IV Intermittent every 8 hours  OLANZapine 5 milliGRAM(s) Oral two times a day    MEDICATIONS  (PRN):      Allergies  moxifloxacin (Unknown)  azithromycin (Unknown)  penicillin (Unknown)      Review of Systems:   unable to obtain        Physical Examination:  T(C): 36.7 (11-27-24 @ 01:01), Max: 37.2 (11-26-24 @ 19:33)  HR: 80 (11-27-24 @ 01:01) (80 - 82)  BP: 130/69 (11-27-24 @ 01:01) (116/75 - 130/69)  RR: 20 (11-27-24 @ 01:01) (18 - 20)  SpO2: 98% (11-27-24 @ 01:01) (97% - 98%)    Weight (kg): 52.2 (11-26-24 @ 19:33)        GENERAL: AAOx2  HEAD:  Atraumatic, Normocephalic  EYES: conjunctiva and sclera clear  CHEST/LUNG: Clear to auscultation bilaterally; No wheeze, rhonchi, or rales  HEART: Regular rate and rhythm; normal S1, S2,  ABDOMEN: Soft, nontender, nondistended; Bowel sounds present  SKIN: Intact, no jaundice        Data:                        11.3   11.71 )-----------( 316      ( 27 Nov 2024 11:08 )             36.3     Hgb Trend:  11.3  11-27-24 @ 11:08  11.6  11-26-24 @ 21:30      11-27    146  |  110  |  20  ----------------------------<  107[H]  4.7   |  22  |  0.6[L]    Ca    9.3      27 Nov 2024 11:08  Mg     2.2     11-27    TPro  6.5  /  Alb  3.4[L]  /  TBili  3.2[H]  /  DBili  x   /  AST  154[H]  /  ALT  355[H]  /  AlkPhos  550[H]  11-27    Liver panel trend:  TBili 3.2   /      /      /   AlkP 550   /   Tptn 6.5   /   Alb 3.4    /   DBili --      11-27  TBili 3.0   /      /      /   AlkP 563   /   Tptn 6.2   /   Alb 3.3    /   DBili 1.8      11-26              Radiology:  CT Abdomen and Pelvis w/ IV Cont:   ACC: [id number] EXAM:  CT ABDOMEN AND PELVIS IC   ORDERED BY: ADRIÁN DONOHUE     PROCEDURE DATE:  11/27/2024          INTERPRETATION:  CLINICAL INFORMATION: Abdominal pain, fever, elevated   AST, vaginal bleeding    COMPARISON: CT abdomen and pelvis 1/8/2022.    CONTRAST/COMPLICATIONS:  IV Contrast: Omnipaque 350  100 cc administered   0 cc discarded  Oral Contrast: NONE      PROCEDURE:  CT of the Abdomen and Pelvis was performed.  Sagittal and coronal reformats were performed.    FINDINGS:  LOWER CHEST: Trace left basilar atelectatic change and/or scarring. Mild   coronary calcifications.    LIVER: Within normal limits.  BILE DUCTS: Normal caliber.  GALLBLADDER: Within normal limits.  SPLEEN: Within normal limits.  PANCREAS: Within normal limits.  ADRENALS: Within normal limits.  KIDNEYS/URETERS: Symmetric renal enhancement. No hydronephrosis   centimeter cortical hypodensity too small to characterize left kidney    BLADDER: Edematous bladder wall thickening with mucosal hyperemia  REPRODUCTIVE ORGANS: Endometrium is not definitively delineated.   Enhancing nodule possibly myometrial likely fibroid.    BOWEL: Moderate hiatal hernia. Mild stool distended rectum. No   obstruction. Unchanged duodenal diverticulum.  PERITONEUM/RETROPERITONEUM: Within normal limits.  VESSELS: Atherosclerotic changes.  LYMPH NODES: Stable prominent common hepatic and portacaval nodes   measuring up to approximately 1.3 cm, series 4 image 28.  ABDOMINAL WALL: Within normal limits.  BONES: Mild levoscoliotic rotational curvature with degenerative changes.   Chronic left femoral intertrochanteric fracture status post partial image   intramedullary gamma nail fixation.. Stable mild compression fracture at   T12. Slight interval progression of chronic mild L5 compression deformity.      IMPRESSION:  1.  Edematous bladder wall thickening with mucosal hyperemia compatible   with cystitis.  2.  Mild stool distended rectum, correlate for constipation or fecal   impaction. No bowel obstruction.  3.  Small enhancing lesion in the uterus likely fibroid though polyp   cannot be excluded. Limited assessment on CT.    --- End of Report ---          GEORGIA BAIRD MD; Resident Radiologist  This document has been electronically signed.  BONNIE SAPP MD; Attending Radiologist  This document has been electronically signed. Nov 27 2024  3:24AM (11-27-24 @ 00:04)    US Abdomen Upper Quadrant Right:   ACC: [id number] EXAM:  US ABDOMEN RT UPR QUADRANT   ORDERED BY: ADRIÁN DONOHUE     PROCEDURE DATE:  11/27/2024          INTERPRETATION:  CLINICAL INFORMATION: Elevated LFTs.    COMPARISON: CT dated 11/27/2024    TECHNIQUE: Sonography of the right upper quadrant.    FINDINGS:  Liver: Within normal limits.  Bile ducts: Normal caliber. Common bile duct measures 3 mm.  Gallbladder: Within normal limits. Negative sonographic Miguel's sign.  Pancreas: Poorly visualized.  Right kidney: 9.1 cm. No hydronephrosis.  Ascites: None.      IMPRESSION:  Unremarkable right upper quadrant abdominal ultrasound.        --- End of Report ---          PHYLLIS TUCKER MD; Resident Radiologist  This document has been electronically signed.  YARELIS HERNANDEZ MD; Attending Radiologist  This document has been electronically signed. Nov 27 2024  9:36AM (11-27-24 @ 07:54)

## 2024-11-27 NOTE — CONSULT NOTE ADULT - SUBJECTIVE AND OBJECTIVE BOX
EDIE FARIAS  64y, Female  Allergy: moxifloxacin (Unknown)  azithromycin (Unknown)  penicillin (Unknown)      All historical available data reviewed.    HPI:  H&P was obtained from a combination of speaking with the patient and [facility] nursing home ([phone])    Edie Farias is a 65 yo, with PMHx recurrent UTI, panic disorder, anxiety, schizophrenia, Parkinson's Disease?, generalized muscle weakness, osteoarthritis, vitamin D deficiency who arrived to the ED last night from the [facility] due to persistent fever (>101F on ibuprofen), positive UTI, elevated WBC on CBC, confusion, loss of appetite. 5 days ago patient had positive outpatient  UA, pt completed Macrobid x 5 days with non-resolution of symptoms, and so was switched to IV Ertapenem 1 gm solution which she took 1 dose yesterday. Pt has hx recurrent UTIs  previous culture grew morganella infection in the past which was sensitive for rocephin. Patient denies any dysuria, urinary frequency. Patient was found to have vaginal bleeding in nursing home a few days ago. Patient does not have any associated pain. Nursing home states that patient has been confused for 5 days since onset of UTI as well as decreased po intake. Patient was afebrile in nursing home only developed fevers in ED.     Pt sustained a mechanical fall 10 days ago at the nursing home. Patient was getting out of bed and fell but did not lose consciousness. No imaging was done.      In the ED, pt c/o generalized weakness and pain, nausea, and diarrhea.      Vitals  Temp 99F  116/75 HR 82  18 RR O2 97        Labs  HG 11.7   Alk Phos 563      T Bili 3.0  Bili 1.8  Lipase 92      UA:  + Nitrites, Large Bilirubin Large Blood, Proteinuria    Imaging  Transabdominal US:   Neither ovary is visualized. Uterus delineated but not well evaluated on   this limited study. Measures approximately 4.1 x 2.5 x 3.6 cm.   Endometrium not well delineated. No definite correlate to CT finding.    Evaluation of the bladder demonstrates intraluminal echogenic material   without internal vascularity could represent bladder hematoma given the   urinalysis demonstrated large blood. Neoplastic process cannot be   entirely excluded.      CT A/P:   1.  Edematous bladder wall thickening with mucosal hyperemia compatible   with cystitis.  2.  Mild stool distended rectum, correlate for constipation or fecal   impaction. No bowel obstruction.  3.  Small enhancing lesion in the uterus likely fibroid though polyp   cannot be excluded. Limited assessment on CT.    RUQ US:  Unremarkable right upper quadrant abdominal ultrasound.   (27 Nov 2024 09:14)    FAMILY HISTORY:  FH: prostate cancer (Father)    FH: Parkinson's disease (Mother)      PAST MEDICAL & SURGICAL HISTORY:  Schizophrenia      Bipolar disorder      Depression      Anxiety      Chronic lower back pain  result of pelvic fracture in the past      Osteoarthritis      Urinary incontinence      Chronic urinary tract infection      History of tremor      History of total knee arthroplasty, left            VITALS:  T(F): 98.1, Max: 99 (11-26-24 @ 19:33)  HR: 80  BP: 130/69  RR: 20Vital Signs Last 24 Hrs  T(C): 36.7 (27 Nov 2024 01:01), Max: 37.2 (26 Nov 2024 19:33)  T(F): 98.1 (27 Nov 2024 01:01), Max: 99 (26 Nov 2024 19:33)  HR: 80 (27 Nov 2024 01:01) (80 - 82)  BP: 130/69 (27 Nov 2024 01:01) (116/75 - 130/69)  BP(mean): --  RR: 20 (27 Nov 2024 01:01) (18 - 20)  SpO2: 98% (27 Nov 2024 01:01) (97% - 98%)    Parameters below as of 27 Nov 2024 01:01  Patient On (Oxygen Delivery Method): room air        TESTS & MEASUREMENTS:                        11.3   11.71 )-----------( 316      ( 27 Nov 2024 11:08 )             36.3     11-27    146  |  110  |  20  ----------------------------<  107[H]  4.7   |  22  |  0.6[L]    Ca    9.3      27 Nov 2024 11:08  Mg     2.2     11-27    TPro  6.5  /  Alb  3.4[L]  /  TBili  3.2[H]  /  DBili  x   /  AST  154[H]  /  ALT  355[H]  /  AlkPhos  550[H]  11-27    LIVER FUNCTIONS - ( 27 Nov 2024 11:08 )  Alb: 3.4 g/dL / Pro: 6.5 g/dL / ALK PHOS: 550 U/L / ALT: 355 U/L / AST: 154 U/L / GGT: x             Urinalysis Basic - ( 27 Nov 2024 11:08 )    Color: x / Appearance: x / SG: x / pH: x  Gluc: 107 mg/dL / Ketone: x  / Bili: x / Urobili: x   Blood: x / Protein: x / Nitrite: x   Leuk Esterase: x / RBC: x / WBC x   Sq Epi: x / Non Sq Epi: x / Bacteria: x          RADIOLOGY & ADDITIONAL TESTS:  Personal review of radiological diagnostics performed  Echo and EKG results noted when applicable.     MEDICATIONS:  artificial  tears Solution 1 Drop(s) Both EYES daily  benztropine 1 milliGRAM(s) Oral daily  carbidopa/levodopa  25/100 1 Tablet(s) Oral three times a day  clonazePAM  Tablet 1 milliGRAM(s) Oral daily  famotidine    Tablet 20 milliGRAM(s) Oral every 48 hours  gabapentin 400 milliGRAM(s) Oral three times a day  heparin   Injectable 5000 Unit(s) SubCutaneous every 12 hours  lactulose Syrup 10 Gram(s) Oral two times a day  meropenem  IVPB 1000 milliGRAM(s) IV Intermittent every 8 hours  OLANZapine 5 milliGRAM(s) Oral two times a day      ANTIBIOTICS:  meropenem  IVPB 1000 milliGRAM(s) IV Intermittent every 8 hours

## 2024-11-27 NOTE — CONSULT NOTE ADULT - SUBJECTIVE AND OBJECTIVE BOX
Urology Consult- Unable to obtain any accurate Urologic history.  Medical history obtained from chart and [facility].     Pt is a 65yo Female PMH depression, schizoaffective, bipolar, IBS who presented to the ED from [facility] for elevated AST and fever.   Patient is alert but not a good historian.   Patient was being treated for a UTI with Macrobid for 5 days. Staff at the [facility] center noted bleeding in patients diaper for the past few days, with blood in each change.   Denies any abdominal pain, chest pain, back pain, fevers or dysuria.    PAST MEDICAL & SURGICAL HISTORY:  Schizophrenia    Bipolar disorder    Depression    Anxiety    Chronic lower back pain  result of pelvic fracture in the past    Osteoarthritis    Urinary incontinence    Chronic urinary tract infection    History of tremor    History of total knee arthroplasty, left      MEDICATIONS  (STANDING):    MEDICATIONS  (PRN):      Allergies    moxifloxacin (Unknown)  azithromycin (Unknown)  penicillin (Unknown)          SOCIAL HISTORY: No illicit drug use    FAMILY HISTORY:  FH: prostate cancer (Father)    FH: Parkinson's disease (Mother)        REVIEW OF SYSTEMS   [x] Due to altered mental status/intubation, subjective information were not able to be obtained from patient. History was obtained, to the extent possible, from review of the chart and collateral sources of information.    Vital Signs Last 24 Hrs  T(C): 36.7 (27 Nov 2024 01:01), Max: 37.2 (26 Nov 2024 19:33)  T(F): 98.1 (27 Nov 2024 01:01), Max: 99 (26 Nov 2024 19:33)  HR: 80 (27 Nov 2024 01:01) (80 - 82)  BP: 130/69 (27 Nov 2024 01:01) (116/75 - 130/69)  RR: 20 (27 Nov 2024 01:01) (18 - 20)  SpO2: 98% (27 Nov 2024 01:01) (97% - 98%)    Parameters below as of 27 Nov 2024 01:01  Patient On (Oxygen Delivery Method): room air        PHYSICAL EXAM:    GEN: NAD, awake. A&Ox2  SKIN: Non diaphoretic. Dry mucous membranes  RESP: Non-labored breathing. No use of accessory muscles.  ABDO: Soft, NT/ND, no palpable bladder. + Suprapubic tenderness  BACK: No CVAT B/L  : Vaginal bleeding vs. Hematuria  EXT: DOTSON x 4      I&O's Summary      LABS:                        11.6   9.14  )-----------( 359      ( 26 Nov 2024 21:30 )             36.7     11-26    140  |  107  |  28[H]  ----------------------------<  112[H]  4.7   |  21  |  0.9    Ca    9.1      26 Nov 2024 21:30    TPro  6.2  /  Alb  3.3[L]  /  TBili  3.0[H]  /  DBili  1.8[H]  /  AST  188[H]  /  ALT  350[H]  /  AlkPhos  563[H]  11-26      Urinalysis (11.26.24 @ 22:24)    pH Urine: 6.0   Glucose Qualitative, Urine: Negative mg/dL   Blood, Urine: Large   Color: Dark Yellow   Urine Appearance: Cloudy   Bilirubin: Large   Ketone - Urine: 15 mg/dL   Specific Gravity: 1.024   Protein, Urine: >=1000 mg/dL   Urobilinogen: 1.0 mg/dL   Nitrite: Positive   Leukocyte Esterase Concentration: Small      Urine Microscopic-Add On (NC) (11.26.24 @ 22:24)    Cast: 0 /LPF   Epithelial Cells: 1 /HPF   Red Blood Cell - Urine: >1900 /HPF   White Blood Cell - Urine: 150 /HPF   Bacteria: Negative /HPF      RADIOLOGY & ADDITIONAL STUDIES:    < from: CT Abdomen and Pelvis w/ IV Cont (11.27.24 @ 00:04) >  ACC: [id number] EXAM:  CT ABDOMEN AND PELVIS IC   ORDERED BY: ADRIÁN DONOHUE     PROCEDURE DATE:  11/27/2024          INTERPRETATION:  CLINICAL INFORMATION: Abdominal pain, fever, elevated   AST, vaginal bleeding    COMPARISON: CT abdomen and pelvis 1/8/2022.    CONTRAST/COMPLICATIONS:  IV Contrast: Omnipaque 350  100 cc administered   0 cc discarded  Oral Contrast: NONE      PROCEDURE:  CT of the Abdomen and Pelvis was performed.  Sagittal and coronal reformats were performed.    FINDINGS:  LOWER CHEST: Trace left basilar atelectatic change and/or scarring. Mild   coronary calcifications.    LIVER: Within normal limits.  BILE DUCTS: Normal caliber.  GALLBLADDER: Within normal limits.  SPLEEN: Within normal limits.  PANCREAS: Within normal limits.  ADRENALS: Within normal limits.  KIDNEYS/URETERS: Symmetric renal enhancement. No hydronephrosis   centimeter cortical hypodensity too small to characterize left kidney    BLADDER: Edematous bladder wall thickening with mucosal hyperemia  REPRODUCTIVE ORGANS: Endometrium is not definitively delineated.   Enhancing nodule possibly myometrial likely fibroid.    BOWEL: Moderate hiatal hernia. Mild stool distended rectum. No   obstruction. Unchanged duodenal diverticulum.  PERITONEUM/RETROPERITONEUM: Within normal limits.  VESSELS: Atherosclerotic changes.  LYMPH NODES: Stable prominent common hepatic and portacaval nodes   measuring up to approximately 1.3 cm, series 4 image 28.  ABDOMINAL WALL: Within normal limits.  BONES: Mild levoscoliotic rotational curvature with degenerative changes.   Chronic left femoral intertrochanteric fracture status post partial image   intramedullary gamma nail fixation.. Stable mild compression fracture at   T12. Slight interval progression of chronic mild L5 compression deformity.      IMPRESSION:  1.  Edematous bladder wall thickening with mucosal hyperemia compatible   with cystitis.  2.  Mild stool distended rectum, correlate for constipation or fecal   impaction. No bowel obstruction.  3.  Small enhancing lesion in the uterus likely fibroid though polyp   cannot be excluded. Limited assessment on CT.    --- End of Report ---      GEORGIA BAIRD MD; Resident Radiologist  This document has been electronically signed.  BONNIE SAPP MD; Attending Radiologist  This document has been electronically signed. Nov 27 2024  3:24AM    < end of copied text >

## 2024-11-27 NOTE — CONSULT NOTE ADULT - ASSESSMENT
The patient is a 64 year old Female PMHx schizoaffective, bipolar, depression, IBS who presents with a UTI.      A) UTI   Hematuria   ?vaginal bleeding  Altered mental status      P)   - Obtain clean UA/ UC via straight cath  -continue with abx, F/u cultures and adjust accordingly   -Consider ID c/s for abx recommendations  -Monitor hematuria and trend Hgb. Transfuse prn   -Monitor vitals for fevers  - Will discuss w Attending The patient is a 64 year old Female PMHx schizoaffective, bipolar, depression, IBS who presents with a UTI.      A) UTI   Hematuria   ?vaginal bleeding  Altered mental status      P)   - Obtain clean UA/ UC via straight cath  -continue with abx, F/u cultures and adjust accordingly   -Consider ID c/s for abx recommendations  -Monitor hematuria and trend Hgb. Transfuse prn   -Monitor vitals for fevers  -Cystoscopy as outpatient

## 2024-11-27 NOTE — ED PROVIDER NOTE - ATTENDING APP SHARED VISIT CONTRIBUTION OF CARE
pt with vaginal bleeding, weakness, abdominal pain  + vaginal bleeding on exam, non profuse, BRB    ct, pelvic US. admission.

## 2024-11-27 NOTE — CONSULT NOTE ADULT - SUBJECTIVE AND OBJECTIVE BOX
Chief Complaint: bleeding on diaper    HPI: 63yo G0 postmenopausal presenting from nursing home due to elevated LFTs and fever, recently treated for UTI w/ macrobid per chart review. Nursing staff also noticed bleeding on diaper. Patient reports bleeding for the past two weeks associated with dysuria. Patient states that her LMP was in her 50s. She is unable to provide any other relevant other history.     Ob/Gyn History:  G0                LMP - 50s  Denies history of ovarian cysts, uterine fibroids    Denies the following: constitutional symptoms, visual symptoms, cardiovascular symptoms, respiratory symptoms, GI symptoms, musculoskeletal symptoms, skin symptoms, neurologic symptoms, hematologic symptoms, allergic symptoms, psychiatric symptoms  Except any pertinent positives listed.     Home Medications:  bisacodyl 5 mg oral delayed release tablet: 1 tab(s) orally every 12 hours, As needed, Constipation (17 Dec 2021 10:48)  celecoxib 200 mg oral capsule: 1 cap(s) orally once a day (2022 14:16)  clonazePAM 1 mg oral tablet: 1 tab(s) orally once a day (13 Dec 2021 09:42)  famotidine 20 mg oral tablet: 1 tab(s) orally once a day (2022 15:54)  OLANZapine 10 mg oral tablet: 1 tab(s) orally once a day (13 Dec 2021 09:42)  sertraline 100 mg oral tablet: 1 tab(s) orally once a day (13 Dec 2021 09:42)  tamsulosin 0.4 mg oral capsule: 1 cap(s) orally once a day (at bedtime) (2022 15:35)      Allergies  moxifloxacin (Unknown)  azithromycin (Unknown)  penicillin (Unknown)    PAST MEDICAL & SURGICAL HISTORY:  Schizophrenia  Bipolar disorder  Depression  Anxiety  Chronic lower back pain  result of pelvic fracture in the past  Osteoarthritis  Urinary incontinence  Chronic urinary tract infection  History of tremor  History of total knee arthroplasty, left      FAMILY HISTORY:  FH: prostate cancer (Father)  FH: Parkinson's disease (Mother)    SOCIAL HISTORY: Denies cigarette use, alcohol use, or illicit drug use    Vital Signs Last 24 Hrs  T(F): 98.1 (2024 01:01), Max: 99 (2024 19:33)  HR: 80 (2024 01:01) (80 - 82)  BP: 130/69 (2024 01:01) (116/75 - 130/69)  RR: 20 (2024 01:01) (18 - 20)    Weight (kg): 52.2 (24 @ 19:33)    General Appearance - AAOx3, NAD    GYN/Pelvis: Limited secondary to patient physical limitations and discomfort  Sheets soaked with urine, blood-tinged.   Blood present around external genitalia. Digital vaginal exam reveals minimal blood on exam, likely due to blood around genitalia  No blood on rectal exam.       Meds:   lactated ringers Bolus 1000 milliLiter(s) IV Bolus once      Weight (kg): 52.2 (24 @ 19:33)    LABS:                        11.6   9.14  )-----------( 359      ( 2024 21:30 )             36.7             140  |  107  |  28[H]  ----------------------------<  112[H]  4.7   |  21  |  0.9    Ca    9.1      2024 21:30    TPro  6.2  /  Alb  3.3[L]  /  TBili  3.0[H]  /  DBili  1.8[H]  /  AST  188[H]  /  ALT  350[H]  /  AlkPhos  563[H]        Urinalysis Basic - ( 2024 22:24 )    Color: Dark Yellow / Appearance: Cloudy / S.024 / pH: x  Gluc: x / Ketone: 15 mg/dL  / Bili: Large / Urobili: 1.0 mg/dL   Blood: x / Protein: >=1000 mg/dL / Nitrite: Positive   Leuk Esterase: Small / RBC: >1900 /HPF /  /HPF   Sq Epi: x / Non Sq Epi: 1 /HPF / Bacteria: Negative /HPF      RADIOLOGY & ADDITIONAL STUDIES:  LMP: Postmenopausal.    COMPARISON: None available.    TECHNIQUE:  Transabdominal pelvic sonogram only. Color and Spectral Doppler was performed.    FINDINGS/  IMPRESSION:  Limited transabdominal exam.    Neither ovary is visualized. Uterus delineated but not well evaluated on this limited study. Measures approximately 4.1 x 2.5 x 3.6 cm. Endometrium not well delineated. No definite correlate to CT finding.    Evaluation of the bladder demonstrates intraluminal echogenic material without internal vascularity could represent bladder hematoma given the urinalysis demonstrated large blood. Neoplastic process cannot be entirely excluded.      COMPARISON: CT abdomen and pelvis 2022.    CONTRAST/COMPLICATIONS:  IV Contrast: Omnipaque 350 100 cc administered 0 cc discarded  Oral Contrast: NONE      PROCEDURE:  CT of the Abdomen and Pelvis was performed.  Sagittal and coronal reformats were performed.    FINDINGS:  LOWER CHEST: Trace left basilar atelectatic change and/or scarring. Mild coronary calcifications.    LIVER: Within normal limits.  BILE DUCTS: Normal caliber.  GALLBLADDER: Within normal limits.  SPLEEN: Within normal limits.  PANCREAS: Within normal limits.  ADRENALS: Within normal limits.  KIDNEYS/URETERS: Symmetric renal enhancement. No hydronephrosis centimeter cortical hypodensity too small to characterize left kidney    BLADDER: Edematous bladder wall thickening with mucosal hyperemia  REPRODUCTIVE ORGANS: Endometrium is not definitively delineated. Enhancing nodule possibly myometrial likely fibroid.    BOWEL: Moderate hiatal hernia. Mild stool distended rectum. No obstruction. Unchanged duodenal diverticulum.  PERITONEUM/RETROPERITONEUM: Within normal limits.  VESSELS: Atherosclerotic changes.  LYMPH NODES: Stable prominent common hepatic and portacaval nodes measuring up to approximately 1.3 cm, series 4 image 28.  ABDOMINAL WALL: Within normal limits.  BONES: Mild levoscoliotic rotational curvature with degenerative changes. Chronic left femoral intertrochanteric fracture status post partial image intramedullary gamma nail fixation.. Stable mild compression fracture at T12. Slight interval progression of chronic mild L5 compression deformity.      IMPRESSION:  1. Edematous bladder wall thickening with mucosal hyperemia compatible with cystitis.  2. Mild stool distended rectum, correlate for constipation or fecal impaction. No bowel obstruction.  3. Small enhancing lesion in the uterus likely fibroid though polyp cannot be excluded. Limited assessment on CT. Chief Complaint: bleeding on diaper    HPI: 63yo G0 postmenopausal presenting from nursing home due to elevated LFTs and fever, recently treated for UTI w/ macrobid per chart review. Nursing staff also noticed bleeding on diaper. Patient reports bleeding for the past two weeks associated with dysuria. Patient states that her LMP was in her 50s. She is unable to provide any other relevant other history.     Ob/Gyn History:  G0                LMP - 50s  Denies history of ovarian cysts, uterine fibroids    Denies the following: constitutional symptoms, visual symptoms, cardiovascular symptoms, respiratory symptoms, GI symptoms, musculoskeletal symptoms, skin symptoms, neurologic symptoms, hematologic symptoms, allergic symptoms, psychiatric symptoms  Except any pertinent positives listed.     Home Medications:  bisacodyl 5 mg oral delayed release tablet: 1 tab(s) orally every 12 hours, As needed, Constipation (17 Dec 2021 10:48)  celecoxib 200 mg oral capsule: 1 cap(s) orally once a day (2022 14:16)  clonazePAM 1 mg oral tablet: 1 tab(s) orally once a day (13 Dec 2021 09:42)  famotidine 20 mg oral tablet: 1 tab(s) orally once a day (2022 15:54)  OLANZapine 10 mg oral tablet: 1 tab(s) orally once a day (13 Dec 2021 09:42)  sertraline 100 mg oral tablet: 1 tab(s) orally once a day (13 Dec 2021 09:42)  tamsulosin 0.4 mg oral capsule: 1 cap(s) orally once a day (at bedtime) (2022 15:35)      Allergies  moxifloxacin (Unknown)  azithromycin (Unknown)  penicillin (Unknown)    PAST MEDICAL & SURGICAL HISTORY:  Schizophrenia  Bipolar disorder  Depression  Anxiety  Chronic lower back pain  result of pelvic fracture in the past  Osteoarthritis  Urinary incontinence  Chronic urinary tract infection  History of tremor  History of total knee arthroplasty, left      FAMILY HISTORY:  FH: prostate cancer (Father)  FH: Parkinson's disease (Mother)    SOCIAL HISTORY: Denies cigarette use, alcohol use, or illicit drug use    Vital Signs Last 24 Hrs  T(F): 98.1 (2024 01:01), Max: 99 (2024 19:33)  HR: 80 (2024 01:01) (80 - 82)  BP: 130/69 (2024 01:01) (116/75 - 130/69)  RR: 20 (2024 01:01) (18 - 20)    Weight (kg): 52.2 (24 @ 19:33)    General Appearance - AAOx2, NAD    GYN/Pelvis: Limited secondary to patient physical limitations and discomfort  Sheets soaked with urine, blood-tinged.   Blood present around external genitalia. Digital vaginal exam reveals no blood on exam  No blood on rectal exam.       Meds:   lactated ringers Bolus 1000 milliLiter(s) IV Bolus once      Weight (kg): 52.2 (24 @ 19:33)    LABS:                        11.6   9.14  )-----------( 359      ( 2024 21:30 )             36.7             140  |  107  |  28[H]  ----------------------------<  112[H]  4.7   |  21  |  0.9    Ca    9.1      2024 21:30    TPro  6.2  /  Alb  3.3[L]  /  TBili  3.0[H]  /  DBili  1.8[H]  /  AST  188[H]  /  ALT  350[H]  /  AlkPhos  563[H]        Urinalysis Basic - ( 2024 22:24 )    Color: Dark Yellow / Appearance: Cloudy / S.024 / pH: x  Gluc: x / Ketone: 15 mg/dL  / Bili: Large / Urobili: 1.0 mg/dL   Blood: x / Protein: >=1000 mg/dL / Nitrite: Positive   Leuk Esterase: Small / RBC: >1900 /HPF /  /HPF   Sq Epi: x / Non Sq Epi: 1 /HPF / Bacteria: Negative /HPF      RADIOLOGY & ADDITIONAL STUDIES:  LMP: Postmenopausal.    COMPARISON: None available.    TECHNIQUE:  Transabdominal pelvic sonogram only. Color and Spectral Doppler was performed.    FINDINGS/  IMPRESSION:  Limited transabdominal exam.    Neither ovary is visualized. Uterus delineated but not well evaluated on this limited study. Measures approximately 4.1 x 2.5 x 3.6 cm. Endometrium not well delineated. No definite correlate to CT finding.    Evaluation of the bladder demonstrates intraluminal echogenic material without internal vascularity could represent bladder hematoma given the urinalysis demonstrated large blood. Neoplastic process cannot be entirely excluded.      COMPARISON: CT abdomen and pelvis 2022.    CONTRAST/COMPLICATIONS:  IV Contrast: Omnipaque 350 100 cc administered 0 cc discarded  Oral Contrast: NONE      PROCEDURE:  CT of the Abdomen and Pelvis was performed.  Sagittal and coronal reformats were performed.    FINDINGS:  LOWER CHEST: Trace left basilar atelectatic change and/or scarring. Mild coronary calcifications.    LIVER: Within normal limits.  BILE DUCTS: Normal caliber.  GALLBLADDER: Within normal limits.  SPLEEN: Within normal limits.  PANCREAS: Within normal limits.  ADRENALS: Within normal limits.  KIDNEYS/URETERS: Symmetric renal enhancement. No hydronephrosis centimeter cortical hypodensity too small to characterize left kidney    BLADDER: Edematous bladder wall thickening with mucosal hyperemia  REPRODUCTIVE ORGANS: Endometrium is not definitively delineated. Enhancing nodule possibly myometrial likely fibroid.    BOWEL: Moderate hiatal hernia. Mild stool distended rectum. No obstruction. Unchanged duodenal diverticulum.  PERITONEUM/RETROPERITONEUM: Within normal limits.  VESSELS: Atherosclerotic changes.  LYMPH NODES: Stable prominent common hepatic and portacaval nodes measuring up to approximately 1.3 cm, series 4 image 28.  ABDOMINAL WALL: Within normal limits.  BONES: Mild levoscoliotic rotational curvature with degenerative changes. Chronic left femoral intertrochanteric fracture status post partial image intramedullary gamma nail fixation.. Stable mild compression fracture at T12. Slight interval progression of chronic mild L5 compression deformity.      IMPRESSION:  1. Edematous bladder wall thickening with mucosal hyperemia compatible with cystitis.  2. Mild stool distended rectum, correlate for constipation or fecal impaction. No bowel obstruction.  3. Small enhancing lesion in the uterus likely fibroid though polyp cannot be excluded. Limited assessment on CT.

## 2024-11-27 NOTE — ED PROVIDER NOTE - OBJECTIVE STATEMENT
Patient is a 64-year-old female PMH depression, schizoaffective, bipolar, IBS coming to ED from [facility] for elevated AST and fever.  Patient not a good historian, collateral from chart and/[facility]–patient has had fever for last couple days, had outpatient blood work and noted to have elevated AST, patient also being treated for UTI for 5 days on Macrobid.  Staff at the center noted patient to have vaginal bleeding for the last few days as well, staff reports changing her diaper twice daily and noting vaginal bleeding each time.  Denies chest pain, fall/trauma, difficulty breathing, abdominal pain, rash

## 2024-11-27 NOTE — H&P ADULT - NSHPPHYSICALEXAM_GEN_ALL_CORE
PHYSICAL EXAM:  GENERAL: NAD, lying in bed comfortably  HEAD:  Atraumatic, Normocephalic, poor dentition   EYES: EOMI, sclera clear  ENT: Moist mucous membranes  NECK: Supple, trachea midline  CHEST/LUNG: Clear to auscultation anteriorly bilaterally; No rales, rhonchi, wheezing, or rubs. Unlabored respirations  HEART: Regular rate and rhythm; No appreciable murmurs  ABDOMEN: (reduced)BS; Soft, nondistended, tenderness to RLQ   Neuro:AOx2 not oriented to location  MS: awake, alerts to name, follows some simple commands  Language: conversant, tangential /confused  CNs: Pupils b/l do not constrict fully EOMI seems intact, face symmetric  Motor: Hand  2/5 bilateral, no upper extremity drift noted but patient UE 2/5 ,   leg extension 2/5 , no drift noted  Sensory: reduced sensation to touch on face bilateral, UE and LE intact to light touch  : clots in vaginal vault / hematuria noted   EXTREMITIES: no edema  SKIN: No rashes or lesions to b/l forearm, face. PHYSICAL EXAM:  GENERAL: NAD, lying in bed comfortably  HEAD:  Atraumatic, Normocephalic, poor dentition   EYES: EOMI, sclera clear  ENT: Moist mucous membranes  NECK: Supple, trachea midline  CHEST/LUNG: Clear to auscultation anteriorly bilaterally; No rales, rhonchi, wheezing, or rubs. Unlabored respirations  HEART: Regular rate and rhythm; No appreciable murmurs  ABDOMEN: (reduced)BS; Soft, nondistended, tenderness to RLQ  and suprapubic  Neuro:AOx2 not oriented to location  MS: awake, alerts to name, follows some simple commands  Language: conversant, tangential /confused  CNs: Pupils b/l do not constrict fully EOMI seems intact, face symmetric  Motor: Hand  2/5 bilateral, no upper extremity drift noted but patient UE 2/5 ,   leg extension 2/5 , no drift noted  Sensory: reduced sensation to touch on face bilateral, UE and LE intact to light touch  : clots in vaginal vault / hematuria noted   EXTREMITIES: no edema  SKIN: No rashes or lesions to b/l forearm, face.

## 2024-11-27 NOTE — CONSULT NOTE ADULT - SUBJECTIVE AND OBJECTIVE BOX
HPI:  H&P was obtained from a combination of speaking with the patient and [facility] nursing home ([phone])    Edie Gould is a 63 yo, with PMHx recurrent UTI, panic disorder, anxiety, schizophrenia, Parkinson's Disease?, generalized muscle weakness, osteoarthritis, vitamin D deficiency who arrived to the ED last night from the [facility] due to persistent fever (>101F on ibuprofen), positive UTI, elevated WBC on CBC, confusion, loss of appetite. 5 days ago patient had positive outpatient  UA, pt completed Macrobid x 5 days with non-resolution of symptoms, and so was switched to IV Ertapenem 1 gm solution which she took 1 dose yesterday. Pt has hx recurrent UTIs  previous culture grew morganella infection in the past which was sensitive for rocephin. Patient denies any dysuria, urinary frequency. Patient was found to have vaginal bleeding in nursing home a few days ago. Patient does not have any associated pain. Nursing home states that patient has been confused for 5 days since onset of UTI as well as decreased po intake. Patient was afebrile in nursing home only developed fevers in ED.     Pt sustained a mechanical fall 10 days ago at the nursing home. Patient was getting out of bed and fell but did not lose consciousness. No imaging was done.      In the ED, pt c/o generalized weakness and pain, nausea, and diarrhea.      Vitals  Temp 99F  116/75 HR 82  18 RR O2 97        Labs  HG 11.7   Alk Phos 563      T Bili 3.0  Bili 1.8  Lipase 92      UA:  + Nitrites, Large Bilirubin Large Blood, Proteinuria    Imaging  Transabdominal US:   Neither ovary is visualized. Uterus delineated but not well evaluated on   this limited study. Measures approximately 4.1 x 2.5 x 3.6 cm.   Endometrium not well delineated. No definite correlate to CT finding.    Evaluation of the bladder demonstrates intraluminal echogenic material   without internal vascularity could represent bladder hematoma given the   urinalysis demonstrated large blood. Neoplastic process cannot be   entirely excluded.      CT A/P:   1.  Edematous bladder wall thickening with mucosal hyperemia compatible   with cystitis.  2.  Mild stool distended rectum, correlate for constipation or fecal   impaction. No bowel obstruction.  3.  Small enhancing lesion in the uterus likely fibroid though polyp   cannot be excluded. Limited assessment on CT.    RUQ US:  Unremarkable right upper quadrant abdominal ultrasound.   (27 Nov 2024 09:14)    PERTINENT PM/SXH:   Schizophrenia    Bipolar disorder    Depression    Anxiety    Chronic lower back pain    Osteoarthritis    Urinary incontinence    Chronic urinary tract infection    History of tremor      History of total knee arthroplasty, left      FAMILY HISTORY:  FH: prostate cancer (Father)    FH: Parkinson's disease (Mother)     difficult to obtain from patient  ITEMS NOT CHECKED ARE NOT PRESENT    SOCIAL HISTORY:   Significant other/partner[ ]  Children[ ]  Gnosticism/Spirituality:  Substance hx:  [ ]   Tobacco hx:  [ ]   Alcohol hx: [ ]   Living Situation: [ ]Home  [ ]Long term care  [ ]Rehab [ ]Other  Home Services: [ ] HHA [ ] Visting RN [ ] Hospice  Occupation:  Home Opioid hx:  [ ] Y [ ] N [ ] I-Stop Reference No:    ADVANCE DIRECTIVES:    [ ] Full Code [ ] DNR  MOLST  [ ]  Living Will  [ ]   DECISION MAKER(s):  [ ] Health Care Proxy(s)  [ ] Surrogate(s)  [ ] Guardian           Name(s): Phone Number(s):    BASELINE (I)ADL(s) (prior to admission):  Petroleum: [ ]Total  [ ] Moderate [ ]Dependent  Palliative Performance Status Version 2:         %    http://npcrc.org/files/news/palliative_performance_scale_ppsv2.pdf    Allergies    moxifloxacin (Unknown)  azithromycin (Unknown)  penicillin (Unknown)    Intolerances    MEDICATIONS  (STANDING):  artificial  tears Solution 1 Drop(s) Both EYES daily  benztropine 1 milliGRAM(s) Oral daily  carbidopa/levodopa  25/100 1 Tablet(s) Oral three times a day  clonazePAM  Tablet 1 milliGRAM(s) Oral daily  diclofenac 25 milliGRAM(s) Oral daily  famotidine    Tablet 20 milliGRAM(s) Oral every 48 hours  gabapentin 400 milliGRAM(s) Oral three times a day  heparin   Injectable 5000 Unit(s) SubCutaneous every 12 hours  lactulose Syrup 10 Gram(s) Oral two times a day  meropenem  IVPB 1000 milliGRAM(s) IV Intermittent every 8 hours  OLANZapine 5 milliGRAM(s) Oral two times a day    MEDICATIONS  (PRN):      PRESENT SYMPTOMS: [ ]Unable to obtain due to poor mentation   Source if other than patient:  [ ]Family   [ ]Team     Pain: [ ]yes [ ]no  QOL impact -   Location -                    Aggravating factors -  Quality -  Radiation -  Timing-  Severity (0-10 scale):  Minimal acceptable level (0-10 scale):     CPOT:    https://www.[facility].org/getattachment/[email or web address]/Critical-Care-Pain-Observation-Tool-(CPOT)    PAIN AD Score:   [email or web address]/cog/painad.pdf (press ctrl +  left click to view)    Dyspnea:                           [ ]None[ ]Mild [ ]Moderate [ ]Severe     Respiratory Distress Observation Scale (RDOS):   A score of 0 to 2 signifies little or no respiratory distress, 3 signifies mild distress, scores 4 to 6 indicate moderate distress, and scores greater than 7 signify severe distress  https://www.[facility].ca/sites/default/files/PDFS/251644-jdanfzgozzd-nkmbvybs-jbrsiouifzm-yrcdd.pdf    Anxiety:                             [ ]None[ ]Mild [ ]Moderate [ ]Severe   Fatigue:                             [ ]None[ ]Mild [ ]Moderate [ ]Severe   Nausea:                             [ ]None[ ]Mild [ ]Moderate [ ]Severe   Loss of appetite:              [ ]None[ ]Mild [ ]Moderate [ ]Severe   Constipation:                    [ ]None[ ]Mild [ ]Moderate [ ]Severe    Other Symptoms:  [ ]All other review of systems negative     Palliative Performance Status Version 2:         %    http://npcrc.org/files/news/palliative_performance_scale_ppsv2.pdf  PHYSICAL EXAM:  Vital Signs Last 24 Hrs  T(C): 36.7 (27 Nov 2024 01:01), Max: 37.2 (26 Nov 2024 19:33)  T(F): 98.1 (27 Nov 2024 01:01), Max: 99 (26 Nov 2024 19:33)  HR: 80 (27 Nov 2024 01:01) (80 - 82)  BP: 130/69 (27 Nov 2024 01:01) (116/75 - 130/69)  BP(mean): --  RR: 20 (27 Nov 2024 01:01) (18 - 20)  SpO2: 98% (27 Nov 2024 01:01) (97% - 98%)    Parameters below as of 27 Nov 2024 01:01  Patient On (Oxygen Delivery Method): room air     I&O's Summary      GENERAL:  [X ] No acute distress [ ]Lethargic  [ ]Unarousable  [ ]Verbal  [ ]Non-Verbal [ ]Cachexia    BEHAVIORAL/PSYCH:  [ ]Alert and Oriented x  [ ] Anxiety [ ] Delirium [ ] Agitation [X ] Calm   EYES: [ ] No scleral icterus [ ] Scleral icterus [ ] Closed  ENMT:  [ ]Dry mouth  [ ]No external oral lesions [ ] No external ear or nose lesions  CARDIOVASCULAR:  [ ]Regular [ ]Irregular [ ]Tachy [ ]Not Tachy  [ ]Mikhail [ ] Edema [ ] No edema  PULMONARY:  [ ]Tachypnea  [ ]Audible excessive secretions [X ] No labored breathing [ ] labored breathing  GASTROINTESTINAL: [ ]Soft  [ ]Distended  [ X]Not distended [ ]Non tender [ ]Tender  MUSCULOSKELETAL: [ ]No clubbing [ ] clubbing  [ ] No cyanosis [ ] cyanosis  NEUROLOGIC: [ ]No focal deficits  [ ]Follows commands  [ ]Does not follow commands  [ ]Cognitive impairment  [ ]Dysphagia  [ ]Dysarthria  [ ]Paresis   SKIN: [ ] Jaundiced [X ] Non-jaundiced [ ]Rash [ ]No Rash [ ] Warm [ ] Dry  MISC/LINES: [ ] ET tube [ ] Trach [ ]NGT/OGT [ ]PEG [ ][place]duffie    CRITICAL CARE:  [ ] Shock Present  [ ]Septic [ ]Cardiogenic [ ]Neurologic [ ]Hypovolemic  [ ]  Vasopressors [ ]  Inotropes   [ ]Respiratory failure present [ ]Mechanical ventilation [ ]Non-invasive ventilatory support [ ]High flow  [ ]Acute  [ ]Chronic [ ]Hypoxic  [ ]Hypercarbic [ ]Other  [ ]Other organ failure     LABS: reviewed by me                        11.3   11.71 )-----------( 316      ( 27 Nov 2024 11:08 )             36.3   11-27    146  |  110  |  20  ----------------------------<  107[H]  4.7   |  22  |  0.6[L]    Ca    9.3      27 Nov 2024 11:08  Mg     2.2     11-27    TPro  6.5  /  Alb  3.4[L]  /  TBili  3.2[H]  /  DBili  x   /  AST  154[H]  /  ALT  355[H]  /  AlkPhos  550[H]  11-27      Urinalysis Basic - ( 27 Nov 2024 11:08 )    Color: x / Appearance: x / SG: x / pH: x  Gluc: 107 mg/dL / Ketone: x  / Bili: x / Urobili: x   Blood: x / Protein: x / Nitrite: x   Leuk Esterase: x / RBC: x / WBC x   Sq Epi: x / Non Sq Epi: x / Bacteria: x      RADIOLOGY & ADDITIONAL STUDIES: reviewed by me    PROTEIN CALORIE MALNUTRITION PRESENT: [ ]mild [ ]moderate [ ]severe [ ]underweight [ ]morbid obesity  https://www.andeal.org/vault/2440/web/files/ONC/Table_Clinical%20Characteristics%20to%20Document%20Malnutrition-White%20JV%20et%20al%202012.pdf      Weight (kg): 52.2 (11-26-24 @ 19:33)    [ ]PPSV2 < or = to 30% [ ]significant weight loss  [ ]poor nutritional intake  [ ]anasarca      [ ]Artificial Nutrition          Palliative Care Spiritual/Emotional Screening Tool Question  Severity (0-4):                    OR                    [X ] Unable to determine/NA  Score of 2 or greater indicates recommendation of Chaplaincy referral  Chaplaincy Referral: [ ] Yes [ ] Refused [ ] Following     Caregiver [place]:  [ ] Yes [ ] No    OR    [x ] Unable to determine. Will assess at later time if appropriate.  Social Work Referral [ ]  Patient and Family Centered Care Referral [ ]    Anticipatory Grief Present: [ ] Yes [ ] No    OR     [ x] Unable to determine. Will assess at later time if appropriate.  Social Work Referral [ ]  Patient and Family Centered Care Referral [ ]    REFERRALS:   [ ]Chaplaincy  [ ]Hospice  [ ]Child Life  [ ]Social Work  [ ]Case management [ ]Holistic Therapy     Palliative care education provided to patient and/or family    Goals of Care Document:     ______________

## 2024-11-27 NOTE — CONSULT NOTE ADULT - ASSESSMENT
63 yo, with PMHx recurrent UTI, panic disorder, anxiety, schizophrenia, Parkinson's Disease?, generalized muscle weakness, osteoarthritis, vitamin D deficiency who arrived to the ED last night from the [facility] due to persistent fever (>101F on ibuprofen), positive UTI, elevated WBC on CBC, confusion, loss of appetite. 5 days ago patient had positive outpatient  UA, pt completed Macrobid x 5 days with non-resolution of symptoms, and so was switched to IV Ertapenem 1 gm solution which she took 1 dose yesterday. Pt has hx recurrent UTIs  previous culture grew morganella infection in the past which was sensitive for rocephin. Patient denies any dysuria, urinary frequency. Patient was found to have vaginal bleeding in nursing home a few days ago. Patient does not have any associated pain. Nursing home states that patient has been confused for 5 days since onset of UTI as well as decreased po intake. Patient was afebrile in nursing home only developed fevers in ED.     Pt sustained a mechanical fall 10 days ago at the nursing home. Patient was getting out of bed and fell but did not lose consciousness. No imaging was done.      In the ED, pt c/o generalized weakness and pain, nausea, and diarrhea.    Transabdominal US:     Evaluation of the bladder demonstrates intraluminal echogenic material   without internal vascularity could represent bladder hematoma given the   urinalysis demonstrated large blood. Neoplastic process cannot be   entirely excluded.      CT A/P:   1.  Edematous bladder wall thickening with mucosal hyperemia compatible   with cystitis.  2.  Mild stool distended rectum, correlate for constipation or fecal   impaction. No bowel obstruction.  3.  Small enhancing lesion in the uterus likely fibroid though polyp   cannot be excluded. Limited assessment on CT.    RUQ US:  Unremarkable right upper quadrant abdominal ultrasound.    IMPRESSION/RECOMMENDATIONS  Immunosuppression/Immunosenescence ( above age 60 yrs there is a exponential decline in immunity which could result in poor clinical outcomes.     anxiety  schizophrenia  Parkinson's Disease 63 yo, with PMHx recurrent UTI, panic disorder, anxiety, schizophrenia, Parkinson's Disease?, generalized muscle weakness, osteoarthritis, vitamin D deficiency who arrived to the ED last night from the [facility] due to persistent fever (>101F on ibuprofen), positive UTI, elevated WBC on CBC, confusion, loss of appetite. 5 days ago patient had positive outpatient  UA, pt completed Macrobid x 5 days with non-resolution of symptoms, and so was switched to IV Ertapenem 1 gm solution which she took 1 dose yesterday. Pt has hx recurrent UTIs  previous culture grew morganella infection in the past which was sensitive for rocephin.     Pt sustained a mechanical fall 10 days ago at the nursing home. Patient was getting out of bed and fell but did not lose consciousness. No imaging was done.      11/26 Transabdominal US: bladder hematoma     CT A/P:   1.  Edematous bladder wall thickening with mucosal hyperemia compatible   with cystitis.  2.  Mild stool distended rectum, correlate for constipation or fecal   impaction. No bowel obstruction.  3.  Small enhancing lesion in the uterus likely fibroid though polyp   cannot be excluded. Limited assessment on CT.    RUQ US: Unremarkable right upper quadrant abdominal ultrasound.    IMPRESSION/RECOMMENDATIONS  Immunosuppression/Immunosenescence ( above age 60 yrs there is a exponential decline in immunity which could result in poor clinical outcomes.   Acute pyelonephritis with no obstructive uropathy   Metabolic encephalopathy  11/26 CT ( Independent interpretation of test : cystitis, no PNA, fecal impaction  WBc 11.7 with 3.2 % bands : suggestive of a bacterial infection  Transaminases  11/27 Hepatology : DILI  11/27 US RUQ : NG    anxiety  schizophrenia  Parkinson's Disease    -UCX  -BCx  -Off loading to prevent pressure sores and preventive measures to avoid aspiration  -Rocephin 2 gm iv q24h

## 2024-11-27 NOTE — PHARMACOTHERAPY INTERVENTION NOTE - COMMENTS
Recommended ceftriaxone 2g IV q24h as per ID consult recommendations.    Ed Mckeon, PharmD, [facility]  Clinical Pharmacy Specialist, Infectious Diseases  Tele-Antimicrobial Stewardship Program (Tele-ASP)  Tele-ASP Phone: [phone] 
I recommended to Dr Garcia to adjust Pepcid  to 20 mg q48h because cecr= 51 ml/min. Dr Garcia will adjust dose

## 2024-11-27 NOTE — ED PROVIDER NOTE - CARE PLAN
Principal Discharge DX:	Acute UTI  Secondary Diagnosis:	Elevated LFTs  Secondary Diagnosis:	Urinary tract infection with hematuria   1

## 2024-11-27 NOTE — CONSULT NOTE ADULT - CONVERSATION DETAILS
pt does not have capacity for GOC discussion. Team needs to identify surrogate decision maker. If none available, then two physician decision making would be implemented.

## 2024-11-27 NOTE — CONSULT NOTE ADULT - ASSESSMENT
65yo G0 postmenopausal with elevated LFTs and suspected hematuria, low suspicion for acute GYN bleed    - No gyn intervention  - Recommend urology consult   - Please reconsult GYN as needed    D/w Dr. Norman and Dr. Hawthorne

## 2024-11-27 NOTE — CONSULT NOTE ADULT - ASSESSMENT
63 yo Female  with PMHx recurrent UTI, panic disorder, anxiety, schizophrenia, Parkinson's Disease, osteoarthritis, vitamin D deficiency who arrived to the ED from the [facility] due to persistent fever (>101F on ibuprofen), positive UTI, elevated WBC on CBC, confusion, loss of appetite. 5 days ago patient had positive outpatient  UA, pt completed Macrobid x 5 days with non-resolution of symptoms, and so was switched to IV Ertapenem 1 gm solution which she took 1 dose yesterday. Pt has hx recurrent UTIs  previous culture grew morganella infection in the past which was sensitive for rocephin. Patient currently AAx2. Hepatology consulted for elevated liver enzymes. On med rec , patient is on diclofenac BID. Unable to obtain further history. Admitted to medicine with UTI workup    #Acute Liver Injury - mixed pattern likely DILI with underlying sepsis vs others  #UTI with encephalopathy  - LFTs on admission:3.2/550/154/355  - LFTs 2021 normal, 2022 mildly elevated AST  - RUQ sono - CBD 3mm unremarkable  - CTAP : mild stool burden, stable common hepatic and portocaval lymph nodes, uterine polyp  - Medications reviewed: received macrobid, 1 dose of ertapenam, on diclofenac    Rec  - Check INR, trend LFTs  - Discontinue NSAIDs  - Please send Hepatitis A IgM, Hepatitis B core IgM, core Ab total, surface Ag, surface Ab, HCV antibody, HCV RNA, Anti HEV  - Please send RADHA, AMA, anti-Smooth Muscle Ab type 1, immunoglobulin panel  - Please send CMV PCR, EBV PCR, HSV IgM  - Please send Serum Drug screen and Utox  - Please check tylenol level  - Please avoid hepatotoxic medications/avoid hypotension          65 yo Female  with PMHx recurrent UTI, panic disorder, anxiety, schizophrenia, Parkinson's Disease, osteoarthritis, vitamin D deficiency who arrived to the ED from the [facility] due to persistent fever (>101F on ibuprofen), positive UTI, elevated WBC on CBC, confusion, loss of appetite. 5 days ago patient had positive outpatient  UA, pt completed Macrobid x 5 days with non-resolution of symptoms, and so was switched to IV Ertapenem 1 gm solution which she took 1 dose yesterday. Pt has hx recurrent UTIs  previous culture grew morganella infection in the past which was sensitive for rocephin. Patient currently AAx2. Hepatology consulted for elevated liver enzymes. On med rec , patient is on diclofenac BID. Unable to obtain further history. Admitted to medicine with UTI workup    #Acute Liver Injury - mixed pattern likely DILI with underlying sepsis vs others  #UTI with encephalopathy  - LFTs on admission:3.2/550/154/355  - LFTs 2021 normal, 2022 mildly elevated AST  - RUQ sono - CBD 3mm unremarkable  - CTAP : mild stool burden, stable common hepatic and portocaval lymph nodes, uterine polyp  - Medications reviewed: received macrobid, 1 dose of ertapenam, on diclofenac    Rec  - Check INR, trend LFTs  - Discontinue NSAIDs  - Please send Hepatitis A IgM, Hepatitis B core IgM, core Ab total, surface Ag, surface Ab, HCV antibody, HCV RNA, Anti HEV  - Please send RADHA, AMA, anti-Smooth Muscle Ab type 1, immunoglobulin panel  - Please send CMV PCR, EBV PCR, HSV IgM  - Please send Serum Drug screen and Utox  - Please check tylenol level  - Please avoid hepatotoxic medications/avoid hypotension    #Stool burden on CT with constipation    RECS  - Recommend miralax BID senna 2 tabs at night  - recommend outpatient colonoscopy  - Follow up with our [facility] located at [address] [place] [address]. Phone Number: [phone]      #Hx of gastric cardia ulcer  - s/p EGD 2022: 1/22 for abdominal pain and esophageal thickening: esophageal candidiasis (biopsy reflux esophagitis), non erosive gastritis , ulcer in the cardia , normal duodenum (bx duodenitis)  - Was recommended to repeat EGD in 2months but did not follow up    RECS  - PPI 40mg once daily for ppx  - Follow up with our [facility] located at [address] [place] [address]. Phone Number: [phone]

## 2024-11-27 NOTE — CONSULT NOTE ADULT - ATTENDING COMMENTS
Agree with plan as above as discussed with resident
64 y o  F with parkinson's schizophrenia admitted with UTI.  Had fever and leucocytosis with positive UA at SNF and was on nitrofurantoin about 5 days ago.     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

## 2024-11-27 NOTE — ED PROVIDER NOTE - DIFFERENTIAL DIAGNOSIS
Pancreatitis, hepatic failure, obstructive uropathy, diverticulitis, colitis, abscess, bowel obstruction, bowel perforation. Electrolyte abnormalities, BRENNAN, and GI bleeding. Differential Diagnosis

## 2024-11-27 NOTE — ED PROVIDER NOTE - PHYSICAL EXAMINATION
CONST: in NAD  EYES: EOMI, Sclera and conjunctiva clear.   ENT: No nasal discharge.   NECK: Non-tender  CARD: Normal S1 S2; Normal rate and rhythm  RESP: Equal BS B/L, No wheezes, rhonchi or rales. No distress  GI: Soft, non-tender, non-distended.  MS: Normal ROM in all extremities  SKIN: Warm, dry, Good turgor  NEURO: A&Ox2, Strength 5/5   : (Dr. Morales chaperone) vaginal bleeding with clots

## 2024-11-28 LAB
24R-OH-CALCIDIOL SERPL-MCNC: 26 NG/ML — LOW (ref 30–80)
ALBUMIN SERPL ELPH-MCNC: 3.3 G/DL — LOW (ref 3.5–5.2)
ALP SERPL-CCNC: 510 U/L — HIGH (ref 30–115)
ALT FLD-CCNC: 83 U/L — HIGH (ref 0–41)
ANION GAP SERPL CALC-SCNC: 11 MMOL/L — SIGNIFICANT CHANGE UP (ref 7–14)
APPEARANCE UR: ABNORMAL
AST SERPL-CCNC: 176 U/L — HIGH (ref 0–41)
BACTERIA # UR AUTO: ABNORMAL /HPF
BASOPHILS # BLD AUTO: 0.1 K/UL — SIGNIFICANT CHANGE UP (ref 0–0.2)
BASOPHILS NFR BLD AUTO: 0.8 % — SIGNIFICANT CHANGE UP (ref 0–1)
BILIRUB SERPL-MCNC: 2.4 MG/DL — HIGH (ref 0.2–1.2)
BILIRUB UR-MCNC: ABNORMAL
BUN SERPL-MCNC: 19 MG/DL — SIGNIFICANT CHANGE UP (ref 10–20)
CALCIUM SERPL-MCNC: 9.1 MG/DL — SIGNIFICANT CHANGE UP (ref 8.4–10.4)
CAST: 0 /LPF — SIGNIFICANT CHANGE UP (ref 0–4)
CHLORIDE SERPL-SCNC: 107 MMOL/L — SIGNIFICANT CHANGE UP (ref 98–110)
CO2 SERPL-SCNC: 23 MMOL/L — SIGNIFICANT CHANGE UP (ref 17–32)
COLOR SPEC: ABNORMAL
CREAT SERPL-MCNC: 0.5 MG/DL — LOW (ref 0.7–1.5)
CULTURE RESULTS: NO GROWTH — SIGNIFICANT CHANGE UP
DIFF PNL FLD: ABNORMAL
EGFR: 103 ML/MIN/1.73M2 — SIGNIFICANT CHANGE UP
EOSINOPHIL # BLD AUTO: 0.2 K/UL — SIGNIFICANT CHANGE UP (ref 0–0.7)
EOSINOPHIL NFR BLD AUTO: 1.7 % — SIGNIFICANT CHANGE UP (ref 0–8)
GLUCOSE SERPL-MCNC: 91 MG/DL — SIGNIFICANT CHANGE UP (ref 70–99)
GLUCOSE UR QL: NEGATIVE MG/DL — SIGNIFICANT CHANGE UP
HAV IGM SER-ACNC: SIGNIFICANT CHANGE UP
HAV IGM SER-ACNC: SIGNIFICANT CHANGE UP
HBV CORE IGM SER-ACNC: SIGNIFICANT CHANGE UP
HBV CORE IGM SER-ACNC: SIGNIFICANT CHANGE UP
HBV SURFACE AG SER-ACNC: SIGNIFICANT CHANGE UP
HBV SURFACE AG SER-ACNC: SIGNIFICANT CHANGE UP
HCT VFR BLD CALC: 32.4 % — LOW (ref 37–47)
HCV AB S/CO SERPL IA: 0.12 S/CO — SIGNIFICANT CHANGE UP (ref 0–0.99)
HCV AB S/CO SERPL IA: 0.14 S/CO — SIGNIFICANT CHANGE UP (ref 0–0.99)
HCV AB SERPL-IMP: SIGNIFICANT CHANGE UP
HCV AB SERPL-IMP: SIGNIFICANT CHANGE UP
HGB BLD-MCNC: 10.4 G/DL — LOW (ref 12–16)
HSV 1/2 SOURCE: SIGNIFICANT CHANGE UP
HSV1 DNA BLD QL NAA+PROBE: SIGNIFICANT CHANGE UP
HSV2 DNA BLD QL NAA+PROBE: SIGNIFICANT CHANGE UP
IMM GRANULOCYTES NFR BLD AUTO: 4.4 % — HIGH (ref 0.1–0.3)
KETONES UR-MCNC: 15 MG/DL
LEUKOCYTE ESTERASE UR-ACNC: ABNORMAL
LYMPHOCYTES # BLD AUTO: 2.65 K/UL — SIGNIFICANT CHANGE UP (ref 1.2–3.4)
LYMPHOCYTES # BLD AUTO: 22.4 % — SIGNIFICANT CHANGE UP (ref 20.5–51.1)
MCHC RBC-ENTMCNC: 30.5 PG — SIGNIFICANT CHANGE UP (ref 27–31)
MCHC RBC-ENTMCNC: 32.1 G/DL — SIGNIFICANT CHANGE UP (ref 32–37)
MCV RBC AUTO: 95 FL — SIGNIFICANT CHANGE UP (ref 81–99)
MONOCYTES # BLD AUTO: 0.76 K/UL — HIGH (ref 0.1–0.6)
MONOCYTES NFR BLD AUTO: 6.4 % — SIGNIFICANT CHANGE UP (ref 1.7–9.3)
NEUTROPHILS # BLD AUTO: 7.61 K/UL — HIGH (ref 1.4–6.5)
NEUTROPHILS NFR BLD AUTO: 64.3 % — SIGNIFICANT CHANGE UP (ref 42.2–75.2)
NITRITE UR-MCNC: POSITIVE
NRBC # BLD: 0 /100 WBCS — SIGNIFICANT CHANGE UP (ref 0–0)
PH UR: 5 — SIGNIFICANT CHANGE UP (ref 5–8)
PLATELET # BLD AUTO: 378 K/UL — SIGNIFICANT CHANGE UP (ref 130–400)
PMV BLD: 11 FL — HIGH (ref 7.4–10.4)
POTASSIUM SERPL-MCNC: 4.4 MMOL/L — SIGNIFICANT CHANGE UP (ref 3.5–5)
POTASSIUM SERPL-SCNC: 4.4 MMOL/L — SIGNIFICANT CHANGE UP (ref 3.5–5)
PROT SERPL-MCNC: 6.1 G/DL — SIGNIFICANT CHANGE UP (ref 6–8)
PROT UR-MCNC: 100 MG/DL
RBC # BLD: 3.41 M/UL — LOW (ref 4.2–5.4)
RBC # FLD: 14.4 % — SIGNIFICANT CHANGE UP (ref 11.5–14.5)
RBC CASTS # UR COMP ASSIST: ABNORMAL /HPF
SODIUM SERPL-SCNC: 141 MMOL/L — SIGNIFICANT CHANGE UP (ref 135–146)
SP GR SPEC: >1.03 — HIGH (ref 1–1.03)
SPECIMEN SOURCE: SIGNIFICANT CHANGE UP
SQUAMOUS # UR AUTO: 7 /HPF — HIGH (ref 0–5)
UROBILINOGEN FLD QL: 1 MG/DL — SIGNIFICANT CHANGE UP (ref 0.2–1)
WBC # BLD: 11.84 K/UL — HIGH (ref 4.8–10.8)
WBC # FLD AUTO: 11.84 K/UL — HIGH (ref 4.8–10.8)
WBC UR QL: 330 /HPF — HIGH (ref 0–5)
YEAST-LIKE CELLS: PRESENT

## 2024-11-28 PROCEDURE — 99233 SBSQ HOSP IP/OBS HIGH 50: CPT

## 2024-11-28 PROCEDURE — 95816 EEG AWAKE AND DROWSY: CPT | Mod: 26

## 2024-11-28 PROCEDURE — 70450 CT HEAD/BRAIN W/O DYE: CPT | Mod: 26

## 2024-11-28 RX ORDER — SENNOSIDES 8.6 MG
2 TABLET ORAL AT BEDTIME
Refills: 0 | Status: DISCONTINUED | OUTPATIENT
Start: 2024-11-28 | End: 2024-12-03

## 2024-11-28 RX ORDER — POLYETHYLENE GLYCOL 3350 17 G/17G
17 POWDER, FOR SOLUTION ORAL
Refills: 0 | Status: DISCONTINUED | OUTPATIENT
Start: 2024-11-28 | End: 2024-12-03

## 2024-11-28 RX ADMIN — OLANZAPINE 5 MILLIGRAM(S): 20 TABLET ORAL at 05:36

## 2024-11-28 RX ADMIN — GABAPENTIN 400 MILLIGRAM(S): 300 CAPSULE ORAL at 05:37

## 2024-11-28 RX ADMIN — Medication 100 MILLIGRAM(S): at 17:26

## 2024-11-28 RX ADMIN — LACTULOSE 10 GRAM(S): 10 SOLUTION ORAL at 05:36

## 2024-11-28 RX ADMIN — OLANZAPINE 5 MILLIGRAM(S): 20 TABLET ORAL at 20:31

## 2024-11-28 RX ADMIN — FAMOTIDINE 20 MILLIGRAM(S): 20 TABLET, FILM COATED ORAL at 05:37

## 2024-11-28 RX ADMIN — Medication 2 TABLET(S): at 21:06

## 2024-11-28 RX ADMIN — CARBIDOPA AND LEVODOPA 1 TABLET(S): 10; 100 TABLET ORAL at 13:36

## 2024-11-28 RX ADMIN — GABAPENTIN 400 MILLIGRAM(S): 300 CAPSULE ORAL at 13:35

## 2024-11-28 RX ADMIN — LACTULOSE 10 GRAM(S): 10 SOLUTION ORAL at 17:28

## 2024-11-28 RX ADMIN — CARBIDOPA AND LEVODOPA 1 TABLET(S): 10; 100 TABLET ORAL at 21:06

## 2024-11-28 RX ADMIN — CARBIDOPA AND LEVODOPA 1 TABLET(S): 10; 100 TABLET ORAL at 05:36

## 2024-11-28 RX ADMIN — POLYETHYLENE GLYCOL 3350 17 GRAM(S): 17 POWDER, FOR SOLUTION ORAL at 17:28

## 2024-11-28 NOTE — CONSULT NOTE ADULT - SUBJECTIVE AND OBJECTIVE BOX
**NEUROLOGY CONSULT NOTE**    HPI: "65 y/o female, with PMHx recurrent UTI, panic disorder, anxiety, schizophrenia, Parkinson's Disease?, generalized muscle weakness, osteoarthritis, vitamin D deficiency who arrived to the ED from the [facility] due to persistent fever (>101F on ibuprofen), positive UTI, elevated WBC on CBC, confusion, loss of appetite. 5 days ago patient had positive outpatient  UA, pt completed Macrobid x 5 days with non-resolution of symptoms, and so was switched to IV Ertapenem 1 gm solution which she took 1 dose on 11/26. Pt has hx recurrent UTIs  previous culture grew morganella infection in the past which was sensitive for rocephin. Patient denies any dysuria, urinary frequency. Patient was found to have vaginal bleeding in nursing home a few days ago. Patient does not have any associated pain. Nursing home states that patient has been confused for 5 days since onset of UTI as well as decreased po intake. Patient was afebrile in nursing home only developed fevers in ED. Pt also sustained a mechanical fall 10 days ago at the nursing home. Patient was getting out of bed and fell but did not lose consciousness. No imaging was done.      Neurology consulted for CTH findings of ventriculomegaly concerning for NPH per radiology. Upon chart review, non con CTH performed on 1/5/22 and 5/12/2021 also with ventriculomegaly, appears stable to today's scan. Pt states she has had trouble with her gait for the past ~7 years and utilizes a walker and wheelchair for assistance. Pt also notes history of urinary incontinence for "many years." Pt is able to recall experiencing a fall at her facility but is unclear what caused it. When asked how long she has been living at her facility for she states 1 week however per her guardian she is a long term resident. Per chart note, legal guardian states she is able to have appropriate conversation. Pt denies headache, new visual disturbances, nausea/vomiting.     T(C): 36.8 (11-28-24 @ 08:02), Max: 36.9 (11-27-24 @ 22:18)  HR: 75 (11-28-24 @ 08:02) (75 - 77)  BP: 107/72 (11-28-24 @ 08:02) (107/72 - 124/80)  RR: 17 (11-28-24 @ 08:02) (17 - 18)  SpO2: 98% (11-28-24 @ 08:02) (98% - 98%)    PAST MEDICAL & SURGICAL HISTORY:  Schizophrenia      Bipolar disorder      Depression      Anxiety      Chronic lower back pain  result of pelvic fracture in the past      Osteoarthritis      Urinary incontinence      Chronic urinary tract infection      History of tremor      History of total knee arthroplasty, left          FAMILY HISTORY:  FH: prostate cancer (Father)    FH: Parkinson's disease (Mother)    SOCIAL HISTORY:   Patient is a long term resident of [facility]  Smoking status: denies   Drinking: denies   Drug Use: denies     ROS:  Constitutional: No fever  Eyes: No visual disturbances,   ENMT:  No difficulty hearing, tinnitus  Neck: No pain or stiffness  Respiratory: No cough, wheezing, chills or hemoptysis  Cardiovascular: No chest pain, palpitations, shortness of breath  Gastrointestinal: No abdominal pain. No nausea, vomiting   Genitourinary: + dysuria   Neurological: As per HPI    MEDICATIONS  (STANDING):  artificial  tears Solution 1 Drop(s) Both EYES daily  benztropine 1 milliGRAM(s) Oral daily  bisacodyl Suppository 10 milliGRAM(s) Rectal daily  carbidopa/levodopa  25/100 1 Tablet(s) Oral three times a day  cefTRIAXone   IVPB 2000 milliGRAM(s) IV Intermittent every 24 hours  famotidine    Tablet 20 milliGRAM(s) Oral every 48 hours  lactulose Syrup 10 Gram(s) Oral two times a day  OLANZapine 5 milliGRAM(s) Oral two times a day  polyethylene glycol 3350 17 Gram(s) Oral two times a day  senna 2 Tablet(s) Oral at bedtime    MEDICATIONS  (PRN):    Allergies    moxifloxacin (Unknown)  azithromycin (Unknown)  penicillin (Unknown)    Intolerances      Vital Signs Last 24 Hrs  T(C): 36.8 (28 Nov 2024 08:02), Max: 36.9 (27 Nov 2024 22:18)  T(F): 98.2 (28 Nov 2024 08:02), Max: 98.5 (27 Nov 2024 22:18)  HR: 75 (28 Nov 2024 08:02) (75 - 77)  BP: 107/72 (28 Nov 2024 08:02) (107/72 - 124/80)  BP(mean): --  RR: 17 (28 Nov 2024 08:02) (17 - 18)  SpO2: 98% (28 Nov 2024 08:02) (98% - 98%)    Parameters below as of 28 Nov 2024 08:02  Patient On (Oxygen Delivery Method): room air    Physical exam:    Neurologic:  -Mental status: Awake, alert, oriented to person, time, not to place (nursing home). Speech is fluent but hypophonic, with intact naming, repetition, and comprehension, no dysarthria. Recent and remote memory intact. Unable to subtract 7 from 100. Follows commands. Attention/concentration intact. Making appropriate conversations throughout encounter   -Cranial nerves:   II: Visual fields are full to confrontation.  III, IV, VI: Extraocular movements are intact without nystagmus. ?dysconjugate gaze. R pupil slightly irregular in size, reactive to light. L pupil round and reactive to light, both 4mm.  V:  Facial sensation V1-V3 equal and intact   VII: Face is symmetric with normal eye closure and smile  VIII: Hearing is bilaterally intact to finger rub  IX, X: Uvula is midline and soft palate rises symmetrically  XI: Head turning and shoulder shrug are intact.  XII: Tongue protrudes midline  Motor: LUE does not lift as high as RUE. 3+/5 deltoid, 3/5 tricep/bicep. LUE 3/5 throughout. LLE pain limited d/t hip pain, at least 4/5 HF, 3/5 knee extension/flexion. RLE 4/5 HF, 3/5 knee extension/flexion. R knee swelling.   Sensation: Intact to light touch bilaterally. No neglect or extinction on double simultaneous testing.  Coordination: No dysmetria on finger-to-nose bilaterally  Reflexes: 2+ patellar reflexes  Gait: Deferred - pt requires assistance in transfer to wheelchair    Fingerstick Blood Glucose: CAPILLARY BLOOD GLUCOSE        LABS:                        10.4   11.84 )-----------( 378      ( 28 Nov 2024 05:34 )             32.4     11-28    141  |  107  |  19  ----------------------------<  91  4.4   |  23  |  0.5[L]    Ca    9.1      28 Nov 2024 05:34  Mg     2.2     11-27    TPro  6.1  /  Alb  3.3[L]  /  TBili  2.4[H]  /  DBili  x   /  AST  176[H]  /  ALT  83[H]  /  AlkPhos  510[H]  11-28      Urinalysis Basic - ( 28 Nov 2024 16:12 )    Color: Red / Appearance: Turbid / SG: >1.030 / pH: x  Gluc: x / Ketone: 15 mg/dL  / Bili: Moderate / Urobili: 1.0 mg/dL   Blood: x / Protein: 100 mg/dL / Nitrite: Positive   Leuk Esterase: Large / RBC: Too Numerous to count /HPF /  /HPF   Sq Epi: x / Non Sq Epi: 7 /HPF / Bacteria: Occasional /HPF      RADIOLOGY & ADDITIONAL STUDIES:    < from: CT Head No Cont (11.28.24 @ 14:36) >  IMPRESSION:  No evidence of acute transcortical infarct, acute intracranial   hemorrhage, or mass effect.  Ventricles are dilated out of proportion to the degree of cortical   atrophy, which can signify normal pressure hydrocephalus.    < end of copied text >    **NEUROLOGY CONSULT NOTE**    HPI: "63 y/o female, with PMHx recurrent UTI, panic disorder, anxiety, schizophrenia, Parkinson's Disease?, generalized muscle weakness, osteoarthritis, vitamin D deficiency who arrived to the ED from the [facility] due to persistent fever (>101F on ibuprofen), positive UTI, elevated WBC on CBC, confusion, loss of appetite. 5 days ago patient had positive outpatient  UA, pt completed Macrobid x 5 days with non-resolution of symptoms, and so was switched to IV Ertapenem 1 gm solution which she took 1 dose on 11/26. Pt has hx recurrent UTIs  previous culture grew morganella infection in the past which was sensitive for rocephin. Patient denies any dysuria, urinary frequency. Patient was found to have vaginal bleeding in nursing home a few days ago. Patient does not have any associated pain. Nursing home states that patient has been confused for 5 days since onset of UTI as well as decreased po intake. Patient was afebrile in nursing home only developed fevers in ED. Pt also sustained a mechanical fall 10 days ago at the nursing home. Patient was getting out of bed and fell but did not lose consciousness. No imaging was done.      Neurology consulted for CTH findings of ventriculomegaly concerning for NPH per radiology. Upon chart review, non con CTH performed on 1/5/22 and 5/12/2021 also with ventriculomegaly, appears stable to today's scan. Pt states she has had trouble with her gait for the past ~7 years and utilizes a walker and wheelchair for assistance. Pt also notes history of urinary incontinence for "many years." Pt is able to recall experiencing a fall at her facility but is unclear what caused it. When asked how long she has been living at her facility for she states 1 week however per her guardian she is a long term resident. Per chart note, legal guardian states she is able to have appropriate conversation. Pt denies headache, new visual disturbances, nausea/vomiting.  Pt had neurosurgery evaluation last year for hydrocephalus and was recommended against having intervention including VPS placement since unlikely to benefit.      T(C): 36.8 (11-28-24 @ 08:02), Max: 36.9 (11-27-24 @ 22:18)  HR: 75 (11-28-24 @ 08:02) (75 - 77)  BP: 107/72 (11-28-24 @ 08:02) (107/72 - 124/80)  RR: 17 (11-28-24 @ 08:02) (17 - 18)  SpO2: 98% (11-28-24 @ 08:02) (98% - 98%)    PAST MEDICAL & SURGICAL HISTORY:  Schizophrenia  Bipolar disorder  Depression  Anxiety  Chronic lower back pain  result of pelvic fracture in the past  Osteoarthritis  Urinary incontinence  Chronic urinary tract infection  History of tremor  History of total knee arthroplasty, left    FAMILY HISTORY:  FH: prostate cancer (Father)    FH: Parkinson's disease (Mother)    SOCIAL HISTORY:   Patient is a long term resident of [facility]  Smoking status: denies   Drinking: denies   Drug Use: denies     ROS:  Constitutional: No fever  Eyes: No visual disturbances,   ENMT:  No difficulty hearing, tinnitus  Neck: No pain or stiffness  Respiratory: No cough, wheezing, chills or hemoptysis  Cardiovascular: No chest pain, palpitations, shortness of breath  Gastrointestinal: No abdominal pain. No nausea, vomiting   Genitourinary: + dysuria   Neurological: As per HPI    MEDICATIONS  (STANDING):  artificial  tears Solution 1 Drop(s) Both EYES daily  benztropine 1 milliGRAM(s) Oral daily  bisacodyl Suppository 10 milliGRAM(s) Rectal daily  carbidopa/levodopa  25/100 1 Tablet(s) Oral three times a day  cefTRIAXone   IVPB 2000 milliGRAM(s) IV Intermittent every 24 hours  famotidine    Tablet 20 milliGRAM(s) Oral every 48 hours  lactulose Syrup 10 Gram(s) Oral two times a day  OLANZapine 5 milliGRAM(s) Oral two times a day  polyethylene glycol 3350 17 Gram(s) Oral two times a day  senna 2 Tablet(s) Oral at bedtime    MEDICATIONS  (PRN):    Allergies    moxifloxacin (Unknown)  azithromycin (Unknown)  penicillin (Unknown)    Intolerances      Vital Signs Last 24 Hrs  T(C): 36.8 (28 Nov 2024 08:02), Max: 36.9 (27 Nov 2024 22:18)  T(F): 98.2 (28 Nov 2024 08:02), Max: 98.5 (27 Nov 2024 22:18)  HR: 75 (28 Nov 2024 08:02) (75 - 77)  BP: 107/72 (28 Nov 2024 08:02) (107/72 - 124/80)  BP(mean): --  RR: 17 (28 Nov 2024 08:02) (17 - 18)  SpO2: 98% (28 Nov 2024 08:02) (98% - 98%)    Parameters below as of 28 Nov 2024 08:02  Patient On (Oxygen Delivery Method): room air    Physical exam:    Neurologic:  -Mental status: Awake, alert, oriented to person, time, not to place (nursing home). Speech is fluent but hypophonic, with intact naming, repetition, and comprehension, no dysarthria. Recent and remote memory intact. Unable to subtract 7 from 100. Follows commands. Attention/concentration intact. Making appropriate conversations throughout encounter   -Cranial nerves:   II: Visual fields are full to confrontation.  III, IV, VI: Extraocular movements are intact without nystagmus. ?dysconjugate gaze. R pupil slightly irregular in size, reactive to light. L pupil round and reactive to light, both 4mm.  V:  Facial sensation V1-V3 equal and intact   VII: Face is symmetric with normal eye closure and smile  VIII: Hearing is bilaterally intact to finger rub  IX, X: Uvula is midline and soft palate rises symmetrically  XI: Head turning and shoulder shrug are intact.  XII: Tongue protrudes midline  Motor: LUE does not lift as high as RUE. 3+/5 deltoid, 3/5 tricep/bicep. LUE 3/5 throughout. LLE pain limited d/t hip pain, at least 4/5 HF, 3/5 knee extension/flexion. RLE 4/5 HF, 3/5 knee extension/flexion. R knee swelling.   Sensation: Intact to light touch bilaterally. No neglect or extinction on double simultaneous testing.  Coordination: No dysmetria on finger-to-nose bilaterally  Reflexes: 2+ patellar reflexes  Gait: Deferred - pt requires assistance in transfer to wheelchair    Fingerstick Blood Glucose: CAPILLARY BLOOD GLUCOSE    LABS:                        10.4   11.84 )-----------( 378      ( 28 Nov 2024 05:34 )             32.4     11-28    141  |  107  |  19  ----------------------------<  91  4.4   |  23  |  0.5[L]    Ca    9.1      28 Nov 2024 05:34  Mg     2.2     11-27    TPro  6.1  /  Alb  3.3[L]  /  TBili  2.4[H]  /  DBili  x   /  AST  176[H]  /  ALT  83[H]  /  AlkPhos  510[H]  11-28      Urinalysis Basic - ( 28 Nov 2024 16:12 )    Color: Red / Appearance: Turbid / SG: >1.030 / pH: x  Gluc: x / Ketone: 15 mg/dL  / Bili: Moderate / Urobili: 1.0 mg/dL   Blood: x / Protein: 100 mg/dL / Nitrite: Positive   Leuk Esterase: Large / RBC: Too Numerous to count /HPF /  /HPF   Sq Epi: x / Non Sq Epi: 7 /HPF / Bacteria: Occasional /HPF      RADIOLOGY & ADDITIONAL STUDIES:    < from: CT Head No Cont (11.28.24 @ 14:36) >  IMPRESSION:  No evidence of acute transcortical infarct, acute intracranial   hemorrhage, or mass effect.  Ventricles are dilated out of proportion to the degree of cortical   atrophy, which can signify normal pressure hydrocephalus.    < end of copied text >

## 2024-11-28 NOTE — PATIENT PROFILE ADULT - FALL HARM RISK - HARM RISK INTERVENTIONS

## 2024-11-28 NOTE — CONSULT NOTE ADULT - REASON FOR ADMISSION
Transaminitis, UTI, vaginal bleeding

## 2024-11-28 NOTE — PATIENT PROFILE ADULT - FUNCTIONAL ASSESSMENT - DAILY ACTIVITY 2.
Use Enhanced Medication Counseling?: No Topical Retinoid counseling:  Patient advised to apply a pea-sized amount only at bedtime and wait 30 minutes after washing their face before applying.  If too drying, patient may add a non-comedogenic moisturizer. The patient verbalized understanding of the proper use and possible adverse effects of retinoids.  All of the patient's questions and concerns were addressed. Bactrim Counseling:  I discussed with the patient the risks of sulfa antibiotics including but not limited to GI upset, allergic reaction, drug rash, diarrhea, dizziness, photosensitivity, and yeast infections.  Rarely, more serious reactions can occur including but not limited to aplastic anemia, agranulocytosis, methemoglobinemia, blood dyscrasias, liver or kidney failure, lung infiltrates or desquamative/blistering drug rashes. Tetracycline Pregnancy And Lactation Text: This medication is Pregnancy Category D and not consider safe during pregnancy. It is also excreted in breast milk. Benzoyl Peroxide Counseling: Patient counseled that medicine may cause skin irritation and bleach clothing.  In the event of skin irritation, the patient was advised to reduce the amount of the drug applied or use it less frequently.   The patient verbalized understanding of the proper use and possible adverse effects of benzoyl peroxide.  All of the patient's questions and concerns were addressed. Erythromycin Pregnancy And Lactation Text: This medication is Pregnancy Category B and is considered safe during pregnancy. It is also excreted in breast milk. Isotretinoin Pregnancy And Lactation Text: This medication is Pregnancy Category X and is considered extremely dangerous during pregnancy. It is unknown if it is excreted in breast milk. Birth Control Pills Counseling: Birth Control Pill Counseling: I discussed with the patient the potential side effects of OCPs including but not limited to increased risk of stroke, heart attack, thrombophlebitis, deep venous thrombosis, hepatic adenomas, breast changes, GI upset, headaches, and depression.  The patient verbalized understanding of the proper use and possible adverse effects of OCPs. All of the patient's questions and concerns were addressed. High Dose Vitamin A Pregnancy And Lactation Text: High dose vitamin A therapy is contraindicated during pregnancy and breast feeding. Doxycycline Pregnancy And Lactation Text: This medication is Pregnancy Category D and not consider safe during pregnancy. It is also excreted in breast milk but is considered safe for shorter treatment courses. Topical Retinoid Pregnancy And Lactation Text: This medication is Pregnancy Category C. It is unknown if this medication is excreted in breast milk. Topical Sulfur Applications Pregnancy And Lactation Text: This medication is Pregnancy Category C and has an unknown safety profile during pregnancy. It is unknown if this topical medication is excreted in breast milk. Isotretinoin Counseling: Patient should get monthly blood tests, not donate blood, not drive at night if vision affected, not share medication, and not undergo elective surgery for 6 months after tx completed. Side effects reviewed, pt to contact office should one occur. Spironolactone Counseling: Patient advised regarding risks of diarrhea, abdominal pain, hyperkalemia, birth defects (for female patients), liver toxicity and renal toxicity. The patient may need blood work to monitor liver and kidney function and potassium levels while on therapy. The patient verbalized understanding of the proper use and possible adverse effects of spironolactone.  All of the patient's questions and concerns were addressed. Birth Control Pills Pregnancy And Lactation Text: This medication should be avoided if pregnant and for the first 30 days post-partum. Azithromycin Pregnancy And Lactation Text: This medication is considered safe during pregnancy and is also secreted in breast milk. Topical Sulfur Applications Counseling: Topical Sulfur Counseling: Patient counseled that this medication may cause skin irritation or allergic reactions.  In the event of skin irritation, the patient was advised to reduce the amount of the drug applied or use it less frequently.   The patient verbalized understanding of the proper use and possible adverse effects of topical sulfur application.  All of the patient's questions and concerns were addressed. Spironolactone Pregnancy And Lactation Text: This medication can cause feminization of the male fetus and should be avoided during pregnancy. The active metabolite is also found in breast milk. Bactrim Pregnancy And Lactation Text: This medication is Pregnancy Category D and is known to cause fetal risk.  It is also excreted in breast milk. Tazorac Pregnancy And Lactation Text: This medication is not safe during pregnancy. It is unknown if this medication is excreted in breast milk. Detail Level: Detailed Tetracycline Counseling: Patient counseled regarding possible photosensitivity and increased risk for sunburn.  Patient instructed to avoid sunlight, if possible.  When exposed to sunlight, patients should wear protective clothing, sunglasses, and sunscreen.  The patient was instructed to call the office immediately if the following severe adverse effects occur:  hearing changes, easy bruising/bleeding, severe headache, or vision changes.  The patient verbalized understanding of the proper use and possible adverse effects of tetracycline.  All of the patient's questions and concerns were addressed. Patient understands to avoid pregnancy while on therapy due to potential birth defects. Doxycycline Counseling:  Patient counseled regarding possible photosensitivity and increased risk for sunburn.  Patient instructed to avoid sunlight, if possible.  When exposed to sunlight, patients should wear protective clothing, sunglasses, and sunscreen.  The patient was instructed to call the office immediately if the following severe adverse effects occur:  hearing changes, easy bruising/bleeding, severe headache, or vision changes.  The patient verbalized understanding of the proper use and possible adverse effects of doxycycline.  All of the patient's questions and concerns were addressed. 2 = A lot of assistance Minocycline Counseling: Patient advised regarding possible photosensitivity and discoloration of the teeth, skin, lips, tongue and gums.  Patient instructed to avoid sunlight, if possible.  When exposed to sunlight, patients should wear protective clothing, sunglasses, and sunscreen.  The patient was instructed to call the office immediately if the following severe adverse effects occur:  hearing changes, easy bruising/bleeding, severe headache, or vision changes.  The patient verbalized understanding of the proper use and possible adverse effects of minocycline.  All of the patient's questions and concerns were addressed. Erythromycin Counseling:  I discussed with the patient the risks of erythromycin including but not limited to GI upset, allergic reaction, drug rash, diarrhea, increase in liver enzymes, and yeast infections. Benzoyl Peroxide Pregnancy And Lactation Text: This medication is Pregnancy Category C. It is unknown if benzoyl peroxide is excreted in breast milk. Azithromycin Counseling:  I discussed with the patient the risks of azithromycin including but not limited to GI upset, allergic reaction, drug rash, diarrhea, and yeast infections. Tazorac Counseling:  Patient advised that medication is irritating and drying.  Patient may need to apply sparingly and wash off after an hour before eventually leaving it on overnight.  The patient verbalized understanding of the proper use and possible adverse effects of tazorac.  All of the patient's questions and concerns were addressed. Topical Clindamycin Pregnancy And Lactation Text: This medication is Pregnancy Category B and is considered safe during pregnancy. It is unknown if it is excreted in breast milk. Dapsone Counseling: I discussed with the patient the risks of dapsone including but not limited to hemolytic anemia, agranulocytosis, rashes, methemoglobinemia, kidney failure, peripheral neuropathy, headaches, GI upset, and liver toxicity.  Patients who start dapsone require monitoring including baseline LFTs and weekly CBCs for the first month, then every month thereafter.  The patient verbalized understanding of the proper use and possible adverse effects of dapsone.  All of the patient's questions and concerns were addressed. Dapsone Pregnancy And Lactation Text: This medication is Pregnancy Category C and is not considered safe during pregnancy or breast feeding. High Dose Vitamin A Counseling: Side effects reviewed, pt to contact office should one occur. Topical Clindamycin Counseling: Patient counseled that this medication may cause skin irritation or allergic reactions.  In the event of skin irritation, the patient was advised to reduce the amount of the drug applied or use it less frequently.   The patient verbalized understanding of the proper use and possible adverse effects of clindamycin.  All of the patient's questions and concerns were addressed.

## 2024-11-28 NOTE — PROGRESS NOTE ADULT - SUBJECTIVE AND OBJECTIVE BOX
HPI  Patient is a 64y old Female who presents with a chief complaint of Transaminitis, UTI, vaginal bleeding (27 Nov 2024 14:38)    Currently admitted to medicine with the primary diagnosis of Acute UTI       Today is hospital day 1d.     INTERVAL HPI / OVERNIGHT EVENTS:  Patient was seen and examined at bedside  obtunded; minimally able to participate in interview        PAST MEDICAL & SURGICAL HISTORY  Schizophrenia    Bipolar disorder    Depression    Anxiety    Chronic lower back pain  result of pelvic fracture in the past    Osteoarthritis    Urinary incontinence    Chronic urinary tract infection    History of tremor    History of total knee arthroplasty, left      ALLERGIES  moxifloxacin (Unknown)  azithromycin (Unknown)  penicillin (Unknown)    MEDICATIONS  STANDING MEDICATIONS  artificial  tears Solution 1 Drop(s) Both EYES daily  benztropine 1 milliGRAM(s) Oral daily  carbidopa/levodopa  25/100 1 Tablet(s) Oral three times a day  cefTRIAXone   IVPB 2000 milliGRAM(s) IV Intermittent every 24 hours  clonazePAM  Tablet 1 milliGRAM(s) Oral daily  famotidine    Tablet 20 milliGRAM(s) Oral every 48 hours  gabapentin 400 milliGRAM(s) Oral three times a day  lactulose Syrup 10 Gram(s) Oral two times a day  OLANZapine 5 milliGRAM(s) Oral two times a day    PRN MEDICATIONS    VITALS:  T(F): 98.2  HR: 75  BP: 107/72  RR: 17  SpO2: 98%    PHYSICAL EXAM  GEN: no distress,  pupils dilated; but reactive  PULM: BS heard b/l equal, No wheezing  CVS: S1S2 present, no rubs or gallops  ABD: Soft, non-distended, no guarding; non-tender  EXT: No lower extremity edema  NEURO: obtunded; able to tell name, moving upper extremity    LABS                        10.4   11.84 )-----------( 378      ( 28 Nov 2024 05:34 )             32.4     11-28    141  |  107  |  19  ----------------------------<  91  4.4   |  23  |  0.5[L]    Ca    9.1      28 Nov 2024 05:34  Mg     2.2     11-27    TPro  6.1  /  Alb  3.3[L]  /  TBili  2.4[H]  /  DBili  x   /  AST  176[H]  /  ALT  83[H]  /  AlkPhos  510[H]  11-28      Urinalysis Basic - ( 28 Nov 2024 05:34 )    Color: x / Appearance: x / SG: x / pH: x  Gluc: 91 mg/dL / Ketone: x  / Bili: x / Urobili: x   Blood: x / Protein: x / Nitrite: x   Leuk Esterase: x / RBC: x / WBC x   Sq Epi: x / Non Sq Epi: x / Bacteria: x            Culture - Urine (collected 26 Nov 2024 22:24)  Source: Clean Catch Clean Catch (Midstream)  Final Report (28 Nov 2024 07:22):    No growth          RADIOLOGY

## 2024-11-28 NOTE — CONSULT NOTE ADULT - CONSULT REQUESTED DATE/TIME
27-Nov-2024 13:51
27-Nov-2024 04:22
27-Nov-2024 09:21
28-Nov-2024 17:27
27-Nov-2024 14:12
27-Nov-2024 14:38

## 2024-11-28 NOTE — PROGRESS NOTE ADULT - ASSESSMENT
63 yo, with PMHx recurrent  UTI, panic disorder, anxiety, schizophrenia, questionable Parkinson's Disease, generalized muscle weakness, osteoarthritis, vitamin D deficiency, arrived to the ED last night from the [facility] due to persistent fever (>101F on ibuprofen), positive UTI, elevated WBC on CBC, confusion, loss of appetite. In the ED, pt is afebrile but c/o generalized weakness and pain, nausea, and diarrhea. Patient was admitted for transaminitis as well as UTI.     # AMS - possibly metabolic encephalopathy  check CT head urgent  check blood cultures  hold gabapentin 400 mg q8h, klonopin 1; monitor for withdrawal  avoid sedatives  check ammonia level, EEG    # complicated UTI  # echogenic material in bladder- hematoma vs neoplastic process  urology evaluated  - recheck UA and culture with straight cath  - monitor cbc, active type and screeen  ID eval- continue cefriaxone 2 g daily  - initial urine culture- no growth  Gyn eval- no intervention; recommends urology    # Transaminitis- mixed pattern- possibly due to DILI; nitrofurantoin  -RUQ: negative  -Hepatitis Panel: neg  Monitor liver tests and INR .    # constipation  senna, miralax  dulcolax supp daily    #Schizophrenia/ Anxiety/ Panic Disorder  -Continue home medications: Olanzapine  restart klonopin when improved mental state    #Parkinson's Disease  -Continue with Sinemet and Benztropine    #Vitamin D def  #OA  -Continue with home medications  -Vit D level pending    DVT ppx: hold for monitoring CBC and hematuria  Dispo: from [facility]    Pending: cbc, repeat UA, culture, blood culture, EEG, monitor mental status, restartin home meds- klonopin and gabapentin  monitoring LFT, urology follow up    Time spent 55 mnts

## 2024-11-28 NOTE — CONSULT NOTE ADULT - ASSESSMENT
65 y/o female, with PMHx recurrent UTI, panic disorder, anxiety, schizophrenia, Parkinson's Disease?, generalized muscle weakness, osteoarthritis, vitamin D deficiency who arrived to the ED from the [facility] due to persistent fever (>101F on ibuprofen), positive UTI, elevated WBC on CBC, confusion, loss of appetite . In the ED, pt is afebrile but c/o generalized weakness and pain, nausea, and diarrhea. Patient was admitted for transaminitis as well as UTI. Neurology consulted for CTH findings of ventriculomegaly concerning for NPH per radiology. Upon chart review, non con CTH performed on 1/5/22 and 5/12/2021 also with ventriculomegaly, appears stable to today's scan. Pt states she has had trouble with her gait for the past ~7 years and utilizes a walker and wheelchair for assistance. Pt also notes history of urinary incontinence for "many years." Exam notable for mild cognitive decline.     Impression: ventriculomegaly seen on CTH from 11/28 appears stable from previous CTs from 2022 and 2021. Gait difficulties and urinary incontinence history appears chronic.    Further recs will be appreciated upon attending discussion.   65 y/o female, with PMHx recurrent UTI, panic disorder, anxiety, schizophrenia, Parkinson's Disease?, generalized muscle weakness, osteoarthritis, vitamin D deficiency who arrived to the ED from the [facility] due to persistent fever (>101F on ibuprofen), positive UTI, elevated WBC on CBC, confusion, loss of appetite . In the ED, pt is afebrile but c/o generalized weakness and pain, nausea, and diarrhea. Patient was admitted for transaminitis as well as UTI. Neurology consulted for CTH findings of ventriculomegaly concerning for NPH per radiology. Upon chart review, non con CTH performed on 1/5/22 and 5/12/2021 also with ventriculomegaly, appears stable to today's scan. Pt states she has had trouble with her gait for the past ~7 years and utilizes a walker and wheelchair for assistance. Pt also notes history of urinary incontinence for "many years."     Impression: ventriculomegaly seen on CTH from 11/28 appears stable from previous CTs from 2022 and 2021. Gait difficulties and urinary incontinence history appears chronic.    - patient is not a candidate for acute intervention such as shunt placement   - recommend PT/OT    Case discussed with attending neurologist Dr. Roberts  65 y/o female, with PMHx recurrent UTI, panic disorder, anxiety, schizophrenia, Parkinson's Disease?, generalized muscle weakness, osteoarthritis, vitamin D deficiency who arrived to the ED from the [facility] due to persistent fever (>101F on ibuprofen), positive UTI, elevated WBC on CBC, confusion, loss of appetite . In the ED, pt is afebrile but c/o generalized weakness and pain, nausea, and diarrhea. Patient was admitted for transaminitis as well as UTI. Neurology consulted for CTH findings of ventriculomegaly concerning for NPH per radiology. Upon chart review, non con CTH performed on 1/5/22 and 5/12/2021 also with ventriculomegaly, appears stable to today's scan. Pt states she has had trouble with her gait for the past ~7 years and utilizes a walker and wheelchair for assistance. Pt also notes history of urinary incontinence for "many years."  Ventriculomegaly likely chronic and not candidate for VPS since risks outweigh benefits at this time.      Recommendations:  - patient is not a candidate for acute intervention such as shunt placement   - recommend PT/OT  - call back as needed    Case discussed with attending neurologist Dr. Roberts

## 2024-11-28 NOTE — CONSULT NOTE ADULT - CONSULT REASON
Transaminitis, UTI, vaginal bleeding
elevated LFTs
Bleeding on diaper
ventriculomegaly c/f NPH on CTH
GOC
Gross hematuria

## 2024-11-29 DIAGNOSIS — Z51.5 ENCOUNTER FOR PALLIATIVE CARE: ICD-10-CM

## 2024-11-29 DIAGNOSIS — R41.82 ALTERED MENTAL STATUS, UNSPECIFIED: ICD-10-CM

## 2024-11-29 DIAGNOSIS — N39.0 URINARY TRACT INFECTION, SITE NOT SPECIFIED: ICD-10-CM

## 2024-11-29 LAB
ALBUMIN SERPL ELPH-MCNC: 3.2 G/DL — LOW (ref 3.5–5.2)
ALP SERPL-CCNC: 572 U/L — HIGH (ref 30–115)
ALT FLD-CCNC: 48 U/L — HIGH (ref 0–41)
AMMONIA BLD-MCNC: 24 UMOL/L — SIGNIFICANT CHANGE UP (ref 11–55)
ANION GAP SERPL CALC-SCNC: 10 MMOL/L — SIGNIFICANT CHANGE UP (ref 7–14)
AST SERPL-CCNC: 209 U/L — HIGH (ref 0–41)
BILIRUB SERPL-MCNC: 2 MG/DL — HIGH (ref 0.2–1.2)
BUN SERPL-MCNC: 16 MG/DL — SIGNIFICANT CHANGE UP (ref 10–20)
CALCIUM SERPL-MCNC: 9.2 MG/DL — SIGNIFICANT CHANGE UP (ref 8.4–10.5)
CANCER AG125 SERPL-ACNC: 17 U/ML — SIGNIFICANT CHANGE UP
CANCER AG19-9 SERPL-ACNC: 30 U/ML — SIGNIFICANT CHANGE UP
CEA SERPL-MCNC: 3.5 NG/ML — SIGNIFICANT CHANGE UP (ref 0–3.8)
CHLORIDE SERPL-SCNC: 109 MMOL/L — SIGNIFICANT CHANGE UP (ref 98–110)
CMV IGG FLD QL: <0.2 U/ML — SIGNIFICANT CHANGE UP
CMV IGG SERPL-IMP: NEGATIVE — SIGNIFICANT CHANGE UP
CMV IGM FLD-ACNC: <8 AU/ML — SIGNIFICANT CHANGE UP
CMV IGM SERPL QL: NEGATIVE — SIGNIFICANT CHANGE UP
CO2 SERPL-SCNC: 24 MMOL/L — SIGNIFICANT CHANGE UP (ref 17–32)
CREAT SERPL-MCNC: <0.5 MG/DL — LOW (ref 0.7–1.5)
CULTURE RESULTS: SIGNIFICANT CHANGE UP
EBV EA AB SER IA-ACNC: >150 U/ML — HIGH
EBV EA AB TITR SER IF: POSITIVE
EBV EA IGG SER-ACNC: POSITIVE
EBV NA IGG SER IA-ACNC: >600 U/ML — HIGH
EBV PATRN SPEC IB-IMP: SIGNIFICANT CHANGE UP
EBV VCA IGG AVIDITY SER QL IA: POSITIVE
EBV VCA IGM SER IA-ACNC: 199 U/ML — HIGH
EBV VCA IGM SER IA-ACNC: 24.6 U/ML — SIGNIFICANT CHANGE UP
EBV VCA IGM TITR FLD: NEGATIVE — SIGNIFICANT CHANGE UP
EGFR: 109 ML/MIN/1.73M2 — SIGNIFICANT CHANGE UP
GLUCOSE SERPL-MCNC: 122 MG/DL — HIGH (ref 70–99)
HCT VFR BLD CALC: 31.3 % — LOW (ref 37–47)
HGB BLD-MCNC: 10 G/DL — LOW (ref 12–16)
MAGNESIUM SERPL-MCNC: 2 MG/DL — SIGNIFICANT CHANGE UP (ref 1.8–2.4)
MCHC RBC-ENTMCNC: 30.5 PG — SIGNIFICANT CHANGE UP (ref 27–31)
MCHC RBC-ENTMCNC: 31.9 G/DL — LOW (ref 32–37)
MCV RBC AUTO: 95.4 FL — SIGNIFICANT CHANGE UP (ref 81–99)
NRBC # BLD: 0 /100 WBCS — SIGNIFICANT CHANGE UP (ref 0–0)
PLATELET # BLD AUTO: 382 K/UL — SIGNIFICANT CHANGE UP (ref 130–400)
PMV BLD: 10.4 FL — SIGNIFICANT CHANGE UP (ref 7.4–10.4)
POTASSIUM SERPL-MCNC: 4 MMOL/L — SIGNIFICANT CHANGE UP (ref 3.5–5)
POTASSIUM SERPL-SCNC: 4 MMOL/L — SIGNIFICANT CHANGE UP (ref 3.5–5)
PROT SERPL-MCNC: 6 G/DL — SIGNIFICANT CHANGE UP (ref 6–8)
RBC # BLD: 3.28 M/UL — LOW (ref 4.2–5.4)
RBC # FLD: 14.4 % — SIGNIFICANT CHANGE UP (ref 11.5–14.5)
SODIUM SERPL-SCNC: 143 MMOL/L — SIGNIFICANT CHANGE UP (ref 135–146)
SPECIMEN SOURCE: SIGNIFICANT CHANGE UP
WBC # BLD: 13.66 K/UL — HIGH (ref 4.8–10.8)
WBC # FLD AUTO: 13.66 K/UL — HIGH (ref 4.8–10.8)

## 2024-11-29 PROCEDURE — 99232 SBSQ HOSP IP/OBS MODERATE 35: CPT

## 2024-11-29 PROCEDURE — 99231 SBSQ HOSP IP/OBS SF/LOW 25: CPT

## 2024-11-29 PROCEDURE — 99233 SBSQ HOSP IP/OBS HIGH 50: CPT

## 2024-11-29 RX ORDER — URSODIOL 300 MG/1
200 CAPSULE ORAL
Refills: 0 | Status: DISCONTINUED | OUTPATIENT
Start: 2024-11-29 | End: 2024-12-03

## 2024-11-29 RX ORDER — URSODIOL 300 MG/1
200 CAPSULE ORAL
Refills: 0 | Status: DISCONTINUED | OUTPATIENT
Start: 2024-11-29 | End: 2024-11-29

## 2024-11-29 RX ORDER — CLONAZEPAM 0.12 MG/1
0.5 TABLET, ORALLY DISINTEGRATING ORAL
Refills: 0 | Status: DISCONTINUED | OUTPATIENT
Start: 2024-11-29 | End: 2024-11-30

## 2024-11-29 RX ORDER — GABAPENTIN 300 MG/1
400 CAPSULE ORAL THREE TIMES A DAY
Refills: 0 | Status: DISCONTINUED | OUTPATIENT
Start: 2024-11-29 | End: 2024-11-30

## 2024-11-29 RX ADMIN — Medication 10 MILLIGRAM(S): at 11:03

## 2024-11-29 RX ADMIN — Medication 1 MILLIGRAM(S): at 11:03

## 2024-11-29 RX ADMIN — CARBIDOPA AND LEVODOPA 1 TABLET(S): 10; 100 TABLET ORAL at 13:04

## 2024-11-29 RX ADMIN — URSODIOL 200 MILLIGRAM(S): 300 CAPSULE ORAL at 17:33

## 2024-11-29 RX ADMIN — GABAPENTIN 400 MILLIGRAM(S): 300 CAPSULE ORAL at 13:06

## 2024-11-29 RX ADMIN — OLANZAPINE 5 MILLIGRAM(S): 20 TABLET ORAL at 17:19

## 2024-11-29 RX ADMIN — LACTULOSE 10 GRAM(S): 10 SOLUTION ORAL at 05:16

## 2024-11-29 RX ADMIN — CARBIDOPA AND LEVODOPA 1 TABLET(S): 10; 100 TABLET ORAL at 21:13

## 2024-11-29 RX ADMIN — Medication 100 MILLIGRAM(S): at 17:19

## 2024-11-29 RX ADMIN — POLYETHYLENE GLYCOL 3350 17 GRAM(S): 17 POWDER, FOR SOLUTION ORAL at 17:11

## 2024-11-29 RX ADMIN — Medication 2 TABLET(S): at 21:16

## 2024-11-29 RX ADMIN — GABAPENTIN 400 MILLIGRAM(S): 300 CAPSULE ORAL at 21:13

## 2024-11-29 RX ADMIN — POVIDONE, POLYVINYL ALCOHOL 1 DROP(S): 20; 27 SOLUTION OPHTHALMIC at 11:03

## 2024-11-29 RX ADMIN — LACTULOSE 10 GRAM(S): 10 SOLUTION ORAL at 17:18

## 2024-11-29 RX ADMIN — POLYETHYLENE GLYCOL 3350 17 GRAM(S): 17 POWDER, FOR SOLUTION ORAL at 05:16

## 2024-11-29 RX ADMIN — OLANZAPINE 5 MILLIGRAM(S): 20 TABLET ORAL at 05:16

## 2024-11-29 RX ADMIN — CLONAZEPAM 0.5 MILLIGRAM(S): 0.12 TABLET, ORALLY DISINTEGRATING ORAL at 17:20

## 2024-11-29 RX ADMIN — CARBIDOPA AND LEVODOPA 1 TABLET(S): 10; 100 TABLET ORAL at 05:15

## 2024-11-29 NOTE — PROGRESS NOTE ADULT - ASSESSMENT
65 yo, with PMHx recurrent  UTI, panic disorder, anxiety, schizophrenia, questionable Parkinson's Disease, generalized muscle weakness, osteoarthritis, vitamin D deficiency, arrived to the ED last night from the [facility] due to persistent fever (>101F on ibuprofen), positive UTI, elevated WBC on CBC, confusion, loss of appetite. In the ED, pt is afebrile but c/o generalized weakness and pain, nausea, and diarrhea. Patient was admitted for transaminitis as well as UTI.     # AMS - possibly metabolic encephalopathy  check CT head urgent  check blood cultures  hold gabapentin 400 mg q8h, klonopin 1; monitor for withdrawal  avoid sedatives  - 11/29 Ammonia wnl  - Neuro recs: PT/OT; No acute neuro interventions    # complicated UTI  # echogenic material in bladder- hematoma vs neoplastic process  urology evaluated  - recheck UA and culture with straight cath  - monitor cbc, active type and screeen  ID eval- continue cefriaxone 2 g daily  - initial urine culture- no growth  Gyn eval- no intervention; recommends urology  - Urology signed off. F/u outpatient    # Transaminitis- mixed pattern- possibly due to DILI; nitrofurantoin  -RUQ: negative  -Hepatitis Panel: neg  - Monitor liver tests and INR .    # constipation  - Continue senna, miralax  - Continue dulcolax supp daily    #Schizophrenia/ Anxiety/ Panic Disorder  -Continue home medications: Olanzapine  restart klonopin when improved mental state    #Parkinson's Disease  -Continue with Sinemet and Benztropine    #Vitamin D def  #OA  -Continue with home medications  -Vit D level pending    -DVT prophylaxis: Heparin  -GI prophylaxis: None  -Diet:Pureed  -Code status: Full code  -Activity: IAT  -Dispo: pending clinical improvement    #H

## 2024-11-29 NOTE — PROGRESS NOTE ADULT - SUBJECTIVE AND OBJECTIVE BOX
COMFORT FARIAS  64y, Female    All available historical data reviewed    OVERNIGHT EVENTS:  none    ROS:  unable to obtain history secondary to patient's mental status     VITALS:  T(F): 97.6, Max: 98.1 (11-28-24 @ 16:35)  HR: 91  BP: 109/71  RR: 17Vital Signs Last 24 Hrs  T(C): 36.4 (29 Nov 2024 08:44), Max: 36.7 (28 Nov 2024 16:35)  T(F): 97.6 (29 Nov 2024 08:44), Max: 98.1 (28 Nov 2024 16:35)  HR: 91 (29 Nov 2024 08:44) (69 - 91)  BP: 109/71 (29 Nov 2024 08:44) (109/71 - 125/83)  BP(mean): 87 (29 Nov 2024 00:33) (87 - 87)  RR: 17 (29 Nov 2024 08:44) (17 - 18)  SpO2: 98% (29 Nov 2024 08:44) (98% - 99%)    Parameters below as of 29 Nov 2024 08:44  Patient On (Oxygen Delivery Method): room air        TESTS & MEASUREMENTS:                        10.0   13.66 )-----------( 382      ( 29 Nov 2024 07:27 )             31.3     11-29    143  |  109  |  16  ----------------------------<  122[H]  4.0   |  24  |  <0.5[L]    Ca    9.2      29 Nov 2024 07:27  Mg     2.0     11-29    TPro  6.0  /  Alb  3.2[L]  /  TBili  2.0[H]  /  DBili  x   /  AST  209[H]  /  ALT  48[H]  /  AlkPhos  572[H]  11-29    LIVER FUNCTIONS - ( 29 Nov 2024 07:27 )  Alb: 3.2 g/dL / Pro: 6.0 g/dL / ALK PHOS: 572 U/L / ALT: 48 U/L / AST: 209 U/L / GGT: x             Culture - Urine (collected 11-26-24 @ 22:24)  Source: Clean Catch Clean Catch (Midstream)  Final Report (11-28-24 @ 07:22):    No growth      Urinalysis Basic - ( 29 Nov 2024 07:27 )    Color: x / Appearance: x / SG: x / pH: x  Gluc: 122 mg/dL / Ketone: x  / Bili: x / Urobili: x   Blood: x / Protein: x / Nitrite: x   Leuk Esterase: x / RBC: x / WBC x   Sq Epi: x / Non Sq Epi: x / Bacteria: x          Social History:  Tobacco Use: No  Alcohol Use: No  Drug Use: No    RADIOLOGY & ADDITIONAL TESTS:  Personal review of radiological diagnostics performed  Echo and EKG results noted when applicable.     MEDICATIONS:  artificial  tears Solution 1 Drop(s) Both EYES daily  benztropine 1 milliGRAM(s) Oral daily  carbidopa/levodopa  25/100 1 Tablet(s) Oral three times a day  cefTRIAXone   IVPB 2000 milliGRAM(s) IV Intermittent every 24 hours  clonazePAM  Tablet 0.5 milliGRAM(s) Oral two times a day  famotidine    Tablet 20 milliGRAM(s) Oral every 48 hours  gabapentin 400 milliGRAM(s) Oral three times a day  lactulose Syrup 10 Gram(s) Oral two times a day  OLANZapine 5 milliGRAM(s) Oral two times a day  polyethylene glycol 3350 17 Gram(s) Oral two times a day  senna 2 Tablet(s) Oral at bedtime      ANTIBIOTICS:  cefTRIAXone   IVPB 2000 milliGRAM(s) IV Intermittent every 24 hours

## 2024-11-29 NOTE — PROGRESS NOTE ADULT - SUBJECTIVE AND OBJECTIVE BOX
Gastroenterology progress note:     Patient is a 64y old  Female who presents with a chief complaint of Transaminitis, UTI, vaginal bleeding (29 Nov 2024 16:22)       Admitted on: 11-27-24    We are following the patient for: hepatitis       Interval History: stable LFts    PAST MEDICAL & SURGICAL HISTORY:  Schizophrenia      Bipolar disorder      Depression      Anxiety      Chronic lower back pain  result of pelvic fracture in the past      Osteoarthritis      Urinary incontinence      Chronic urinary tract infection      History of tremor      History of total knee arthroplasty, left          MEDICATIONS  (STANDING):  artificial  tears Solution 1 Drop(s) Both EYES daily  benztropine 1 milliGRAM(s) Oral daily  carbidopa/levodopa  25/100 1 Tablet(s) Oral three times a day  cefTRIAXone   IVPB 2000 milliGRAM(s) IV Intermittent every 24 hours  clonazePAM  Tablet 0.5 milliGRAM(s) Oral two times a day  famotidine    Tablet 20 milliGRAM(s) Oral every 48 hours  gabapentin 400 milliGRAM(s) Oral three times a day  lactulose Syrup 10 Gram(s) Oral two times a day  OLANZapine 5 milliGRAM(s) Oral two times a day  polyethylene glycol 3350 17 Gram(s) Oral two times a day  senna 2 Tablet(s) Oral at bedtime  ursodiol Suspension 200 milliGRAM(s) Oral two times a day    MEDICATIONS  (PRN):      Allergies  moxifloxacin (Unknown)  azithromycin (Unknown)  penicillin (Unknown)      Review of Systems:   Cardiovascular:  No Chest Pain, No Palpitations  Respiratory:  No Cough, No Dyspnea  Gastrointestinal:  As described in HPI  Skin:  No Skin Lesions, No Jaundice  Neuro:  No Syncope, No Dizziness    Physical Examination:  T(C): 36.3 (11-29-24 @ 16:50), Max: 36.6 (11-29-24 @ 00:33)  HR: 90 (11-29-24 @ 16:50) (69 - 91)  BP: 122/79 (11-29-24 @ 16:50) (109/71 - 122/79)  RR: 18 (11-29-24 @ 16:50) (17 - 18)  SpO2: 98% (11-29-24 @ 16:50) (98% - 99%)      11-28-24 @ 07:01  -  11-29-24 @ 07:00  --------------------------------------------------------  IN: 360 mL / OUT: 0 mL / NET: 360 mL    11-29-24 @ 07:01  -  11-29-24 @ 17:52  --------------------------------------------------------  IN: 0 mL / OUT: 1 mL / NET: -1 mL        GENERAL: AAOx2  HEAD:  Atraumatic, Normocephalic  EYES: conjunctiva and sclera clear  NECK: Supple, no JVD or thyromegaly  CHEST/LUNG: Clear to auscultation bilaterally;   HEART: Regular rate and rhythm; normal S1, S2, No murmurs.  ABDOMEN: Soft, nontender, nondistended; Bowel sounds present  NEUROLOGY: No asterixis or tremor.   SKIN: Intact, no jaundice     Data:                        10.0   13.66 )-----------( 382      ( 29 Nov 2024 07:27 )             31.3     Hgb trend:  10.0  11-29-24 @ 07:27  10.4  11-28-24 @ 05:34  10.9  11-27-24 @ 20:57  11.3  11-27-24 @ 11:08  11.6  11-26-24 @ 21:30        11-29    143  |  109  |  16  ----------------------------<  122[H]  4.0   |  24  |  <0.5[L]    Ca    9.2      29 Nov 2024 07:27  Mg     2.0     11-29    TPro  6.0  /  Alb  3.2[L]  /  TBili  2.0[H]  /  DBili  x   /  AST  209[H]  /  ALT  48[H]  /  AlkPhos  572[H]  11-29    Liver panel trend:  TBili 2.0   /      /   ALT 48   /   AlkP 572   /   Tptn 6.0   /   Alb 3.2    /   DBili --      11-29  TBili 2.4   /      /   ALT 83   /   AlkP 510   /   Tptn 6.1   /   Alb 3.3    /   DBili --      11-28  TBili 3.2   /      /      /   AlkP 550   /   Tptn 6.5   /   Alb 3.4    /   DBili --      11-27  TBili 3.0   /      /      /   AlkP 563   /   Tptn 6.2   /   Alb 3.3    /   DBili 1.8      11-26          Culture - Urine (collected 26 Nov 2024 22:24)  Source: Clean Catch Clean Catch (Midstream)  Final Report (28 Nov 2024 07:22):    No growth         Radiology:

## 2024-11-29 NOTE — PROGRESS NOTE ADULT - PROBLEM SELECTOR PLAN 1
confused and lacks capacity on exam  per intake note, confused as baseline  has legal guardian- need to confirm she is the medical decision maker, will request paperwork

## 2024-11-29 NOTE — PROGRESS NOTE ADULT - ASSESSMENT
· Assessment	  65 yo, with PMHx recurrent UTI, panic disorder, anxiety, schizophrenia, Parkinson's Disease?, generalized muscle weakness, osteoarthritis, vitamin D deficiency who arrived to the ED last night from the [facility] due to persistent fever (>101F on ibuprofen), positive UTI, elevated WBC on CBC, confusion, loss of appetite. 5 days ago patient had positive outpatient  UA, pt completed Macrobid x 5 days with non-resolution of symptoms, and so was switched to IV Ertapenem 1 gm solution which she took 1 dose yesterday. Pt has hx recurrent UTIs  previous culture grew morganella infection in the past which was sensitive for rocephin.     Pt sustained a mechanical fall 10 days ago at the nursing home. Patient was getting out of bed and fell but did not lose consciousness. No imaging was done.      11/26 Transabdominal US: bladder hematoma     CT A/P:   1.  Edematous bladder wall thickening with mucosal hyperemia compatible   with cystitis.  2.  Mild stool distended rectum, correlate for constipation or fecal   impaction. No bowel obstruction.  3.  Small enhancing lesion in the uterus likely fibroid though polyp   cannot be excluded. Limited assessment on CT.    RUQ US: Unremarkable right upper quadrant abdominal ultrasound.    IMPRESSION/RECOMMENDATIONS  Immunosuppression/Immunosenescence ( above age 60 yrs there is a exponential decline in immunity which could result in poor clinical outcomes.   Acute pyelonephritis with no obstructive uropathy   Metabolic encephalopathy  11/29 EEG ; no seizures  11/26 CT ( Independent interpretation of test : cystitis, no PNA, fecal impaction  WBc initially with 11.7 with 3.2 % bands : suggestive of a bacterial infection  11/26 UCx NG  CBC :  ( WBC 13.6  ), ( Hg 10.0   ), CMP ( Cr 0.5  ) reviewed .   Transaminases  11/27 Hepatology : DILI  11/27 US RUQ : NG    anxiety  schizophrenia  Parkinson's Disease    -BCx  -Off loading to prevent pressure sores and preventive measures to avoid aspiration  -Rocephin 2 gm iv q24h    Discussion of management/test results/antibiotic regimen  with primary medical team.

## 2024-11-29 NOTE — PROGRESS NOTE ADULT - SUBJECTIVE AND OBJECTIVE BOX
HPI  Patient is a 64y old Female who presents with a chief complaint of Transaminitis, UTI, vaginal bleeding (29 Nov 2024 12:51)    Currently admitted to medicine with the primary diagnosis of Acute UTI       Today is hospital day 2d.     INTERVAL HPI / OVERNIGHT EVENTS:  Patient was seen and examined at bedside  No new complaints  Denies any complains of chest pain or shortness of breath  Denies any abdominal pain/nausea/vomiting        PAST MEDICAL & SURGICAL HISTORY  Schizophrenia    Bipolar disorder    Depression    Anxiety    Chronic lower back pain  result of pelvic fracture in the past    Osteoarthritis    Urinary incontinence    Chronic urinary tract infection    History of tremor    History of total knee arthroplasty, left      ALLERGIES  moxifloxacin (Unknown)  azithromycin (Unknown)  penicillin (Unknown)    MEDICATIONS  STANDING MEDICATIONS  artificial  tears Solution 1 Drop(s) Both EYES daily  benztropine 1 milliGRAM(s) Oral daily  carbidopa/levodopa  25/100 1 Tablet(s) Oral three times a day  cefTRIAXone   IVPB 2000 milliGRAM(s) IV Intermittent every 24 hours  clonazePAM  Tablet 0.5 milliGRAM(s) Oral two times a day  famotidine    Tablet 20 milliGRAM(s) Oral every 48 hours  gabapentin 400 milliGRAM(s) Oral three times a day  lactulose Syrup 10 Gram(s) Oral two times a day  OLANZapine 5 milliGRAM(s) Oral two times a day  polyethylene glycol 3350 17 Gram(s) Oral two times a day  senna 2 Tablet(s) Oral at bedtime    PRN MEDICATIONS    VITALS:  T(F): 97.6  HR: 91  BP: 109/71  RR: 17  SpO2: 98%    PHYSICAL EXAM  GEN: no distress, comfortable  PULM: BS heard b/l equal, No wheezing  CVS: S1S2 present, no rubs or gallops  ABD: Soft, non-distended, no guarding; non-tender  EXT: No lower extremity edema  NEURO: A&Ox2; much better today, conversant,, moving all extremities- generalized lower extrmity weakness present    LABS                        10.0   13.66 )-----------( 382      ( 29 Nov 2024 07:27 )             31.3     11-29    143  |  109  |  16  ----------------------------<  122[H]  4.0   |  24  |  <0.5[L]    Ca    9.2      29 Nov 2024 07:27  Mg     2.0     11-29    TPro  6.0  /  Alb  3.2[L]  /  TBili  2.0[H]  /  DBili  x   /  AST  209[H]  /  ALT  48[H]  /  AlkPhos  572[H]  11-29      Urinalysis Basic - ( 29 Nov 2024 07:27 )    Color: x / Appearance: x / SG: x / pH: x  Gluc: 122 mg/dL / Ketone: x  / Bili: x / Urobili: x   Blood: x / Protein: x / Nitrite: x   Leuk Esterase: x / RBC: x / WBC x   Sq Epi: x / Non Sq Epi: x / Bacteria: x            Culture - Urine (collected 26 Nov 2024 22:24)  Source: Clean Catch Clean Catch (Midstream)  Final Report (28 Nov 2024 07:22):    No growth          RADIOLOGY

## 2024-11-29 NOTE — PROGRESS NOTE ADULT - SUBJECTIVE AND OBJECTIVE BOX
SUBJECTIVE/OVERNIGHT EVENTS  Today is hospital day 2d. This morning patient was seen and examined at bedside, resting comfortably in bed. No acute or major events overnight.    HOSPITAL COURSE  Day 1:   Day 2:   Day 3:     CODE STATUS:    FAMILY COMMUNICATION  Contact date:  Name of person contacted:  Relationship to patient:  Communication details:    MEDICATIONS  STANDING MEDICATIONS  artificial  tears Solution 1 Drop(s) Both EYES daily  benztropine 1 milliGRAM(s) Oral daily  carbidopa/levodopa  25/100 1 Tablet(s) Oral three times a day  cefTRIAXone   IVPB 2000 milliGRAM(s) IV Intermittent every 24 hours  clonazePAM  Tablet 0.5 milliGRAM(s) Oral two times a day  famotidine    Tablet 20 milliGRAM(s) Oral every 48 hours  gabapentin 400 milliGRAM(s) Oral three times a day  lactulose Syrup 10 Gram(s) Oral two times a day  OLANZapine 5 milliGRAM(s) Oral two times a day  polyethylene glycol 3350 17 Gram(s) Oral two times a day  senna 2 Tablet(s) Oral at bedtime    PRN MEDICATIONS    VITALS  T(F): 97.6 (11-29-24 @ 08:44), Max: 98.1 (11-28-24 @ 16:35)  HR: 91 (11-29-24 @ 08:44) (69 - 91)  BP: 109/71 (11-29-24 @ 08:44) (109/71 - 125/83)  RR: 17 (11-29-24 @ 08:44) (17 - 18)  SpO2: 98% (11-29-24 @ 08:44) (98% - 99%)    PHYSICAL EXAM  CONSTITUTIONAL: Well groomed, no apparent distress  EYES: EOMI, No conjunctival or scleral injection, non-icteric  ENMT: Oral mucosa with moist membranes   NECK: Supple and symmetric  RESP: CTAB; No respiratory distress, no use of accessory muscles  CV: RRR, +S1S2, no MRG; no JVD; no peripheral edema  GI: Soft, NT, ND, no rebound, no guarding; no palpable masses  SKIN: No rashes or ulcers noted   PSYCH: Appropriate insight/judgment; A+O x 1( person), mood and affect appropriate, recent/remote memory intact  (  ) Indwelling Mcduffie Catheter   Date insterted:    Reason (  ) Critical illness     (  ) urinary retention    (  ) Accurate Ins/Outs Monitoring     (  ) CMO patient    (  ) Central Line  Date inserted:  Location: (  ) Right IJ   (  ) Left IJ   (  ) Right Fem   (  ) Left Fem    (  ) SPC  (  ) pigtail  (  ) PEG tube  (  ) colostomy  (  ) jejunostomy  (  ) U-Dall    LABS             10.0   13.66 )-----------( 382      ( 11-29-24 @ 07:27 )             31.3     143  |  109  |  16  -------------------------<  122   11-29-24 @ 07:27  4.0  |  24  |  <0.5    Ca      9.2     11-29-24 @ 07:27  Mg     2.0     11-29-24 @ 07:27    TPro  6.0  /  Alb  3.2  /  TBili  2.0  /  DBili  x   /  AST  209  /  ALT  48  /  AlkPhos  572  /  GGT  x     11-29-24 @ 07:27        Urinalysis Basic - ( 29 Nov 2024 07:27 )    Color: x / Appearance: x / SG: x / pH: x  Gluc: 122 mg/dL / Ketone: x  / Bili: x / Urobili: x   Blood: x / Protein: x / Nitrite: x   Leuk Esterase: x / RBC: x / WBC x   Sq Epi: x / Non Sq Epi: x / Bacteria: x          Culture - Urine (collected 26 Nov 2024 22:24)  Source: Clean Catch Clean Catch (Midstream)  Final Report (28 Nov 2024 07:22):    No growth      IMAGING

## 2024-11-29 NOTE — PROGRESS NOTE ADULT - SUBJECTIVE AND OBJECTIVE BOX
HPI:    H&P was obtained from a combination of speaking with the patient and [facility] nursing home ([phone])    Edie Gould is a 63 yo, with PMHx recurrent UTI, panic disorder, anxiety, schizophrenia, Parkinson's Disease?, generalized muscle weakness, osteoarthritis, vitamin D deficiency who arrived to the ED last night from the [facility] due to persistent fever (>101F on ibuprofen), positive UTI, elevated WBC on CBC, confusion, loss of appetite. 5 days ago patient had positive outpatient  UA, pt completed Macrobid x 5 days with non-resolution of symptoms, and so was switched to IV Ertapenem 1 gm solution which she took 1 dose yesterday. Pt has hx recurrent UTIs  previous culture grew morganella infection in the past which was sensitive for rocephin. Patient denies any dysuria, urinary frequency. Patient was found to have vaginal bleeding in nursing home a few days ago. Patient does not have any associated pain. Nursing home states that patient has been confused for 5 days since onset of UTI as well as decreased po intake. Patient was afebrile in nursing home only developed fevers in ED.     Pt sustained a mechanical fall 10 days ago at the nursing home. Patient was getting out of bed and fell but did not lose consciousness. No imaging was done.      In the ED, pt c/o generalized weakness and pain, nausea, and diarrhea.      Vitals  Temp 99F  116/75 HR 82  18 RR O2 97    Labs  HG 11.7   Alk Phos 563      T Bili 3.0  Bili 1.8  Lipase 92      UA:  + Nitrites, Large Bilirubin Large Blood, Proteinuria    Imaging  Transabdominal US:   Neither ovary is visualized. Uterus delineated but not well evaluated on   this limited study. Measures approximately 4.1 x 2.5 x 3.6 cm.   Endometrium not well delineated. No definite correlate to CT finding.    Evaluation of the bladder demonstrates intraluminal echogenic material   without internal vascularity could represent bladder hematoma given the   urinalysis demonstrated large blood. Neoplastic process cannot be   entirely excluded.      CT A/P:   1.  Edematous bladder wall thickening with mucosal hyperemia compatible   with cystitis.  2.  Mild stool distended rectum, correlate for constipation or fecal   impaction. No bowel obstruction.  3.  Small enhancing lesion in the uterus likely fibroid though polyp   cannot be excluded. Limited assessment on CT.    RUQ US:  Unremarkable right upper quadrant abdominal ultrasound.   (27 Nov 2024 09:14)      Interval history:     - patient comfortable on exam  - patient is confused and does not think she is in the hospital, tells me she lives at home      ADVANCE DIRECTIVES:     [X ] Full Code [ ] DNR  MOLST  [ ]  Living Will  [ ]   DECISION MAKER(s): Patient has a legal guardian - Maria Luisa Hopkins [phone]  [ ] Health Care Proxy(s)  [ ] Surrogate(s)  [ X ] Guardian           Name(s): Phone Number(s):      BASELINE (I)ADL(s) (prior to admission):    [place]: [ ]Total  [ ] Moderate [ ]Dependent  Palliative Performance Status Version 2:         %    http://npc.org/files/news/palliative_performance_scale_[email or web address].pdf    Allergies    moxifloxacin (Unknown)  azithromycin (Unknown)  penicillin (Unknown)    Intolerances    MEDICATIONS  (STANDING):  artificial  tears Solution 1 Drop(s) Both EYES daily  benztropine 1 milliGRAM(s) Oral daily  bisacodyl Suppository 10 milliGRAM(s) Rectal daily  carbidopa/levodopa  25/100 1 Tablet(s) Oral three times a day  cefTRIAXone   IVPB 2000 milliGRAM(s) IV Intermittent every 24 hours  clonazePAM  Tablet 0.5 milliGRAM(s) Oral two times a day  famotidine    Tablet 20 milliGRAM(s) Oral every 48 hours  gabapentin 400 milliGRAM(s) Oral three times a day  lactulose Syrup 10 Gram(s) Oral two times a day  OLANZapine 5 milliGRAM(s) Oral two times a day  polyethylene glycol 3350 17 Gram(s) Oral two times a day  senna 2 Tablet(s) Oral at bedtime    MEDICATIONS  (PRN):    PRESENT SYMPTOMS: [ ]Unable to obtain due to poor mentation   Source if other than patient:  [ ]Family   [ ]Team     Pain: [ ]yes [ ]no  QOL impact -   Location -                    Aggravating factors -  Quality -  Radiation -  Timing-  Severity (0-10 scale):  Minimal acceptable level (0-10 scale):     CPOT:    https://www.[facility].org/getattachment/[email or web address]/Critical-Care-Pain-Observation-Tool-(CPOT)    PAIN AD Score:   http://geriatrictoolkit.[facility]/cog/painad.pdf (press ctrl +  left click to view)    Dyspnea:                           [ ]None[ ]Mild [ ]Moderate [ ]Severe     Respiratory Distress Observation Scale (RDOS):   A score of 0 to 2 signifies little or no respiratory distress, 3 signifies mild distress, scores 4 to 6 indicate moderate distress, and scores greater than 7 signify severe distress  https://www.[facility].ca/sites/default/files/PDFS/348661-uwsrjktotez-iawncbvb-bumxjaoaqem-scxfw.pdf    Anxiety:                             [ ]None[ ]Mild [ ]Moderate [ ]Severe   Fatigue:                             [ ]None[ ]Mild [ ]Moderate [ ]Severe   Nausea:                             [ ]None[ ]Mild [ ]Moderate [ ]Severe   Loss of appetite:              [ ]None[ ]Mild [ ]Moderate [ ]Severe   Constipation:                    [ ]None[ ]Mild [ ]Moderate [ ]Severe    Other Symptoms:  [ ]All other review of systems negative     Palliative Performance Status Version 2:         %    http://[facility].org/files/news/palliative_performance_scale_ppsv2.pdf    PHYSICAL EXAM:  Vital Signs Last 24 Hrs  T(C): 36.4 (29 Nov 2024 08:44), Max: 36.7 (28 Nov 2024 16:35)  T(F): 97.6 (29 Nov 2024 08:44), Max: 98.1 (28 Nov 2024 16:35)  HR: 91 (29 Nov 2024 08:44) (69 - 91)  BP: 109/71 (29 Nov 2024 08:44) (109/71 - 125/83)  BP(mean): 87 (29 Nov 2024 00:33) (87 - 87)  RR: 17 (29 Nov 2024 08:44) (17 - 18)  SpO2: 98% (29 Nov 2024 08:44) (98% - 99%)    Parameters below as of 29 Nov 2024 08:44  Patient On (Oxygen Delivery Method): room air     I&O's Summary    28 Nov 2024 07:01  -  29 Nov 2024 07:00  --------------------------------------------------------  IN: 360 mL / OUT: 0 mL / NET: 360 mL        GENERAL:  [ ] No acute distress [ ]Lethargic  [ ]Unarousable  [ ]Verbal  [ ]Non-Verbal [ ]Cachexia    BEHAVIORAL/PSYCH:  [ ]Alert and Oriented x  [ ] Anxiety [ ] Delirium [ ] Agitation [ ] Calm   EYES: [ ] No scleral icterus [ ] Scleral icterus [ ] Closed  ENMT:  [ ]Dry mouth  [ ]No external oral lesions [ ] No external ear or nose lesions  CARDIOVASCULAR:  [ ]Regular [ ]Irregular [ ]Tachy [ ]Not Tachy  [ ]Mikhail [ ] Edema [ ] No edema  PULMONARY:  [ ]Tachypnea  [ ]Audible excessive secretions [ ] No labored breathing [ ] labored breathing  GASTROINTESTINAL: [ ]Soft  [ ]Distended  [ ]Not distended [ ]Non tender [ ]Tender  MUSCULOSKELETAL: [ ]No clubbing [ ] clubbing  [ ] No cyanosis [ ] cyanosis  NEUROLOGIC: [ ]No focal deficits  [ ]Follows commands  [ ]Does not follow commands  [ ]Cognitive impairment  [ ]Dysphagia  [ ]Dysarthria  [ ]Paresis   SKIN: [ ] Jaundiced [ ] Non-jaundiced [ ]Rash [ ]No Rash [ ] Warm [ ] Dry  MISC/LINES: [ ] ET tube [ ] Trach [ ]NGT/OGT [ ]PEG [ ]Mcduffie    LABS: reviewed by me                        10.0   13.66 )-----------( 382      ( 29 Nov 2024 07:27 )             31.3   11-29    143  |  109  |  16  ----------------------------<  122[H]  4.0   |  24  |  <0.5[L]    Ca    9.2      29 Nov 2024 07:27  Mg     2.0     11-29    TPro  6.0  /  Alb  3.2[L]  /  TBili  2.0[H]  /  DBili  x   /  AST  209[H]  /  ALT  48[H]  /  AlkPhos  572[H]  11-29      Urinalysis Basic - ( 29 Nov 2024 07:27 )    Color: x / Appearance: x / SG: x / pH: x  Gluc: 122 mg/dL / Ketone: x  / Bili: x / Urobili: x   Blood: x / Protein: x / Nitrite: x   Leuk Esterase: x / RBC: x / WBC x   Sq Epi: x / Non Sq Epi: x / Bacteria: x      RADIOLOGY & ADDITIONAL STUDIES: reviewed by me    EKG: reviewed by me      PROTEIN CALORIE MALNUTRITION PRESENT: [ ]mild [ ]moderate [ ]severe [ ]underweight [ ]morbid obesity  https://www.andeal.org/vault/2440/web/files/ONC/Table_Clinical%20Characteristics%20to%20Document%20Malnutrition-White%20JV%20et%20al%540299.pdf      Weight (kg): 52.2 (11-26-24 @ 19:33)  [ ]PPSV2 < or = to 30% [ ]significant weight loss  [ ]poor nutritional intake  [ ]anasarca      [ ]Artificial Nutrition      Palliative Care Spiritual/Emotional Screening Tool Question  Severity (0-4):  Score of 2 or greater indicates recommendation of Chaplaincy referral  Chaplaincy Referral: [ ] Yes [ ] Refused [ ] Following    Caregiver [place]:  [ ] Yes [ ] No [ ] Deferred  Social Work Referral [ ]  Patient and Family Centered Care Referral [ ]    Anticipatory Grief Present: [ ] Yes [ ] No [ ] Deferred  Social Work Referral [ ]  Patient and Family Centered Care Referral [ ]    Patient discussed with primary medical team MD  Palliative care education provided to patient and/or family   HPI:    H&P was obtained from a combination of speaking with the patient and [facility] nursing home ([phone])    Edie Gould is a 65 yo, with PMHx recurrent UTI, panic disorder, anxiety, schizophrenia, Parkinson's Disease?, generalized muscle weakness, osteoarthritis, vitamin D deficiency who arrived to the ED last night from the [facility] due to persistent fever (>101F on ibuprofen), positive UTI, elevated WBC on CBC, confusion, loss of appetite. 5 days ago patient had positive outpatient  UA, pt completed Macrobid x 5 days with non-resolution of symptoms, and so was switched to IV Ertapenem 1 gm solution which she took 1 dose yesterday. Pt has hx recurrent UTIs  previous culture grew morganella infection in the past which was sensitive for rocephin. Patient denies any dysuria, urinary frequency. Patient was found to have vaginal bleeding in nursing home a few days ago. Patient does not have any associated pain. Nursing home states that patient has been confused for 5 days since onset of UTI as well as decreased po intake. Patient was afebrile in nursing home only developed fevers in ED.     Pt sustained a mechanical fall 10 days ago at the nursing home. Patient was getting out of bed and fell but did not lose consciousness. No imaging was done.      In the ED, pt c/o generalized weakness and pain, nausea, and diarrhea.      Vitals  Temp 99F  116/75 HR 82  18 RR O2 97    Labs  HG 11.7   Alk Phos 563      T Bili 3.0  Bili 1.8  Lipase 92      UA:  + Nitrites, Large Bilirubin Large Blood, Proteinuria    Imaging  Transabdominal US:   Neither ovary is visualized. Uterus delineated but not well evaluated on   this limited study. Measures approximately 4.1 x 2.5 x 3.6 cm.   Endometrium not well delineated. No definite correlate to CT finding.    Evaluation of the bladder demonstrates intraluminal echogenic material   without internal vascularity could represent bladder hematoma given the   urinalysis demonstrated large blood. Neoplastic process cannot be   entirely excluded.      CT A/P:   1.  Edematous bladder wall thickening with mucosal hyperemia compatible   with cystitis.  2.  Mild stool distended rectum, correlate for constipation or fecal   impaction. No bowel obstruction.  3.  Small enhancing lesion in the uterus likely fibroid though polyp   cannot be excluded. Limited assessment on CT.    RUQ US:  Unremarkable right upper quadrant abdominal ultrasound.   (27 Nov 2024 09:14)      Interval history:     - patient comfortable on exam  - patient is confused and does not think she is in the hospital, tells me she lives at home      ADVANCE DIRECTIVES:     [X ] Full Code [ ] DNR  MOLST  [ ]  Living Will  [ ]   DECISION MAKER(s): Patient has a legal guardian - Maria Luisa Hopkins [phone] --> will try to confirm if she is medical decision maker   [ ] Health Care Proxy(s)  [ ] Surrogate(s)  [ X ] Guardian           Name(s): Phone Number(s):      BASELINE (I)ADL(s) (prior to admission):    [place]: [ ]Total  [X ] Moderate [ ]Dependent  Palliative Performance Status Version 2:         %    http://[facility].org/files/news/palliative_performance_scale_ppsv2.pdf    Allergies    moxifloxacin (Unknown)  azithromycin (Unknown)  penicillin (Unknown)    Intolerances    MEDICATIONS  (STANDING):  artificial  tears Solution 1 Drop(s) Both EYES daily  benztropine 1 milliGRAM(s) Oral daily  bisacodyl Suppository 10 milliGRAM(s) Rectal daily  carbidopa/levodopa  25/100 1 Tablet(s) Oral three times a day  cefTRIAXone   IVPB 2000 milliGRAM(s) IV Intermittent every 24 hours  clonazePAM  Tablet 0.5 milliGRAM(s) Oral two times a day  famotidine    Tablet 20 milliGRAM(s) Oral every 48 hours  gabapentin 400 milliGRAM(s) Oral three times a day  lactulose Syrup 10 Gram(s) Oral two times a day  OLANZapine 5 milliGRAM(s) Oral two times a day  polyethylene glycol 3350 17 Gram(s) Oral two times a day  senna 2 Tablet(s) Oral at bedtime    MEDICATIONS  (PRN):    PRESENT SYMPTOMS: [ ]Unable to obtain due to poor mentation   Source if other than patient:  [ ]Family   [ ]Team     Pain: [ ]yes [ X ]no  QOL impact -   Location -                    Aggravating factors -  Quality -  Radiation -  Timing-  Severity (0-10 scale):  Minimal acceptable level (0-10 scale):     CPOT:    https://www.[facility].org/getattachment/[email or web address]/Critical-Care-Pain-Observation-Tool-(CPOT)    PAIN AD Score:   http://geriatrictoolkit.[facility]/cog/painad.pdf (press ctrl +  left click to view)    Dyspnea:                           [ X]None[ ]Mild [ ]Moderate [ ]Severe     Respiratory Distress Observation Scale (RDOS):   A score of 0 to 2 signifies little or no respiratory distress, 3 signifies mild distress, scores 4 to 6 indicate moderate distress, and scores greater than 7 signify severe distress  https://www.[facility].ca/sites/default/files/PDFS/355372-tnzmfjiqwly-wmludzgb-uquikeveodq-trlte.pdf    Anxiety:                             [ ]None[ ]Mild [ ]Moderate [ ]Severe   Fatigue:                             [ ]None[ ]Mild [ ]Moderate [ ]Severe   Nausea:                             [ ]None[ ]Mild [ ]Moderate [ ]Severe   Loss of appetite:              [ ]None[ ]Mild [ ]Moderate [ ]Severe   Constipation:                    [ ]None[ ]Mild [ ]Moderate [ ]Severe    Other Symptoms:  [X ]All other review of systems negative     Palliative Performance Status Version 2:         %    http://npcrc.org/files/news/palliative_performance_scale_ppsv2.pdf    PHYSICAL EXAM:  Vital Signs Last 24 Hrs  T(C): 36.4 (29 Nov 2024 08:44), Max: 36.7 (28 Nov 2024 16:35)  T(F): 97.6 (29 Nov 2024 08:44), Max: 98.1 (28 Nov 2024 16:35)  HR: 91 (29 Nov 2024 08:44) (69 - 91)  BP: 109/71 (29 Nov 2024 08:44) (109/71 - 125/83)  BP(mean): 87 (29 Nov 2024 00:33) (87 - 87)  RR: 17 (29 Nov 2024 08:44) (17 - 18)  SpO2: 98% (29 Nov 2024 08:44) (98% - 99%)    GENERAL:  [x ] No acute distress [ ]Lethargic  [ ]Unarousable  [x ]Verbal  [ ]Non-Verbal [ ]Cachexia    BEHAVIORAL/PSYCH:  [x ]Alert and Oriented x 1 [ ] Anxiety [ ] Delirium [ ] Agitation [x ] Calm   EYES: [x ] No scleral icterus [ ] Scleral icterus [ ] Closed  ENMT:  [ ]Dry mouth  [ x]No external oral lesions [ ] No external ear or nose lesions  CARDIOVASCULAR:  [ ]Regular [ ]Irregular [ ]Tachy [ ]Not Tachy  [ ]Mikhail [ ] Edema [ x] No edema  PULMONARY:  [ ]Tachypnea  [ ]Audible excessive secretions [ x] No labored breathing [ ] labored breathing  GASTROINTESTINAL: [ ]Soft  [ ]Distended  [x ]Not distended [ ]Non tender [ ]Tender  MUSCULOSKELETAL: [ x ]No clubbing [ ] clubbing  [ ] No cyanosis [ ] cyanosis  NEUROLOGIC: [ ]No focal deficits  [ x]Follows commands  [ ]Does not follow commands  [x ]Cognitive impairment  [ ]Dysphagia  [ ]Dysarthria  [ ]Paresis   SKIN: [ ] Jaundiced [ x] Non-jaundiced [ ]Rash [ ]No Rash [ ] Warm [ ] Dry  MISC/LINES: [ ] ET tube [ ] Trach [ ]NGT/OGT [ ]PEG [ ]Mcduffie    LABS: reviewed by me                        10.0   13.66 )-----------( 382      ( 29 Nov 2024 07:27 )             31.3   11-29    143  |  109  |  16  ----------------------------<  122[H]  4.0   |  24  |  <0.5[L]    Ca    9.2      29 Nov 2024 07:27  Mg     2.0     11-29    TPro  6.0  /  Alb  3.2[L]  /  TBili  2.0[H]  /  DBili  x   /  AST  209[H]  /  ALT  48[H]  /  AlkPhos  572[H]  11-29      Urinalysis Basic - ( 29 Nov 2024 07:27 )    Color: x / Appearance: x / SG: x / pH: x  Gluc: 122 mg/dL / Ketone: x  / Bili: x / Urobili: x   Blood: x / Protein: x / Nitrite: x   Leuk Esterase: x / RBC: x / WBC x   Sq Epi: x / Non Sq Epi: x / Bacteria: x      RADIOLOGY & ADDITIONAL STUDIES: reviewed by me    < from: CT Head No Cont (11.28.24 @ 14:36) >    IMPRESSION:  No evidence of acute transcortical infarct, acute intracranial   hemorrhage, or mass effect.  Ventricles are dilated out of proportion to the degree of cortical   atrophy, which can signify normal pressure hydrocephalus.    --- End of Report ---        < end of copied text >      EKG: reviewed by me    Patient discussed with primary medical team MD  Palliative care education provided to patient and/or family   HPI:    H&P was obtained from a combination of speaking with the patient and [facility] nursing home ([phone])    Edie Gould is a 65 yo, with PMHx recurrent UTI, panic disorder, anxiety, schizophrenia, Parkinson's Disease?, generalized muscle weakness, osteoarthritis, vitamin D deficiency who arrived to the ED last night from the [facility] due to persistent fever (>101F on ibuprofen), positive UTI, elevated WBC on CBC, confusion, loss of appetite. 5 days ago patient had positive outpatient  UA, pt completed Macrobid x 5 days with non-resolution of symptoms, and so was switched to IV Ertapenem 1 gm solution which she took 1 dose yesterday. Pt has hx recurrent UTIs  previous culture grew morganella infection in the past which was sensitive for rocephin. Patient denies any dysuria, urinary frequency. Patient was found to have vaginal bleeding in nursing home a few days ago. Patient does not have any associated pain. Nursing home states that patient has been confused for 5 days since onset of UTI as well as decreased po intake. Patient was afebrile in nursing home only developed fevers in ED.     Pt sustained a mechanical fall 10 days ago at the nursing home. Patient was getting out of bed and fell but did not lose consciousness. No imaging was done.      In the ED, pt c/o generalized weakness and pain, nausea, and diarrhea.      Vitals  Temp 99F  116/75 HR 82  18 RR O2 97    Labs  HG 11.7   Alk Phos 563      T Bili 3.0  Bili 1.8  Lipase 92      UA:  + Nitrites, Large Bilirubin Large Blood, Proteinuria    Imaging  Transabdominal US:   Neither ovary is visualized. Uterus delineated but not well evaluated on   this limited study. Measures approximately 4.1 x 2.5 x 3.6 cm.   Endometrium not well delineated. No definite correlate to CT finding.    Evaluation of the bladder demonstrates intraluminal echogenic material   without internal vascularity could represent bladder hematoma given the   urinalysis demonstrated large blood. Neoplastic process cannot be   entirely excluded.      CT A/P:   1.  Edematous bladder wall thickening with mucosal hyperemia compatible   with cystitis.  2.  Mild stool distended rectum, correlate for constipation or fecal   impaction. No bowel obstruction.  3.  Small enhancing lesion in the uterus likely fibroid though polyp   cannot be excluded. Limited assessment on CT.    RUQ US:  Unremarkable right upper quadrant abdominal ultrasound.   (27 Nov 2024 09:14)      Interval history:     - patient comfortable on exam  - patient is confused and does not think she is in the hospital, tells me she lives at home      ADVANCE DIRECTIVES:     [X ] Full Code [ ] DNR  MOLST  [ ]  Living Will  [ ]   DECISION MAKER(s): Patient has a legal guardian - Maria Luisa Hopkins [phone] --> she is medical decision maker (court order is in the chart)   [ ] Health Care Proxy(s)  [ ] Surrogate(s)  [ X ] Guardian           Name(s): Phone Number(s):      BASELINE (I)ADL(s) (prior to admission):    [place]: [ ]Total  [X ] Moderate [ ]Dependent  Palliative Performance Status Version 2:         %    http://[facility].org/files/news/palliative_performance_scale_ppsv2.pdf    Allergies    moxifloxacin (Unknown)  azithromycin (Unknown)  penicillin (Unknown)    Intolerances    MEDICATIONS  (STANDING):  artificial  tears Solution 1 Drop(s) Both EYES daily  benztropine 1 milliGRAM(s) Oral daily  bisacodyl Suppository 10 milliGRAM(s) Rectal daily  carbidopa/levodopa  25/100 1 Tablet(s) Oral three times a day  cefTRIAXone   IVPB 2000 milliGRAM(s) IV Intermittent every 24 hours  clonazePAM  Tablet 0.5 milliGRAM(s) Oral two times a day  famotidine    Tablet 20 milliGRAM(s) Oral every 48 hours  gabapentin 400 milliGRAM(s) Oral three times a day  lactulose Syrup 10 Gram(s) Oral two times a day  OLANZapine 5 milliGRAM(s) Oral two times a day  polyethylene glycol 3350 17 Gram(s) Oral two times a day  senna 2 Tablet(s) Oral at bedtime    MEDICATIONS  (PRN):    PRESENT SYMPTOMS: [ ]Unable to obtain due to poor mentation   Source if other than patient:  [ ]Family   [ ]Team     Pain: [ ]yes [ X ]no  QOL impact -   Location -                    Aggravating factors -  Quality -  Radiation -  Timing-  Severity (0-10 scale):  Minimal acceptable level (0-10 scale):     CPOT:    https://www.[facility].org/getattachment/[email or web address]/Critical-Care-Pain-Observation-Tool-(CPOT)    PAIN AD Score:   http://geriatrictoolkit.[facility]/cog/painad.pdf (press ctrl +  left click to view)    Dyspnea:                           [ X]None[ ]Mild [ ]Moderate [ ]Severe     Respiratory Distress Observation Scale (RDOS):   A score of 0 to 2 signifies little or no respiratory distress, 3 signifies mild distress, scores 4 to 6 indicate moderate distress, and scores greater than 7 signify severe distress  https://www.[facility].ca/sites/default/files/PDFS/359037-uwcbrfnshdi-zoppsgkw-hnhnqtpkuiw-mhboe.pdf    Anxiety:                             [ ]None[ ]Mild [ ]Moderate [ ]Severe   Fatigue:                             [ ]None[ ]Mild [ ]Moderate [ ]Severe   Nausea:                             [ ]None[ ]Mild [ ]Moderate [ ]Severe   Loss of appetite:              [ ]None[ ]Mild [ ]Moderate [ ]Severe   Constipation:                    [ ]None[ ]Mild [ ]Moderate [ ]Severe    Other Symptoms:  [X ]All other review of systems negative     Palliative Performance Status Version 2:         %    http://[facility].org/files/news/palliative_performance_scale_ppsv2.pdf    PHYSICAL EXAM:  Vital Signs Last 24 Hrs  T(C): 36.4 (29 Nov 2024 08:44), Max: 36.7 (28 Nov 2024 16:35)  T(F): 97.6 (29 Nov 2024 08:44), Max: 98.1 (28 Nov 2024 16:35)  HR: 91 (29 Nov 2024 08:44) (69 - 91)  BP: 109/71 (29 Nov 2024 08:44) (109/71 - 125/83)  BP(mean): 87 (29 Nov 2024 00:33) (87 - 87)  RR: 17 (29 Nov 2024 08:44) (17 - 18)  SpO2: 98% (29 Nov 2024 08:44) (98% - 99%)    GENERAL:  [x ] No acute distress [ ]Lethargic  [ ]Unarousable  [x ]Verbal  [ ]Non-Verbal [ ]Cachexia    BEHAVIORAL/PSYCH:  [x ]Alert and Oriented x 1 [ ] Anxiety [ ] Delirium [ ] Agitation [x ] Calm   EYES: [x ] No scleral icterus [ ] Scleral icterus [ ] Closed  ENMT:  [ ]Dry mouth  [ x]No external oral lesions [ ] No external ear or nose lesions  CARDIOVASCULAR:  [ ]Regular [ ]Irregular [ ]Tachy [ ]Not Tachy  [ ]Mikhail [ ] Edema [ x] No edema  PULMONARY:  [ ]Tachypnea  [ ]Audible excessive secretions [ x] No labored breathing [ ] labored breathing  GASTROINTESTINAL: [ ]Soft  [ ]Distended  [x ]Not distended [ ]Non tender [ ]Tender  MUSCULOSKELETAL: [ x ]No clubbing [ ] clubbing  [ ] No cyanosis [ ] cyanosis  NEUROLOGIC: [ ]No focal deficits  [ x]Follows commands  [ ]Does not follow commands  [x ]Cognitive impairment  [ ]Dysphagia  [ ]Dysarthria  [ ]Paresis   SKIN: [ ] Jaundiced [ x] Non-jaundiced [ ]Rash [ ]No Rash [ ] Warm [ ] Dry  MISC/LINES: [ ] ET tube [ ] Trach [ ]NGT/OGT [ ]PEG [ ]Mcduffie    LABS: reviewed by me                        10.0   13.66 )-----------( 382      ( 29 Nov 2024 07:27 )             31.3   11-29    143  |  109  |  16  ----------------------------<  122[H]  4.0   |  24  |  <0.5[L]    Ca    9.2      29 Nov 2024 07:27  Mg     2.0     11-29    TPro  6.0  /  Alb  3.2[L]  /  TBili  2.0[H]  /  DBili  x   /  AST  209[H]  /  ALT  48[H]  /  AlkPhos  572[H]  11-29      Urinalysis Basic - ( 29 Nov 2024 07:27 )    Color: x / Appearance: x / SG: x / pH: x  Gluc: 122 mg/dL / Ketone: x  / Bili: x / Urobili: x   Blood: x / Protein: x / Nitrite: x   Leuk Esterase: x / RBC: x / WBC x   Sq Epi: x / Non Sq Epi: x / Bacteria: x      RADIOLOGY & ADDITIONAL STUDIES: reviewed by me    < from: CT Head No Cont (11.28.24 @ 14:36) >    IMPRESSION:  No evidence of acute transcortical infarct, acute intracranial   hemorrhage, or mass effect.  Ventricles are dilated out of proportion to the degree of cortical   atrophy, which can signify normal pressure hydrocephalus.    --- End of Report ---        < end of copied text >      EKG: reviewed by me    Patient discussed with primary medical team MD  Palliative care education provided to patient and/or family

## 2024-11-29 NOTE — PROGRESS NOTE ADULT - TIME BILLING
-I independently reviewed the completed labs ( CBC, CMP, cultures  along with sensitivities ) and imaging (CT/CXR as available with independent interpretation )  -My assessment required an independent historian  -I discussed my diagnostic/therapeutic recommendations with the primary team housestaff/Attending  -I assisted in the decision regarding continued need for hospitalization / or escalation of care as needed.

## 2024-11-29 NOTE — EEG REPORT - NS EEG TEXT BOX
[facility] Department of Neurology         Inpatient Routine-Electroencephalography Report    Patient Name:	COMFORT FARIAS    :	1960  MRN:	-    Study Start Date/Time:	2024, 4:55:28 PM    End Time (if applicable):    Brief Clinical History:  COMFORT FARIAS is a 64 year old Female; study performed to investigate for seizures or markers of epilepsy.   Technologist notes: ams, acute uti,tremor, osteoarthritis, anxiety, depression, bipolar disorder, schizophrenia, elevated LFTS    Diagnosis Code:  R56.9 convulsions/seizure  CPT:   23971 (awake/drowsy)     Pertinent Medication:  n/a    Acquisition Details:  Electroencephalography was acquired using a minimum of 21 channels on an [employer] Neurology system v 9.3.1 with electrode placement according to the standard International 10-20 system following ACNS (American Clinical Neurophysiology Society) guidelines.  Anterior temporal T1 and T2 electrodes were utilized whenever possible.  The XLTEK automated spike & seizure detections were all reviewed in detail, in addition to the entire raw EEG.    Findings:  Background:  continuous, with predominantly alpha and beta frequencies.  Generalized Slowing:  None  Symmetry/Focality: No persistent asymmetries of voltage or frequency.     Voltage:  Normal (20+ uV)  Organization:  Appropriate anterior-posterior gradient  Posterior Dominant Rhythm:  7-8 Hz symmetric, well-organized, and well-modulated  Sleep:  Symmetric, synchronous spindles and K complexes.  Variability:   Yes		Reactivity:  Yes    Spontaneous Activity:  No epileptiform discharges   Events:  1)	No electrographic seizures or significant clinical events occurred during this study.  Provocations:  •	Hyperventilation: was not performed.  •	Photic stimulation: was not performed.  FINAL Impression:  Normalawake and/or drowsy routine EEG  1)	 Unremarkable awake  recording.      Final Clinical Correlation:  1)	There were no findings of active epilepsy, however this alone does not rule out the diagnosis.          Yulia Romero MD   Attending Neurologist, [facility]

## 2024-11-29 NOTE — PROGRESS NOTE ADULT - ASSESSMENT
65 yo Female  with PMHx recurrent UTI, panic disorder, anxiety, schizophrenia, Parkinson's Disease, osteoarthritis, vitamin D deficiency who arrived to the ED from the [facility] due to persistent fever (>101F on ibuprofen), positive UTI, elevated WBC on CBC, confusion, loss of appetite. 5 days ago patient had positive outpatient  UA, pt completed Macrobid x 5 days with non-resolution of symptoms, and so was switched to IV Ertapenem 1 gm solution which she took 1 dose yesterday. Pt has hx recurrent UTIs  previous culture grew morganella infection in the past which was sensitive for rocephin. Patient currently AAx2. Hepatology consulted for elevated liver enzymes. On med rec , patient is on diclofenac BID. Unable to obtain further history. Admitted to medicine with UTI workup    #Acute Liver Injury - mixed pattern likely DILI with underlying sepsis vs others  #UTI with encephalopathy  - LFTs on admission:3.2/550/154/355  - LFTs 2021 normal, 2022 mildly elevated AST  - RUQ sono - CBD 3mm unremarkable  - CTAP : mild stool burden, stable common hepatic and portocaval lymph nodes, uterine polyp  - Medications reviewed: received macrobid, 1 dose of ertapenam, on diclofenac  - Hepatitis A IgM-, Hepatitis B core IgM-, core Ab total, surface Ag-, surface Ab, HCV antibody- CMV Ig M, EBV IgM -, HSV -, autoimmune pending    Rec  - Check INR, trend LFTs  - STart ursodiol 5-10mg/kg in two divided doses as ALP>500  - Discontinue NSAIDs  - Please avoid hepatotoxic medications/avoid hypotension    #Stool burden on CT with constipation- improved    RECS  - Recommend miralax BID senna 2 tabs at night  - recommend outpatient colonoscopy  - Follow up with our [facility] located at [address] [place] [address]. Phone Number: [phone]      #Hx of gastric cardia ulcer  - s/p EGD 2022: 1/22 for abdominal pain and esophageal thickening: esophageal candidiasis (biopsy reflux esophagitis), non erosive gastritis , ulcer in the cardia , normal duodenum (bx duodenitis)  - Was recommended to repeat EGD in 2months but did not follow up    RECS  - PPI 40mg once daily for ppx  - Follow up with our [facility] located at [address] [place] [address]. Phone Number: [phone]

## 2024-11-29 NOTE — PROGRESS NOTE ADULT - ASSESSMENT
63 yo F with PMHx recurrent  UTI, panic disorder, anxiety, schizophrenia, questionable Parkinson's Disease, generalized muscle weakness, osteoarthritis, vitamin D deficiency, arrived to the ED last night from the [facility] due to persistent fever (>101F on ibuprofen), positive UTI, elevated WBC on CBC, confusion, loss of appetite. In the ED, pt is afebrile but c/o generalized weakness and pain, nausea, and diarrhea. Patient was admitted for transaminitis as well as UTI.     Patient comfortable on exam. Patient lacks decision making capacity. Will try to confirm that legal guardian is surrogate medical decision maker as well.     Education about palliative care provided to patient/family.  See Recs below.    Please call [phone] with questions or concerns 24/7.   We will continue to follow.    63 yo F with PMHx recurrent  UTI, panic disorder, anxiety, schizophrenia, questionable Parkinson's Disease, generalized muscle weakness, osteoarthritis, vitamin D deficiency, arrived to the ED last night from the [facility] due to persistent fever (>101F on ibuprofen), positive UTI, elevated WBC on CBC, confusion, loss of appetite. In the ED, pt is afebrile but c/o generalized weakness and pain, nausea, and diarrhea. Patient was admitted for transaminitis as well as UTI.     Patient comfortable on exam. Patient lacks decision making capacity. Confirmed legal guardian is decision maker for medical decisions. No real need for GOC discussions with legal guardian at this time. Will sign off.     Education about palliative care provided to patient/family.  See Recs below.    Please call [phone] with questions or concerns 24/7.

## 2024-11-30 LAB
ALBUMIN SERPL ELPH-MCNC: 3.1 G/DL — LOW (ref 3.5–5.2)
ALP SERPL-CCNC: 524 U/L — HIGH (ref 30–115)
ALT FLD-CCNC: 111 U/L — HIGH (ref 0–41)
ANION GAP SERPL CALC-SCNC: 13 MMOL/L — SIGNIFICANT CHANGE UP (ref 7–14)
APTT BLD: 30.3 SEC — SIGNIFICANT CHANGE UP (ref 27–39.2)
AST SERPL-CCNC: 229 U/L — HIGH (ref 0–41)
BASOPHILS # BLD AUTO: 0.17 K/UL — SIGNIFICANT CHANGE UP (ref 0–0.2)
BASOPHILS NFR BLD AUTO: 1.2 % — HIGH (ref 0–1)
BILIRUB SERPL-MCNC: 1.5 MG/DL — HIGH (ref 0.2–1.2)
BUN SERPL-MCNC: 11 MG/DL — SIGNIFICANT CHANGE UP (ref 10–20)
CALCIUM SERPL-MCNC: 8.8 MG/DL — SIGNIFICANT CHANGE UP (ref 8.4–10.5)
CHLORIDE SERPL-SCNC: 105 MMOL/L — SIGNIFICANT CHANGE UP (ref 98–110)
CO2 SERPL-SCNC: 21 MMOL/L — SIGNIFICANT CHANGE UP (ref 17–32)
CREAT SERPL-MCNC: 0.5 MG/DL — LOW (ref 0.7–1.5)
EGFR: 103 ML/MIN/1.73M2 — SIGNIFICANT CHANGE UP
EOSINOPHIL # BLD AUTO: 0.36 K/UL — SIGNIFICANT CHANGE UP (ref 0–0.7)
EOSINOPHIL NFR BLD AUTO: 2.6 % — SIGNIFICANT CHANGE UP (ref 0–8)
GLUCOSE SERPL-MCNC: 68 MG/DL — LOW (ref 70–99)
HCT VFR BLD CALC: 33 % — LOW (ref 37–47)
HGB BLD-MCNC: 10.4 G/DL — LOW (ref 12–16)
IMM GRANULOCYTES NFR BLD AUTO: 6.4 % — HIGH (ref 0.1–0.3)
INR BLD: 0.93 RATIO — SIGNIFICANT CHANGE UP (ref 0.65–1.3)
LYMPHOCYTES # BLD AUTO: 37 % — SIGNIFICANT CHANGE UP (ref 20.5–51.1)
LYMPHOCYTES # BLD AUTO: 5.12 K/UL — HIGH (ref 1.2–3.4)
MCHC RBC-ENTMCNC: 30.3 PG — SIGNIFICANT CHANGE UP (ref 27–31)
MCHC RBC-ENTMCNC: 31.5 G/DL — LOW (ref 32–37)
MCV RBC AUTO: 96.2 FL — SIGNIFICANT CHANGE UP (ref 81–99)
MONOCYTES # BLD AUTO: 0.74 K/UL — HIGH (ref 0.1–0.6)
MONOCYTES NFR BLD AUTO: 5.4 % — SIGNIFICANT CHANGE UP (ref 1.7–9.3)
NEUTROPHILS # BLD AUTO: 6.56 K/UL — HIGH (ref 1.4–6.5)
NEUTROPHILS NFR BLD AUTO: 47.4 % — SIGNIFICANT CHANGE UP (ref 42.2–75.2)
NRBC # BLD: 0 /100 WBCS — SIGNIFICANT CHANGE UP (ref 0–0)
PLATELET # BLD AUTO: 297 K/UL — SIGNIFICANT CHANGE UP (ref 130–400)
PMV BLD: 11.8 FL — HIGH (ref 7.4–10.4)
POTASSIUM SERPL-MCNC: 5.2 MMOL/L — HIGH (ref 3.5–5)
POTASSIUM SERPL-SCNC: 5.2 MMOL/L — HIGH (ref 3.5–5)
PROT SERPL-MCNC: 5.9 G/DL — LOW (ref 6–8)
PROTHROM AB SERPL-ACNC: 11 SEC — SIGNIFICANT CHANGE UP (ref 9.95–12.87)
RBC # BLD: 3.43 M/UL — LOW (ref 4.2–5.4)
RBC # FLD: 14.8 % — HIGH (ref 11.5–14.5)
SODIUM SERPL-SCNC: 139 MMOL/L — SIGNIFICANT CHANGE UP (ref 135–146)
WBC # BLD: 13.83 K/UL — HIGH (ref 4.8–10.8)
WBC # FLD AUTO: 13.83 K/UL — HIGH (ref 4.8–10.8)

## 2024-11-30 PROCEDURE — 99232 SBSQ HOSP IP/OBS MODERATE 35: CPT

## 2024-11-30 PROCEDURE — 99222 1ST HOSP IP/OBS MODERATE 55: CPT

## 2024-11-30 RX ORDER — GABAPENTIN 300 MG/1
400 CAPSULE ORAL AT BEDTIME
Refills: 0 | Status: DISCONTINUED | OUTPATIENT
Start: 2024-11-30 | End: 2024-12-01

## 2024-11-30 RX ORDER — CLONAZEPAM 0.12 MG/1
0.5 TABLET, ORALLY DISINTEGRATING ORAL AT BEDTIME
Refills: 0 | Status: DISCONTINUED | OUTPATIENT
Start: 2024-11-30 | End: 2024-12-01

## 2024-11-30 RX ORDER — CHOLECALCIFEROL (VITAMIN D3) 10MCG/0.25
1000 DROPS ORAL DAILY
Refills: 0 | Status: DISCONTINUED | OUTPATIENT
Start: 2024-11-30 | End: 2024-12-03

## 2024-11-30 RX ADMIN — CARBIDOPA AND LEVODOPA 1 TABLET(S): 10; 100 TABLET ORAL at 05:25

## 2024-11-30 RX ADMIN — LACTULOSE 10 GRAM(S): 10 SOLUTION ORAL at 05:26

## 2024-11-30 RX ADMIN — OLANZAPINE 5 MILLIGRAM(S): 20 TABLET ORAL at 17:05

## 2024-11-30 RX ADMIN — POVIDONE, POLYVINYL ALCOHOL 1 DROP(S): 20; 27 SOLUTION OPHTHALMIC at 11:07

## 2024-11-30 RX ADMIN — OLANZAPINE 5 MILLIGRAM(S): 20 TABLET ORAL at 05:27

## 2024-11-30 RX ADMIN — CLONAZEPAM 0.5 MILLIGRAM(S): 0.12 TABLET, ORALLY DISINTEGRATING ORAL at 05:26

## 2024-11-30 RX ADMIN — GABAPENTIN 400 MILLIGRAM(S): 300 CAPSULE ORAL at 21:24

## 2024-11-30 RX ADMIN — URSODIOL 200 MILLIGRAM(S): 300 CAPSULE ORAL at 17:05

## 2024-11-30 RX ADMIN — LACTULOSE 10 GRAM(S): 10 SOLUTION ORAL at 17:05

## 2024-11-30 RX ADMIN — Medication 1000 UNIT(S): at 11:44

## 2024-11-30 RX ADMIN — Medication 2 TABLET(S): at 21:24

## 2024-11-30 RX ADMIN — POLYETHYLENE GLYCOL 3350 17 GRAM(S): 17 POWDER, FOR SOLUTION ORAL at 17:05

## 2024-11-30 RX ADMIN — Medication 100 MILLIGRAM(S): at 17:05

## 2024-11-30 RX ADMIN — GABAPENTIN 400 MILLIGRAM(S): 300 CAPSULE ORAL at 05:26

## 2024-11-30 RX ADMIN — CLONAZEPAM 0.5 MILLIGRAM(S): 0.12 TABLET, ORALLY DISINTEGRATING ORAL at 21:24

## 2024-11-30 RX ADMIN — POLYETHYLENE GLYCOL 3350 17 GRAM(S): 17 POWDER, FOR SOLUTION ORAL at 05:27

## 2024-11-30 RX ADMIN — FAMOTIDINE 20 MILLIGRAM(S): 20 TABLET, FILM COATED ORAL at 05:26

## 2024-11-30 RX ADMIN — URSODIOL 200 MILLIGRAM(S): 300 CAPSULE ORAL at 05:26

## 2024-11-30 RX ADMIN — CARBIDOPA AND LEVODOPA 1 TABLET(S): 10; 100 TABLET ORAL at 21:24

## 2024-11-30 RX ADMIN — CARBIDOPA AND LEVODOPA 1 TABLET(S): 10; 100 TABLET ORAL at 12:59

## 2024-11-30 RX ADMIN — Medication 1 MILLIGRAM(S): at 11:07

## 2024-11-30 NOTE — DIETITIAN INITIAL EVALUATION ADULT - OTHER CALCULATIONS
Estimated Calorie Needs: [id number] x AF 1.2-1.3=1205-1305kcal/day   Estimated Protein Needs: 63-73grams/day (1.2-1.4grams/kg of admit weight) -Due to age  Estimated Fluid Needs: 1205-1305mL/day (1mL/kcal)

## 2024-11-30 NOTE — DIETITIAN INITIAL EVALUATION ADULT - PERTINENT MEDS FT
MEDICATIONS  (STANDING):  artificial  tears Solution 1 Drop(s) Both EYES daily  benztropine 1 milliGRAM(s) Oral daily  carbidopa/levodopa  25/100 1 Tablet(s) Oral three times a day  cefTRIAXone   IVPB 2000 milliGRAM(s) IV Intermittent every 24 hours  cholecalciferol 1000 Unit(s) Oral daily  clonazePAM  Tablet 0.5 milliGRAM(s) Oral at bedtime  famotidine    Tablet 20 milliGRAM(s) Oral every 48 hours  gabapentin 400 milliGRAM(s) Oral at bedtime  lactulose Syrup 10 Gram(s) Oral two times a day  OLANZapine 5 milliGRAM(s) Oral two times a day  polyethylene glycol 3350 17 Gram(s) Oral two times a day  senna 2 Tablet(s) Oral at bedtime  ursodiol Suspension 200 milliGRAM(s) Oral two times a day    MEDICATIONS  (PRN):

## 2024-11-30 NOTE — DIETITIAN INITIAL EVALUATION ADULT - ORAL INTAKE PTA/DIET HISTORY
I spoke to the patient's niece name Freida via telephone [phone]. Per niece, prior to admission, the patient resided at [facility]. However, the niece is not sure what kind of diet the patient received and how her appetite was prior to admission. NKFA. Denies weight loss.

## 2024-11-30 NOTE — DIETITIAN INITIAL EVALUATION ADULT - NAME AND PHONE
Intervention: 1.Meals and Snacks 2.Coordination of Care  Monitor/Evaluate: Diet order, energy intake, nutrition focused physical findings, K, glucose and anemia profile

## 2024-11-30 NOTE — DIETITIAN INITIAL EVALUATION ADULT - PERTINENT LABORATORY DATA
11-30    139  |  105  |  11  ----------------------------<  68[L]  5.2[H]   |  21  |  0.5[L]    Ca    8.8      30 Nov 2024 05:11  Mg     2.0     11-29    TPro  5.9[L]  /  Alb  3.1[L]  /  TBili  1.5[H]  /  DBili  x   /  AST  229[H]  /  ALT  111[H]  /  AlkPhos  524[H]  11-30

## 2024-11-30 NOTE — PROGRESS NOTE ADULT - ASSESSMENT
63 yo, with PMHx recurrent  UTI, panic disorder, anxiety, schizophrenia, questionable Parkinson's Disease, generalized muscle weakness, osteoarthritis, vitamin D deficiency, arrived to the ED last night from the [facility] due to persistent fever (>101F on ibuprofen), positive UTI, elevated WBC on CBC, confusion, loss of appetite. In the ED, pt is afebrile but c/o generalized weakness and pain, nausea, and diarrhea. Patient was admitted for transaminitis as well as UTI.     # AMS - possibly metabolic encephalopathy- recurrent episode  - r/o toxic encephalopathy related to klonopin/gabapentin  CT head - ventriculomegaly, neuro evaluated- similar to prior imaging  EEG- unremarkable  stop klonopin and gabapentin  - monitor mental status    # complicated UTI  # echogenic material in bladder- hematoma vs neoplastic process  - urine culture- no growth  Gyn eval- no intervention; recommends urology  urology evaluated recommended OP follow up  ID eval- continue cefriaxone 2 g daily  - monitor cbc, active type and screen    # Transaminitis- mixed pattern- possibly due to DILI; nitrofurantoin  -RUQ: negative  -Hepatitis Panel: neg, EBV- IgG +  - started on ursodiol  Monitor liver tests and INR .    # constipation- improved  senna, miralax    #Schizophrenia/ Anxiety/ Panic Disorder  -Continue home medications: Olanzapine  hold klonopin and gabapentin    #Parkinson's Disease  -Continue with Sinemet and Benztropine    #Vitamin D def  #OA  -Continue with home medications  -Vit D level pending    DVT ppx: hold for hematuria  Dispo: from [facility]    Pending: cbc, improvement in mental status, continuing antibiotics, ID follow up, monitoring LFT    Time spent 45 mnts .

## 2024-11-30 NOTE — CHART NOTE - NSCHARTNOTEFT_GEN_A_CORE
patient evaluated again.  Patient is more awake and able to converse  Will change klonopin and gabapentin only to the bedtime. will monitor patient's mental status    Patient requested to call family- spoke to her niece Freida- [phone]; family stated they will be coming to visit her today  Also updated patient's legal guardian- Jayden. Informed that she is patient's medical decision maker as well.
Discussed with legal guardian- Mone Hopkins - [phone]  patient long term resident of [facility]; at baseline able to have appropriate conversation. Patient needs assistance in transfer to wheelchair  patient has guardian because of psych history.

## 2024-11-30 NOTE — DIETITIAN INITIAL EVALUATION ADULT - NSFNSGIIOFT_GEN_A_CORE
Dx: 65y/o female with h/o recurrent UTI, panic disorder, anxiety, schizophrenia, questionable Parkinson's Disease, generalized muscle weakness, osteoarthritis and vitamin D deficiency, arrived to the ED last night from the [facility] due to persistent fever (>101F on ibuprofen), positive UTI, elevated WBC on CBC, confusion, loss of appetite. Noted to have generalized weakness and pain, nausea, and diarrhea. Admitted for transaminitis, UTI and AMS possibly metabolic encephalopathy (recurrent episode).

## 2024-11-30 NOTE — PROGRESS NOTE ADULT - SUBJECTIVE AND OBJECTIVE BOX
HPI  Patient is a 64y old Female who presents with a chief complaint of Transaminitis, UTI, vaginal bleeding (29 Nov 2024 17:52)    Currently admitted to medicine with the primary diagnosis of Acute UTI       Today is hospital day 3d.     INTERVAL HPI / OVERNIGHT EVENTS:  Patient was seen and examined at bedside  patient is obtunded with very minimal verbal response          PAST MEDICAL & SURGICAL HISTORY  Schizophrenia    Bipolar disorder    Depression    Anxiety    Chronic lower back pain  result of pelvic fracture in the past    Osteoarthritis    Urinary incontinence    Chronic urinary tract infection    History of tremor    History of total knee arthroplasty, left      ALLERGIES  moxifloxacin (Unknown)  azithromycin (Unknown)  penicillin (Unknown)    MEDICATIONS  STANDING MEDICATIONS  artificial  tears Solution 1 Drop(s) Both EYES daily  benztropine 1 milliGRAM(s) Oral daily  carbidopa/levodopa  25/100 1 Tablet(s) Oral three times a day  cefTRIAXone   IVPB 2000 milliGRAM(s) IV Intermittent every 24 hours  cholecalciferol 1000 Unit(s) Oral daily  famotidine    Tablet 20 milliGRAM(s) Oral every 48 hours  lactulose Syrup 10 Gram(s) Oral two times a day  OLANZapine 5 milliGRAM(s) Oral two times a day  polyethylene glycol 3350 17 Gram(s) Oral two times a day  senna 2 Tablet(s) Oral at bedtime  ursodiol Suspension 200 milliGRAM(s) Oral two times a day    PRN MEDICATIONS    VITALS:  T(F): 97.6  HR: 76  BP: 127/72  RR: 18  SpO2: 99%    PHYSICAL EXAM  GEN: no distress, comfortable  pupils dilated - reactive  PULM: BS heard b/l equal, No wheezing  CVS: S1S2 present, no rubs or gallops  ABD: Soft, non-distended, no guarding; non-tender  EXT: No lower extremity edema  NEURO:obtunded; moving both upper extremity spontaneously    LABS                        10.4   13.83 )-----------( 297      ( 30 Nov 2024 05:11 )             33.0     11-30    139  |  105  |  11  ----------------------------<  68[L]  5.2[H]   |  21  |  0.5[L]    Ca    8.8      30 Nov 2024 05:11  Mg     2.0     11-29    TPro  5.9[L]  /  Alb  3.1[L]  /  TBili  1.5[H]  /  DBili  x   /  AST  229[H]  /  ALT  111[H]  /  AlkPhos  524[H]  11-30      Urinalysis Basic - ( 30 Nov 2024 05:11 )    Color: x / Appearance: x / SG: x / pH: x  Gluc: 68 mg/dL / Ketone: x  / Bili: x / Urobili: x   Blood: x / Protein: x / Nitrite: x   Leuk Esterase: x / RBC: x / WBC x   Sq Epi: x / Non Sq Epi: x / Bacteria: x            Culture - Urine (collected 28 Nov 2024 16:12)  Source: Catheterized Catheterized  Final Report (29 Nov 2024 22:59):    <10,000 CFU/mL Normal Urogenital Landy          RADIOLOGY

## 2024-12-01 PROCEDURE — 99232 SBSQ HOSP IP/OBS MODERATE 35: CPT

## 2024-12-01 RX ADMIN — POVIDONE, POLYVINYL ALCOHOL 1 DROP(S): 20; 27 SOLUTION OPHTHALMIC at 11:02

## 2024-12-01 RX ADMIN — URSODIOL 200 MILLIGRAM(S): 300 CAPSULE ORAL at 05:27

## 2024-12-01 RX ADMIN — CARBIDOPA AND LEVODOPA 1 TABLET(S): 10; 100 TABLET ORAL at 13:16

## 2024-12-01 RX ADMIN — Medication 1 MILLIGRAM(S): at 11:02

## 2024-12-01 RX ADMIN — Medication 1000 UNIT(S): at 11:02

## 2024-12-01 RX ADMIN — OLANZAPINE 5 MILLIGRAM(S): 20 TABLET ORAL at 05:27

## 2024-12-01 RX ADMIN — URSODIOL 200 MILLIGRAM(S): 300 CAPSULE ORAL at 17:04

## 2024-12-01 RX ADMIN — LACTULOSE 10 GRAM(S): 10 SOLUTION ORAL at 05:26

## 2024-12-01 RX ADMIN — OLANZAPINE 5 MILLIGRAM(S): 20 TABLET ORAL at 17:04

## 2024-12-01 RX ADMIN — Medication 100 MILLIGRAM(S): at 17:04

## 2024-12-01 RX ADMIN — CARBIDOPA AND LEVODOPA 1 TABLET(S): 10; 100 TABLET ORAL at 21:17

## 2024-12-01 RX ADMIN — LACTULOSE 10 GRAM(S): 10 SOLUTION ORAL at 17:04

## 2024-12-01 RX ADMIN — POLYETHYLENE GLYCOL 3350 17 GRAM(S): 17 POWDER, FOR SOLUTION ORAL at 17:04

## 2024-12-01 RX ADMIN — POLYETHYLENE GLYCOL 3350 17 GRAM(S): 17 POWDER, FOR SOLUTION ORAL at 05:28

## 2024-12-01 RX ADMIN — CARBIDOPA AND LEVODOPA 1 TABLET(S): 10; 100 TABLET ORAL at 05:27

## 2024-12-01 NOTE — PHYSICAL THERAPY INITIAL EVALUATION ADULT - WEIGHT-BEARING RESTRICTIONS: SIT/STAND, REHAB EVAL
[Normal Oropharynx] : the oropharynx was normal [Normal Nasal Mucosa] : the nasal mucosa was normal [Kyphosis] : no kyphosis [Scoliosis] : no scoliosis [de-identified] : Irregularly irregular briefly [de-identified] : refused [FreeTextEntry1] : Refused rectal exam weight-bearing as tolerated

## 2024-12-01 NOTE — PHYSICAL THERAPY INITIAL EVALUATION ADULT - PERTINENT HX OF CURRENT PROBLEM, REHAB EVAL
H&P was obtained from a combination of speaking with the patient and [facility] nursing home ([phone])    Edie Gould is a 65 yo, with PMHx recurrent UTI, panic disorder, anxiety, schizophrenia, Parkinson's Disease?, generalized muscle weakness, osteoarthritis, vitamin D deficiency who arrived to the ED last night from the [facility] due to persistent fever (>101F on ibuprofen), positive UTI, elevated WBC on CBC, confusion, loss of appetite. 5 days ago patient had positive outpatient  UA, pt completed Macrobid x 5 days with non-resolution of symptoms, and so was switched to IV Ertapenem 1 gm solution which she took 1 dose yesterday. Pt has hx recurrent UTIs  previous culture grew morganella infection in the past which was sensitive for rocephin. Patient denies any dysuria, urinary frequency. Patient was found to have vaginal bleeding in nursing home a few days ago. Patient does not have any associated pain. Nursing home states that patient has been confused for 5 days since onset of UTI as well as decreased po intake. Patient was afebrile in nursing home only developed fevers in ED.     Pt sustained a mechanical fall 10 days ago at the nursing home. Patient was getting out of bed and fell but did not lose consciousness. No imaging was done.

## 2024-12-01 NOTE — PROGRESS NOTE ADULT - SUBJECTIVE AND OBJECTIVE BOX
SUBJECTIVE/OVERNIGHT EVENTS  Today is hospital day 4d. This morning patient was seen and examined at bedside, resting comfortably in bed. No acute or major events overnight.    HOSPITAL COURSE  Day 1:   Day 2:   Day 3:     CODE STATUS:    FAMILY COMMUNICATION  Contact date:  Name of person contacted:  Relationship to patient:  Communication details:    MEDICATIONS  STANDING MEDICATIONS  artificial  tears Solution 1 Drop(s) Both EYES daily  benztropine 1 milliGRAM(s) Oral daily  carbidopa/levodopa  25/100 1 Tablet(s) Oral three times a day  cefTRIAXone   IVPB 2000 milliGRAM(s) IV Intermittent every 24 hours  cholecalciferol 1000 Unit(s) Oral daily  clonazePAM  Tablet 0.5 milliGRAM(s) Oral at bedtime  famotidine    Tablet 20 milliGRAM(s) Oral every 48 hours  gabapentin 400 milliGRAM(s) Oral at bedtime  lactulose Syrup 10 Gram(s) Oral two times a day  OLANZapine 5 milliGRAM(s) Oral two times a day  polyethylene glycol 3350 17 Gram(s) Oral two times a day  senna 2 Tablet(s) Oral at bedtime  ursodiol Suspension 200 milliGRAM(s) Oral two times a day    PRN MEDICATIONS    VITALS  T(F): 97.9 (11-30-24 @ 15:50), Max: 97.9 (11-30-24 @ 15:50)  HR: 87 (11-30-24 @ 15:50) (76 - 87)  BP: 113/66 (11-30-24 @ 15:50) (113/66 - 134/61)  RR: 19 (11-30-24 @ 15:50) (18 - 19)  SpO2: 98% (11-30-24 @ 15:50) (98% - 99%)      (  ) Indwelling Mcduffie Catheter   Date insterted:    Reason (  ) Critical illness     (  ) urinary retention    (  ) Accurate Ins/Outs Monitoring     (  ) CMO patient    (  ) Central Line  Date inserted:  Location: (  ) Right IJ   (  ) Left IJ   (  ) Right Fem   (  ) Left Fem    (  ) SPC  (  ) pigtail  (  ) PEG tube  (  ) colostomy  (  ) jejunostomy  (  ) U-Dall    LABS             10.4   13.83 )-----------( 297      ( 11-30-24 @ 05:11 )             33.0     139  |  105  |  11  -------------------------<  68   11-30-24 @ 05:11  5.2  |  21  |  0.5    Ca      8.8     11-30-24 @ 05:11  Mg     2.0     11-29-24 @ 07:27    TPro  5.9  /  Alb  3.1  /  TBili  1.5  /  DBili  x   /  AST  229  /  ALT  111  /  AlkPhos  524  /  GGT  x     11-30-24 @ 05:11    PT/INR - ( 11-30-24 @ 12:24 )   PT: 11.00 sec;   INR: 0.93 ratio  PTT - ( 11-30-24 @ 12:24 )  PTT:30.3 sec      Urinalysis Basic - ( 30 Nov 2024 05:11 )    Color: x / Appearance: x / SG: x / pH: x  Gluc: 68 mg/dL / Ketone: x  / Bili: x / Urobili: x   Blood: x / Protein: x / Nitrite: x   Leuk Esterase: x / RBC: x / WBC x   Sq Epi: x / Non Sq Epi: x / Bacteria: x          Culture - Urine (collected 28 Nov 2024 16:12)  Source: Catheterized Catheterized  Final Report (29 Nov 2024 22:59):    <10,000 CFU/mL Normal Urogenital Landy      IMAGING

## 2024-12-01 NOTE — PROGRESS NOTE ADULT - SUBJECTIVE AND OBJECTIVE BOX
HPI  Patient is a 64y old Female who presents with a chief complaint of Transaminitis, UTI, vaginal bleeding (01 Dec 2024 00:24)    Currently admitted to medicine with the primary diagnosis of Acute UTI       Today is hospital day 4d.     INTERVAL HPI / OVERNIGHT EVENTS:  Patient was seen and examined at bedside  patient is obtunded  needs constant stimuli for response        PAST MEDICAL & SURGICAL HISTORY  Schizophrenia    Bipolar disorder    Depression    Anxiety    Chronic lower back pain  result of pelvic fracture in the past    Osteoarthritis    Urinary incontinence    Chronic urinary tract infection    History of tremor    History of total knee arthroplasty, left      ALLERGIES  moxifloxacin (Unknown)  azithromycin (Unknown)  penicillin (Unknown)    MEDICATIONS  STANDING MEDICATIONS  artificial  tears Solution 1 Drop(s) Both EYES daily  benztropine 1 milliGRAM(s) Oral daily  carbidopa/levodopa  25/100 1 Tablet(s) Oral three times a day  cefTRIAXone   IVPB 2000 milliGRAM(s) IV Intermittent every 24 hours  cholecalciferol 1000 Unit(s) Oral daily  famotidine    Tablet 20 milliGRAM(s) Oral every 48 hours  lactulose Syrup 10 Gram(s) Oral two times a day  OLANZapine 5 milliGRAM(s) Oral two times a day  polyethylene glycol 3350 17 Gram(s) Oral two times a day  senna 2 Tablet(s) Oral at bedtime  ursodiol Suspension 200 milliGRAM(s) Oral two times a day    PRN MEDICATIONS    VITALS:  T(F): 97.5  HR: 67  BP: 117/82  RR: 18  SpO2: 98%    PHYSICAL EXAM  GEN: no distress  PULM: BS heard b/l equal, No wheezing  CVS: S1S2 present, no rubs or gallops  ABD: Soft, non-distended, no guarding; non-tender  EXT: No lower extremity edema  NEURO: obtunded; oriented X2; moving b/l upper extremity to commans- very slow and weak     LABS                        10.4   13.83 )-----------( 297      ( 30 Nov 2024 05:11 )             33.0     11-30    139  |  105  |  11  ----------------------------<  68[L]  5.2[H]   |  21  |  0.5[L]    Ca    8.8      30 Nov 2024 05:11    TPro  5.9[L]  /  Alb  3.1[L]  /  TBili  1.5[H]  /  DBili  x   /  AST  229[H]  /  ALT  111[H]  /  AlkPhos  524[H]  11-30    PT/INR - ( 30 Nov 2024 12:24 )   PT: 11.00 sec;   INR: 0.93 ratio         PTT - ( 30 Nov 2024 12:24 )  PTT:30.3 sec  Urinalysis Basic - ( 30 Nov 2024 05:11 )    Color: x / Appearance: x / SG: x / pH: x  Gluc: 68 mg/dL / Ketone: x  / Bili: x / Urobili: x   Blood: x / Protein: x / Nitrite: x   Leuk Esterase: x / RBC: x / WBC x   Sq Epi: x / Non Sq Epi: x / Bacteria: x            Culture - Urine (collected 28 Nov 2024 16:12)  Source: Catheterized Catheterized  Final Report (29 Nov 2024 22:59):    <10,000 CFU/mL Normal Urogenital Landy          RADIOLOGY

## 2024-12-01 NOTE — PHYSICAL THERAPY INITIAL EVALUATION ADULT - ADDITIONAL COMMENTS
Patient admitted form SICC. Patient is a poor historian. Claims to be independent in ADLS and ambulation using RW.

## 2024-12-01 NOTE — PROGRESS NOTE ADULT - ASSESSMENT
63 yo, with PMHx recurrent  UTI, panic disorder, anxiety, schizophrenia, questionable Parkinson's Disease, generalized muscle weakness, osteoarthritis, vitamin D deficiency, arrived to the ED last night from the [facility] due to persistent fever (>101F on ibuprofen), positive UTI, elevated WBC on CBC, confusion, loss of appetite. In the ED, pt is afebrile but c/o generalized weakness and pain, nausea, and diarrhea. Patient was admitted for transaminitis as well as UTI.     # AMS - possibly metabolic encephalopathy- recurrent episode  - r/o toxic encephalopathy related to klonopin/gabapentin  CT head - ventriculomegaly, neuro evaluated- similar to prior imaging  EEG- unremarkable  stop klonopin and gabapentin  - monitor mental status    # complicated UTI  # echogenic material in bladder- hematoma vs neoplastic process  - urine culture- no growth  Gyn eval- no intervention; recommends urology  urology evaluated recommended OP follow up  ID eval- continue cefriaxone 2 g daily  - monitor cbc, active type and screen    # Transaminitis- mixed pattern- possibly due to DILI; nitrofurantoin  -RUQ: negative  -Hepatitis Panel: neg, EBV- IgG +  - started on ursodiol  Monitor liver tests and INR .    # constipation- improved  senna, miralax    #Schizophrenia/ Anxiety/ Panic Disorder  -Continue home medications: Olanzapine  hold klonopin and gabapentin    #Parkinson's Disease  -Continue with Sinemet and Benztropine    #Vitamin D def  #OA  -Continue with home medications  -Vit D level pending    DVT ppx: hold for hematuria  Dispo: from [facility]    Pending: cbc, improvement in mental status, continuing antibiotics, ID follow up, monitoring LFT

## 2024-12-01 NOTE — PHYSICAL THERAPY INITIAL EVALUATION ADULT - GENERAL OBSERVATIONS, REHAB EVAL
Patient encountered lying in bed. Patient appears mildly confused. Agreed to participate in therapy.

## 2024-12-01 NOTE — PHYSICAL THERAPY INITIAL EVALUATION ADULT - IMPAIRMENTS CONTRIBUTING IMPAIRED BED MOBILITY, REHAB EVAL
Subjective:    Patient ID:  Susan Chaidez is a 69 y.o. female who presents for follow-up of No chief complaint on file.      PCP: Juana Rizzo MD       HPI:  Pt is a 70 yo F w/ PMH of HTN, NPH, and Obesity w/ BMI 33 who presents today for f/u appt. She was last seen on 9/8/22 for f/u appt evaluation of claudication, additional testing ordered.  She was seen prior on 6/28/22 for evaluation of leg swelling and f/u HTN management and was continued on medical therapy. She also reports BLE edema.  Recent arterial BLE US revealed significant stenosis within the proximal Left SFA. Normal COLIN and TBI study 7/8/22, medication regimen continued.  She denies cp, sob, orthopnea,PND, palpitations,pre-syncope, LOC. She notes some leg discomfort with walking. Patient notes decreased claudication of left leg since starting cilostazol. Patient also notes dark discoloration to feet.  She mentions that she has been exercising by walking more and doing leg exercises. She is also f/u w neurology for normal pressure hydrocephalus.        12/9/22:Patient presents today for f/u appt. She was last seen on 9/8/22 for f/u claudication evaluation. Venous US negative for insufficiency. She denies cp, sob, orthopnea,PND, palpitations,pre-syncope, LOC. She notes some leg discomfort with walking. Patient notes decreased claudication of left leg since starting cilostazol. Patient also notes dark discoloration to feet.  She has not been able to exercise regularly 2/2 generalized weakness. She is followed by Neurology for NPH, patient continues to refuse  shunt surgery, despite increased in symptoms. Patient notes medication compliance without side effects. Patient notes drowsiness and unsteady gait when SBP 100s-110s. She notes recent syncope episodes X 2 while on vacation cruise.       Past Medical History:   Diagnosis Date    Basal cell carcinoma     Cataract     Chronic back pain     Depression     Dry eye syndrome     Edema      Hyperlipidemia     Hypertension     NPH (normal pressure hydrocephalus)      Past Surgical History:   Procedure Laterality Date    CATARACT EXTRACTION W/  INTRAOCULAR LENS IMPLANT Left 2022    Procedure: EXTRACTION, CATARACT, WITH IOL INSERTION;  Surgeon: Lorraine Yeh MD;  Location: [facility];  Service: Ophthalmology;  Laterality: Left;    CATARACT EXTRACTION W/  INTRAOCULAR LENS IMPLANT Right 2022    Procedure: EXTRACTION, CATARACT, WITH IOL INSERTION;  Surgeon: Lorraine Yeh MD;  Location: [facility];  Service: Ophthalmology;  Laterality: Right;    CHOLECYSTECTOMY      DILATION AND CURETTAGE OF UTERUS      LAPAROSCOPIC CHOLECYSTECTOMY N/A 3/29/2019    Procedure: CHOLECYSTECTOMY, LAPAROSCOPIC, sign consent AM of surgery;  Surgeon: Ernesto Plascencia MD;  Location: [address];  Service: General;  Laterality: N/A;    SPINE SURGERY  Appx     Disc in neck    TUBAL LIGATION       Social History     Socioeconomic History    Marital status:    Tobacco Use    Smoking status: Former     Packs/day: 0.25     Years: 15.00     Pack years: 3.75     Types: Cigarettes     Start date: 10/27/1968     Quit date: 2005     Years since quittin.3    Smokeless tobacco: Never    Tobacco comments:     quit in    Substance and Sexual Activity    Alcohol use: No    Drug use: No    Sexual activity: Yes     Partners: Male     Birth control/protection: Post-menopausal     Comment:      Social Determinants of Health     Financial Resource Strain: Unknown    Difficulty of Paying Living Expenses: Patient refused   Food Insecurity: Unknown    Worried About Running Out of Food in the Last Year: Patient refused    Ran Out of Food in the Last Year: Patient refused   Transportation Needs: Unknown    Lack of Transportation (Medical): Patient refused    Lack of Transportation (Non-Medical): Patient refused   Physical Activity: Unknown    Days of Exercise per Week: Patient refused    Minutes of Exercise  per Session: Patient refused   Stress: Unknown    Feeling of Stress : Patient refused   Social Connections: Unknown    Frequency of Communication with Friends and Family: More than three times a week    Frequency of Social Gatherings with Friends and Family: Twice a week    Active Member of Clubs or Organizations: Yes    Attends Club or Organization Meetings: More than 4 times per year    Marital Status:    Housing Stability: Unknown    Unable to Pay for Housing in the Last Year: Patient refused    Number of Places Lived in the Last Year: 1    Unstable Housing in the Last Year: Patient refused     Family History   Problem Relation Age of Onset    Breast cancer Maternal Aunt     Hypertension Mother     Hypertension Father     Colon cancer Neg Hx     Ovarian cancer Neg Hx        Review of patient's allergies indicates:   Allergen Reactions    Hydrochlorothiazide Rash and Blisters    Sulfamethoxazole-trimethoprim Swelling and Blisters     Blisters and swelling    Telmisartan Swelling    Flurbiprofen      Other reaction(s): Unknown    Nsaids (non-steroidal anti-inflammatory drug) Other (See Comments)    Sulfa (sulfonamide antibiotics)      Other reaction(s): Unknown       Medication List with Changes/Refills   Current Medications    ASPIRIN 81 MG CHEW    Take 1 tablet (81 mg total) by mouth once daily.    ATORVASTATIN (LIPITOR) 40 MG TABLET    Take 1 tablet (40 mg total) by mouth once daily.    CILOSTAZOL (PLETAL) 50 MG TAB    Take 1 tablet (50 mg total) by mouth 2 (two) times daily.    DIPHENOXYLATE-ATROPINE 2.5-0.025 MG (LOMOTIL) 2.5-0.025 MG PER TABLET    1 tablet as needed    FLUTICASONE PROPIONATE (FLONASE) 50 MCG/ACTUATION NASAL SPRAY    1 spray (50 mcg total) by Each Nostril route once daily.    FUROSEMIDE (LASIX) 20 MG TABLET    TAKE 1 TABLET(20 MG) BY MOUTH DAILY AS NEEDED FOR LEG SWELLING    LEVOCETIRIZINE (XYZAL) 5 MG TABLET    Take 1 tablet (5 mg total) by mouth every evening.    NYSTATIN  (MYCOSTATIN) 100,000 UNIT/ML SUSPENSION    SWISH AND SWALLOW 5 ML BY MOUTH FOUR TIMES DAILY FOR 14 DAYS. RETAIN IN MOUTH AS LONG AS POSSIBLE    OXYBUTYNIN (DITROPAN-XL) 10 MG 24 HR TABLET    Take 2 tablets (20 mg total) by mouth once daily.    POTASSIUM CHLORIDE SA (K-DUR,KLOR-CON) 20 MEQ TABLET    Take 1 tablet (20 mEq total) by mouth 2 (two) times daily.    SERTRALINE (ZOLOFT) 50 MG TABLET    Take 1 tablet (50 mg total) by mouth once daily.    TIZANIDINE (ZANAFLEX) 4 MG TABLET    Take 1 tablet (4 mg total) by mouth 2 (two) times daily as needed (muscle spasm).   Discontinued Medications    NIFEDIPINE (PROCARDIA-XL) 60 MG (OSM) 24 HR TABLET    Take 1 tablet (60 mg total) by mouth once daily.       Review of Systems   Constitutional: Negative for diaphoresis and fever.   HENT:  Negative for congestion and hearing loss.    Eyes:  Negative for blurred vision and pain.   Cardiovascular:  Positive for claudication, leg swelling and syncope. Negative for chest pain, dyspnea on exertion, near-syncope and palpitations.   Respiratory:  Negative for shortness of breath and sleep disturbances due to breathing.    Hematologic/Lymphatic: Negative for bleeding problem. Does not bruise/bleed easily.   Skin:  Positive for color change. Negative for poor wound healing.        Dark discoloration to bilateral feet    Gastrointestinal:  Negative for abdominal pain and nausea.   Genitourinary:  Negative for bladder incontinence and flank pain.   Neurological:  Positive for disturbances in coordination, dizziness and loss of balance. Negative for focal weakness and light-headedness.      Objective:   /68   Pulse 85   Wt 90.7 kg (200 lb)   LMP  (LMP Unknown)   SpO2 96%   BMI 35.43 kg/m²    Physical Exam  Constitutional:       Appearance: She is well-developed. She is not diaphoretic.   HENT:      Head: Normocephalic and atraumatic.   Eyes:      General: No scleral icterus.     Pupils: Pupils are equal, round, and reactive to  light.   Neck:      Vascular: No JVD.   Cardiovascular:      Rate and Rhythm: Normal rate and regular rhythm.      Pulses: Intact distal pulses.           Radial pulses are 2+ on the right side and 2+ on the left side.        Dorsalis pedis pulses are 2+ on the right side and 2+ on the left side.        Posterior tibial pulses are 2+ on the right side and 2+ on the left side.      Heart sounds: S1 normal and S2 normal. No murmur heard.    No friction rub. No gallop.   Pulmonary:      Effort: Pulmonary effort is normal. No respiratory distress.      Breath sounds: Normal breath sounds. No wheezing or rales.   Chest:      Chest wall: No tenderness.   Abdominal:      General: Bowel sounds are normal. There is no distension.      Palpations: Abdomen is soft. There is no mass.      Tenderness: There is no abdominal tenderness. There is no rebound.   Musculoskeletal:         General: No tenderness. Normal range of motion.      Cervical back: Normal range of motion and neck supple.      Right lower le+ Edema present.      Left lower le+ Pitting Edema present.   Skin:     General: Skin is warm and dry.      Coloration: Skin is not pale.   Neurological:      Mental Status: She is alert and oriented to person, place, and time.      Coordination: Coordination normal.      Deep Tendon Reflexes: Reflexes normal.   Psychiatric:         Behavior: Behavior normal.         Judgment: Judgment normal.     CV US  Lower Extremities Veins Bilateral Insufficiency study- 22  Conclusion    There is no evidence of a right lower extremity DVT.  There is no evidence of a left lower extremity DVT.  No refulx bilaterally.      COLIN study 22-reviewed    Conclusion    Normal ABIs and TBIs bilaterally.    Cardiac echo 12/10/21   Summary    The left ventricle is normal in size with normal systolic function. The estimated ejection fraction is 60%.  Normal right ventricular size with normal right ventricular systolic function.  Normal  left ventricular diastolic function.  Mild tricuspid regurgitation.  The estimated PA systolic pressure is 31 mmHg.  Normal central venous pressure (3 mmHg).    48 hr Holter monitor   Conclusion    Normal sinus rhythm  No significant arrhythmias observed  Assessment:       1. Generalized anxiety disorder    2. Mixed hyperlipidemia    3. Claudication of left lower extremity    4. Essential hypertension    5. Class 2 severe obesity due to excess calories with serious comorbidity and body mass index (BMI) of 37.0 to 37.9 in adult    6. Swelling of both lower extremities               Plan:         Generalized anxiety disorder  Followed by PCP.   -continue medical therapy    Hyperlipidemia  -Continue statin therapy     Claudication of left lower extremity  -Patient notes pain in  Left leg with ambulation  -continue cilostazol 50 mg BID   -COLIN and TBI study 7/8/22-normal ABIs  -CV US Venous BLE - 9/14/22- negative for reflux   -PAD Rehab     Essential hypertension  -Goal BP < 140/90.  Pt monitors BP and notes compliance w/ meds however has history of presyncope and hypotension.  -controlled, medication compliance   - continue medication regimen   -nifedipine decreased to 40 mg 2/2 symptonatic hypotension  - continue w/ digital HTN program  - continue to monitor BP and record, present log to PCP and cardiology appts   - encouraged risk factor and lifestyle modifications (diet, exercise, and weight loss)    Class 2 severe obesity due to excess calories with serious comorbidity and body mass index (BMI) of 37.0 to 37.9 in adult  BMI 35.  Pt aware of health complications a/w obesity and is attempting to lose weight.    - encouraged lifestyle modifications (diet, exercise, weight loss, low sodium)    Swelling of both lower extremities  -BLE 2+ edema  -Instructed patient to elevate legs when sitting   -Compression hose, wear daily and take off at bedtime       Total duration of face to face visit time 30 minutes.  Total time  spent counseling greater than fifty percent of total visit time.  Counseling included discussion regarding imaging findings, diagnosis, possibilities, treatment options, risks and benefits.  The patient had many questions regarding the options and long-term effects      Malik Roberto NP  Cardiology                    impaired balance/cognition/decreased flexibility/pain/decreased strength

## 2024-12-01 NOTE — PROGRESS NOTE ADULT - ASSESSMENT
65 yo, with PMHx recurrent  UTI, panic disorder, anxiety, schizophrenia, questionable Parkinson's Disease, generalized muscle weakness, osteoarthritis, vitamin D deficiency, arrived to the ED last night from the [facility] due to persistent fever (>101F on ibuprofen), positive UTI, elevated WBC on CBC, confusion, loss of appetite. In the ED, pt is afebrile but c/o generalized weakness and pain, nausea, and diarrhea. Patient was admitted for transaminitis as well as UTI.     # AMS - possibly metabolic encephalopathy- recurrent episode  - r/o toxic encephalopathy related to klonopin/gabapentin  CT head - ventriculomegaly, neuro evaluated- similar to prior imaging  EEG- unremarkable  patient was given klonopin and gabapentin last night- will stop medication and will monitor mental status    # complicated UTI  # echogenic material in bladder- hematoma vs neoplastic process  - urine culture- no growth  Gyn eval- no intervention; recommends urology  urology evaluated recommended OP follow up  ID eval- continue cefriaxone 2 g daily  - monitor cbc, active type and screen    # Transaminitis- mixed pattern- possibly due to DILI; nitrofurantoin  -RUQ: negative  -Hepatitis Panel: neg, EBV- IgG +  - started on ursodiol  Monitor liver tests and INR .    # constipation- improved  senna, miralax    #Schizophrenia/ Anxiety/ Panic Disorder  -Continue home medications: Olanzapine  hold klonopin and gabapentin    #Parkinson's Disease  -Continue with Sinemet and Benztropine    #Vitamin D def  #OA  -Continue with home medications  -Vit D - low 26; started on cholecalciferol 100 U dialy    DVT ppx: hold for hematuria  Dispo: from [facility]    Pending: cbc, improvement in mental status, continuing antibiotics, ID follow up, monitoring LFT    Time spent 40 mnts .

## 2024-12-02 LAB
ALBUMIN SERPL ELPH-MCNC: 3.3 G/DL — LOW (ref 3.5–5.2)
ALP SERPL-CCNC: 503 U/L — HIGH (ref 30–115)
ALT FLD-CCNC: 116 U/L — HIGH (ref 0–41)
ANION GAP SERPL CALC-SCNC: 10 MMOL/L — SIGNIFICANT CHANGE UP (ref 7–14)
AST SERPL-CCNC: 170 U/L — HIGH (ref 0–41)
BASOPHILS # BLD AUTO: 0.07 K/UL — SIGNIFICANT CHANGE UP (ref 0–0.2)
BASOPHILS NFR BLD AUTO: 0.7 % — SIGNIFICANT CHANGE UP (ref 0–1)
BILIRUB DIRECT SERPL-MCNC: 1 MG/DL — HIGH (ref 0–0.3)
BILIRUB INDIRECT FLD-MCNC: 0.3 MG/DL — SIGNIFICANT CHANGE UP (ref 0.2–1.2)
BILIRUB SERPL-MCNC: 1.3 MG/DL — HIGH (ref 0.2–1.2)
BUN SERPL-MCNC: 6 MG/DL — LOW (ref 10–20)
CALCIUM SERPL-MCNC: 9.4 MG/DL — SIGNIFICANT CHANGE UP (ref 8.4–10.5)
CHLORIDE SERPL-SCNC: 100 MMOL/L — SIGNIFICANT CHANGE UP (ref 98–110)
CO2 SERPL-SCNC: 24 MMOL/L — SIGNIFICANT CHANGE UP (ref 17–32)
CREAT SERPL-MCNC: <0.5 MG/DL — LOW (ref 0.7–1.5)
EGFR: 109 ML/MIN/1.73M2 — SIGNIFICANT CHANGE UP
EOSINOPHIL # BLD AUTO: 0.16 K/UL — SIGNIFICANT CHANGE UP (ref 0–0.7)
EOSINOPHIL NFR BLD AUTO: 1.6 % — SIGNIFICANT CHANGE UP (ref 0–8)
GLUCOSE SERPL-MCNC: 128 MG/DL — HIGH (ref 70–99)
HCT VFR BLD CALC: 31.1 % — LOW (ref 37–47)
HGB BLD-MCNC: 9.8 G/DL — LOW (ref 12–16)
IMM GRANULOCYTES NFR BLD AUTO: 1.5 % — HIGH (ref 0.1–0.3)
LYMPHOCYTES # BLD AUTO: 2.23 K/UL — SIGNIFICANT CHANGE UP (ref 1.2–3.4)
LYMPHOCYTES # BLD AUTO: 23 % — SIGNIFICANT CHANGE UP (ref 20.5–51.1)
MAGNESIUM SERPL-MCNC: 2 MG/DL — SIGNIFICANT CHANGE UP (ref 1.8–2.4)
MCHC RBC-ENTMCNC: 30.7 PG — SIGNIFICANT CHANGE UP (ref 27–31)
MCHC RBC-ENTMCNC: 31.5 G/DL — LOW (ref 32–37)
MCV RBC AUTO: 97.5 FL — SIGNIFICANT CHANGE UP (ref 81–99)
MONOCYTES # BLD AUTO: 0.5 K/UL — SIGNIFICANT CHANGE UP (ref 0.1–0.6)
MONOCYTES NFR BLD AUTO: 5.1 % — SIGNIFICANT CHANGE UP (ref 1.7–9.3)
NEUTROPHILS # BLD AUTO: 6.6 K/UL — HIGH (ref 1.4–6.5)
NEUTROPHILS NFR BLD AUTO: 68.1 % — SIGNIFICANT CHANGE UP (ref 42.2–75.2)
NRBC # BLD: 0 /100 WBCS — SIGNIFICANT CHANGE UP (ref 0–0)
PLATELET # BLD AUTO: 453 K/UL — HIGH (ref 130–400)
PMV BLD: 10.3 FL — SIGNIFICANT CHANGE UP (ref 7.4–10.4)
POTASSIUM SERPL-MCNC: 4 MMOL/L — SIGNIFICANT CHANGE UP (ref 3.5–5)
POTASSIUM SERPL-SCNC: 4 MMOL/L — SIGNIFICANT CHANGE UP (ref 3.5–5)
PROT SERPL-MCNC: 6.1 G/DL — SIGNIFICANT CHANGE UP (ref 6–8)
RBC # BLD: 3.19 M/UL — LOW (ref 4.2–5.4)
RBC # FLD: 15.4 % — HIGH (ref 11.5–14.5)
SODIUM SERPL-SCNC: 134 MMOL/L — LOW (ref 135–146)
WBC # BLD: 9.71 K/UL — SIGNIFICANT CHANGE UP (ref 4.8–10.8)
WBC # FLD AUTO: 9.71 K/UL — SIGNIFICANT CHANGE UP (ref 4.8–10.8)

## 2024-12-02 PROCEDURE — 99232 SBSQ HOSP IP/OBS MODERATE 35: CPT

## 2024-12-02 PROCEDURE — 99231 SBSQ HOSP IP/OBS SF/LOW 25: CPT

## 2024-12-02 RX ORDER — OLANZAPINE 20 MG/1
2.5 TABLET ORAL ONCE
Refills: 0 | Status: COMPLETED | OUTPATIENT
Start: 2024-12-02 | End: 2024-12-02

## 2024-12-02 RX ORDER — CHLORHEXIDINE GLUCONATE 1.2 MG/ML
1 RINSE ORAL DAILY
Refills: 0 | Status: DISCONTINUED | OUTPATIENT
Start: 2024-12-02 | End: 2024-12-03

## 2024-12-02 RX ORDER — CLONAZEPAM 0.12 MG/1
0.25 TABLET, ORALLY DISINTEGRATING ORAL EVERY 12 HOURS
Refills: 0 | Status: DISCONTINUED | OUTPATIENT
Start: 2024-12-02 | End: 2024-12-03

## 2024-12-02 RX ORDER — GABAPENTIN 300 MG/1
100 CAPSULE ORAL EVERY 12 HOURS
Refills: 0 | Status: DISCONTINUED | OUTPATIENT
Start: 2024-12-02 | End: 2024-12-03

## 2024-12-02 RX ORDER — CLONAZEPAM 0.12 MG/1
0.5 TABLET, ORALLY DISINTEGRATING ORAL EVERY 12 HOURS
Refills: 0 | Status: DISCONTINUED | OUTPATIENT
Start: 2024-12-02 | End: 2024-12-02

## 2024-12-02 RX ADMIN — CARBIDOPA AND LEVODOPA 1 TABLET(S): 10; 100 TABLET ORAL at 14:26

## 2024-12-02 RX ADMIN — Medication 100 MILLIGRAM(S): at 17:15

## 2024-12-02 RX ADMIN — OLANZAPINE 2.5 MILLIGRAM(S): 20 TABLET ORAL at 00:32

## 2024-12-02 RX ADMIN — LACTULOSE 10 GRAM(S): 10 SOLUTION ORAL at 17:14

## 2024-12-02 RX ADMIN — LACTULOSE 10 GRAM(S): 10 SOLUTION ORAL at 05:47

## 2024-12-02 RX ADMIN — OLANZAPINE 5 MILLIGRAM(S): 20 TABLET ORAL at 05:48

## 2024-12-02 RX ADMIN — CLONAZEPAM 0.25 MILLIGRAM(S): 0.12 TABLET, ORALLY DISINTEGRATING ORAL at 17:12

## 2024-12-02 RX ADMIN — POLYETHYLENE GLYCOL 3350 17 GRAM(S): 17 POWDER, FOR SOLUTION ORAL at 17:14

## 2024-12-02 RX ADMIN — FAMOTIDINE 20 MILLIGRAM(S): 20 TABLET, FILM COATED ORAL at 05:48

## 2024-12-02 RX ADMIN — CLONAZEPAM 0.5 MILLIGRAM(S): 0.12 TABLET, ORALLY DISINTEGRATING ORAL at 11:25

## 2024-12-02 RX ADMIN — Medication 1000 UNIT(S): at 11:26

## 2024-12-02 RX ADMIN — URSODIOL 200 MILLIGRAM(S): 300 CAPSULE ORAL at 17:14

## 2024-12-02 RX ADMIN — Medication 2 TABLET(S): at 21:15

## 2024-12-02 RX ADMIN — CARBIDOPA AND LEVODOPA 1 TABLET(S): 10; 100 TABLET ORAL at 21:15

## 2024-12-02 RX ADMIN — CARBIDOPA AND LEVODOPA 1 TABLET(S): 10; 100 TABLET ORAL at 05:48

## 2024-12-02 RX ADMIN — URSODIOL 200 MILLIGRAM(S): 300 CAPSULE ORAL at 05:48

## 2024-12-02 RX ADMIN — Medication 1 MILLIGRAM(S): at 11:26

## 2024-12-02 RX ADMIN — CHLORHEXIDINE GLUCONATE 1 APPLICATION(S): 1.2 RINSE ORAL at 11:28

## 2024-12-02 RX ADMIN — POVIDONE, POLYVINYL ALCOHOL 1 DROP(S): 20; 27 SOLUTION OPHTHALMIC at 11:24

## 2024-12-02 RX ADMIN — POLYETHYLENE GLYCOL 3350 17 GRAM(S): 17 POWDER, FOR SOLUTION ORAL at 05:47

## 2024-12-02 NOTE — PROGRESS NOTE ADULT - SUBJECTIVE AND OBJECTIVE BOX
SUBJECTIVE/OVERNIGHT EVENTS  Today is hospital day 5d. This morning patient was seen and examined at bedside, resting comfortably in bed. No acute or major events overnight.    MEDICATIONS  STANDING MEDICATIONS  artificial  tears Solution 1 Drop(s) Both EYES daily  benztropine 1 milliGRAM(s) Oral daily  carbidopa/levodopa  25/100 1 Tablet(s) Oral three times a day  cefTRIAXone   IVPB 2000 milliGRAM(s) IV Intermittent every 24 hours  chlorhexidine 2% Cloths 1 Application(s) Topical daily  cholecalciferol 1000 Unit(s) Oral daily  famotidine    Tablet 20 milliGRAM(s) Oral every 48 hours  lactulose Syrup 10 Gram(s) Oral two times a day  OLANZapine 5 milliGRAM(s) Oral two times a day  polyethylene glycol 3350 17 Gram(s) Oral two times a day  senna 2 Tablet(s) Oral at bedtime  ursodiol Suspension 200 milliGRAM(s) Oral two times a day    PRN MEDICATIONS  clonazePAM  Tablet 0.5 milliGRAM(s) Oral every 12 hours PRN    VITALS  T(F): 97.6 (12-02-24 @ 07:10), Max: 97.6 (12-02-24 @ 07:10)  HR: 77 (12-02-24 @ 07:10) (77 - 89)  BP: 141/88 (12-02-24 @ 07:10) (116/73 - 141/88)  RR: 17 (12-02-24 @ 07:10) (17 - 17)  SpO2: 98% (12-02-24 @ 07:10) (98% - 99%)    PHYSICAL EXAM  GENERAL  NAD, lying in bed comfortably      HEAD  Atraumatic     NECK  Supple      HEART  Regular rate and rhythm no murmurs rubs or gallops    LUNGS  Clear to auscultation bilaterally, no wheezing rales or rhonchi    ABDOMEN  soft, nontender, nondistended, +BS    EXTREMITIES  no edema    NERVOUS SYSTEM  A&Ox1-2 confused    SKIN  No rashes or lesions        LABS             9.8    9.71  )-----------( 453      ( 12-02-24 @ 11:24 )             31.1           PT/INR - ( 11-30-24 @ 12:24 )   PT: 11.00 sec;   INR: 0.93 ratio  PTT - ( 11-30-24 @ 12:24 )  PTT:30.3 sec            Culture - Blood (collected 30 Nov 2024 12:24)  Source: .Blood BLOOD  Preliminary Report (01 Dec 2024 20:00):    No growth at 24 hours      IMAGING

## 2024-12-02 NOTE — PROGRESS NOTE ADULT - SUBJECTIVE AND OBJECTIVE BOX
COMFORT FARIAS  64y, Female    All available historical data reviewed    OVERNIGHT EVENTS:  no fevers  NAD at rest  does respond  RA    ROS:  not reliable    VITALS:  T(F): 97.6, Max: 97.6 (12-02-24 @ 07:10)  HR: 76  BP: 128/68  RR: 18Vital Signs Last 24 Hrs  T(C): 36.4 (02 Dec 2024 15:23), Max: 36.4 (02 Dec 2024 07:10)  T(F): 97.6 (02 Dec 2024 15:23), Max: 97.6 (02 Dec 2024 07:10)  HR: 76 (02 Dec 2024 15:23) (76 - 77)  BP: 128/68 (02 Dec 2024 15:23) (128/68 - 141/88)  BP(mean): --  RR: 18 (02 Dec 2024 15:23) (17 - 18)  SpO2: 97% (02 Dec 2024 15:23) (97% - 98%)    Parameters below as of 02 Dec 2024 15:23  Patient On (Oxygen Delivery Method): room air        TESTS & MEASUREMENTS:                        9.8    9.71  )-----------( 453      ( 02 Dec 2024 11:24 )             31.1     12-02    134[L]  |  100  |  6[L]  ----------------------------<  128[H]  4.0   |  24  |  <0.5[L]    Ca    9.4      02 Dec 2024 11:24  Mg     2.0     12-02    TPro  6.1  /  Alb  3.3[L]  /  TBili  1.3[H]  /  DBili  1.0[H]  /  AST  170[H]  /  ALT  116[H]  /  AlkPhos  503[H]  12-02    LIVER FUNCTIONS - ( 02 Dec 2024 11:24 )  Alb: 3.3 g/dL / Pro: 6.1 g/dL / ALK PHOS: 503 U/L / ALT: 116 U/L / AST: 170 U/L / GGT: x             Culture - Blood (collected 11-30-24 @ 12:24)  Source: .Blood BLOOD  Preliminary Report (12-01-24 @ 20:00):    No growth at 24 hours    Culture - Urine (collected 11-28-24 @ 16:12)  Source: Catheterized Catheterized  Final Report (11-29-24 @ 22:59):    <10,000 CFU/mL Normal Urogenital Landy    Culture - Urine (collected 11-26-24 @ 22:24)  Source: Clean Catch Clean Catch (Midstream)  Final Report (11-28-24 @ 07:22):    No growth      Urinalysis Basic - ( 02 Dec 2024 11:24 )    Color: x / Appearance: x / SG: x / pH: x  Gluc: 128 mg/dL / Ketone: x  / Bili: x / Urobili: x   Blood: x / Protein: x / Nitrite: x   Leuk Esterase: x / RBC: x / WBC x   Sq Epi: x / Non Sq Epi: x / Bacteria: x          Social History:  Tobacco Use: No  Alcohol Use: No  Drug Use: No    RADIOLOGY & ADDITIONAL TESTS:  Personal review of radiological diagnostics performed  Echo and EKG results noted when applicable.     MEDICATIONS:  artificial  tears Solution 1 Drop(s) Both EYES daily  benztropine 1 milliGRAM(s) Oral daily  carbidopa/levodopa  25/100 1 Tablet(s) Oral three times a day  cefTRIAXone   IVPB 2000 milliGRAM(s) IV Intermittent every 24 hours  chlorhexidine 2% Cloths 1 Application(s) Topical daily  cholecalciferol 1000 Unit(s) Oral daily  clonazePAM  Tablet 0.25 milliGRAM(s) Oral every 12 hours PRN  famotidine    Tablet 20 milliGRAM(s) Oral every 48 hours  gabapentin 100 milliGRAM(s) Oral every 12 hours  lactulose Syrup 10 Gram(s) Oral two times a day  OLANZapine 5 milliGRAM(s) Oral two times a day  polyethylene glycol 3350 17 Gram(s) Oral two times a day  senna 2 Tablet(s) Oral at bedtime  ursodiol Suspension 200 milliGRAM(s) Oral two times a day      ANTIBIOTICS:  cefTRIAXone   IVPB 2000 milliGRAM(s) IV Intermittent every 24 hours

## 2024-12-02 NOTE — PROGRESS NOTE ADULT - ATTENDING COMMENTS
63 yo, with PMHx recurrent  UTI, panic disorder, anxiety, schizophrenia, questionable Parkinson's Disease, generalized muscle weakness, osteoarthritis, vitamin D deficiency, arrived to the ED last night from the [facility] due to persistent fever (>101F on ibuprofen), positive UTI, elevated WBC on CBC, confusion, loss of appetite. In the ED, pt is afebrile but c/o generalized weakness and pain, nausea, and diarrhea. Patient was admitted for transaminitis as well as UTI.     # AMS - possibly metabolic encephalopathy- resolved  check CT head - ventriculomegaly, neuro evaluated- similar to prior imaging    # complicated UTI  # echogenic material in bladder- hematoma vs neoplastic process  - recheck UA and culture with straight cath- monitor  ID eval- continue cefriaxone 2 g daily  - initial urine culture- no growth  Gyn eval- no intervention; recommends urology  - monitor cbc, active type and screeen  urology contacted- recommended OP follow up    # Transaminitis- mixed pattern- possibly due to DILI; nitrofurantoin  -RUQ: negative  -Hepatitis Panel: neg  Monitor liver tests and INR .    # constipation- improved  senna, miralax    #Schizophrenia/ Anxiety/ Panic Disorder  -Continue home medications: Olanzapine  restart klonopin and gabapentin    #Parkinson's Disease  -Continue with Sinemet and Benztropine    #Vitamin D def  #OA  -Continue with home medications  -Vit D level pending    DVT ppx: hold for monitoring CBC and hematuria  Dispo: from [facility]    Pending: cbc, repeat UA, culture,  EEG, continuing antibiotics, clinical improvement  monitoring LFT    Time spent 50 mnts
64 y o  F with parkinson's schizophrenia admitted with UTI.  Had fever and leucocytosis with positive UA at SNF and was on nitrofurantoin about 5 days ago.     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  Start ursodiol
Patient seen and examined. Case discussed with resident physician, nursing staff and case management.
64 y o  F with parkinson's schizophrenia admitted with UTI.  Had fever and leucocytosis with positive UA at SNF and was on nitrofurantoin about 5 days ago.     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 Autoimmune testing pending  Continue ursodiol  Monitor liver tests and INR .

## 2024-12-02 NOTE — PROGRESS NOTE ADULT - ASSESSMENT
65 yo, with PMHx recurrent  UTI, panic disorder, anxiety, schizophrenia, questionable Parkinson's Disease, generalized muscle weakness, osteoarthritis, vitamin D deficiency, arrived to the ED last night from the [facility] due to persistent fever (>101F on ibuprofen), positive UTI, elevated WBC on CBC, confusion, loss of appetite. In the ED, pt is afebrile but c/o generalized weakness and pain, nausea, and diarrhea. Patient was admitted for transaminitis as well as UTI.     # AMS - possibly metabolic encephalopathy- recurrent episode  - r/o toxic encephalopathy related to klonopin/gabapentin  -CT head - ventriculomegaly, neuro evaluated- similar to prior imaging  - EEG- unremarkable  - resume klonopin   - hold gabapentin for now  - monitor mental status    # complicated UTI  # echogenic material in bladder- hematoma vs neoplastic process  - urine culture- no growth  - Gyn eval- no intervention; recommends urology  - urology evaluated recommended OP follow up  ID eval- continue cefriaxone 2 g daily f/u re duration   - monitor cbc, active type and screen    # Transaminitis- mixed pattern- possibly due to DILI; nitrofurantoin  -RUQ: negative  -Hepatitis Panel: neg, EBV- IgG +  - started on ursodiol  Monitor liver tests and INR .    # constipation- improved  senna, miralax    #Schizophrenia/ Anxiety/ Panic Disorder  -Continue home medications: Olanzapine  resume klonopin. holding gabapentin    #Parkinson's Disease  -Continue with Sinemet and Benztropine    #Vitamin D def  #OA  -Continue with home medications  -Vit D level pending    DVT ppx: hold for hematuria  Dispo: from [facility]    Pending: improvement in mental status, continuing antibiotics, ID follow up, monitoring LFT cbc

## 2024-12-02 NOTE — PROGRESS NOTE ADULT - ASSESSMENT
63 yo Female  with PMHx recurrent UTI, panic disorder, anxiety, schizophrenia, Parkinson's Disease, osteoarthritis, vitamin D deficiency who arrived to the ED from the [facility] due to persistent fever (>101F on ibuprofen), positive UTI, elevated WBC on CBC, confusion, loss of appetite. 5 days ago patient had positive outpatient  UA, pt completed Macrobid x 5 days with non-resolution of symptoms, and so was switched to IV Ertapenem 1 gm solution which she took 1 dose yesterday. Pt has hx recurrent UTIs  previous culture grew morganella infection in the past which was sensitive for rocephin. Patient currently AAx2. Hepatology consulted for elevated liver enzymes. On med rec , patient is on diclofenac BID. Unable to obtain further history. Admitted to medicine with UTI workup    #Acute Liver Injury - mixed pattern likely DILI with underlying sepsis vs others  #UTI with encephalopathy  - LFTs on admission:3.2/550/154/355  - LFTs 2021 normal, 2022 mildly elevated AST  - RUQ sono - CBD 3mm unremarkable  - CTAP : mild stool burden, stable common hepatic and portocaval lymph nodes, uterine polyp  - Medications reviewed: received macrobid, 1 dose of ertapenam, on diclofenac  - Hepatitis A IgM-, Hepatitis B core IgM-, core Ab total, surface Ag-, surface Ab, HCV antibody- CMV Ig M, EBV IgM -, HSV -, autoimmune pending  - INR: 0.93    Rec  - C/w ursodiol 5-10mg/kg in two divided doses as ALP>500  - Trend liver enzymes  - follow up autoimmune work up  - Discontinue NSAIDs  - Please avoid hepatotoxic medications/avoid hypotension    #Stool burden on CT with constipation- improved    RECS  - Recommend miralax BID senna 2 tabs at night  - recommend outpatient colonoscopy  - Follow up with our [facility] located at [address] [place] [address]. Phone Number: [phone]      #Hx of gastric cardia ulcer  - s/p EGD 2022: 1/22 for abdominal pain and esophageal thickening: esophageal candidiasis (biopsy reflux esophagitis), non erosive gastritis , ulcer in the cardia , normal duodenum (bx duodenitis)  - Was recommended to repeat EGD in 2months but did not follow up    RECS  - PPI 40mg once daily for ppx  - Follow up with our [facility] located at [address] [place] [address]. Phone Number: [phone]

## 2024-12-02 NOTE — PROGRESS NOTE ADULT - SUBJECTIVE AND OBJECTIVE BOX
Gastroenterology progress note:     Patient is a 64y old  Female who presents with a chief complaint of Transaminitis, UTI, vaginal bleeding (02 Dec 2024 12:09)       Admitted on: 11-27-24    We are following the patient for:       Interval History:    No acute events overnight.   - Diet -   - last BM -   - Abdominal pain -       PAST MEDICAL & SURGICAL HISTORY:  Schizophrenia      Bipolar disorder      Depression      Anxiety      Chronic lower back pain  result of pelvic fracture in the past      Osteoarthritis      Urinary incontinence      Chronic urinary tract infection      History of tremor      History of total knee arthroplasty, left          MEDICATIONS  (STANDING):  artificial  tears Solution 1 Drop(s) Both EYES daily  benztropine 1 milliGRAM(s) Oral daily  carbidopa/levodopa  25/100 1 Tablet(s) Oral three times a day  cefTRIAXone   IVPB 2000 milliGRAM(s) IV Intermittent every 24 hours  chlorhexidine 2% Cloths 1 Application(s) Topical daily  cholecalciferol 1000 Unit(s) Oral daily  famotidine    Tablet 20 milliGRAM(s) Oral every 48 hours  gabapentin 100 milliGRAM(s) Oral every 12 hours  lactulose Syrup 10 Gram(s) Oral two times a day  OLANZapine 5 milliGRAM(s) Oral two times a day  polyethylene glycol 3350 17 Gram(s) Oral two times a day  senna 2 Tablet(s) Oral at bedtime  ursodiol Suspension 200 milliGRAM(s) Oral two times a day    MEDICATIONS  (PRN):  clonazePAM  Tablet 0.25 milliGRAM(s) Oral every 12 hours PRN anxiety/agitation      Allergies  moxifloxacin (Unknown)  azithromycin (Unknown)  penicillin (Unknown)      Review of Systems:   Cardiovascular:  No Chest Pain, No Palpitations  Respiratory:  No Cough, No Dyspnea  Gastrointestinal:  As described in HPI  Skin:  No Skin Lesions, No Jaundice  Neuro:  No Syncope, No Dizziness    Physical Examination:  T(C): 36.4 (12-02-24 @ 15:23), Max: 36.4 (12-02-24 @ 07:10)  HR: 76 (12-02-24 @ 15:23) (76 - 77)  BP: 128/68 (12-02-24 @ 15:23) (128/68 - 141/88)  RR: 18 (12-02-24 @ 15:23) (17 - 18)  SpO2: 97% (12-02-24 @ 15:23) (97% - 98%)      12-01-24 @ 07:01  -  12-02-24 @ 07:00  --------------------------------------------------------  IN: 120 mL / OUT: 3 mL / NET: 117 mL        GENERAL: AAOx3, no acute distress.  HEAD:  Atraumatic, Normocephalic  EYES: conjunctiva and sclera clear  NECK: Supple, no JVD or thyromegaly  CHEST/LUNG: Clear to auscultation bilaterally; No wheeze, rhonchi, or rales  HEART: Regular rate and rhythm; normal S1, S2, No murmurs.  ABDOMEN: Soft, nontender, nondistended; Bowel sounds present  NEUROLOGY: No asterixis or tremor.   SKIN: Intact, no jaundice     Data:                        9.8    9.71  )-----------( 453      ( 02 Dec 2024 11:24 )             31.1     Hgb trend:  9.8  12-02-24 @ 11:24  10.4  11-30-24 @ 05:11        12-02    134[L]  |  100  |  6[L]  ----------------------------<  128[H]  4.0   |  24  |  <0.5[L]    Ca    9.4      02 Dec 2024 11:24  Mg     2.0     12-02    TPro  6.1  /  Alb  3.3[L]  /  TBili  1.3[H]  /  DBili  1.0[H]  /  AST  170[H]  /  ALT  116[H]  /  AlkPhos  503[H]  12-02    Liver panel trend:  TBili 1.3   /      /      /   AlkP 503   /   Tptn 6.1   /   Alb 3.3    /   DBili 1.0      12-02  TBili 1.5   /      /      /   AlkP 524   /   Tptn 5.9   /   Alb 3.1    /   DBili --      11-30  TBili 2.0   /      /   ALT 48   /   AlkP 572   /   Tptn 6.0   /   Alb 3.2    /   DBili --      11-29  TBili 2.4   /      /   ALT 83   /   AlkP 510   /   Tptn 6.1   /   Alb 3.3    /   DBili --      11-28  TBili 3.2   /      /      /   AlkP 550   /   Tptn 6.5   /   Alb 3.4    /   DBili --      11-27  TBili 3.0   /      /      /   AlkP 563   /   Tptn 6.2   /   Alb 3.3    /   DBili 1.8      11-26          Culture - Blood (collected 30 Nov 2024 12:24)  Source: .Blood BLOOD  Preliminary Report (01 Dec 2024 20:00):    No growth at 24 hours         Radiology:       Gastroenterology progress note:     Patient is a 64y old  Female who presents with a chief complaint of Transaminitis, UTI, vaginal bleeding (02 Dec 2024 12:09)       Admitted on: 11-27-24    We are following the patient for: DILI       Interval History: mildly improving LFTs      PAST MEDICAL & SURGICAL HISTORY:  Schizophrenia      Bipolar disorder      Depression      Anxiety      Chronic lower back pain  result of pelvic fracture in the past      Osteoarthritis      Urinary incontinence      Chronic urinary tract infection      History of tremor      History of total knee arthroplasty, left          MEDICATIONS  (STANDING):  artificial  tears Solution 1 Drop(s) Both EYES daily  benztropine 1 milliGRAM(s) Oral daily  carbidopa/levodopa  25/100 1 Tablet(s) Oral three times a day  cefTRIAXone   IVPB 2000 milliGRAM(s) IV Intermittent every 24 hours  chlorhexidine 2% Cloths 1 Application(s) Topical daily  cholecalciferol 1000 Unit(s) Oral daily  famotidine    Tablet 20 milliGRAM(s) Oral every 48 hours  gabapentin 100 milliGRAM(s) Oral every 12 hours  lactulose Syrup 10 Gram(s) Oral two times a day  OLANZapine 5 milliGRAM(s) Oral two times a day  polyethylene glycol 3350 17 Gram(s) Oral two times a day  senna 2 Tablet(s) Oral at bedtime  ursodiol Suspension 200 milliGRAM(s) Oral two times a day    MEDICATIONS  (PRN):  clonazePAM  Tablet 0.25 milliGRAM(s) Oral every 12 hours PRN anxiety/agitation      Allergies  moxifloxacin (Unknown)  azithromycin (Unknown)  penicillin (Unknown)      Review of Systems:   Cardiovascular:  No Chest Pain, No Palpitations  Respiratory:  No Cough, No Dyspnea  Gastrointestinal:  As described in HPI  Skin:  No Skin Lesions, No Jaundice  Neuro:  No Syncope, No Dizziness    Physical Examination:  T(C): 36.4 (12-02-24 @ 15:23), Max: 36.4 (12-02-24 @ 07:10)  HR: 76 (12-02-24 @ 15:23) (76 - 77)  BP: 128/68 (12-02-24 @ 15:23) (128/68 - 141/88)  RR: 18 (12-02-24 @ 15:23) (17 - 18)  SpO2: 97% (12-02-24 @ 15:23) (97% - 98%)      12-01-24 @ 07:01  -  12-02-24 @ 07:00  --------------------------------------------------------  IN: 120 mL / OUT: 3 mL / NET: 117 mL        GENERAL: AAOx1-2.  HEAD:  Atraumatic, Normocephalic  EYES: conjunctiva and sclera clear  NECK: Supple, no JVD or thyromegaly  CHEST/LUNG: Clear to auscultation bilaterally; No wheeze, rhonchi, or rales  HEART: Regular rate and rhythm; normal S1, S2, No murmurs.  ABDOMEN: Soft, nontender, nondistended; Bowel sounds present  NEUROLOGY: No asterixis or tremor.   SKIN: Intact, no jaundice     Data:                        9.8    9.71  )-----------( 453      ( 02 Dec 2024 11:24 )             31.1     Hgb trend:  9.8  12-02-24 @ 11:24  10.4  11-30-24 @ 05:11        12-02    134[L]  |  100  |  6[L]  ----------------------------<  128[H]  4.0   |  24  |  <0.5[L]    Ca    9.4      02 Dec 2024 11:24  Mg     2.0     12-02    TPro  6.1  /  Alb  3.3[L]  /  TBili  1.3[H]  /  DBili  1.0[H]  /  AST  170[H]  /  ALT  116[H]  /  AlkPhos  503[H]  12-02    Liver panel trend:  TBili 1.3   /      /      /   AlkP 503   /   Tptn 6.1   /   Alb 3.3    /   DBili 1.0      12-02  TBili 1.5   /      /      /   AlkP 524   /   Tptn 5.9   /   Alb 3.1    /   DBili --      11-30  TBili 2.0   /      /   ALT 48   /   AlkP 572   /   Tptn 6.0   /   Alb 3.2    /   DBili --      11-29  TBili 2.4   /      /   ALT 83   /   AlkP 510   /   Tptn 6.1   /   Alb 3.3    /   DBili --      11-28  TBili 3.2   /      /      /   AlkP 550   /   Tptn 6.5   /   Alb 3.4    /   DBili --      11-27  TBili 3.0   /      /      /   AlkP 563   /   Tptn 6.2   /   Alb 3.3    /   DBili 1.8      11-26          Culture - Blood (collected 30 Nov 2024 12:24)  Source: .Blood BLOOD  Preliminary Report (01 Dec 2024 20:00):    No growth at 24 hours

## 2024-12-02 NOTE — PROGRESS NOTE ADULT - ATTENDING SUPERVISION STATEMENT
October 27, 2023      Rodrigo Villanueva  [address] [place] [address]        Dear Rodrigo,      I am pleased to inform you that results of your echocardiogram are normal LV function with aortic valve sclerosis (hardening). We will continue to monitor. If you have any questions or concerns, please do not hesitate to call my office.      Sincerely,      Jt Ng M.D.      [facility] [place]  [id number] [place] [address], [address] [id number] [phone]      
Fellow
Resident
Resident
Fellow

## 2024-12-02 NOTE — PROGRESS NOTE ADULT - ASSESSMENT
· Assessment	  · Assessment	  63 yo, with PMHx recurrent UTI, panic disorder, anxiety, schizophrenia, Parkinson's Disease?, generalized muscle weakness, osteoarthritis, vitamin D deficiency who arrived to the ED last night from the [facility] due to persistent fever (>101F on ibuprofen), positive UTI, elevated WBC on CBC, confusion, loss of appetite. 5 days ago patient had positive outpatient  UA, pt completed Macrobid x 5 days with non-resolution of symptoms, and so was switched to IV Ertapenem 1 gm solution which she took 1 dose yesterday. Pt has hx recurrent UTIs  previous culture grew morganella infection in the past which was sensitive for rocephin.     Pt sustained a mechanical [facility] 10 days ago at the nursing home. Patient was getting out of bed and fell but did not lose consciousness. No imaging was done.      11/26 Transabdominal US: bladder hematoma     CT A/P:   1.  Edematous bladder wall thickening with mucosal hyperemia compatible   with cystitis.  2.  Mild stool distended rectum, correlate for constipation or fecal   impaction. No bowel obstruction.  3.  Small enhancing lesion in the uterus likely fibroid though polyp   cannot be excluded. Limited assessment on CT.    RUQ US: Unremarkable right upper quadrant abdominal ultrasound.    IMPRESSION/RECOMMENDATIONS  Immunosuppression/Immunosenescence ( above age 60 yrs there is a exponential decline in immunity which could result in poor clinical outcomes.   Acute pyelonephritis with no obstructive uropathy   Metabolic encephalopathy  11/29 EEG ; no seizures  11/26 CT ( Independent interpretation of test : cystitis, no PNA, fecal impaction  WBc initially with 11.7 with 3.2 % bands : suggestive of a bacterial infection  1130 BCX NG  11/28 UCx NG  11/26 UCx NG  CBC :  ( WBC 9.7  ), ( Hg 9.8   ), CMP ( Cr 0.5  ) reviewed .   Transaminases  11/27 Hepatology : DILI  11/27 US RUQ : NG    anxiety  schizophrenia  Parkinson's Disease    -Off loading to prevent pressure sores and preventive measures to avoid aspiration  -Rocephin 2 gm iv q24h. Will recommend holding after dose on 12/3    Discussion of management/test results/antibiotic regimen  with primary medical team.

## 2024-12-03 ENCOUNTER — TRANSCRIPTION ENCOUNTER (OUTPATIENT)
Age: 64
End: 2024-12-03

## 2024-12-03 VITALS — DIASTOLIC BLOOD PRESSURE: 73 MMHG | SYSTOLIC BLOOD PRESSURE: 116 MMHG | HEART RATE: 91 BPM

## 2024-12-03 LAB
ALBUMIN SERPL ELPH-MCNC: 3.2 G/DL — LOW (ref 3.5–5.2)
ALP SERPL-CCNC: 430 U/L — HIGH (ref 30–115)
ALT FLD-CCNC: 54 U/L — HIGH (ref 0–41)
ANION GAP SERPL CALC-SCNC: 9 MMOL/L — SIGNIFICANT CHANGE UP (ref 7–14)
APTT BLD: 31.2 SEC — SIGNIFICANT CHANGE UP (ref 27–39.2)
AST SERPL-CCNC: 144 U/L — HIGH (ref 0–41)
BASOPHILS # BLD AUTO: 0.07 K/UL — SIGNIFICANT CHANGE UP (ref 0–0.2)
BASOPHILS NFR BLD AUTO: 0.8 % — SIGNIFICANT CHANGE UP (ref 0–1)
BILIRUB SERPL-MCNC: 1.1 MG/DL — SIGNIFICANT CHANGE UP (ref 0.2–1.2)
BUN SERPL-MCNC: 5 MG/DL — LOW (ref 10–20)
CALCIUM SERPL-MCNC: 9 MG/DL — SIGNIFICANT CHANGE UP (ref 8.4–10.4)
CHLORIDE SERPL-SCNC: 107 MMOL/L — SIGNIFICANT CHANGE UP (ref 98–110)
CO2 SERPL-SCNC: 25 MMOL/L — SIGNIFICANT CHANGE UP (ref 17–32)
CREAT SERPL-MCNC: 0.5 MG/DL — LOW (ref 0.7–1.5)
EGFR: 103 ML/MIN/1.73M2 — SIGNIFICANT CHANGE UP
EOSINOPHIL # BLD AUTO: 0.21 K/UL — SIGNIFICANT CHANGE UP (ref 0–0.7)
EOSINOPHIL NFR BLD AUTO: 2.5 % — SIGNIFICANT CHANGE UP (ref 0–8)
GLUCOSE SERPL-MCNC: 106 MG/DL — HIGH (ref 70–99)
HCT VFR BLD CALC: 29.5 % — LOW (ref 37–47)
HGB BLD-MCNC: 9.3 G/DL — LOW (ref 12–16)
IMM GRANULOCYTES NFR BLD AUTO: 1.4 % — HIGH (ref 0.1–0.3)
INR BLD: 0.91 RATIO — SIGNIFICANT CHANGE UP (ref 0.65–1.3)
LYMPHOCYTES # BLD AUTO: 2.72 K/UL — SIGNIFICANT CHANGE UP (ref 1.2–3.4)
LYMPHOCYTES # BLD AUTO: 32.1 % — SIGNIFICANT CHANGE UP (ref 20.5–51.1)
MCHC RBC-ENTMCNC: 30.6 PG — SIGNIFICANT CHANGE UP (ref 27–31)
MCHC RBC-ENTMCNC: 31.5 G/DL — LOW (ref 32–37)
MCV RBC AUTO: 97 FL — SIGNIFICANT CHANGE UP (ref 81–99)
MONOCYTES # BLD AUTO: 0.5 K/UL — SIGNIFICANT CHANGE UP (ref 0.1–0.6)
MONOCYTES NFR BLD AUTO: 5.9 % — SIGNIFICANT CHANGE UP (ref 1.7–9.3)
NEUTROPHILS # BLD AUTO: 4.85 K/UL — SIGNIFICANT CHANGE UP (ref 1.4–6.5)
NEUTROPHILS NFR BLD AUTO: 57.3 % — SIGNIFICANT CHANGE UP (ref 42.2–75.2)
NRBC # BLD: 0 /100 WBCS — SIGNIFICANT CHANGE UP (ref 0–0)
PLATELET # BLD AUTO: 453 K/UL — HIGH (ref 130–400)
PMV BLD: 10.8 FL — HIGH (ref 7.4–10.4)
POTASSIUM SERPL-MCNC: 4 MMOL/L — SIGNIFICANT CHANGE UP (ref 3.5–5)
POTASSIUM SERPL-SCNC: 4 MMOL/L — SIGNIFICANT CHANGE UP (ref 3.5–5)
PROT SERPL-MCNC: 5.9 G/DL — LOW (ref 6–8)
PROTHROM AB SERPL-ACNC: 10.7 SEC — SIGNIFICANT CHANGE UP (ref 9.95–12.87)
RBC # BLD: 3.04 M/UL — LOW (ref 4.2–5.4)
RBC # FLD: 15.6 % — HIGH (ref 11.5–14.5)
SODIUM SERPL-SCNC: 141 MMOL/L — SIGNIFICANT CHANGE UP (ref 135–146)
WBC # BLD: 8.47 K/UL — SIGNIFICANT CHANGE UP (ref 4.8–10.8)
WBC # FLD AUTO: 8.47 K/UL — SIGNIFICANT CHANGE UP (ref 4.8–10.8)

## 2024-12-03 PROCEDURE — 99232 SBSQ HOSP IP/OBS MODERATE 35: CPT

## 2024-12-03 RX ORDER — POVIDONE, POLYVINYL ALCOHOL 20; 27 G/1000ML; G/1000ML
1 SOLUTION OPHTHALMIC
Qty: 0 | Refills: 0 | DISCHARGE

## 2024-12-03 RX ORDER — GABAPENTIN 300 MG/1
1 CAPSULE ORAL
Qty: 30 | Refills: 0
Start: 2024-12-03 | End: 2025-01-01

## 2024-12-03 RX ORDER — URSODIOL 300 MG/1
1 CAPSULE ORAL
Qty: 28 | Refills: 0
Start: 2024-12-03 | End: 2024-12-16

## 2024-12-03 RX ORDER — CLONAZEPAM 0.12 MG/1
1 TABLET, ORALLY DISINTEGRATING ORAL
Qty: 15 | Refills: 0
Start: 2024-12-03 | End: 2025-01-01

## 2024-12-03 RX ORDER — LACTULOSE 10 G/15ML
15 SOLUTION ORAL
Qty: 0 | Refills: 0 | DISCHARGE

## 2024-12-03 RX ORDER — CLONAZEPAM 0.12 MG/1
0.25 TABLET, ORALLY DISINTEGRATING ORAL AT BEDTIME
Refills: 0 | Status: DISCONTINUED | OUTPATIENT
Start: 2024-12-03 | End: 2024-12-03

## 2024-12-03 RX ORDER — GABAPENTIN 300 MG/1
100 CAPSULE ORAL AT BEDTIME
Refills: 0 | Status: DISCONTINUED | OUTPATIENT
Start: 2024-12-03 | End: 2024-12-03

## 2024-12-03 RX ORDER — GABAPENTIN 300 MG/1
400 CAPSULE ORAL
Refills: 0 | DISCHARGE

## 2024-12-03 RX ORDER — CHOLECALCIFEROL (VITAMIN D3) 10MCG/0.25
1000 DROPS ORAL
Qty: 0 | Refills: 0 | DISCHARGE
Start: 2024-12-03

## 2024-12-03 RX ORDER — SENNOSIDES 8.6 MG
2 TABLET ORAL
Qty: 0 | Refills: 0 | DISCHARGE
Start: 2024-12-03

## 2024-12-03 RX ORDER — POLYETHYLENE GLYCOL 3350 17 G/17G
17 POWDER, FOR SOLUTION ORAL
Qty: 0 | Refills: 0 | DISCHARGE
Start: 2024-12-03

## 2024-12-03 RX ORDER — GABAPENTIN 300 MG/1
1 CAPSULE ORAL
Qty: 60 | Refills: 0
Start: 2024-12-03 | End: 2025-01-01

## 2024-12-03 RX ORDER — DICLOFENAC POTASSIUM 50 MG
1 TABLET ORAL
Refills: 0 | DISCHARGE

## 2024-12-03 RX ORDER — CEFTRIAXONE SODIUM 1 G
2000 VIAL (EA) INJECTION EVERY 24 HOURS
Refills: 0 | Status: COMPLETED | OUTPATIENT
Start: 2024-12-03 | End: 2024-12-03

## 2024-12-03 RX ORDER — OLANZAPINE 20 MG/1
1 TABLET ORAL
Qty: 0 | Refills: 0 | DISCHARGE
Start: 2024-12-03

## 2024-12-03 RX ADMIN — URSODIOL 200 MILLIGRAM(S): 300 CAPSULE ORAL at 17:02

## 2024-12-03 RX ADMIN — Medication 1 MILLIGRAM(S): at 14:33

## 2024-12-03 RX ADMIN — POLYETHYLENE GLYCOL 3350 17 GRAM(S): 17 POWDER, FOR SOLUTION ORAL at 17:03

## 2024-12-03 RX ADMIN — Medication 1000 UNIT(S): at 11:40

## 2024-12-03 RX ADMIN — LACTULOSE 10 GRAM(S): 10 SOLUTION ORAL at 05:17

## 2024-12-03 RX ADMIN — POVIDONE, POLYVINYL ALCOHOL 1 DROP(S): 20; 27 SOLUTION OPHTHALMIC at 11:37

## 2024-12-03 RX ADMIN — LACTULOSE 10 GRAM(S): 10 SOLUTION ORAL at 17:03

## 2024-12-03 RX ADMIN — OLANZAPINE 5 MILLIGRAM(S): 20 TABLET ORAL at 17:01

## 2024-12-03 RX ADMIN — CHLORHEXIDINE GLUCONATE 1 APPLICATION(S): 1.2 RINSE ORAL at 11:42

## 2024-12-03 RX ADMIN — POLYETHYLENE GLYCOL 3350 17 GRAM(S): 17 POWDER, FOR SOLUTION ORAL at 05:17

## 2024-12-03 RX ADMIN — CARBIDOPA AND LEVODOPA 1 TABLET(S): 10; 100 TABLET ORAL at 14:32

## 2024-12-03 RX ADMIN — GABAPENTIN 100 MILLIGRAM(S): 300 CAPSULE ORAL at 05:17

## 2024-12-03 RX ADMIN — Medication 100 MILLIGRAM(S): at 15:20

## 2024-12-03 RX ADMIN — OLANZAPINE 5 MILLIGRAM(S): 20 TABLET ORAL at 05:17

## 2024-12-03 RX ADMIN — CARBIDOPA AND LEVODOPA 1 TABLET(S): 10; 100 TABLET ORAL at 05:17

## 2024-12-03 RX ADMIN — URSODIOL 200 MILLIGRAM(S): 300 CAPSULE ORAL at 05:17

## 2024-12-03 NOTE — DISCHARGE NOTE PROVIDER - CARE PROVIDER_API CALL
Sanjeev Kumar  Gastroenterology  [address] [place] [address]  Phone: [phone]  Fax: [phone]  Follow Up Time: 2 weeks   Sanjeev Kumar  Gastroenterology  [address] [place] [address]  Phone: [phone]  Fax: [phone]  Follow Up Time: 2 weeks    Perry Simpson  Urology  [address] [place] [address]  Phone: [phone]  Fax: [phone]  Follow Up Time: 1 week    Steven Coppola  Internal Medicine  [address] [place] [address]  Phone: [phone]  Fax: [phone]  Follow Up Time: 2 weeks   Sanjeev Kumar  Gastroenterology  [address] [place] [address]  Phone: [phone]  Fax: [phone]  Follow Up Time: 2 weeks    Perry Simpson  Urology  [address] [place] [address]  Phone: [phone]  Fax: [phone]  Follow Up Time: 1 week    Steven Coppola  Internal Medicine  [address] [place] [address]  Phone: [phone]  Fax: [phone]  Follow Up Time: 2 weeks    Primary care physician,   Phone: (   )    -  Fax: (   )    -  Follow Up Time: 1 week

## 2024-12-03 NOTE — PROGRESS NOTE ADULT - MUSCULOSKELETAL
no calf tenderness/no strength/no chest wall tenderness

## 2024-12-03 NOTE — PROGRESS NOTE ADULT - NS ATTEST RISK PROBLEM GEN_ALL_CORE FT
I have personally seen and examined this patient. I have reviewed all pertinent clinical information and reviewed all relevant imaging ( and noted the impression from the Radiologist ) and diagnostic studies personally. I counseled the patient about the diagnostic testing and treatment plan. I discussed my recommendations with the primary team.
-I independently reviewed the completed labs ( CBC, CMP, cultures  along with sensitivities ) and imaging (CT/CXR as available with independent interpretation )  -My assessment required an independent historian  -I discussed my diagnostic/therapeutic recommendations with the primary team housestaff/Attending  -I assisted in the decision regarding continued need for hospitalization / or escalation of care as needed.

## 2024-12-03 NOTE — DISCHARGE NOTE PROVIDER - NSDCCPCAREPLAN_GEN_ALL_CORE_FT
PRINCIPAL DISCHARGE DIAGNOSIS  Diagnosis: Acute UTI  Assessment and Plan of Treatment: You were admitted to the hospital for acute UTI and altered mental status. You were started on ceftriazone with the last dose being 12/3=. You developed transaminitis  during your stay and we would like you to follow up with your PCP in 1 week to do a comprehensive metabolic panel (CMP) to monitor  your liver enzymes. Please continue your urosidol for 1-2 weeks after discharged. The GI team recommended to follow up  for outpatient colonoscopy and a repeat EGD for your history of gastric cardia ulcer. Continue to take pantoprazole 40 mg once a day.   Please continue to take medications as instructed and follow up with your PCP and GI doctor.      SECONDARY DISCHARGE DIAGNOSES  Diagnosis: Elevated LFTs  Assessment and Plan of Treatment:     Diagnosis: Urinary tract infection with hematuria  Assessment and Plan of Treatment:      PRINCIPAL DISCHARGE DIAGNOSIS  Diagnosis: Acute UTI  Assessment and Plan of Treatment: You were admitted to the hospital for acute UTI and altered mental status. You were started on ceftriazone with the last dose being 12/3=. You developed transaminitis  during your stay and we would like you to follow up with your PCP in 1 week to do a comprehensive metabolic panel (CMP) to monitor  your liver enzymes. Please continue your urosidol for 1-2 weeks after discharged. The GI team recommended to follow up  for outpatient colonoscopy and a repeat EGD for your history of gastric cardia ulcer. Continue to take pantoprazole 40 mg once a day.   Please continue to take medications as instructed and follow up with your PCP and GI doctor.      SECONDARY DISCHARGE DIAGNOSES  Diagnosis: Elevated LFTs  Assessment and Plan of Treatment: continue ursodiol  check liver function test in 1 week  Follow up with PCP    Diagnosis: Urinary tract infection with hematuria  Assessment and Plan of Treatment: You had hematuria and was found to have UTI. You were found to no echogenic material in bladder and is thought to be blood. Neoplastic process cannot be ruled out. You need to follow up with urologist for further evaluation

## 2024-12-03 NOTE — DISCHARGE NOTE PROVIDER - NSDCMRMEDTOKEN_GEN_ALL_CORE_FT
Artificial Tears ophthalmic solution: 1 drop(s) in each eye  benztropine 1 mg oral tablet: 1 tab(s) orally once a day  cholecalciferol oral tablet: 1000 unit(s) orally once a day  clonazePAM 1 mg oral tablet: 1 tab(s) orally once a day  diclofenac potassium 25 mg oral tablet: 1 tab(s) orally 2 times a day  famotidine 20 mg oral tablet: 1 tab(s) orally once a day  lactulose 10 g/15 mL oral syrup: 15 milliliter(s) orally 2 times a day  melatonin 10 mg oral tablet: 1 tab(s) orally once (at bedtime)  Neurontin 100 mg oral capsule: 1 cap(s) orally every 12 hours  OLANZapine 10 mg oral tablet: 1 tab(s) orally once a day  polyethylene glycol 3350 oral powder for reconstitution: 17 gram(s) orally 2 times a day  Reltone 200 mg oral capsule: 1 cap(s) orally 2 times a day  senna leaf extract oral tablet: 2 tab(s) orally once a day (at bedtime)  Sinemet 25 mg-100 mg oral tablet: 1 tab(s) orally 3 times a day   Artificial Tears ophthalmic solution: 1 drop(s) in each eye 2 times a day  benztropine 1 mg oral tablet: 1 tab(s) orally once a day  cholecalciferol oral tablet: 1000 unit(s) orally once a day  famotidine 20 mg oral tablet: 1 tab(s) orally once a day  lactulose 10 g/15 mL oral syrup: 15 milliliter(s) orally 2 times a day  melatonin 10 mg oral tablet: 1 tab(s) orally once (at bedtime)  Neurontin 100 mg oral capsule: 1 cap(s) orally every 12 hours  OLANZapine 10 mg oral tablet: 1 tab(s) orally once a day  polyethylene glycol 3350 oral powder for reconstitution: 17 gram(s) orally 2 times a day Hold if having more than 3 bowel movements per day  senna leaf extract oral tablet: 2 tab(s) orally once a day (at bedtime)  Sinemet 25 mg-100 mg oral tablet: 1 tab(s) orally 3 times a day   Artificial Tears ophthalmic solution: 1 drop(s) in each eye 2 times a day  benztropine 1 mg oral tablet: 1 tab(s) orally once a day  cholecalciferol oral tablet: 1000 unit(s) orally once a day  famotidine 20 mg oral tablet: 1 tab(s) orally once a day  lactulose 10 g/15 mL oral syrup: 15 milliliter(s) orally 2 times a day hold if having more than 2 bowel movements a day  melatonin 10 mg oral tablet: 1 tab(s) orally once (at bedtime)  Neurontin 100 mg oral capsule: 1 cap(s) orally once a day (at bedtime)  OLANZapine 5 mg oral tablet: 1 tab(s) orally 2 times a day  polyethylene glycol 3350 oral powder for reconstitution: 17 gram(s) orally 2 times a day Hold if having more than 3 bowel movements per day  senna leaf extract oral tablet: 2 tab(s) orally once a day (at bedtime)  Sinemet 25 mg-100 mg oral tablet: 1 tab(s) orally 3 times a day

## 2024-12-03 NOTE — DISCHARGE NOTE PROVIDER - HOSPITAL COURSE
65 yo, with PMHx recurrent UTI, panic disorder, anxiety, schizophrenia, Parkinson's Disease?, generalized muscle weakness, osteoarthritis, vitamin D deficiency who arrived to the ED last night from the [facility] due to persistent fever (>101F on ibuprofen), positive UTI, elevated WBC on CBC, confusion, loss of appetite. 5 days ago patient had positive outpatient  UA, pt completed Macrobid x 5 days with non-resolution of symptoms, and so was switched to IV Ertapenem 1 gm solution which she took 1 dose yesterday. Pt has hx recurrent UTIs  previous culture grew morganella infection in the past which was sensitive for rocephin. Patient denies any dysuria, urinary frequency. Patient was found to have vaginal bleeding in nursing home a few days ago. Patient does not have any associated pain. Nursing home states that patient has been confused for 5 days since onset of UTI as well as decreased po intake. Patient was afebrile in nursing home only developed fevers in ED.     Pt sustained a mechanical fall 10 days ago at the nursing home. Patient was getting out of bed and fell but did not lose consciousness. No imaging was done.      In the ED, pt c/o generalized weakness and pain, nausea, and diarrhea.      Vitals  Temp 99F  116/75 HR 82  18 RR O2 97        Labs  HG 11.7   Alk Phos 563      T Bili 3.0  Bili 1.8  Lipase 92      UA:  + Nitrites, Large Bilirubin Large Blood, Proteinuria    Imaging  Transabdominal US:   Neither ovary is visualized. Uterus delineated but not well evaluated on   this limited study. Measures approximately 4.1 x 2.5 x 3.6 cm.   Endometrium not well delineated. No definite correlate to CT finding.    Evaluation of the bladder demonstrates intraluminal echogenic material   without internal vascularity could represent bladder hematoma given the   urinalysis demonstrated large blood. Neoplastic process cannot be   entirely excluded.      CT A/P:   1.  Edematous bladder wall thickening with mucosal hyperemia compatible   with cystitis.  2.  Mild stool distended rectum, correlate for constipation or fecal   impaction. No bowel obstruction.  3.  Small enhancing lesion in the uterus likely fibroid though polyp   cannot be excluded. Limited assessment on CT.    RUQ US: Unremarkable right upper quadrant abdominal ultrasound.    Pt was seen for the following conditions:    # AMS - possibly metabolic encephalopathy- recurrent episode  - r/o toxic encephalopathy related to klonopin/gabapentin  -CT head - ventriculomegaly, neuro evaluated- similar to prior imaging  - EEG- unremarkable  - resume klonopin   - hold gabapentin for now  - monitor mental status    # complicated UTI  # echogenic material in bladder- hematoma vs neoplastic process  - urine culture- no growth  - Gyn eval- no intervention; recommends urology  - urology evaluated recommended OP follow up  - ID eval- Hold ceftriaxone 2 g daily after 12/3 dose.   -    # Transaminitis- mixed pattern- possibly due to DILI; nitrofurantoin  -RUQ: negative  -Hepatitis Panel: neg, EBV- IgG +  - Continue ursodiol for 1-2 weeks.   - F/u with PCP in 1 week for CMP.  Monitor liver tests and INR .    # constipation- improved  - Start senna, miralax    #Schizophrenia/ Anxiety/ Panic Disorder  -Continue home medications: Olanzapine  resume klonopin. holding gabapentin    #Parkinson's Disease  -Continue with Sinemet and Benztropine    #Vitamin D def  #OA  -Continue with home medications  -Vit D level pending    Pt is medically stable for discharge. Discharge was discussed and approved by Dr. Gentile.        65 yo, with PMHx recurrent UTI, panic disorder, anxiety, schizophrenia, Parkinson's Disease?, generalized muscle weakness, osteoarthritis, vitamin D deficiency who arrived to the ED last night from the [facility] due to persistent fever (>101F on ibuprofen), positive UTI, elevated WBC on CBC, confusion, loss of appetite. 5 days ago patient had positive outpatient  UA, pt completed Macrobid x 5 days with non-resolution of symptoms, and so was switched to IV Ertapenem 1 gm solution which she took 1 dose yesterday. Pt has hx recurrent UTIs  previous culture grew morganella infection in the past which was sensitive for rocephin. Patient denies any dysuria, urinary frequency. Patient was found to have vaginal bleeding in nursing home a few days ago. Patient does not have any associated pain. Nursing home states that patient has been confused for 5 days since onset of UTI as well as decreased po intake. Patient was afebrile in nursing home only developed fevers in ED.     Pt sustained a mechanical fall 10 days ago at the nursing home. Patient was getting out of bed and fell but did not lose consciousness. No imaging was done.      In the ED, pt c/o generalized weakness and pain, nausea, and diarrhea.      Vitals  Temp 99F  116/75 HR 82  18 RR O2 97        Labs  HG 11.7   Alk Phos 563      T Bili 3.0  Bili 1.8  Lipase 92      UA:  + Nitrites, Large Bilirubin Large Blood, Proteinuria    Imaging  Transabdominal US:   Neither ovary is visualized. Uterus delineated but not well evaluated on   this limited study. Measures approximately 4.1 x 2.5 x 3.6 cm.   Endometrium not well delineated. No definite correlate to CT finding.    Evaluation of the bladder demonstrates intraluminal echogenic material   without internal vascularity could represent bladder hematoma given the   urinalysis demonstrated large blood. Neoplastic process cannot be   entirely excluded.      CT A/P:   1.  Edematous bladder wall thickening with mucosal hyperemia compatible   with cystitis.  2.  Mild stool distended rectum, correlate for constipation or fecal   impaction. No bowel obstruction.  3.  Small enhancing lesion in the uterus likely fibroid though polyp   cannot be excluded. Limited assessment on CT.    RUQ US: Unremarkable right upper quadrant abdominal ultrasound.    Pt was seen for the following conditions:    # AMS - possibly metabolic encephalopathy- recurrent episode  - r/o toxic encephalopathy related to klonopin/gabapentin  -CT head - ventriculomegaly, neuro evaluated- similar to prior imaging  - EEG- unremarkable  - resume klonopin   - hold gabapentin for now  - monitor mental status    # complicated UTI  # echogenic material in bladder- hematoma vs neoplastic process  - urine culture- no growth  - Gyn eval- no intervention; recommends urology  - urology evaluated recommended OP follow up  - ID eval- Hold ceftriaxone 2 g daily after 12/3 dose.     # Transaminitis- mixed pattern- possibly due to DILI; nitrofurantoin  -RUQ: negative  -Hepatitis Panel: neg, EBV- IgG +  - Continue ursodiol for 1-2 weeks.   - F/u with PCP in 1 week for CMP.  Monitor liver tests and INR .    # constipation- improved  - Start senna, miralax    #Schizophrenia/ Anxiety/ Panic Disorder  -Continue home medications: Olanzapine  resume klonopin. holding gabapentin    #Parkinson's Disease  -Continue with Sinemet and Benztropine    #Vitamin D def  #OA  -Continue with home medications  -Vit D level pending    Pt is medically stable for discharge. Discharge was discussed and approved by Dr. Gentile.  63 yo, with PMHx recurrent UTI, panic disorder, anxiety, schizophrenia, Parkinson's Disease?, generalized muscle weakness, osteoarthritis, vitamin D deficiency who arrived to the ED last night from the [facility] due to persistent fever (>101F on ibuprofen), positive UTI, elevated WBC on CBC, confusion, loss of appetite. 5 days ago patient had positive outpatient  UA, pt completed Macrobid x 5 days with non-resolution of symptoms, and so was switched to IV Ertapenem 1 gm solution which she took 1 dose yesterday. Pt has hx recurrent UTIs  previous culture grew morganella infection in the past which was sensitive for rocephin. Patient denies any dysuria, urinary frequency. Patient was found to have vaginal bleeding in nursing home a few days ago. Patient does not have any associated pain. Nursing home states that patient has been confused for 5 days since onset of UTI as well as decreased po intake. Patient was afebrile in nursing home only developed fevers in ED.     Pt sustained a mechanical fall 10 days ago at the nursing home. Patient was getting out of bed and fell but did not lose consciousness. No imaging was done.      In the ED, pt c/o generalized weakness and pain, nausea, and diarrhea.      Vitals  Temp 99F  116/75 HR 82  18 RR O2 97        Labs  HG 11.7   Alk Phos 563      T Bili 3.0  Bili 1.8  Lipase 92      UA:  + Nitrites, Large Bilirubin Large Blood, Proteinuria    Imaging  Transabdominal US:   Neither ovary is visualized. Uterus delineated but not well evaluated on   this limited study. Measures approximately 4.1 x 2.5 x 3.6 cm.   Endometrium not well delineated. No definite correlate to CT finding.    Evaluation of the bladder demonstrates intraluminal echogenic material   without internal vascularity could represent bladder hematoma given the   urinalysis demonstrated large blood. Neoplastic process cannot be   entirely excluded.      CT A/P:   1.  Edematous bladder wall thickening with mucosal hyperemia compatible   with cystitis.  2.  Mild stool distended rectum, correlate for constipation or fecal   impaction. No bowel obstruction.  3.  Small enhancing lesion in the uterus likely fibroid though polyp   cannot be excluded. Limited assessment on CT.    RUQ US: Unremarkable right upper quadrant abdominal ultrasound.    Pt was seen for the following conditions:    # AMS - possibly metabolic encephalopathy- recurrent episode  - r/o toxic encephalopathy related to klonopin/gabapentin- dosage adjusted  -CT head - ventriculomegaly, neuro evaluated- similar to prior imaging  - EEG- unremarkable    # complicated UTI  # echogenic material in bladder- hematoma vs neoplastic process  - urine culture- no growth  - Gyn eval- no intervention; recommends urology  - urology evaluated recommended OP follow up  - ID eval- s/p ceftriaxone 2 g daily till 12/3 dose.     # Transaminitis- mixed pattern- possibly due to DILI; nitrofurantoin  -RUQ: negative  -Hepatitis Panel: neg, EBV- IgG +  - Continue ursodiol for 1-2 weeks.   - F/u with PCP in 1 week for CMP.    # constipation- improved  - Start senna, miralax    #Schizophrenia/ Anxiety/ Panic Disorder  -Continue home medications: Olanzapine  resume klonopin. holding gabapentin    #Parkinson's Disease  -Continue with Sinemet and Benztropine    #Vitamin D def  #OA  start on supplement    Pt is medically stable for discharge. Discharge was discussed and approved by Dr. Gentile.

## 2024-12-03 NOTE — PROGRESS NOTE ADULT - NEUROLOGICAL
responds to pain/responds to verbal commands

## 2024-12-03 NOTE — DISCHARGE NOTE PROVIDER - CARE PROVIDERS DIRECT ADDRESSES
,[email or web address],DirectAddress_Unknown,[email or web address],DirectAddress_Unknown

## 2024-12-03 NOTE — PROGRESS NOTE ADULT - SUBJECTIVE AND OBJECTIVE BOX
COMFORT FARIAS  64y, Female    All available historical data reviewed    OVERNIGHT EVENTS:  no fevers  on RA  confused    ROS:  unable to obtain history secondary to patient's mental status     VITALS:  T(F): 97.9, Max: 97.9 (12-02-24 @ 23:40)  HR: 81  BP: 129/69  RR: 18Vital Signs Last 24 Hrs  T(C): 36.6 (02 Dec 2024 23:40), Max: 36.6 (02 Dec 2024 23:40)  T(F): 97.9 (02 Dec 2024 23:40), Max: 97.9 (02 Dec 2024 23:40)  HR: 81 (02 Dec 2024 23:40) (76 - 81)  BP: 129/69 (02 Dec 2024 23:40) (128/68 - 129/69)  BP(mean): --  RR: 18 (02 Dec 2024 23:40) (18 - 18)  SpO2: 98% (02 Dec 2024 23:40) (97% - 98%)    Parameters below as of 02 Dec 2024 23:40  Patient On (Oxygen Delivery Method): room air        TESTS & MEASUREMENTS:                        9.3    8.47  )-----------( 453      ( 03 Dec 2024 06:08 )             29.5     12-03    141  |  107  |  5[L]  ----------------------------<  106[H]  4.0   |  25  |  0.5[L]    Ca    9.0      03 Dec 2024 06:08  Mg     2.0     12-02    TPro  5.9[L]  /  Alb  3.2[L]  /  TBili  1.1  /  DBili  x   /  AST  144[H]  /  ALT  54[H]  /  AlkPhos  430[H]  12-03    LIVER FUNCTIONS - ( 03 Dec 2024 06:08 )  Alb: 3.2 g/dL / Pro: 5.9 g/dL / ALK PHOS: 430 U/L / ALT: 54 U/L / AST: 144 U/L / GGT: x             Culture - Blood (collected 11-30-24 @ 12:24)  Source: .Blood BLOOD  Preliminary Report (12-02-24 @ 20:01):    No growth at 48 Hours    Culture - Urine (collected 11-28-24 @ 16:12)  Source: Catheterized Catheterized  Final Report (11-29-24 @ 22:59):    <10,000 CFU/mL Normal Urogenital Landy    Culture - Urine (collected 11-26-24 @ 22:24)  Source: Clean Catch Clean Catch (Midstream)  Final Report (11-28-24 @ 07:22):    No growth      Urinalysis Basic - ( 03 Dec 2024 06:08 )    Color: x / Appearance: x / SG: x / pH: x  Gluc: 106 mg/dL / Ketone: x  / Bili: x / Urobili: x   Blood: x / Protein: x / Nitrite: x   Leuk Esterase: x / RBC: x / WBC x   Sq Epi: x / Non Sq Epi: x / Bacteria: x          Social History:  Tobacco Use: No  Alcohol Use: No  Drug Use: No    RADIOLOGY & ADDITIONAL TESTS:  Personal review of radiological diagnostics performed  Echo and EKG results noted when applicable.     MEDICATIONS:  artificial  tears Solution 1 Drop(s) Both EYES daily  benztropine 1 milliGRAM(s) Oral daily  carbidopa/levodopa  25/100 1 Tablet(s) Oral three times a day  cefTRIAXone   IVPB 2000 milliGRAM(s) IV Intermittent every 24 hours  chlorhexidine 2% Cloths 1 Application(s) Topical daily  cholecalciferol 1000 Unit(s) Oral daily  clonazePAM  Tablet 0.25 milliGRAM(s) Oral every 12 hours PRN  famotidine    Tablet 20 milliGRAM(s) Oral every 48 hours  gabapentin 100 milliGRAM(s) Oral every 12 hours  lactulose Syrup 10 Gram(s) Oral two times a day  OLANZapine 5 milliGRAM(s) Oral two times a day  polyethylene glycol 3350 17 Gram(s) Oral two times a day  senna 2 Tablet(s) Oral at bedtime  ursodiol Suspension 200 milliGRAM(s) Oral two times a day      ANTIBIOTICS:  cefTRIAXone   IVPB 2000 milliGRAM(s) IV Intermittent every 24 hours

## 2024-12-03 NOTE — DISCHARGE NOTE PROVIDER - PROVIDER TOKENS
PROVIDER:[TOKEN:[[id number]],FOLLOWUP:[2 weeks]] PROVIDER:[TOKEN:[[id number]],FOLLOWUP:[2 weeks]],PROVIDER:[TOKEN:[[id number]],FOLLOWUP:[1 week]],PROVIDER:[TOKEN:[[id number]],FOLLOWUP:[2 weeks]] PROVIDER:[TOKEN:[[id number]],FOLLOWUP:[2 weeks]],PROVIDER:[TOKEN:[[id number]],FOLLOWUP:[1 week]],PROVIDER:[TOKEN:[[id number]],FOLLOWUP:[2 weeks]],FREE:[LAST:[Primary care physician],PHONE:[(   )    -],FAX:[(   )    -],FOLLOWUP:[1 week]]

## 2024-12-03 NOTE — PROGRESS NOTE ADULT - PROVIDER SPECIALTY LIST ADULT
Hepatology
Internal Medicine
Internal Medicine
Palliative Care
Hepatology
Internal Medicine
Infectious Disease

## 2024-12-03 NOTE — PROGRESS NOTE ADULT - ASSESSMENT
65 yo, with PMHx recurrent UTI, panic disorder, anxiety, schizophrenia, Parkinson's Disease?, generalized muscle weakness, osteoarthritis, vitamin D deficiency who arrived to the ED last night from the [facility] due to persistent fever (>101F on ibuprofen), positive UTI, elevated WBC on CBC, confusion, loss of appetite. 5 days ago patient had positive outpatient  UA, pt completed Macrobid x 5 days with non-resolution of symptoms, and so was switched to IV Ertapenem 1 gm solution which she took 1 dose yesterday. Pt has hx recurrent UTIs  previous culture grew morganella infection in the past which was sensitive for rocephin.     Pt sustained a mechanical fall 10 days ago at the nursing home. Patient was getting out of bed and fell but did not lose consciousness. No imaging was done.      11/26 Transabdominal US: bladder hematoma     CT A/P:   1.  Edematous bladder wall thickening with mucosal hyperemia compatible   with cystitis.  2.  Mild stool distended rectum, correlate for constipation or fecal   impaction. No bowel obstruction.  3.  Small enhancing lesion in the uterus likely fibroid though polyp   cannot be excluded. Limited assessment on CT.    RUQ US: Unremarkable right upper quadrant abdominal ultrasound.    IMPRESSION/RECOMMENDATIONS  Immunosuppression/Immunosenescence ( above age 60 yrs there is a exponential decline in immunity which could result in poor clinical outcomes.   Acute pyelonephritis with no obstructive uropathy   Metabolic encephalopathy  11/29 EEG ; no seizures  11/26 CT ( Independent interpretation of test : cystitis, no PNA, fecal impaction  WBc initially with 11.7 with 3.2 % bands : suggestive of a bacterial infection  1130 BCX NG  11/28 UCx NG  11/26 UCx NG  CBC :  ( WBC 8.4 ), ( Hg 10.4  ), CMP ( Cr 0.5  ) reviewed .   Transaminases : decreasing  11/27 Hepatology : DILI  11/27 US RUQ : NG    anxiety  schizophrenia  Parkinson's Disease    -Off loading to prevent pressure sores and preventive measures to avoid aspiration  -Rocephin 2 gm iv q24h. Will recommend holding after todays dose    Discussion of management/test results/antibiotic regimen  with primary medical team.

## 2024-12-03 NOTE — PROGRESS NOTE ADULT - REASON FOR ADMISSION
Transaminitis, UTI, vaginal bleeding

## 2024-12-03 NOTE — DISCHARGE NOTE NURSING/CASE MANAGEMENT/SOCIAL WORK - FINANCIAL ASSISTANCE
[facility] provides services at a reduced cost to those who are determined to be eligible through [facility]’s financial assistance program. Information regarding [facility]’s financial assistance program can be found by going to [email or web address] or by calling [phone].

## 2024-12-03 NOTE — DISCHARGE NOTE NURSING/CASE MANAGEMENT/SOCIAL WORK - NSDCPEFALRISK_GEN_ALL_CORE
For information on Fall & Injury Prevention, visit: [email or web address] OR  [email or web address] OR  https://www.cdc.gov/steadi/patient.html

## 2024-12-03 NOTE — DISCHARGE NOTE NURSING/CASE MANAGEMENT/SOCIAL WORK - PATIENT PORTAL LINK FT
You can access the FollowMyHealth Patient Portal offered by [facility] by registering at the following website: [email or web address]. By joining [facility]’s FollowMyHealth portal, you will also be able to view your health information using other applications (apps) compatible with our system.

## 2024-12-03 NOTE — DISCHARGE NOTE PROVIDER - NPI NUMBER (FOR SYSADMIN USE ONLY) :
[[id number]] [[id number]],[[id number]],[[id number]] [[id number]],[[id number]],[[id number]],[UNKNOWN]

## 2024-12-05 LAB
CULTURE RESULTS: SIGNIFICANT CHANGE UP
SPECIMEN SOURCE: SIGNIFICANT CHANGE UP

## 2024-12-18 DIAGNOSIS — D25.9 LEIOMYOMA OF UTERUS, UNSPECIFIED: ICD-10-CM

## 2024-12-18 DIAGNOSIS — K58.9 IRRITABLE BOWEL SYNDROME, UNSPECIFIED: ICD-10-CM

## 2024-12-18 DIAGNOSIS — G20.A1 PARKINSON'S DISEASE WITHOUT DYSKINESIA, WITHOUT MENTION OF FLUCTUATIONS: ICD-10-CM

## 2024-12-18 DIAGNOSIS — N10 ACUTE PYELONEPHRITIS: ICD-10-CM

## 2024-12-18 DIAGNOSIS — M19.90 UNSPECIFIED OSTEOARTHRITIS, UNSPECIFIED SITE: ICD-10-CM

## 2024-12-18 DIAGNOSIS — T37.8X5A ADVERSE EFFECT OF OTHER SPECIFIED SYSTEMIC ANTI-INFECTIVES AND ANTIPARASITICS, INITIAL ENCOUNTER: ICD-10-CM

## 2024-12-18 DIAGNOSIS — R31.0 GROSS HEMATURIA: ICD-10-CM

## 2024-12-18 DIAGNOSIS — G89.29 OTHER CHRONIC PAIN: ICD-10-CM

## 2024-12-18 DIAGNOSIS — G93.41 METABOLIC ENCEPHALOPATHY: ICD-10-CM

## 2024-12-18 DIAGNOSIS — F41.0 PANIC DISORDER [EPISODIC PAROXYSMAL ANXIETY]: ICD-10-CM

## 2024-12-18 DIAGNOSIS — K71.10 TOXIC LIVER DISEASE WITH HEPATIC NECROSIS, WITHOUT COMA: ICD-10-CM

## 2024-12-18 DIAGNOSIS — F32.9 MAJOR DEPRESSIVE DISORDER, SINGLE EPISODE, UNSPECIFIED: ICD-10-CM

## 2024-12-18 DIAGNOSIS — Z80.42 FAMILY HISTORY OF MALIGNANT NEOPLASM OF PROSTATE: ICD-10-CM

## 2024-12-18 DIAGNOSIS — R32 UNSPECIFIED URINARY INCONTINENCE: ICD-10-CM

## 2024-12-18 DIAGNOSIS — Z88.0 ALLERGY STATUS TO PENICILLIN: ICD-10-CM

## 2024-12-18 DIAGNOSIS — Z51.5 ENCOUNTER FOR PALLIATIVE CARE: ICD-10-CM

## 2024-12-18 DIAGNOSIS — D84.9 IMMUNODEFICIENCY, UNSPECIFIED: ICD-10-CM

## 2024-12-18 DIAGNOSIS — Z82.0 FAMILY HISTORY OF EPILEPSY AND OTHER DISEASES OF THE NERVOUS SYSTEM: ICD-10-CM

## 2024-12-18 DIAGNOSIS — R30.0 DYSURIA: ICD-10-CM

## 2024-12-18 DIAGNOSIS — N30.91 CYSTITIS, UNSPECIFIED WITH HEMATURIA: ICD-10-CM

## 2024-12-18 DIAGNOSIS — K80.50 CALCULUS OF BILE DUCT WITHOUT CHOLANGITIS OR CHOLECYSTITIS WITHOUT OBSTRUCTION: ICD-10-CM

## 2024-12-18 DIAGNOSIS — E55.9 VITAMIN D DEFICIENCY, UNSPECIFIED: ICD-10-CM

## 2024-12-18 DIAGNOSIS — Z88.8 ALLERGY STATUS TO OTHER DRUGS, MEDICAMENTS AND BIOLOGICAL SUBSTANCES: ICD-10-CM

## 2024-12-18 DIAGNOSIS — K59.00 CONSTIPATION, UNSPECIFIED: ICD-10-CM

## 2024-12-18 DIAGNOSIS — Z86.69 PERSONAL HISTORY OF OTHER DISEASES OF THE NERVOUS SYSTEM AND SENSE ORGANS: ICD-10-CM

## 2024-12-18 DIAGNOSIS — Z87.440 PERSONAL HISTORY OF URINARY (TRACT) INFECTIONS: ICD-10-CM

## 2024-12-18 DIAGNOSIS — G93.89 OTHER SPECIFIED DISORDERS OF BRAIN: ICD-10-CM

## 2024-12-18 DIAGNOSIS — Z96.652 PRESENCE OF LEFT ARTIFICIAL KNEE JOINT: ICD-10-CM

## 2024-12-18 DIAGNOSIS — F25.9 SCHIZOAFFECTIVE DISORDER, UNSPECIFIED: ICD-10-CM

## 2024-12-18 DIAGNOSIS — R93.41 ABNORMAL RADIOLOGIC FINDINGS ON DIAGNOSTIC IMAGING OF RENAL PELVIS, URETER, OR BLADDER: ICD-10-CM

## 2024-12-18 DIAGNOSIS — R79.89 OTHER SPECIFIED ABNORMAL FINDINGS OF BLOOD CHEMISTRY: ICD-10-CM

## 2024-12-18 DIAGNOSIS — M54.50 LOW BACK PAIN, UNSPECIFIED: ICD-10-CM

## 2024-12-25 NOTE — ED PROVIDER NOTE - NSICDXFAMILYHX_GEN_ALL_CORE_FT
FAMILY HISTORY:  Father  Still living? Unknown  FH: prostate cancer, Age at diagnosis: Age Unknown    Mother  Still living? Unknown  FH: Parkinson's disease, Age at diagnosis: Age Unknown    
Not applicable

## 2025-03-18 NOTE — PROGRESS NOTE ADULT - SUBJECTIVE AND OBJECTIVE BOX
COMFORT FARIAS 61y Female  MRN#: [id number]   CODE STATUS:     Hospital Day: 7d    Pt is currently admitted with the primary diagnosis of     SUBJECTIVE  Hospital Course:    Overnight events:    Interval hx/Subjective complaints:     Pt denies any fevers, chills, fatigue, CP, palpitations, cough, SOB, abdominal pain, n/v/d, hematochezia, melena, weakness, numbness, tingling, or other FND.     Present Today:   - Mcduffie:  No [  ], Yes [   ] : Indication:     - Type of IV Access:       .. CVC/Piccline:  No [  ], Yes [   ] : Indication:       .. Midline: No [  ], Yes [   ] : Indication:                                             ----------------------------------------------------------  OBJECTIVE  PAST MEDICAL & SURGICAL HISTORY  Schizophrenia    Bipolar disorder    Depression    Anxiety    Chronic lower back pain  result of pelvic fracture in the past    Osteoarthritis    Urinary incontinence    Chronic urinary tract infection    History of tremor    History of total knee arthroplasty, left                                              -----------------------------------------------------------  ALLERGIES:  azithromycin (Unknown)  moxifloxacin (Unknown)  penicillin (Unknown)                                            ------------------------------------------------------------    HOME MEDICATIONS  Home Medications:  acetaminophen 325 mg oral tablet: 2 tab(s) orally every 6 hours, As needed, Temp greater or equal to 38C (100.4F), Mild Pain (1 - 3) (14 Dec 2021 13:05)  bisacodyl 5 mg oral delayed release tablet: 1 tab(s) orally every 12 hours, As needed, Constipation (17 Dec 2021 10:48)  clonazePAM 1 mg oral tablet: 1 tab(s) orally once a day (13 Dec 2021 09:42)  OLANZapine 10 mg oral tablet: 1 tab(s) orally once a day (13 Dec 2021 09:42)  sertraline 100 mg oral tablet: 1 tab(s) orally once a day (13 Dec 2021 09:42)                           MEDICATIONS:  STANDING MEDICATIONS  clonazePAM  Tablet 1 milliGRAM(s) Oral at bedtime  enoxaparin Injectable 40 milliGRAM(s) SubCutaneous daily  famotidine    Tablet 20 milliGRAM(s) Oral daily  fluconAZOLE   Tablet 200 milliGRAM(s) Oral daily  OLANZapine 10 milliGRAM(s) Oral daily  pantoprazole    Tablet 40 milliGRAM(s) Oral two times a day  senna 2 Tablet(s) Oral at bedtime  sertraline 100 milliGRAM(s) Oral daily    PRN MEDICATIONS  acetaminophen     Tablet .. 650 milliGRAM(s) Oral every 6 hours PRN  aluminum hydroxide/magnesium hydroxide/simethicone Suspension 30 milliLiter(s) Oral every 4 hours PRN  artificial  tears Solution 1 Drop(s) Right EYE three times a day PRN  bisacodyl Suppository 10 milliGRAM(s) Rectal daily PRN  diclofenac 25 milliGRAM(s) Oral once PRN  melatonin 3 milliGRAM(s) Oral at bedtime PRN  ondansetron Injectable 4 milliGRAM(s) IV Push every 6 hours PRN  polyethylene glycol 3350 17 Gram(s) Oral daily PRN                                            ------------------------------------------------------------  VITAL SIGNS: Last 24 Hours  T(C): 35.8 (13 Jan 2022 05:43), Max: 36.9 (12 Jan 2022 22:32)  T(F): 96.4 (13 Jan 2022 05:43), Max: 98.4 (12 Jan 2022 22:32)  HR: 57 (13 Jan 2022 05:43) (57 - 77)  BP: 118/66 (13 Jan 2022 05:43) (109/57 - 118/66)  BP(mean): --  RR: 18 (13 Jan 2022 05:43) (18 - 19)  SpO2: --      01-12-22 @ 07:01  -  01-13-22 @ 07:00  --------------------------------------------------------  IN: 0 mL / OUT: 525 mL / NET: -525 mL    01-13-22 @ 07:01  -  01-13-22 @ 12:49  --------------------------------------------------------  IN: 0 mL / OUT: 150 mL / NET: -150 mL                                             --------------------------------------------------------------  LABS:                        11.3   8.61  )-----------( 373      ( 13 Jan 2022 07:00 )             36.1     01-13    140  |  104  |  17  ----------------------------<  90  4.1   |  25  |  0.6<L>    Ca    9.4      13 Jan 2022 07:00  Mg     1.8     01-13    TPro  5.8<L>  /  Alb  3.3<L>  /  TBili  <0.2  /  DBili  x   /  AST  16  /  ALT  11  /  AlkPhos  88  01-13                                                              -------------------------------------------------------------  RADIOLOGY:                                            --------------------------------------------------------------  PHYSICAL EXAM:  General:   HEENT:  LUNGS:  HEART:  ABDOMEN:  EXT:  NEURO:  SKIN:                                         --------------------------------------------------------------    ASSESSMENT & PLAN        #Maintainence   - DVT ppx:   - GI ppx:   - Diet:  - Activity:   - Code status:  - Dispo:     #Handoff  Patient informed and voiced understanding.     COMFORT FARIAS 61y Female  MRN#: [id number]   CODE STATUS:     Hospital Day: 7d    Pt is currently admitted with the primary diagnosis of frequent falls     SUBJECTIVE  Hospital Course: 59 y/o female with PMHx bipolar disorder with hypomanic episodes, schizophrenia, IBS, anxiety, depression, severe right knee osteoarthritis, low back pain, recurrent UTI, presented to ED for inability to care for herself and poor living conditions that has been going on for few months  patient was admitted back in june 2021 for the same chief complaint and got dc to [facility] for STR. she said that her niece was not able to take care of her anymore. patient usually uses a walker to ambulate, but recently she was having trouble walking even with a rolling walker in view of her severe pain in her right knee. she mentions that she was taking diclofenac and her pain was well controlled at that time but recently she went to [facility] and her doctor stopped her diclofenac. to note that she had several falls over the last few months but she denied any head trauma, or LOC. she had a contusion on her breastbone. she also has chronic recurrent UTI and c/o urgency and intermittency and most of the time, she cannot make it to the bathroom in view of her inability to ambulate  patient will be admitted for disposition plan, to r/o UTI and to check for right knee OA progression     Overnight events: none    Interval hx/Subjective complaints: Pt feeling well, no complaints.     Pt denies any fevers, chills, fatigue, CP, palpitations, cough, SOB, abdominal pain, n/v/d, hematochezia, melena, weakness, numbness, tingling, or other FND.    ----------------------------------------------------------  OBJECTIVE  PAST MEDICAL & SURGICAL HISTORY  Schizophrenia    Bipolar disorder    Depression    Anxiety    Chronic lower back pain  result of pelvic fracture in the past    Osteoarthritis    Urinary incontinence    Chronic urinary tract infection    History of tremor    History of total knee arthroplasty, left                                              -----------------------------------------------------------  ALLERGIES:  azithromycin (Unknown)  moxifloxacin (Unknown)  penicillin (Unknown)                                            ------------------------------------------------------------    HOME MEDICATIONS  Home Medications:  acetaminophen 325 mg oral tablet: 2 tab(s) orally every 6 hours, As needed, Temp greater or equal to 38C (100.4F), Mild Pain (1 - 3) (14 Dec 2021 13:05)  bisacodyl 5 mg oral delayed release tablet: 1 tab(s) orally every 12 hours, As needed, Constipation (17 Dec 2021 10:48)  clonazePAM 1 mg oral tablet: 1 tab(s) orally once a day (13 Dec 2021 09:42)  OLANZapine 10 mg oral tablet: 1 tab(s) orally once a day (13 Dec 2021 09:42)  sertraline 100 mg oral tablet: 1 tab(s) orally once a day (13 Dec 2021 09:42)                           MEDICATIONS:  STANDING MEDICATIONS  clonazePAM  Tablet 1 milliGRAM(s) Oral at bedtime  enoxaparin Injectable 40 milliGRAM(s) SubCutaneous daily  famotidine    Tablet 20 milliGRAM(s) Oral daily  fluconAZOLE   Tablet 200 milliGRAM(s) Oral daily  OLANZapine 10 milliGRAM(s) Oral daily  pantoprazole    Tablet 40 milliGRAM(s) Oral two times a day  senna 2 Tablet(s) Oral at bedtime  sertraline 100 milliGRAM(s) Oral daily    PRN MEDICATIONS  acetaminophen     Tablet .. 650 milliGRAM(s) Oral every 6 hours PRN  aluminum hydroxide/magnesium hydroxide/simethicone Suspension 30 milliLiter(s) Oral every 4 hours PRN  artificial  tears Solution 1 Drop(s) Right EYE three times a day PRN  bisacodyl Suppository 10 milliGRAM(s) Rectal daily PRN  diclofenac 25 milliGRAM(s) Oral once PRN  melatonin 3 milliGRAM(s) Oral at bedtime PRN  ondansetron Injectable 4 milliGRAM(s) IV Push every 6 hours PRN  polyethylene glycol 3350 17 Gram(s) Oral daily PRN                                            ------------------------------------------------------------  VITAL SIGNS: Last 24 Hours  T(C): 35.8 (13 Jan 2022 05:43), Max: 36.9 (12 Jan 2022 22:32)  T(F): 96.4 (13 Jan 2022 05:43), Max: 98.4 (12 Jan 2022 22:32)  HR: 57 (13 Jan 2022 05:43) (57 - 77)  BP: 118/66 (13 Jan 2022 05:43) (109/57 - 118/66)  BP(mean): --  RR: 18 (13 Jan 2022 05:43) (18 - 19)  SpO2: --      01-12-22 @ 07:01  -  01-13-22 @ 07:00  --------------------------------------------------------  IN: 0 mL / OUT: 525 mL / NET: -525 mL    01-13-22 @ 07:01  -  01-13-22 @ 12:49  --------------------------------------------------------  IN: 0 mL / OUT: 150 mL / NET: -150 mL                                             --------------------------------------------------------------  LABS:                        11.3   8.61  )-----------( 373      ( 13 Jan 2022 07:00 )             36.1     01-13    140  |  104  |  17  ----------------------------<  90  4.1   |  25  |  0.6<L>    Ca    9.4      13 Jan 2022 07:00  Mg     1.8     01-13    TPro  5.8<L>  /  Alb  3.3<L>  /  TBili  <0.2  /  DBili  x   /  AST  16  /  ALT  11  /  AlkPhos  88  01-13   -------------------------------------------------------------  RADIOLOGY:                                            --------------------------------------------------------------  PHYSICAL EXAM:  General: Well appearing woman laying in bed in nad  HEENT: ncat, eomi, mmm  LUNGS: ctab  HEART: s1/s2,rrr  ABDOMEN: soft, ntnd, bs+  EXT: wwp, no cyanosis, clubbing, edema  NEURO: aox4, moves all extremities   SKIN: intact, no rashes or lesions                                          --------------------------------------------------------------    ASSESSMENT & PLAN  62 yo F with PMH of bipolar disorder, schizophrenia, gout, OA, chronic lower back pain, recurrent UTI, anxiety presented to the ED s/p fall, 3 days ago from couch. Denies LOC, head trauma.     #FUO, no clear source   #Exposure to covid  - last fever 1/8   - COVID 19 negative   - UA negative  - CXR shows no acute process  - BCx negative x2  - UCx negative  - elevated D-dimer  - Doppler US of LE: negative for DVTsin b/l LEs and negative for superficial thrombophlebitis  - Per ID, d/c cefepime  - note; pt's roommate tested positive for COVID 1/11, will need to quarantine for 10 days    #Abdominal pain, likely 2/2 gastric ulcer   #Esophageal Candidiases   - Pt having nausea and vomiting.   - CTAP 1/8: mod paraesophageal hiatal hernia a/w thickening of distal esophagus Enlarged gastroesophageal lymph nodes, malignancy not excluded   - EGD 1/10 showing esophageal candidiasis, nonerosive gastritis, ulcer in cardia, normal duodenal mucosa (bx taken)  - c/w with Pepcid and Maalox   - LFT ok  - Per GI, EGD done in AM - Esophageal candidiasis + 1.5 cm pigmented ulcer; start on fluconazole 400mg today, 200 mg for x21d after and Protonix 40mg x2wks, 40mg for 6wks after    #Hypothyroidism   - TSH 0.17 (0.66 in 12/21); Thyroid panel-> T4: 7.4; Free thyroxine: 1.4; T3: 54    # Frequent falls  - likely d/t deconditioning, Severe OA  - Seen by PT: fall precautions, gait abnormality likely secondary to fall, benefit from OT  - Denies LOC, head trauma  - Trauma workup negative  - Echo (01/06/22): LVEF 60-65%; Grade I diastolic dysf(x) - spectral doppler demonstrates impaired relaxation pattern of LV myocardial filling; trace MV regurg  - Fall precautions    # Rhabdomyolysis  - likely sec to fall  - ->328  - resolved   - renal function stable     #Hypomagnesia, resolved  - monitor mg, repleat prn     #Schizophrenia/Bipolar disorder/anxiety  - continue with olanzapine, sertraline, clonazepam (at bedtime)    #OA  - PT  - Pain control - tylenol PRN  - OP ortho f/u    # Diet: Regular  # Activity: IAT  # GI PPX: NA  # DVT PPX: Lovenox  # Full Code    #Progress Note Handoff  Disposition:  pt refusing IP rehab, not safe discharge home, will continue to work with pt    Note; pt roommate tested positive for COVID, will need to quarantine until 1/21.

## 2025-09-16 ENCOUNTER — APPOINTMENT (OUTPATIENT)
Facility: CLINIC | Age: 65
End: 2025-09-16
Payer: MEDICARE

## 2025-09-16 DIAGNOSIS — M17.11 UNILATERAL PRIMARY OSTEOARTHRITIS, RIGHT KNEE: ICD-10-CM

## 2025-09-16 PROCEDURE — 73560 X-RAY EXAM OF KNEE 1 OR 2: CPT | Mod: 50

## 2025-09-16 PROCEDURE — 99203 OFFICE O/P NEW LOW 30 MIN: CPT | Mod: 25

## 2025-09-16 PROCEDURE — 20610 DRAIN/INJ JOINT/BURSA W/O US: CPT | Mod: RT
